# Patient Record
Sex: MALE | Race: WHITE | Employment: OTHER | URBAN - NONMETROPOLITAN AREA
[De-identification: names, ages, dates, MRNs, and addresses within clinical notes are randomized per-mention and may not be internally consistent; named-entity substitution may affect disease eponyms.]

---

## 2018-08-12 ENCOUNTER — DIRECT ADMIT ORDERS (OUTPATIENT)
Dept: PRIMARY CARE CLINIC | Age: 68
End: 2018-08-12

## 2018-08-12 ENCOUNTER — HOSPITAL ENCOUNTER (INPATIENT)
Age: 68
LOS: 1 days | Discharge: HOME | DRG: 379 | End: 2018-08-13
Attending: FAMILY MEDICINE | Admitting: FAMILY MEDICINE
Payer: MEDICARE

## 2018-08-12 ENCOUNTER — HOSPITAL ENCOUNTER (OUTPATIENT)
Age: 68
Setting detail: SPECIMEN
Discharge: HOME | DRG: 379 | End: 2018-08-12
Payer: MEDICARE

## 2018-08-12 ENCOUNTER — HOSPITAL ENCOUNTER (OUTPATIENT)
Age: 68
Discharge: HOME | DRG: 379 | End: 2018-08-14
Payer: MEDICARE

## 2018-08-12 ENCOUNTER — APPOINTMENT (OUTPATIENT)
Dept: CT IMAGING | Age: 68
DRG: 379 | End: 2018-08-12
Attending: FAMILY MEDICINE
Payer: MEDICARE

## 2018-08-12 ENCOUNTER — OFFICE VISIT (OUTPATIENT)
Dept: PRIMARY CARE CLINIC | Age: 68
End: 2018-08-12
Payer: MEDICARE

## 2018-08-12 VITALS
WEIGHT: 197 LBS | SYSTOLIC BLOOD PRESSURE: 118 MMHG | HEIGHT: 68 IN | DIASTOLIC BLOOD PRESSURE: 66 MMHG | HEART RATE: 57 BPM | TEMPERATURE: 97.5 F | OXYGEN SATURATION: 97 % | BODY MASS INDEX: 29.86 KG/M2

## 2018-08-12 DIAGNOSIS — R10.9 ABDOMINAL CRAMPING: ICD-10-CM

## 2018-08-12 DIAGNOSIS — R10.9 ABDOMINAL CRAMPING: Primary | ICD-10-CM

## 2018-08-12 DIAGNOSIS — R19.5 HEMATEST POSITIVE STOOLS: ICD-10-CM

## 2018-08-12 DIAGNOSIS — I25.10 CORONARY ARTERY DISEASE WITHOUT ANGINA PECTORIS, UNSPECIFIED VESSEL OR LESION TYPE, UNSPECIFIED WHETHER NATIVE OR TRANSPLANTED HEART: ICD-10-CM

## 2018-08-12 DIAGNOSIS — K92.2 GASTROINTESTINAL HEMORRHAGE, UNSPECIFIED GASTROINTESTINAL HEMORRHAGE TYPE: ICD-10-CM

## 2018-08-12 LAB
ABSOLUTE EOS #: 0.1 K/UL (ref 0–0.4)
ABSOLUTE IMMATURE GRANULOCYTE: ABNORMAL K/UL (ref 0–0.3)
ABSOLUTE LYMPH #: 1.5 K/UL (ref 1–4.8)
ABSOLUTE MONO #: 0.5 K/UL (ref 0.1–1.2)
ALBUMIN SERPL-MCNC: 3.9 G/DL (ref 3.5–5.2)
ALBUMIN/GLOBULIN RATIO: 1 (ref 1–2.5)
ALP BLD-CCNC: 72 U/L (ref 40–129)
ALT SERPL-CCNC: 52 U/L (ref 5–41)
AMYLASE: 66 U/L (ref 28–100)
ANION GAP SERPL CALCULATED.3IONS-SCNC: 12 MMOL/L (ref 9–17)
AST SERPL-CCNC: 48 U/L
BASOPHILS # BLD: 1 % (ref 0–2)
BASOPHILS ABSOLUTE: 0 K/UL (ref 0–0.2)
BILIRUB SERPL-MCNC: 0.65 MG/DL (ref 0.3–1.2)
BUN BLDV-MCNC: 26 MG/DL (ref 8–23)
BUN/CREAT BLD: 27 (ref 9–20)
CALCIUM SERPL-MCNC: 9.1 MG/DL (ref 8.6–10.4)
CHLORIDE BLD-SCNC: 101 MMOL/L (ref 98–107)
CO2: 25 MMOL/L (ref 20–31)
CREAT SERPL-MCNC: 0.98 MG/DL (ref 0.7–1.2)
DIFFERENTIAL TYPE: ABNORMAL
EOSINOPHILS RELATIVE PERCENT: 2 % (ref 1–8)
GFR AFRICAN AMERICAN: >60 ML/MIN
GFR NON-AFRICAN AMERICAN: >60 ML/MIN
GFR SERPL CREATININE-BSD FRML MDRD: ABNORMAL ML/MIN/{1.73_M2}
GFR SERPL CREATININE-BSD FRML MDRD: ABNORMAL ML/MIN/{1.73_M2}
GLUCOSE BLD-MCNC: 135 MG/DL (ref 70–99)
HCT VFR BLD CALC: 36.7 % (ref 41–53)
HEMOGLOBIN: 12.9 G/DL (ref 13.5–17.5)
IMMATURE GRANULOCYTES: ABNORMAL %
LIPASE: 51 U/L (ref 13–60)
LYMPHOCYTES # BLD: 35 % (ref 15–43)
MCH RBC QN AUTO: 34.4 PG (ref 26–34)
MCHC RBC AUTO-ENTMCNC: 35.2 G/DL (ref 31–37)
MCV RBC AUTO: 97.7 FL (ref 80–100)
MONOCYTES # BLD: 11 % (ref 6–14)
NRBC AUTOMATED: ABNORMAL PER 100 WBC
PDW BLD-RTO: 12.3 % (ref 11–14.5)
PLATELET # BLD: 169 K/UL (ref 140–450)
PLATELET ESTIMATE: ABNORMAL
PMV BLD AUTO: 9.7 FL (ref 6–12)
POTASSIUM SERPL-SCNC: 4 MMOL/L (ref 3.7–5.3)
RBC # BLD: 3.75 M/UL (ref 4.5–5.9)
RBC # BLD: ABNORMAL 10*6/UL
SEG NEUTROPHILS: 51 % (ref 44–74)
SEGMENTED NEUTROPHILS ABSOLUTE COUNT: 2.3 K/UL (ref 1.8–7.7)
SODIUM BLD-SCNC: 138 MMOL/L (ref 135–144)
TOTAL PROTEIN: 7.8 G/DL (ref 6.4–8.3)
WBC # BLD: 4.4 K/UL (ref 3.5–11)
WBC # BLD: ABNORMAL 10*3/UL

## 2018-08-12 PROCEDURE — 80053 COMPREHEN METABOLIC PANEL: CPT

## 2018-08-12 PROCEDURE — G8427 DOCREV CUR MEDS BY ELIG CLIN: HCPCS | Performed by: PHYSICIAN ASSISTANT

## 2018-08-12 PROCEDURE — 6360000004 HC RX CONTRAST MEDICATION: Performed by: FAMILY MEDICINE

## 2018-08-12 PROCEDURE — 85025 COMPLETE CBC W/AUTO DIFF WBC: CPT

## 2018-08-12 PROCEDURE — 36415 COLL VENOUS BLD VENIPUNCTURE: CPT

## 2018-08-12 PROCEDURE — G8419 CALC BMI OUT NRM PARAM NOF/U: HCPCS | Performed by: PHYSICIAN ASSISTANT

## 2018-08-12 PROCEDURE — 2580000003 HC RX 258: Performed by: PHYSICIAN ASSISTANT

## 2018-08-12 PROCEDURE — 1123F ACP DISCUSS/DSCN MKR DOCD: CPT | Performed by: PHYSICIAN ASSISTANT

## 2018-08-12 PROCEDURE — 2580000003 HC RX 258: Performed by: NURSE PRACTITIONER

## 2018-08-12 PROCEDURE — 99214 OFFICE O/P EST MOD 30 MIN: CPT | Performed by: PHYSICIAN ASSISTANT

## 2018-08-12 PROCEDURE — 2709999900 CT ABDOMEN PELVIS W IV CONTRAST

## 2018-08-12 PROCEDURE — 1101F PT FALLS ASSESS-DOCD LE1/YR: CPT | Performed by: PHYSICIAN ASSISTANT

## 2018-08-12 PROCEDURE — 2060000000 HC ICU INTERMEDIATE R&B

## 2018-08-12 PROCEDURE — 82150 ASSAY OF AMYLASE: CPT

## 2018-08-12 PROCEDURE — 83690 ASSAY OF LIPASE: CPT

## 2018-08-12 PROCEDURE — 81001 URINALYSIS AUTO W/SCOPE: CPT

## 2018-08-12 PROCEDURE — 4040F PNEUMOC VAC/ADMIN/RCVD: CPT | Performed by: PHYSICIAN ASSISTANT

## 2018-08-12 PROCEDURE — G8598 ASA/ANTIPLAT THER USED: HCPCS | Performed by: PHYSICIAN ASSISTANT

## 2018-08-12 PROCEDURE — 85018 HEMOGLOBIN: CPT

## 2018-08-12 PROCEDURE — 94760 N-INVAS EAR/PLS OXIMETRY 1: CPT

## 2018-08-12 PROCEDURE — 6370000000 HC RX 637 (ALT 250 FOR IP): Performed by: NURSE PRACTITIONER

## 2018-08-12 PROCEDURE — 1036F TOBACCO NON-USER: CPT | Performed by: PHYSICIAN ASSISTANT

## 2018-08-12 PROCEDURE — 3017F COLORECTAL CA SCREEN DOC REV: CPT | Performed by: PHYSICIAN ASSISTANT

## 2018-08-12 PROCEDURE — 85014 HEMATOCRIT: CPT

## 2018-08-12 RX ORDER — SODIUM CHLORIDE 9 MG/ML
INJECTION, SOLUTION INTRAVENOUS CONTINUOUS
Status: DISCONTINUED | OUTPATIENT
Start: 2018-08-12 | End: 2018-08-13 | Stop reason: HOSPADM

## 2018-08-12 RX ORDER — ISOSORBIDE MONONITRATE 30 MG/1
30 TABLET, EXTENDED RELEASE ORAL DAILY
Status: DISCONTINUED | OUTPATIENT
Start: 2018-08-13 | End: 2018-08-13 | Stop reason: HOSPADM

## 2018-08-12 RX ORDER — METOPROLOL TARTRATE 50 MG/1
50 TABLET, FILM COATED ORAL 2 TIMES DAILY
Status: DISCONTINUED | OUTPATIENT
Start: 2018-08-12 | End: 2018-08-13 | Stop reason: HOSPADM

## 2018-08-12 RX ORDER — FAMOTIDINE 20 MG/1
20 TABLET, FILM COATED ORAL NIGHTLY
COMMUNITY
Start: 2018-07-22 | End: 2019-01-25 | Stop reason: SDUPTHER

## 2018-08-12 RX ORDER — SODIUM CHLORIDE 0.9 % (FLUSH) 0.9 %
10 SYRINGE (ML) INJECTION EVERY 12 HOURS SCHEDULED
Status: DISCONTINUED | OUTPATIENT
Start: 2018-08-12 | End: 2018-08-13 | Stop reason: HOSPADM

## 2018-08-12 RX ORDER — LISINOPRIL AND HYDROCHLOROTHIAZIDE 25; 20 MG/1; MG/1
1 TABLET ORAL DAILY
Status: DISCONTINUED | OUTPATIENT
Start: 2018-08-13 | End: 2018-08-12

## 2018-08-12 RX ORDER — SODIUM CHLORIDE 0.9 % (FLUSH) 0.9 %
10 SYRINGE (ML) INJECTION PRN
Status: CANCELLED | OUTPATIENT
Start: 2018-08-12 | End: 2019-08-12

## 2018-08-12 RX ORDER — CYCLOBENZAPRINE HCL 10 MG
10 TABLET ORAL 2 TIMES DAILY PRN
Status: DISCONTINUED | OUTPATIENT
Start: 2018-08-12 | End: 2018-08-13 | Stop reason: HOSPADM

## 2018-08-12 RX ORDER — LISINOPRIL 20 MG/1
20 TABLET ORAL DAILY
Status: DISCONTINUED | OUTPATIENT
Start: 2018-08-12 | End: 2018-08-13 | Stop reason: HOSPADM

## 2018-08-12 RX ORDER — AMLODIPINE BESYLATE 2.5 MG/1
2.5 TABLET ORAL DAILY
COMMUNITY
Start: 2018-08-08 | End: 2018-09-21

## 2018-08-12 RX ORDER — CYCLOBENZAPRINE HCL 10 MG
10 TABLET ORAL NIGHTLY
COMMUNITY
Start: 2018-07-22 | End: 2018-11-12 | Stop reason: SDUPTHER

## 2018-08-12 RX ORDER — PANTOPRAZOLE SODIUM 40 MG/1
40 TABLET, DELAYED RELEASE ORAL
Status: DISCONTINUED | OUTPATIENT
Start: 2018-08-12 | End: 2018-08-13 | Stop reason: HOSPADM

## 2018-08-12 RX ORDER — SODIUM CHLORIDE 0.9 % (FLUSH) 0.9 %
10 SYRINGE (ML) INJECTION PRN
Status: DISCONTINUED | OUTPATIENT
Start: 2018-08-12 | End: 2018-08-13 | Stop reason: HOSPADM

## 2018-08-12 RX ORDER — LISINOPRIL AND HYDROCHLOROTHIAZIDE 25; 20 MG/1; MG/1
1 TABLET ORAL DAILY
COMMUNITY
Start: 2018-06-11 | End: 2019-10-17 | Stop reason: SDUPTHER

## 2018-08-12 RX ORDER — ONDANSETRON 2 MG/ML
4 INJECTION INTRAMUSCULAR; INTRAVENOUS EVERY 6 HOURS PRN
Status: DISCONTINUED | OUTPATIENT
Start: 2018-08-12 | End: 2018-08-12 | Stop reason: SDUPTHER

## 2018-08-12 RX ORDER — HYDROCHLOROTHIAZIDE 25 MG/1
25 TABLET ORAL DAILY
Status: DISCONTINUED | OUTPATIENT
Start: 2018-08-12 | End: 2018-08-13 | Stop reason: HOSPADM

## 2018-08-12 RX ORDER — GEMFIBROZIL 600 MG/1
600 TABLET, FILM COATED ORAL DAILY
COMMUNITY
Start: 2018-06-17 | End: 2018-11-28 | Stop reason: SDUPTHER

## 2018-08-12 RX ORDER — FAMOTIDINE 20 MG/1
20 TABLET, FILM COATED ORAL NIGHTLY
Status: DISCONTINUED | OUTPATIENT
Start: 2018-08-12 | End: 2018-08-12

## 2018-08-12 RX ORDER — ONDANSETRON 2 MG/ML
4 INJECTION INTRAMUSCULAR; INTRAVENOUS EVERY 6 HOURS PRN
Status: DISCONTINUED | OUTPATIENT
Start: 2018-08-12 | End: 2018-08-13 | Stop reason: HOSPADM

## 2018-08-12 RX ORDER — ONDANSETRON 2 MG/ML
4 INJECTION INTRAMUSCULAR; INTRAVENOUS EVERY 6 HOURS PRN
Status: CANCELLED | OUTPATIENT
Start: 2018-08-12 | End: 2019-08-12

## 2018-08-12 RX ORDER — BISOPROLOL FUMARATE 10 MG/1
10 TABLET ORAL DAILY
COMMUNITY
Start: 2018-06-09 | End: 2019-01-25 | Stop reason: SDUPTHER

## 2018-08-12 RX ORDER — ISOSORBIDE MONONITRATE 30 MG/1
30 TABLET, EXTENDED RELEASE ORAL DAILY
COMMUNITY
Start: 2018-07-22 | End: 2018-11-28 | Stop reason: SDUPTHER

## 2018-08-12 RX ORDER — VITAMIN B COMPLEX
1 CAPSULE ORAL DAILY
COMMUNITY

## 2018-08-12 RX ORDER — SODIUM CHLORIDE 0.9 % (FLUSH) 0.9 %
10 SYRINGE (ML) INJECTION PRN
Status: DISCONTINUED | OUTPATIENT
Start: 2018-08-12 | End: 2018-08-12 | Stop reason: SDUPTHER

## 2018-08-12 RX ORDER — SODIUM CHLORIDE 0.9 % (FLUSH) 0.9 %
10 SYRINGE (ML) INJECTION EVERY 12 HOURS SCHEDULED
Status: CANCELLED | OUTPATIENT
Start: 2018-08-12 | End: 2019-08-12

## 2018-08-12 RX ORDER — AMLODIPINE BESYLATE 2.5 MG/1
2.5 TABLET ORAL DAILY
Status: DISCONTINUED | OUTPATIENT
Start: 2018-08-13 | End: 2018-08-13 | Stop reason: HOSPADM

## 2018-08-12 RX ORDER — SODIUM CHLORIDE 0.9 % (FLUSH) 0.9 %
10 SYRINGE (ML) INJECTION EVERY 12 HOURS SCHEDULED
Status: DISCONTINUED | OUTPATIENT
Start: 2018-08-12 | End: 2018-08-12

## 2018-08-12 RX ORDER — GEMFIBROZIL 600 MG/1
600 TABLET, FILM COATED ORAL DAILY
Status: DISCONTINUED | OUTPATIENT
Start: 2018-08-13 | End: 2018-08-13 | Stop reason: HOSPADM

## 2018-08-12 RX ORDER — PRAVASTATIN SODIUM 10 MG
10 TABLET ORAL DAILY
COMMUNITY
End: 2019-07-29 | Stop reason: SDUPTHER

## 2018-08-12 RX ORDER — CHOLECALCIFEROL (VITAMIN D3) 25 MCG
2500 TABLET,CHEWABLE ORAL DAILY
COMMUNITY

## 2018-08-12 RX ORDER — PRAVASTATIN SODIUM 20 MG
10 TABLET ORAL NIGHTLY
Status: DISCONTINUED | OUTPATIENT
Start: 2018-08-12 | End: 2018-08-13 | Stop reason: HOSPADM

## 2018-08-12 RX ADMIN — PRAVASTATIN SODIUM 10 MG: 20 TABLET ORAL at 21:22

## 2018-08-12 RX ADMIN — SODIUM CHLORIDE, PRESERVATIVE FREE 10 ML: 5 INJECTION INTRAVENOUS at 21:24

## 2018-08-12 RX ADMIN — Medication 10 ML: at 19:56

## 2018-08-12 RX ADMIN — FAMOTIDINE 20 MG: 20 TABLET ORAL at 21:21

## 2018-08-12 RX ADMIN — METOPROLOL TARTRATE 50 MG: 50 TABLET ORAL at 21:21

## 2018-08-12 RX ADMIN — LISINOPRIL 20 MG: 20 TABLET ORAL at 21:21

## 2018-08-12 RX ADMIN — IOPAMIDOL 100 ML: 755 INJECTION, SOLUTION INTRAVENOUS at 19:01

## 2018-08-12 RX ADMIN — IOHEXOL 50 ML: 240 INJECTION, SOLUTION INTRATHECAL; INTRAVASCULAR; INTRAVENOUS; ORAL at 19:01

## 2018-08-12 RX ADMIN — HYDROCHLOROTHIAZIDE 25 MG: 25 TABLET ORAL at 21:21

## 2018-08-12 RX ADMIN — SODIUM CHLORIDE: 9 INJECTION, SOLUTION INTRAVENOUS at 21:21

## 2018-08-12 ASSESSMENT — PAIN SCALES - GENERAL
PAINLEVEL_OUTOF10: 0
PAINLEVEL_OUTOF10: 2

## 2018-08-12 ASSESSMENT — ENCOUNTER SYMPTOMS
HEMATEMESIS: 0
VOMITING: 0
ABDOMINAL PAIN: 1
BLOOD IN STOOL: 1
RESPIRATORY NEGATIVE: 1
HEMATOCHEZIA: 1
NAUSEA: 0
CONSTIPATION: 0

## 2018-08-13 VITALS
HEART RATE: 53 BPM | OXYGEN SATURATION: 97 % | RESPIRATION RATE: 16 BRPM | DIASTOLIC BLOOD PRESSURE: 72 MMHG | HEIGHT: 68 IN | BODY MASS INDEX: 30.36 KG/M2 | SYSTOLIC BLOOD PRESSURE: 134 MMHG | TEMPERATURE: 97.4 F | WEIGHT: 200.3 LBS

## 2018-08-13 LAB
-: ABNORMAL
ABSOLUTE EOS #: 0.1 K/UL (ref 0–0.4)
ABSOLUTE IMMATURE GRANULOCYTE: ABNORMAL K/UL (ref 0–0.3)
ABSOLUTE LYMPH #: 1.4 K/UL (ref 1–4.8)
ABSOLUTE MONO #: 0.4 K/UL (ref 0.1–1.2)
AMORPHOUS: ABNORMAL
ANION GAP SERPL CALCULATED.3IONS-SCNC: 10 MMOL/L (ref 9–17)
BACTERIA: ABNORMAL
BASOPHILS # BLD: 0 % (ref 0–2)
BASOPHILS ABSOLUTE: 0 K/UL (ref 0–0.2)
BILIRUBIN URINE: NEGATIVE
BUN BLDV-MCNC: 20 MG/DL (ref 8–23)
BUN/CREAT BLD: 19 (ref 9–20)
CALCIUM SERPL-MCNC: 8.7 MG/DL (ref 8.6–10.4)
CASTS UA: ABNORMAL /LPF (ref 0–2)
CHLORIDE BLD-SCNC: 102 MMOL/L (ref 98–107)
CO2: 27 MMOL/L (ref 20–31)
COLOR: ABNORMAL
COMMENT UA: ABNORMAL
CREAT SERPL-MCNC: 1.04 MG/DL (ref 0.7–1.2)
CRYSTALS, UA: ABNORMAL /HPF
DATE, STOOL #1: ABNORMAL
DATE, STOOL #2: ABNORMAL
DATE, STOOL #3: ABNORMAL
DIFFERENTIAL TYPE: ABNORMAL
EOSINOPHILS RELATIVE PERCENT: 2 % (ref 1–8)
EPITHELIAL CELLS UA: ABNORMAL /HPF (ref 0–5)
GFR AFRICAN AMERICAN: >60 ML/MIN
GFR NON-AFRICAN AMERICAN: >60 ML/MIN
GFR SERPL CREATININE-BSD FRML MDRD: ABNORMAL ML/MIN/{1.73_M2}
GFR SERPL CREATININE-BSD FRML MDRD: ABNORMAL ML/MIN/{1.73_M2}
GLUCOSE BLD-MCNC: 130 MG/DL (ref 70–99)
GLUCOSE URINE: NEGATIVE
HCT VFR BLD CALC: 33.3 % (ref 41–53)
HCT VFR BLD CALC: 35.1 % (ref 41–53)
HCT VFR BLD CALC: 36.9 % (ref 41–53)
HEMOCCULT SP1 STL QL: POSITIVE
HEMOCCULT SP2 STL QL: ABNORMAL
HEMOCCULT SP3 STL QL: ABNORMAL
HEMOGLOBIN: 11.8 G/DL (ref 13.5–17.5)
HEMOGLOBIN: 12.3 G/DL (ref 13.5–17.5)
HEMOGLOBIN: 12.8 G/DL (ref 13.5–17.5)
IMMATURE GRANULOCYTES: ABNORMAL %
KETONES, URINE: NEGATIVE
LEUKOCYTE ESTERASE, URINE: NEGATIVE
LYMPHOCYTES # BLD: 32 % (ref 15–43)
MCH RBC QN AUTO: 34.1 PG (ref 26–34)
MCHC RBC AUTO-ENTMCNC: 34.9 G/DL (ref 31–37)
MCV RBC AUTO: 97.7 FL (ref 80–100)
MONOCYTES # BLD: 9 % (ref 6–14)
MUCUS: ABNORMAL
NITRITE, URINE: NEGATIVE
NRBC AUTOMATED: ABNORMAL PER 100 WBC
OTHER OBSERVATIONS UA: ABNORMAL
PDW BLD-RTO: 12.3 % (ref 11–14.5)
PH UA: 5 (ref 5–6)
PLATELET # BLD: 131 K/UL (ref 140–450)
PLATELET ESTIMATE: ABNORMAL
PMV BLD AUTO: 9.9 FL (ref 6–12)
POTASSIUM SERPL-SCNC: 4.3 MMOL/L (ref 3.7–5.3)
PROTEIN UA: NEGATIVE
RBC # BLD: 3.59 M/UL (ref 4.5–5.9)
RBC # BLD: ABNORMAL 10*6/UL
RBC UA: ABNORMAL /HPF (ref 0–4)
RENAL EPITHELIAL, UA: ABNORMAL /HPF
SEG NEUTROPHILS: 57 % (ref 44–74)
SEGMENTED NEUTROPHILS ABSOLUTE COUNT: 2.5 K/UL (ref 1.8–7.7)
SODIUM BLD-SCNC: 139 MMOL/L (ref 135–144)
SPECIFIC GRAVITY UA: 1 (ref 1.01–1.02)
TIME, STOOL #1: 1300
TIME, STOOL #2: ABNORMAL
TIME, STOOL #3: ABNORMAL
TRICHOMONAS: ABNORMAL
TURBIDITY: ABNORMAL
URINE HGB: NEGATIVE
UROBILINOGEN, URINE: NORMAL
WBC # BLD: 4.4 K/UL (ref 3.5–11)
WBC # BLD: ABNORMAL 10*3/UL
WBC UA: ABNORMAL /HPF (ref 0–4)
YEAST: ABNORMAL

## 2018-08-13 PROCEDURE — 6370000000 HC RX 637 (ALT 250 FOR IP): Performed by: NURSE PRACTITIONER

## 2018-08-13 PROCEDURE — 99221 1ST HOSP IP/OBS SF/LOW 40: CPT | Performed by: SURGERY

## 2018-08-13 PROCEDURE — 99238 HOSP IP/OBS DSCHRG MGMT 30/<: CPT | Performed by: INTERNAL MEDICINE

## 2018-08-13 PROCEDURE — 36415 COLL VENOUS BLD VENIPUNCTURE: CPT

## 2018-08-13 PROCEDURE — 94761 N-INVAS EAR/PLS OXIMETRY MLT: CPT

## 2018-08-13 PROCEDURE — 80048 BASIC METABOLIC PNL TOTAL CA: CPT

## 2018-08-13 PROCEDURE — 2580000003 HC RX 258: Performed by: NURSE PRACTITIONER

## 2018-08-13 PROCEDURE — 85025 COMPLETE CBC W/AUTO DIFF WBC: CPT

## 2018-08-13 PROCEDURE — 85018 HEMOGLOBIN: CPT

## 2018-08-13 PROCEDURE — 85014 HEMATOCRIT: CPT

## 2018-08-13 PROCEDURE — 82274 ASSAY TEST FOR BLOOD FECAL: CPT

## 2018-08-13 RX ADMIN — GEMFIBROZIL 600 MG: 600 TABLET ORAL at 09:11

## 2018-08-13 RX ADMIN — METOPROLOL TARTRATE 50 MG: 50 TABLET ORAL at 09:06

## 2018-08-13 RX ADMIN — SODIUM CHLORIDE: 9 INJECTION, SOLUTION INTRAVENOUS at 08:42

## 2018-08-13 RX ADMIN — ISOSORBIDE MONONITRATE 30 MG: 30 TABLET, EXTENDED RELEASE ORAL at 09:06

## 2018-08-13 RX ADMIN — AMLODIPINE BESYLATE 2.5 MG: 2.5 TABLET ORAL at 09:11

## 2018-08-13 RX ADMIN — PANTOPRAZOLE SODIUM 40 MG: 40 TABLET, DELAYED RELEASE ORAL at 05:49

## 2018-08-13 ASSESSMENT — PAIN SCALES - GENERAL
PAINLEVEL_OUTOF10: 0

## 2018-08-13 NOTE — PLAN OF CARE
Problem: Discharge Planning:  Goal: Discharged to appropriate level of care  Discharged to appropriate level of care   Outcome: Ongoing      Problem:  Bowel Function - Altered:  Goal: Bowel elimination is within specified parameters  Bowel elimination is within specified parameters   Outcome: Ongoing      Problem: Fluid Volume - Imbalance:  Goal: Will show no signs and symptoms of excessive bleeding  Will show no signs and symptoms of excessive bleeding   Outcome: Ongoing    Goal: Absence of imbalanced fluid volume signs and symptoms  Absence of imbalanced fluid volume signs and symptoms   Outcome: Ongoing      Problem: Nausea/Vomiting:  Goal: Absence of nausea/vomiting  Absence of nausea/vomiting   Outcome: Ongoing    Goal: Able to drink  Able to drink   Outcome: Ongoing    Goal: Able to eat  Able to eat   Outcome: Ongoing    Goal: Ability to achieve adequate nutritional intake will improve  Ability to achieve adequate nutritional intake will improve   Outcome: Ongoing      Problem: Pain:  Goal: Pain level will decrease  Pain level will decrease   Outcome: Ongoing    Goal: Control of acute pain  Control of acute pain   Outcome: Ongoing    Goal: Control of chronic pain  Control of chronic pain   Outcome: Ongoing

## 2018-08-13 NOTE — PROGRESS NOTES
Hospitalist Progress Note    Patient:  Kiera Gunter     YOB: 1950    MRN: [id number]   Admit date: 8/12/2018     Acct: [de-identified]     PCP: No primary care provider on file.     CC--Interval History: GI bleed---hemoglobin stable---no abdominal pain----seen by General Surgery---home----8.13.2018--outpatient colonoscopy---holding aspirin    HTN--see meds below    Hyperlipidemia---on statin and fibrate    Thrombocytopenia     Hearing impairment    See note below     All other ROS negative except noted in HPI    Diet:  Diet NPO Time Specified Exceptions are: Sips with Meds    Medications:  Scheduled Meds:   sodium chloride flush  10 mL Intravenous 2 times per day    metoprolol tartrate  50 mg Oral BID    amLODIPine  2.5 mg Oral Daily    gemfibrozil  600 mg Oral Daily    isosorbide mononitrate  30 mg Oral Daily    pravastatin  10 mg Oral Nightly    lisinopril  20 mg Oral Daily    And    hydrochlorothiazide  25 mg Oral Daily    pantoprazole  40 mg Oral QAM AC     Continuous Infusions:   sodium chloride 75 mL/hr at 08/13/18 0842     PRN Meds:magnesium hydroxide, ondansetron, sodium chloride flush, cyclobenzaprine    Objective:  Labs:  CBC with Differential:    Lab Results   Component Value Date    WBC 4.4 08/13/2018    RBC 3.59 08/13/2018    HGB 12.8 08/13/2018    HCT 36.9 08/13/2018     08/13/2018    MCV 97.7 08/13/2018    MCH 34.1 08/13/2018    MCHC 34.9 08/13/2018    RDW 12.3 08/13/2018    LYMPHOPCT 32 08/13/2018    MONOPCT 9 08/13/2018    BASOPCT 0 08/13/2018    MONOSABS 0.40 08/13/2018    LYMPHSABS 1.40 08/13/2018    EOSABS 0.10 08/13/2018    BASOSABS 0.00 08/13/2018    DIFFTYPE NOT REPORTED 08/13/2018     BMP:    Lab Results   Component Value Date     08/13/2018    K 4.3 08/13/2018     08/13/2018    CO2 27 08/13/2018    BUN 20 08/13/2018    LABALBU 3.9 08/12/2018    CREATININE 1.04 08/13/2018    CALCIUM 8.7 08/13/2018    GFRAA >60 08/13/2018    LABGLOM >60 08/13/2018 GLUCOSE 130 08/13/2018           Physical Exam:  Vitals: BP (!) 148/70   Pulse 58   Temp 98 °F (36.7 °C) (Tympanic)   Resp 16   Ht 5' 8\" (1.727 m)   Wt 200 lb 4.8 oz (90.9 kg)   SpO2 97%   BMI 30.46 kg/m²   24 hour intake/output:  Intake/Output Summary (Last 24 hours) at 08/13/18 1315  Last data filed at 08/13/18 0550   Gross per 24 hour   Intake             1235 ml   Output                0 ml   Net             1235 ml     Last 3 weights: Wt Readings from Last 3 Encounters:   08/12/18 200 lb 4.8 oz (90.9 kg)   08/12/18 197 lb (89.4 kg)     HEENT: Normocephalic and Atraumatic  Neck: Supple, No Masses, Tenderness, Nodularity and No Lymphadenopathy  Chest/Lungs: Clear to Auscultation without Rales, Rhonchi, or Wheezes  Cardiac: Regular Rate and Rhythm  GI/Abdomen:  Bowel Sounds Present and Soft, Non-tender, without Guarding or Rebound Tenderness  : Not examined  EXT/Skin: No Edema, No Cyanosis and No Clubbing  Neuro: hearing impairment-----, Alert and Oriented and No Localizing Signs/Symptoms      Assessment:  NASH GALAN md         dc  world call  70  WM  DALY Morales;  thalia [manjit] Bakos, PM---[place]; Eryn Silvius, GS]  FULL CODE    NO ANTICOAGULATION---GI BLEED    GI bleed---8.12.2018        CT abdomen-pelvis---8.12.2018---no acute changes---                 normal appendix--chronic prostatitis calcifications---                 diverticulosis--normal GB----fatty liver   Hypertension  Hyperlipidemia     ASCVD         Angina at rest---history         Cardiac catheterization x 2  DM Type 2----dietary control  Thrombocytopenia   Tobacco abuse--quit---1.1.1997  HEARING IMPAIRMENT   PMH:    skin carcinoma, OA---knees, constipation   PSH:   . hernia repair, colonoscopy,                cosmetic--forehead--mouth---1987    Allergies:        PCN---rash,  Dtap-ipv--rash  Intolerances:  codeine---nausea-vomiting       Active Problems:    GI bleed  Resolved Problems:    * No resolved hospital

## 2018-08-13 NOTE — CONSULTS
General Surgery   Consultation        PATIENT NAME: Piyush Cabrera OF BIRTH: 1950    ADMISSION DATE: 8/12/2018  6:03 PM     Admitting Provider: Eduard Hair Physician: Rochelle Schwartz     TODAY'S DATE: 8/13/2018    Consult Regarding:  Abdominal pain, rectal bleeding    HISTORY OF PRESENT ILLNESS:  The patient is a 70 y.o. male  who presented last evening with complaint of right sided abdominal pain as well as bright red blood per rectum. Patient states prior to presentation to the ER he had been having right lower quadrant abdominal pain for approximately 1 day and just prior to admission had a bowel movement that was loose with large amount of bright red blood. Patient denies any prior episodes of rectal bleeding. He does report approximately 2 weeks ago he was seen at [facility] ER for complaint of numbness in his right upper and lower extremity as well as in his right flank. Per patient workup at [facility] revealed nerve impingement and disc disease. He was recommended to follow up with his pain management doctor. Per report he is having no pain at that time. On his evaluation in the ER last evening hemoglobin was noted to be slightly low at 11.8. CT scan was performed which showed no acute abdominal abnormalities. However due to patient's symptoms of GI bleed and pain he was admitted for further evaluation. Patient denies any nausea/emesis since onset of symptoms and denies fevers/chills. Patient does report his most recent colonoscopy was approximately 3 years ago done at [facility].  Per his report there were no abnormalities found and he was told to follow-up in 10 years for repeat colonoscopy. Denies any family history of inflammatory bowel conditions or colon cancer. Gen. surgery is consulted for evaluation of abdominal pain and blood per rectum. Since admission patient states his abdominal pain is completely resolved.   He does mention that he wonders

## 2018-08-14 NOTE — DISCHARGE SUMMARY
Imdur,  hydrochlorothiazide/lisinopril combination. Hyperlipidemia, for which he is on Pravachol together with fenofibrate. The patient has had osteoarthritis together with muscle spasm and seen by  pain management, Dr. Adelina Wilkinson at [facility]. Prior history of angina at rest.  Has had at least 2 cardiac caths. The  patient is uncertain as to the results, has never had a stent placed or  surgeries related to the same. The patient noted to have thrombocytopenia this hospitalization with  platelet count of 239,667. The patient received no anticoagulation this  hospitalization due to the patient's GI bleeding. The patient was on  aspirin previously and this has been held pending the patient's upcoming  colonoscopy. The patient is stable, discharged on 08/13/2018. LABORATORY DATA:  Around the time of discharge, white cell count 4.4,  hemoglobin 12.3, hematocrit 35.1, and platelets 430,374 down from 169,000. Sodium 139, potassium 4.3, chloride 102, CO2 27, BUN 20, creatinine 1.0,  glucose 130, calcium 8.7, and GFR greater than 60. DISCHARGE INSTRUCTIONS/FOLLOWUP:  Discharged to home on 08/13/2018. DIET:  Cardiac, diabetic. ACTIVITY:  As tolerated. MEDICATIONS CONTINUED, NO CHANGE:  Amlodipine (Norvasc) 2.5 mg p.o. daily;  B complex vitamins 1 p.o. daily. B12 1200 mcg p.o. daily. Bisoprolol  (Zebeta) 10 mg p.o. daily. Flexeril (cyclobenzaprine) 10 mg p.o. nightly. Vitamin D3 1000 units p.o. daily. Pepcid (famotidine) 20 mg p.o. nightly. Gemfibrozil (Lopid) 600 mg p.o. daily. Isosorbide mononitrate (Imdur) 30  mg p.o. daily. Lisinopril/HCTZ 20/25 one p.o. daily. Pravastatin  (Pravachol) 10 mg p.o. daily. MEDICATION STOPPED:  Aspirin 81 mg daily due to GI bleeding. Followup will be scheduled with Dr. Jose Washington, [facility]. Follow up with Dr. Mook Clarke of General Surgery for  colonoscopy.     Any aspect of the patient's care not discussed in the chart

## 2018-08-21 ENCOUNTER — OFFICE VISIT (OUTPATIENT)
Dept: SURGERY | Age: 68
End: 2018-08-21
Payer: MEDICARE

## 2018-08-21 VITALS
TEMPERATURE: 96.8 F | DIASTOLIC BLOOD PRESSURE: 80 MMHG | SYSTOLIC BLOOD PRESSURE: 142 MMHG | BODY MASS INDEX: 30.16 KG/M2 | WEIGHT: 199 LBS | HEIGHT: 68 IN | HEART RATE: 64 BPM

## 2018-08-21 DIAGNOSIS — K92.1 BLOOD IN STOOL: ICD-10-CM

## 2018-08-21 DIAGNOSIS — R10.31 RIGHT LOWER QUADRANT ABDOMINAL PAIN: Primary | ICD-10-CM

## 2018-08-21 PROCEDURE — 99203 OFFICE O/P NEW LOW 30 MIN: CPT | Performed by: SURGERY

## 2018-08-21 NOTE — LETTER
AMBULATORY SURGERY INSTRUCTIONS    - If you should develop a cold, sore throat, cough, fever or other new indication of illness prior to the scheduled surgery, please notify the [address] [place] [address] office as early as possible. - DO NOT EAT OR DRINK ANYTHING AFTER MIDNIGHT THE NIGHT BEFORE YOUR SURGERY. This includes water, tea, juice, cereal, coffee, etc.    - Refrain from smoking the day of your surgery or procedure. - Wear simple loose clothing, which can be easily changed. - Do not wear any make-up, lipstick or nail polish. - Please leave contact lenses at home or bring container for them. - Leave jewelry (including rings) and other valuables at home. - Make arrangements for a family member or friend to drive you to and from the surgery center. The anesthetic may affect your judgement following surgery and driving a vehicle within 24 hours after the anesthesia could be dangerous. Please remember that a responsible adult must remain in the building at all times during your surgery. - Children should be accompanied by two adults; one to watch the child, the other to drive the vehicle home. - Please shower or bathe both on the evening prior to surgery and on the morning of surgery using an antibacterial soap/Hibiclens    - You may be asked to sign several forms prior to surgery; patients under age 25 have a parent or legal guardian sign the permit to operate.    - DO NOT take aspirin, Aleve, Motrin, Ibuprofen, Naproxen, Vitamins or Herbal supplements for 1  weeks or 7  days prior to the day of surgery.    -The morning of your procedure please take blood pressure, heart, and seizure medications with a small sip of water. [facility] on the 1st floor in the hospital, after check in you will then go to the second floor.        [facility] requests that all medications are kept in

## 2018-08-21 NOTE — PROGRESS NOTES
Jono Phillips is a 70 y.o. male who presents today for follow-up from recent hospitalization for abdominal pain and positive fecal occult blood test.  Patient was in the hospital approximately one week ago with acute onset of right lower quadrant pain. Patient had workup in the ER which included CT scan which showed no acute findings and no evidence of appendicitis. However, the patient did have a fecal occult blood test which returned positive. Due to the patient's significant pain he was admitted for observation. Patient had resolution of his pain soon after admission to the floor and his hemoglobin remained stable in the low to mid 12 range. Since being discharged patient states his abdominal pain has been vastly improved but he does still occasionally get right lower quadrant and right flank pain. He does not relate this to any activity, his bowel movements, or eating. He denies any visible blood in the stool since discharge. Patient has had prior colonoscopies with the last one being approximately 4-5 years ago. He is unable to recall the results of that colonoscopy. He is here for follow-up and schedule colonoscopy for further evaluation of occult positive stool and abdominal pain.     Past Medical History:   Diagnosis Date    Angina at rest [facility])     Arthritis     CAD (coronary artery disease)     angina    Hyperlipidemia     Hypertension     Skin cancer        Past Surgical History:   Procedure Laterality Date    CARDIAC CATHETERIZATION      x 2    COLONOSCOPY      COSMETIC SURGERY      forehead and  mouth in 1987    HERNIA REPAIR         Current Outpatient Prescriptions   Medication Sig Dispense Refill    polyethylene glycol (GOLYTELY) 236 g solution Take 4,000 mLs by mouth once for 1 dose 4000 mL 0    bisacodyl (BISACODYL) 5 MG EC tablet Take 1 tablet by mouth See Admin Instructions 2 tablet 0    gemfibrozil (LOPID) 600 MG tablet Take 600 mg by mouth daily       and moist with normal mucous membranes  Neck: neck supple and non tender without mass  Lungs: clear to auscultation without wheezes or rales   Heart: S1S2, no mumurs, RRR  Abdomen: soft, nontender, no HSM, no guarding, no rebound, no masses  Extremity: negative  Neuro: CN II-XII grossly intact      Assessment     3  57-year-old male with recent admission for right lower quadrant and right flank abdominal pain and fecal occult positive stool    Plan     1. We'll plan to proceed with colonoscopy for further evaluation earliest convenient date. Risks, benefits, and alternatives of the procedure explained and written informed consent obtained. 2.  Bowel prep instructions and prescriptions provided and instructions reviewed in detail. 3.  Plan for follow-up after colonoscopy based on findings.     Electronically signed by Nelly Dupree DO on 8/21/2018 at 11:01 AM

## 2018-08-23 ENCOUNTER — OFFICE VISIT (OUTPATIENT)
Dept: FAMILY MEDICINE CLINIC | Age: 68
End: 2018-08-23
Payer: MEDICARE

## 2018-08-23 VITALS
OXYGEN SATURATION: 97 % | BODY MASS INDEX: 29.95 KG/M2 | DIASTOLIC BLOOD PRESSURE: 80 MMHG | HEART RATE: 67 BPM | SYSTOLIC BLOOD PRESSURE: 120 MMHG | WEIGHT: 197 LBS

## 2018-08-23 DIAGNOSIS — K21.9 GASTROESOPHAGEAL REFLUX DISEASE WITHOUT ESOPHAGITIS: ICD-10-CM

## 2018-08-23 DIAGNOSIS — I10 ESSENTIAL HYPERTENSION: ICD-10-CM

## 2018-08-23 DIAGNOSIS — E78.5 HYPERLIPIDEMIA, UNSPECIFIED HYPERLIPIDEMIA TYPE: Primary | ICD-10-CM

## 2018-08-23 PROCEDURE — 1123F ACP DISCUSS/DSCN MKR DOCD: CPT | Performed by: FAMILY MEDICINE

## 2018-08-23 PROCEDURE — 1111F DSCHRG MED/CURRENT MED MERGE: CPT | Performed by: FAMILY MEDICINE

## 2018-08-23 PROCEDURE — 1101F PT FALLS ASSESS-DOCD LE1/YR: CPT | Performed by: FAMILY MEDICINE

## 2018-08-23 PROCEDURE — G8427 DOCREV CUR MEDS BY ELIG CLIN: HCPCS | Performed by: FAMILY MEDICINE

## 2018-08-23 PROCEDURE — 3017F COLORECTAL CA SCREEN DOC REV: CPT | Performed by: FAMILY MEDICINE

## 2018-08-23 PROCEDURE — G8417 CALC BMI ABV UP PARAM F/U: HCPCS | Performed by: FAMILY MEDICINE

## 2018-08-23 PROCEDURE — G8598 ASA/ANTIPLAT THER USED: HCPCS | Performed by: FAMILY MEDICINE

## 2018-08-23 PROCEDURE — 1036F TOBACCO NON-USER: CPT | Performed by: FAMILY MEDICINE

## 2018-08-23 PROCEDURE — 99204 OFFICE O/P NEW MOD 45 MIN: CPT | Performed by: FAMILY MEDICINE

## 2018-08-23 PROCEDURE — 4040F PNEUMOC VAC/ADMIN/RCVD: CPT | Performed by: FAMILY MEDICINE

## 2018-08-23 ASSESSMENT — PATIENT HEALTH QUESTIONNAIRE - PHQ9
1. LITTLE INTEREST OR PLEASURE IN DOING THINGS: 0
SUM OF ALL RESPONSES TO PHQ QUESTIONS 1-9: 0
SUM OF ALL RESPONSES TO PHQ QUESTIONS 1-9: 0
SUM OF ALL RESPONSES TO PHQ9 QUESTIONS 1 & 2: 0
2. FEELING DOWN, DEPRESSED OR HOPELESS: 0

## 2018-08-23 ASSESSMENT — ENCOUNTER SYMPTOMS
RESPIRATORY NEGATIVE: 1
BLOOD IN STOOL: 1
BACK PAIN: 1

## 2018-09-12 ENCOUNTER — HOSPITAL ENCOUNTER (OUTPATIENT)
Age: 68
Setting detail: OUTPATIENT SURGERY
Discharge: HOME | End: 2018-09-12
Attending: SURGERY | Admitting: SURGERY
Payer: MEDICARE

## 2018-09-12 ENCOUNTER — ANESTHESIA (OUTPATIENT)
Dept: OPERATING ROOM | Age: 68
End: 2018-09-12
Payer: MEDICARE

## 2018-09-12 ENCOUNTER — ANESTHESIA EVENT (OUTPATIENT)
Dept: OPERATING ROOM | Age: 68
End: 2018-09-12
Payer: MEDICARE

## 2018-09-12 VITALS — DIASTOLIC BLOOD PRESSURE: 58 MMHG | SYSTOLIC BLOOD PRESSURE: 124 MMHG | OXYGEN SATURATION: 99 %

## 2018-09-12 VITALS
DIASTOLIC BLOOD PRESSURE: 81 MMHG | RESPIRATION RATE: 16 BRPM | HEART RATE: 60 BPM | HEIGHT: 68 IN | WEIGHT: 190 LBS | OXYGEN SATURATION: 97 % | BODY MASS INDEX: 28.79 KG/M2 | SYSTOLIC BLOOD PRESSURE: 184 MMHG | TEMPERATURE: 98 F

## 2018-09-12 PROCEDURE — 88305 TISSUE EXAM BY PATHOLOGIST: CPT

## 2018-09-12 PROCEDURE — 00811 ANES LWR INTST NDSC NOS: CPT | Performed by: NURSE ANESTHETIST, CERTIFIED REGISTERED

## 2018-09-12 PROCEDURE — 3700000000 HC ANESTHESIA ATTENDED CARE: Performed by: SURGERY

## 2018-09-12 PROCEDURE — 2500000003 HC RX 250 WO HCPCS: Performed by: NURSE ANESTHETIST, CERTIFIED REGISTERED

## 2018-09-12 PROCEDURE — 7100000011 HC PHASE II RECOVERY - ADDTL 15 MIN: Performed by: SURGERY

## 2018-09-12 PROCEDURE — 7100000010 HC PHASE II RECOVERY - FIRST 15 MIN: Performed by: SURGERY

## 2018-09-12 PROCEDURE — 45380 COLONOSCOPY AND BIOPSY: CPT | Performed by: SURGERY

## 2018-09-12 PROCEDURE — 3700000001 HC ADD 15 MINUTES (ANESTHESIA): Performed by: SURGERY

## 2018-09-12 PROCEDURE — 6360000002 HC RX W HCPCS: Performed by: NURSE ANESTHETIST, CERTIFIED REGISTERED

## 2018-09-12 PROCEDURE — 2709999900 HC NON-CHARGEABLE SUPPLY: Performed by: SURGERY

## 2018-09-12 PROCEDURE — 3609010300 HC COLONOSCOPY W/BIOPSY SINGLE/MULTIPLE: Performed by: SURGERY

## 2018-09-12 PROCEDURE — 2580000003 HC RX 258: Performed by: SURGERY

## 2018-09-12 RX ORDER — SODIUM CHLORIDE 0.9 % (FLUSH) 0.9 %
10 SYRINGE (ML) INJECTION PRN
Status: DISCONTINUED | OUTPATIENT
Start: 2018-09-12 | End: 2018-09-12 | Stop reason: HOSPADM

## 2018-09-12 RX ORDER — PROPOFOL 10 MG/ML
INJECTION, EMULSION INTRAVENOUS PRN
Status: DISCONTINUED | OUTPATIENT
Start: 2018-09-12 | End: 2018-09-12 | Stop reason: SDUPTHER

## 2018-09-12 RX ORDER — SODIUM CHLORIDE, SODIUM LACTATE, POTASSIUM CHLORIDE, CALCIUM CHLORIDE 600; 310; 30; 20 MG/100ML; MG/100ML; MG/100ML; MG/100ML
INJECTION, SOLUTION INTRAVENOUS CONTINUOUS
Status: DISCONTINUED | OUTPATIENT
Start: 2018-09-12 | End: 2018-09-12 | Stop reason: HOSPADM

## 2018-09-12 RX ORDER — SODIUM CHLORIDE 0.9 % (FLUSH) 0.9 %
10 SYRINGE (ML) INJECTION EVERY 12 HOURS SCHEDULED
Status: DISCONTINUED | OUTPATIENT
Start: 2018-09-12 | End: 2018-09-12 | Stop reason: HOSPADM

## 2018-09-12 RX ORDER — LIDOCAINE HYDROCHLORIDE 20 MG/ML
INJECTION, SOLUTION INFILTRATION; PERINEURAL PRN
Status: DISCONTINUED | OUTPATIENT
Start: 2018-09-12 | End: 2018-09-12 | Stop reason: SDUPTHER

## 2018-09-12 RX ADMIN — PROPOFOL 450 MG: 10 INJECTION, EMULSION INTRAVENOUS at 07:30

## 2018-09-12 RX ADMIN — SODIUM CHLORIDE, POTASSIUM CHLORIDE, SODIUM LACTATE AND CALCIUM CHLORIDE: 600; 310; 30; 20 INJECTION, SOLUTION INTRAVENOUS at 07:15

## 2018-09-12 RX ADMIN — LIDOCAINE HYDROCHLORIDE 40 MG: 20 INJECTION, SOLUTION INFILTRATION; PERINEURAL at 07:30

## 2018-09-12 ASSESSMENT — PAIN - FUNCTIONAL ASSESSMENT: PAIN_FUNCTIONAL_ASSESSMENT: 0-10

## 2018-09-12 ASSESSMENT — PAIN SCALES - GENERAL
PAINLEVEL_OUTOF10: 0
PAINLEVEL_OUTOF10: 0

## 2018-09-12 NOTE — OP NOTE
PROCEDURE NOTE    DATE OF PROCEDURE: 9/12/2018    SURGEON: Yvonne Dyer DO    ASSISTANT: None    PREOPERATIVE DIAGNOSIS: Abdominal pain, Occult positive stool    POSTOPERATIVE DIAGNOSIS:  Same and cecal polyp and hyperplastic sigmoid polyps    OPERATION: Colonoscopy with cold forcep polypectomy x 2    ANESTHESIA: IV sedation per CRNA.     ESTIMATED BLOOD LOSS:  Less than 5 ml     COMPLICATIONS: None. PROCEDURE: The patient was given IV conscious sedation per anesthesia. The patient was given supplemental O2 by nasal cannula. The patient's SPO2 remained above 90% throughout the procedure. The colonoscope was inserted per rectum and advanced under direct vision to the cecum without difficulty. There was a polyp noted at the cecum which was removed with cold forcep. In sigmoid there were small hyperplastic appearing polyps and the 2 largest were removed with cold forceps. The prep was good so exam was adequate. The colon was decompressed and the scope was removed. Findings:    1. Cecal polyp - removed with cold forcep biopsy tool  2. Sigmoid polyps - likely hyperplastic, 2 largest removed with cold forcep biopsy tool      IMPRESSION/PLAN:     1. Cecal and sigmoid polyps - will follow up pathology  2. Pt no longer having abdominal pain. Continue regular follow up with PCP  3. Repeat colonoscopy recommendation will be based on pathology results of polyps  4. Follow up in 1 week to review pathology and final recommendations.         Electronically signed by Ranjan Arriaga DO  on 9/12/2018 at 7:57 AM

## 2018-09-12 NOTE — H&P
Katherine Gautam is a 70 y.o. male who presents today for follow-up from recent hospitalization for abdominal pain and positive fecal occult blood test.  Patient was in the hospital approximately one week ago with acute onset of right lower quadrant pain. Patient had workup in the ER which included CT scan which showed no acute findings and no evidence of appendicitis. However, the patient did have a fecal occult blood test which returned positive. Due to the patient's significant pain he was admitted for observation. Patient had resolution of his pain soon after admission to the floor and his hemoglobin remained stable in the low to mid 12 range. Since being discharged patient states his abdominal pain has been vastly improved but he does still occasionally get right lower quadrant and right flank pain. He does not relate this to any activity, his bowel movements, or eating. He denies any visible blood in the stool since discharge. Patient has had prior colonoscopies with the last one being approximately 4-5 years ago. He is unable to recall the results of that colonoscopy.   He is here for follow-up and schedule colonoscopy for further evaluation of occult positive stool and abdominal pain.     Past Medical History        Past Medical History:   Diagnosis Date    Angina at rest [facility])      Arthritis      CAD (coronary artery disease)       angina    Hyperlipidemia      Hypertension      Skin cancer              Past Surgical History   Past Surgical History:   Procedure Laterality Date    CARDIAC CATHETERIZATION         x 2    COLONOSCOPY        COSMETIC SURGERY         forehead and  mouth in 1987    HERNIA REPAIR                Current Facility-Administered Medications          Current Outpatient Prescriptions   Medication Sig Dispense Refill    polyethylene glycol (GOLYTELY) 236 g solution Take 4,000 mLs by mouth once for 1 dose 4000 mL 0    bisacodyl (BISACODYL) 5 MG EC tablet Take 08/21/18 0951   BP: (!) 142/80   Pulse:     Temp:        General:in no apparent distress, well developed and well nourished, alert and oriented times 3  Eyes: PERRL and EOMI  Ears, Nose, Throat: external ear and ear canal normal bilaterally, oropharynx clear and moist with normal mucous membranes  Neck: neck supple and non tender without mass  Lungs: clear to auscultation without wheezes or rales   Heart: S1S2, no mumurs, RRR  Abdomen: soft, nontender, no HSM, no guarding, no rebound, no masses  Extremity: negative  Neuro: CN II-XII grossly intact        Assessment      3  34-year-old male with recent admission for right lower quadrant and right flank abdominal pain and fecal occult positive stool     Plan      1. We'll plan to proceed with colonoscopy for further evaluation earliest convenient date. Risks, benefits, and alternatives of the procedure explained and written informed consent obtained. 2.  Bowel prep instructions and prescriptions provided and instructions reviewed in detail.   3.  Plan for follow-up after colonoscopy based on findings.     Electronically signed by Bessie Lemon DO on 8/21/2018 at 11:01 AM    The patient was interviewed and examined and there have been no changes since the above History and Physical.    Electronically signed by Bessie Lemon DO on 9/11/2018 at 9:27 PM

## 2018-09-13 LAB — SURGICAL PATHOLOGY REPORT: NORMAL

## 2018-09-21 ENCOUNTER — OFFICE VISIT (OUTPATIENT)
Dept: FAMILY MEDICINE CLINIC | Age: 68
End: 2018-09-21
Payer: MEDICARE

## 2018-09-21 VITALS
SYSTOLIC BLOOD PRESSURE: 148 MMHG | DIASTOLIC BLOOD PRESSURE: 80 MMHG | BODY MASS INDEX: 30.41 KG/M2 | WEIGHT: 200 LBS | OXYGEN SATURATION: 97 % | HEART RATE: 66 BPM

## 2018-09-21 DIAGNOSIS — E78.5 HYPERLIPIDEMIA, UNSPECIFIED HYPERLIPIDEMIA TYPE: ICD-10-CM

## 2018-09-21 DIAGNOSIS — I10 ESSENTIAL HYPERTENSION: Primary | ICD-10-CM

## 2018-09-21 DIAGNOSIS — R25.1 TREMOR: ICD-10-CM

## 2018-09-21 DIAGNOSIS — Z12.5 PROSTATE CANCER SCREENING: ICD-10-CM

## 2018-09-21 DIAGNOSIS — Z23 NEEDS FLU SHOT: ICD-10-CM

## 2018-09-21 DIAGNOSIS — Z23 NEED FOR PROPHYLACTIC VACCINATION AGAINST STREPTOCOCCUS PNEUMONIAE (PNEUMOCOCCUS): ICD-10-CM

## 2018-09-21 DIAGNOSIS — R73.9 ELEVATED BLOOD SUGAR: ICD-10-CM

## 2018-09-21 PROCEDURE — 1036F TOBACCO NON-USER: CPT | Performed by: FAMILY MEDICINE

## 2018-09-21 PROCEDURE — 1123F ACP DISCUSS/DSCN MKR DOCD: CPT | Performed by: FAMILY MEDICINE

## 2018-09-21 PROCEDURE — 99214 OFFICE O/P EST MOD 30 MIN: CPT | Performed by: FAMILY MEDICINE

## 2018-09-21 PROCEDURE — 3017F COLORECTAL CA SCREEN DOC REV: CPT | Performed by: FAMILY MEDICINE

## 2018-09-21 PROCEDURE — G8598 ASA/ANTIPLAT THER USED: HCPCS | Performed by: FAMILY MEDICINE

## 2018-09-21 PROCEDURE — 4040F PNEUMOC VAC/ADMIN/RCVD: CPT | Performed by: FAMILY MEDICINE

## 2018-09-21 PROCEDURE — G8417 CALC BMI ABV UP PARAM F/U: HCPCS | Performed by: FAMILY MEDICINE

## 2018-09-21 PROCEDURE — G8427 DOCREV CUR MEDS BY ELIG CLIN: HCPCS | Performed by: FAMILY MEDICINE

## 2018-09-21 PROCEDURE — 90662 IIV NO PRSV INCREASED AG IM: CPT | Performed by: FAMILY MEDICINE

## 2018-09-21 PROCEDURE — 1101F PT FALLS ASSESS-DOCD LE1/YR: CPT | Performed by: FAMILY MEDICINE

## 2018-09-21 PROCEDURE — G0008 ADMIN INFLUENZA VIRUS VAC: HCPCS | Performed by: FAMILY MEDICINE

## 2018-09-21 RX ORDER — AMLODIPINE BESYLATE 5 MG/1
5 TABLET ORAL DAILY
Qty: 30 TABLET | Refills: 12 | Status: SHIPPED | OUTPATIENT
Start: 2018-09-21 | End: 2018-11-16

## 2018-09-21 ASSESSMENT — ENCOUNTER SYMPTOMS
BACK PAIN: 1
ORTHOPNEA: 0
RESPIRATORY NEGATIVE: 1
BLURRED VISION: 0
GASTROINTESTINAL NEGATIVE: 1
SHORTNESS OF BREATH: 0

## 2018-10-29 LAB
AVERAGE GLUCOSE: NORMAL
HBA1C MFR BLD: 5.8 %
PROSTATE SPECIFIC ANTIGEN: 1.19 NG/ML

## 2018-10-30 DIAGNOSIS — I10 ESSENTIAL HYPERTENSION: ICD-10-CM

## 2018-10-30 DIAGNOSIS — E78.5 HYPERLIPIDEMIA, UNSPECIFIED HYPERLIPIDEMIA TYPE: ICD-10-CM

## 2018-10-30 DIAGNOSIS — Z12.5 PROSTATE CANCER SCREENING: ICD-10-CM

## 2018-10-30 DIAGNOSIS — R73.9 ELEVATED BLOOD SUGAR: ICD-10-CM

## 2018-11-09 ENCOUNTER — TELEPHONE (OUTPATIENT)
Dept: FAMILY MEDICINE CLINIC | Age: 68
End: 2018-11-09

## 2018-11-12 RX ORDER — CYCLOBENZAPRINE HCL 10 MG
10 TABLET ORAL NIGHTLY
Qty: 30 TABLET | Refills: 2 | Status: SHIPPED | OUTPATIENT
Start: 2018-11-12 | End: 2018-11-28 | Stop reason: SDUPTHER

## 2018-11-16 ENCOUNTER — OFFICE VISIT (OUTPATIENT)
Dept: FAMILY MEDICINE CLINIC | Age: 68
End: 2018-11-16
Payer: MEDICARE

## 2018-11-16 VITALS
SYSTOLIC BLOOD PRESSURE: 160 MMHG | BODY MASS INDEX: 30.71 KG/M2 | HEART RATE: 69 BPM | WEIGHT: 202 LBS | OXYGEN SATURATION: 97 % | DIASTOLIC BLOOD PRESSURE: 80 MMHG

## 2018-11-16 DIAGNOSIS — K76.0 FATTY LIVER: Primary | ICD-10-CM

## 2018-11-16 DIAGNOSIS — Z23 NEED FOR PROPHYLACTIC VACCINATION AGAINST STREPTOCOCCUS PNEUMONIAE (PNEUMOCOCCUS): ICD-10-CM

## 2018-11-16 PROCEDURE — 4040F PNEUMOC VAC/ADMIN/RCVD: CPT | Performed by: FAMILY MEDICINE

## 2018-11-16 PROCEDURE — G8482 FLU IMMUNIZE ORDER/ADMIN: HCPCS | Performed by: FAMILY MEDICINE

## 2018-11-16 PROCEDURE — G8427 DOCREV CUR MEDS BY ELIG CLIN: HCPCS | Performed by: FAMILY MEDICINE

## 2018-11-16 PROCEDURE — G8598 ASA/ANTIPLAT THER USED: HCPCS | Performed by: FAMILY MEDICINE

## 2018-11-16 PROCEDURE — G8417 CALC BMI ABV UP PARAM F/U: HCPCS | Performed by: FAMILY MEDICINE

## 2018-11-16 PROCEDURE — 99213 OFFICE O/P EST LOW 20 MIN: CPT | Performed by: FAMILY MEDICINE

## 2018-11-16 PROCEDURE — 90670 PCV13 VACCINE IM: CPT | Performed by: FAMILY MEDICINE

## 2018-11-16 PROCEDURE — G0009 ADMIN PNEUMOCOCCAL VACCINE: HCPCS | Performed by: FAMILY MEDICINE

## 2018-11-16 PROCEDURE — 3017F COLORECTAL CA SCREEN DOC REV: CPT | Performed by: FAMILY MEDICINE

## 2018-11-16 PROCEDURE — 1123F ACP DISCUSS/DSCN MKR DOCD: CPT | Performed by: FAMILY MEDICINE

## 2018-11-16 PROCEDURE — 1101F PT FALLS ASSESS-DOCD LE1/YR: CPT | Performed by: FAMILY MEDICINE

## 2018-11-16 PROCEDURE — 1036F TOBACCO NON-USER: CPT | Performed by: FAMILY MEDICINE

## 2018-11-16 RX ORDER — AMLODIPINE BESYLATE 10 MG/1
10 TABLET ORAL DAILY
Qty: 30 TABLET | Refills: 3 | Status: SHIPPED | OUTPATIENT
Start: 2018-11-16 | End: 2019-07-22 | Stop reason: SDUPTHER

## 2018-11-16 ASSESSMENT — ENCOUNTER SYMPTOMS
GASTROINTESTINAL NEGATIVE: 1
RESPIRATORY NEGATIVE: 1

## 2018-11-28 ENCOUNTER — TELEPHONE (OUTPATIENT)
Dept: FAMILY MEDICINE CLINIC | Age: 68
End: 2018-11-28

## 2018-11-28 RX ORDER — GEMFIBROZIL 600 MG/1
600 TABLET, FILM COATED ORAL DAILY
Qty: 90 TABLET | Refills: 3 | Status: SHIPPED | OUTPATIENT
Start: 2018-11-28 | End: 2018-11-29 | Stop reason: SDUPTHER

## 2018-11-28 RX ORDER — CYCLOBENZAPRINE HCL 10 MG
10 TABLET ORAL NIGHTLY
Qty: 30 TABLET | Refills: 2 | Status: SHIPPED | OUTPATIENT
Start: 2018-11-28 | End: 2019-05-07 | Stop reason: SDUPTHER

## 2018-11-28 RX ORDER — ISOSORBIDE MONONITRATE 30 MG/1
30 TABLET, EXTENDED RELEASE ORAL DAILY
Qty: 90 TABLET | Refills: 3 | Status: SHIPPED | OUTPATIENT
Start: 2018-11-28 | End: 2020-02-24

## 2018-11-29 RX ORDER — GEMFIBROZIL 600 MG/1
600 TABLET, FILM COATED ORAL DAILY
Qty: 90 TABLET | Refills: 3 | Status: SHIPPED | OUTPATIENT
Start: 2018-11-29 | End: 2020-01-13

## 2018-12-03 DIAGNOSIS — K76.0 FATTY LIVER: ICD-10-CM

## 2018-12-14 ENCOUNTER — OFFICE VISIT (OUTPATIENT)
Dept: FAMILY MEDICINE CLINIC | Age: 68
End: 2018-12-14
Payer: MEDICARE

## 2018-12-14 VITALS
DIASTOLIC BLOOD PRESSURE: 70 MMHG | OXYGEN SATURATION: 96 % | HEART RATE: 98 BPM | BODY MASS INDEX: 29.8 KG/M2 | SYSTOLIC BLOOD PRESSURE: 120 MMHG | WEIGHT: 196 LBS

## 2018-12-14 DIAGNOSIS — I10 ESSENTIAL HYPERTENSION: Primary | ICD-10-CM

## 2018-12-14 PROCEDURE — 1036F TOBACCO NON-USER: CPT | Performed by: FAMILY MEDICINE

## 2018-12-14 PROCEDURE — G8427 DOCREV CUR MEDS BY ELIG CLIN: HCPCS | Performed by: FAMILY MEDICINE

## 2018-12-14 PROCEDURE — 3017F COLORECTAL CA SCREEN DOC REV: CPT | Performed by: FAMILY MEDICINE

## 2018-12-14 PROCEDURE — 99213 OFFICE O/P EST LOW 20 MIN: CPT | Performed by: FAMILY MEDICINE

## 2018-12-14 PROCEDURE — 4040F PNEUMOC VAC/ADMIN/RCVD: CPT | Performed by: FAMILY MEDICINE

## 2018-12-14 PROCEDURE — G8482 FLU IMMUNIZE ORDER/ADMIN: HCPCS | Performed by: FAMILY MEDICINE

## 2018-12-14 PROCEDURE — 1123F ACP DISCUSS/DSCN MKR DOCD: CPT | Performed by: FAMILY MEDICINE

## 2018-12-14 PROCEDURE — 1101F PT FALLS ASSESS-DOCD LE1/YR: CPT | Performed by: FAMILY MEDICINE

## 2018-12-14 PROCEDURE — G8598 ASA/ANTIPLAT THER USED: HCPCS | Performed by: FAMILY MEDICINE

## 2018-12-14 PROCEDURE — G8417 CALC BMI ABV UP PARAM F/U: HCPCS | Performed by: FAMILY MEDICINE

## 2018-12-14 ASSESSMENT — ENCOUNTER SYMPTOMS
RESPIRATORY NEGATIVE: 1
GASTROINTESTINAL NEGATIVE: 1

## 2019-01-25 ENCOUNTER — OFFICE VISIT (OUTPATIENT)
Dept: FAMILY MEDICINE CLINIC | Age: 69
End: 2019-01-25
Payer: MEDICARE

## 2019-01-25 VITALS
SYSTOLIC BLOOD PRESSURE: 136 MMHG | BODY MASS INDEX: 29.95 KG/M2 | OXYGEN SATURATION: 98 % | DIASTOLIC BLOOD PRESSURE: 68 MMHG | WEIGHT: 197 LBS | HEART RATE: 64 BPM

## 2019-01-25 DIAGNOSIS — M79.641 RIGHT HAND PAIN: Primary | ICD-10-CM

## 2019-01-25 PROCEDURE — 4040F PNEUMOC VAC/ADMIN/RCVD: CPT | Performed by: FAMILY MEDICINE

## 2019-01-25 PROCEDURE — 3017F COLORECTAL CA SCREEN DOC REV: CPT | Performed by: FAMILY MEDICINE

## 2019-01-25 PROCEDURE — 1036F TOBACCO NON-USER: CPT | Performed by: FAMILY MEDICINE

## 2019-01-25 PROCEDURE — 99213 OFFICE O/P EST LOW 20 MIN: CPT | Performed by: FAMILY MEDICINE

## 2019-01-25 PROCEDURE — G8598 ASA/ANTIPLAT THER USED: HCPCS | Performed by: FAMILY MEDICINE

## 2019-01-25 PROCEDURE — G8482 FLU IMMUNIZE ORDER/ADMIN: HCPCS | Performed by: FAMILY MEDICINE

## 2019-01-25 PROCEDURE — 1123F ACP DISCUSS/DSCN MKR DOCD: CPT | Performed by: FAMILY MEDICINE

## 2019-01-25 PROCEDURE — G8417 CALC BMI ABV UP PARAM F/U: HCPCS | Performed by: FAMILY MEDICINE

## 2019-01-25 PROCEDURE — G8427 DOCREV CUR MEDS BY ELIG CLIN: HCPCS | Performed by: FAMILY MEDICINE

## 2019-01-25 PROCEDURE — 1101F PT FALLS ASSESS-DOCD LE1/YR: CPT | Performed by: FAMILY MEDICINE

## 2019-01-25 RX ORDER — FAMOTIDINE 20 MG/1
20 TABLET, FILM COATED ORAL NIGHTLY
Qty: 180 TABLET | Refills: 3 | Status: SHIPPED | OUTPATIENT
Start: 2019-01-25 | End: 2020-05-12

## 2019-01-25 RX ORDER — BISOPROLOL FUMARATE 10 MG/1
10 TABLET ORAL DAILY
Qty: 90 TABLET | Refills: 3 | Status: SHIPPED | OUTPATIENT
Start: 2019-01-25 | End: 2020-05-18

## 2019-01-25 ASSESSMENT — ENCOUNTER SYMPTOMS
GASTROINTESTINAL NEGATIVE: 1
RESPIRATORY NEGATIVE: 1

## 2019-01-28 ENCOUNTER — TELEPHONE (OUTPATIENT)
Dept: FAMILY MEDICINE CLINIC | Age: 69
End: 2019-01-28

## 2019-01-28 DIAGNOSIS — M79.641 RIGHT HAND PAIN: ICD-10-CM

## 2019-03-05 ENCOUNTER — TELEPHONE (OUTPATIENT)
Dept: FAMILY MEDICINE CLINIC | Age: 69
End: 2019-03-05

## 2019-03-07 ENCOUNTER — OFFICE VISIT (OUTPATIENT)
Dept: FAMILY MEDICINE CLINIC | Age: 69
End: 2019-03-07
Payer: MEDICARE

## 2019-03-07 VITALS
HEART RATE: 70 BPM | BODY MASS INDEX: 30.11 KG/M2 | WEIGHT: 198 LBS | OXYGEN SATURATION: 98 % | SYSTOLIC BLOOD PRESSURE: 120 MMHG | DIASTOLIC BLOOD PRESSURE: 60 MMHG

## 2019-03-07 DIAGNOSIS — M79.605 LEFT LEG PAIN: Primary | ICD-10-CM

## 2019-03-07 PROCEDURE — 1036F TOBACCO NON-USER: CPT | Performed by: FAMILY MEDICINE

## 2019-03-07 PROCEDURE — G8482 FLU IMMUNIZE ORDER/ADMIN: HCPCS | Performed by: FAMILY MEDICINE

## 2019-03-07 PROCEDURE — G8417 CALC BMI ABV UP PARAM F/U: HCPCS | Performed by: FAMILY MEDICINE

## 2019-03-07 PROCEDURE — 4040F PNEUMOC VAC/ADMIN/RCVD: CPT | Performed by: FAMILY MEDICINE

## 2019-03-07 PROCEDURE — G8598 ASA/ANTIPLAT THER USED: HCPCS | Performed by: FAMILY MEDICINE

## 2019-03-07 PROCEDURE — 3017F COLORECTAL CA SCREEN DOC REV: CPT | Performed by: FAMILY MEDICINE

## 2019-03-07 PROCEDURE — 1101F PT FALLS ASSESS-DOCD LE1/YR: CPT | Performed by: FAMILY MEDICINE

## 2019-03-07 PROCEDURE — G8427 DOCREV CUR MEDS BY ELIG CLIN: HCPCS | Performed by: FAMILY MEDICINE

## 2019-03-07 PROCEDURE — 1123F ACP DISCUSS/DSCN MKR DOCD: CPT | Performed by: FAMILY MEDICINE

## 2019-03-07 PROCEDURE — 99213 OFFICE O/P EST LOW 20 MIN: CPT | Performed by: FAMILY MEDICINE

## 2019-03-07 ASSESSMENT — PATIENT HEALTH QUESTIONNAIRE - PHQ9
SUM OF ALL RESPONSES TO PHQ QUESTIONS 1-9: 0
SUM OF ALL RESPONSES TO PHQ QUESTIONS 1-9: 0
2. FEELING DOWN, DEPRESSED OR HOPELESS: 0
1. LITTLE INTEREST OR PLEASURE IN DOING THINGS: 0
SUM OF ALL RESPONSES TO PHQ9 QUESTIONS 1 & 2: 0

## 2019-03-07 ASSESSMENT — ENCOUNTER SYMPTOMS: RESPIRATORY NEGATIVE: 1

## 2019-03-08 RX ORDER — PREDNISONE 10 MG/1
TABLET ORAL
Qty: 20 TABLET | Refills: 0 | Status: SHIPPED | OUTPATIENT
Start: 2019-03-08 | End: 2019-03-15 | Stop reason: SDUPTHER

## 2019-03-15 ENCOUNTER — TELEPHONE (OUTPATIENT)
Dept: FAMILY MEDICINE CLINIC | Age: 69
End: 2019-03-15

## 2019-03-15 RX ORDER — PREDNISONE 10 MG/1
TABLET ORAL
Qty: 20 TABLET | Refills: 0 | Status: SHIPPED | OUTPATIENT
Start: 2019-03-15 | End: 2019-04-16 | Stop reason: ALTCHOICE

## 2019-04-16 ENCOUNTER — OFFICE VISIT (OUTPATIENT)
Dept: FAMILY MEDICINE CLINIC | Age: 69
End: 2019-04-16
Payer: MEDICARE

## 2019-04-16 VITALS
OXYGEN SATURATION: 98 % | DIASTOLIC BLOOD PRESSURE: 62 MMHG | HEART RATE: 64 BPM | SYSTOLIC BLOOD PRESSURE: 130 MMHG | WEIGHT: 198 LBS | BODY MASS INDEX: 30.11 KG/M2

## 2019-04-16 DIAGNOSIS — M54.42 CHRONIC LEFT-SIDED LOW BACK PAIN WITH LEFT-SIDED SCIATICA: Primary | ICD-10-CM

## 2019-04-16 DIAGNOSIS — G89.29 CHRONIC LEFT-SIDED LOW BACK PAIN WITH LEFT-SIDED SCIATICA: Primary | ICD-10-CM

## 2019-04-16 PROCEDURE — G8417 CALC BMI ABV UP PARAM F/U: HCPCS | Performed by: FAMILY MEDICINE

## 2019-04-16 PROCEDURE — 1123F ACP DISCUSS/DSCN MKR DOCD: CPT | Performed by: FAMILY MEDICINE

## 2019-04-16 PROCEDURE — 1036F TOBACCO NON-USER: CPT | Performed by: FAMILY MEDICINE

## 2019-04-16 PROCEDURE — G8598 ASA/ANTIPLAT THER USED: HCPCS | Performed by: FAMILY MEDICINE

## 2019-04-16 PROCEDURE — G8427 DOCREV CUR MEDS BY ELIG CLIN: HCPCS | Performed by: FAMILY MEDICINE

## 2019-04-16 PROCEDURE — 3017F COLORECTAL CA SCREEN DOC REV: CPT | Performed by: FAMILY MEDICINE

## 2019-04-16 PROCEDURE — 99213 OFFICE O/P EST LOW 20 MIN: CPT | Performed by: FAMILY MEDICINE

## 2019-04-16 PROCEDURE — 4040F PNEUMOC VAC/ADMIN/RCVD: CPT | Performed by: FAMILY MEDICINE

## 2019-04-16 RX ORDER — IBUPROFEN 200 MG
200 TABLET ORAL EVERY 6 HOURS PRN
COMMUNITY
End: 2020-11-03

## 2019-04-16 RX ORDER — PREDNISONE 10 MG/1
TABLET ORAL
Qty: 20 TABLET | Refills: 0 | Status: SHIPPED | OUTPATIENT
Start: 2019-04-16 | End: 2019-11-14 | Stop reason: ALTCHOICE

## 2019-04-16 ASSESSMENT — ENCOUNTER SYMPTOMS
BACK PAIN: 1
RESPIRATORY NEGATIVE: 1
EYES NEGATIVE: 1
GASTROINTESTINAL NEGATIVE: 1

## 2019-04-23 ENCOUNTER — HOSPITAL ENCOUNTER (OUTPATIENT)
Dept: MRI IMAGING | Age: 69
Discharge: HOME | End: 2019-04-25
Payer: MEDICARE

## 2019-04-23 DIAGNOSIS — G89.29 CHRONIC LEFT-SIDED LOW BACK PAIN WITH LEFT-SIDED SCIATICA: ICD-10-CM

## 2019-04-23 DIAGNOSIS — M48.062 SPINAL STENOSIS OF LUMBAR REGION WITH NEUROGENIC CLAUDICATION: Primary | ICD-10-CM

## 2019-04-23 DIAGNOSIS — M54.42 CHRONIC LEFT-SIDED LOW BACK PAIN WITH LEFT-SIDED SCIATICA: ICD-10-CM

## 2019-04-23 PROCEDURE — 72148 MRI LUMBAR SPINE W/O DYE: CPT

## 2019-04-29 ENCOUNTER — TELEPHONE (OUTPATIENT)
Dept: FAMILY MEDICINE CLINIC | Age: 69
End: 2019-04-29

## 2019-05-07 RX ORDER — CYCLOBENZAPRINE HCL 10 MG
10 TABLET ORAL NIGHTLY
Qty: 30 TABLET | Refills: 2 | Status: SHIPPED | OUTPATIENT
Start: 2019-05-07 | End: 2020-01-13

## 2019-05-31 ENCOUNTER — OFFICE VISIT (OUTPATIENT)
Dept: PRIMARY CARE CLINIC | Age: 69
End: 2019-05-31
Payer: MEDICARE

## 2019-05-31 ENCOUNTER — HOSPITAL ENCOUNTER (OUTPATIENT)
Dept: GENERAL RADIOLOGY | Age: 69
Discharge: HOME | End: 2019-06-02
Payer: MEDICARE

## 2019-05-31 VITALS
TEMPERATURE: 97.9 F | SYSTOLIC BLOOD PRESSURE: 138 MMHG | BODY MASS INDEX: 28.58 KG/M2 | HEIGHT: 68 IN | HEART RATE: 87 BPM | WEIGHT: 188.6 LBS | DIASTOLIC BLOOD PRESSURE: 84 MMHG | OXYGEN SATURATION: 95 %

## 2019-05-31 DIAGNOSIS — M79.89 SWELLING OF RIGHT HAND: Primary | ICD-10-CM

## 2019-05-31 DIAGNOSIS — M79.89 SWELLING OF RIGHT HAND: ICD-10-CM

## 2019-05-31 PROCEDURE — G8598 ASA/ANTIPLAT THER USED: HCPCS | Performed by: NURSE PRACTITIONER

## 2019-05-31 PROCEDURE — 73130 X-RAY EXAM OF HAND: CPT

## 2019-05-31 PROCEDURE — 1036F TOBACCO NON-USER: CPT | Performed by: NURSE PRACTITIONER

## 2019-05-31 PROCEDURE — 4040F PNEUMOC VAC/ADMIN/RCVD: CPT | Performed by: NURSE PRACTITIONER

## 2019-05-31 PROCEDURE — 3017F COLORECTAL CA SCREEN DOC REV: CPT | Performed by: NURSE PRACTITIONER

## 2019-05-31 PROCEDURE — 99213 OFFICE O/P EST LOW 20 MIN: CPT | Performed by: NURSE PRACTITIONER

## 2019-05-31 PROCEDURE — 1123F ACP DISCUSS/DSCN MKR DOCD: CPT | Performed by: NURSE PRACTITIONER

## 2019-05-31 PROCEDURE — G8427 DOCREV CUR MEDS BY ELIG CLIN: HCPCS | Performed by: NURSE PRACTITIONER

## 2019-05-31 PROCEDURE — G8417 CALC BMI ABV UP PARAM F/U: HCPCS | Performed by: NURSE PRACTITIONER

## 2019-05-31 RX ORDER — NAPROXEN 250 MG/1
250 TABLET ORAL 2 TIMES DAILY WITH MEALS
Qty: 60 TABLET | Refills: 3 | Status: SHIPPED | OUTPATIENT
Start: 2019-05-31 | End: 2020-08-24

## 2019-05-31 RX ORDER — METHYLPREDNISOLONE 4 MG/1
TABLET ORAL
Qty: 1 KIT | Refills: 0 | Status: SHIPPED | OUTPATIENT
Start: 2019-05-31 | End: 2019-06-06

## 2019-05-31 ASSESSMENT — ENCOUNTER SYMPTOMS
GASTROINTESTINAL NEGATIVE: 1
RESPIRATORY NEGATIVE: 1

## 2019-05-31 NOTE — PROGRESS NOTES
Reuben Lopez  18    Chief Complaint   Patient presents with    Pain     Right hand and wrist pain and edema started yesterday. Patient states No injury. This has happend again about 4-5 months ago. HPI: Patient presents with right wrist and hand pain- onset yesterday. No known injury. This has happened once several months ago- resolved on its own. He has not done anything for pain relief. No family hx of gout. Past MedHx:     Past Medical History:   Diagnosis Date    Angina at rest [facility])     Arthritis     CAD (coronary artery disease)     angina    Hyperlipidemia     Hypertension     Skin cancer         Past Surgical History:   Procedure Laterality Date    CARDIAC CATHETERIZATION      x 2    COLONOSCOPY      COLONOSCOPY N/A 2018    COLONOSCOPY WITH BIOPSY performed by Brissa Norman DO at [place] [address]      forehead and  mouth in [place] [address] Hx:     Social History     Tobacco Use    Smoking status: Former Smoker     Packs/day: 0.50     Years: 26.00     Pack years: 13.00     Types: Cigarettes     Start date:      Last attempt to quit: 1997     Years since quittin.4    Smokeless tobacco: Never Used    Tobacco comment: cwaltmire rrt 16   Substance Use Topics    Alcohol use:  Yes     Alcohol/week: 0.6 oz     Types: 1 Cans of beer per week     Comment: 2 beers/month    Drug use: No       Lab Results   Component Value Date    LABA1C 5.8 10/29/2018     No results found for: EAG  Lab Results   Component Value Date    WBC 4.4 2018    HGB 12.8 (L) 2018    HCT 36.9 (L) 2018    MCV 97.7 2018     (L) 2018     Lab Results   Component Value Date     2018    K 4.3 2018     2018    CO2 27 2018    BUN 20 2018    CREATININE 1.04 2018    GLUCOSE 130 (H) 2018    CALCIUM 8.7 2018    PROT 7.8 2018    LABALBU 3.9 2018 BILITOT 0.65 08/12/2018    ALKPHOS 72 08/12/2018    AST 48 (H) 08/12/2018    ALT 52 (H) 08/12/2018    LABGLOM >60 08/13/2018    GFRAA >60 08/13/2018       Outpatient Encounter Medications as of 5/31/2019   Medication Sig Dispense Refill    naproxen (NAPROSYN) 250 MG tablet Take 1 tablet by mouth 2 times daily (with meals) 60 tablet 3    methylPREDNISolone (MEDROL DOSEPACK) 4 MG tablet Take by mouth. 1 kit 0    cyclobenzaprine (FLEXERIL) 10 MG tablet TAKE 1 TABLET BY MOUTH NIGHTLY 30 tablet 2    TURMERIC PO Take 1,000 mg by mouth      ibuprofen (ADVIL;MOTRIN) 200 MG tablet Take 200 mg by mouth every 6 hours as needed for Pain Taking 600mg prn      bisoprolol (ZEBETA) 10 MG tablet Take 1 tablet by mouth daily 90 tablet 3    famotidine (PEPCID) 20 MG tablet Take 1 tablet by mouth nightly 180 tablet 3    gemfibrozil (LOPID) 600 MG tablet Take 1 tablet by mouth daily 90 tablet 3    isosorbide mononitrate (IMDUR) 30 MG extended release tablet Take 1 tablet by mouth daily 90 tablet 3    aspirin 81 MG tablet Take 81 mg by mouth      amLODIPine (NORVASC) 10 MG tablet Take 1 tablet by mouth daily 30 tablet 3    lisinopril-hydrochlorothiazide (PRINZIDE;ZESTORETIC) 20-25 MG per tablet Take 1 tablet by mouth daily       Cyanocobalamin (B-12) 2500 MCG TABS Take 2,500 mcg by mouth daily       pravastatin (PRAVACHOL) 10 MG tablet Take 10 mg by mouth daily      Cholecalciferol (D3-1000 PO) Take 1,000 Units by mouth daily       b complex vitamins capsule Take 1 capsule by mouth daily      predniSONE (DELTASONE) 10 MG tablet Take 2 po bid for 2 days then 3 po once a day for 2 days then 2 pills a day for 2 days then one pill a day till gone 20 tablet 0     No facility-administered encounter medications on file as of 5/31/2019. Review of Systems   Constitutional: Negative for fever. Respiratory: Negative. Cardiovascular: Negative. Gastrointestinal: Negative.     Musculoskeletal: Positive for joint swelling and myalgias. Physical Exam   Constitutional: He appears well-developed and well-nourished. HENT:   Head: Normocephalic. Cardiovascular: Normal rate and regular rhythm. Pulmonary/Chest: Effort normal and breath sounds normal.   Musculoskeletal:        Arms:  Right hand and fingers swelling. No erythema. Very mild warmth to touch. Wrist/fingers with pain with movement. Slightly decreased grasp right hand compared to left. Data reviewed:      :   Diagnosis Orders   1. Swelling of right hand  XR HAND RIGHT (MIN 3 VIEWS)       PLAN:  Return in about 2 weeks (around 6/14/2019). Orders Placed This Encounter   Medications    naproxen (NAPROSYN) 250 MG tablet     Sig: Take 1 tablet by mouth 2 times daily (with meals)     Dispense:  60 tablet     Refill:  3    methylPREDNISolone (MEDROL DOSEPACK) 4 MG tablet     Sig: Take by mouth.      Dispense:  1 kit     Refill:  0     Gout (no hx of) vs arthritis (per xray)  Will treat with above medication  Advised follow up with Dr. Rolando Hodge within next two weeks  Follow up if no improvement    Electronically signed by Larry Pallas, APRN - CNP on 5/31/19 at 3:12 PM

## 2019-06-20 ENCOUNTER — OFFICE VISIT (OUTPATIENT)
Dept: FAMILY MEDICINE CLINIC | Age: 69
End: 2019-06-20
Payer: MEDICARE

## 2019-06-20 VITALS
DIASTOLIC BLOOD PRESSURE: 76 MMHG | BODY MASS INDEX: 30.26 KG/M2 | WEIGHT: 199 LBS | TEMPERATURE: 98 F | HEART RATE: 59 BPM | OXYGEN SATURATION: 98 % | SYSTOLIC BLOOD PRESSURE: 136 MMHG

## 2019-06-20 DIAGNOSIS — E78.5 HYPERLIPIDEMIA, UNSPECIFIED HYPERLIPIDEMIA TYPE: ICD-10-CM

## 2019-06-20 DIAGNOSIS — M19.90 ARTHRITIS: ICD-10-CM

## 2019-06-20 DIAGNOSIS — I10 ESSENTIAL HYPERTENSION: Primary | ICD-10-CM

## 2019-06-20 PROCEDURE — 1036F TOBACCO NON-USER: CPT | Performed by: FAMILY MEDICINE

## 2019-06-20 PROCEDURE — 99214 OFFICE O/P EST MOD 30 MIN: CPT | Performed by: FAMILY MEDICINE

## 2019-06-20 PROCEDURE — G8417 CALC BMI ABV UP PARAM F/U: HCPCS | Performed by: FAMILY MEDICINE

## 2019-06-20 PROCEDURE — 3017F COLORECTAL CA SCREEN DOC REV: CPT | Performed by: FAMILY MEDICINE

## 2019-06-20 PROCEDURE — G8598 ASA/ANTIPLAT THER USED: HCPCS | Performed by: FAMILY MEDICINE

## 2019-06-20 PROCEDURE — 1123F ACP DISCUSS/DSCN MKR DOCD: CPT | Performed by: FAMILY MEDICINE

## 2019-06-20 PROCEDURE — G8427 DOCREV CUR MEDS BY ELIG CLIN: HCPCS | Performed by: FAMILY MEDICINE

## 2019-06-20 PROCEDURE — 4040F PNEUMOC VAC/ADMIN/RCVD: CPT | Performed by: FAMILY MEDICINE

## 2019-06-20 ASSESSMENT — ENCOUNTER SYMPTOMS
ORTHOPNEA: 0
BLURRED VISION: 0
BACK PAIN: 1
SHORTNESS OF BREATH: 0
GASTROINTESTINAL NEGATIVE: 1

## 2019-06-20 NOTE — PATIENT INSTRUCTIONS
Patient Education        DASH Diet: Care Instructions  Your Care Instructions    The DASH diet is an eating plan that can help lower your blood pressure. DASH stands for Dietary Approaches to Stop Hypertension. Hypertension is high blood pressure. The DASH diet focuses on eating foods that are high in calcium, potassium, and magnesium. These nutrients can lower blood pressure. The foods that are highest in these nutrients are fruits, vegetables, low-fat dairy products, nuts, seeds, and legumes. But taking calcium, potassium, and magnesium supplements instead of eating foods that are high in those nutrients does not have the same effect. The DASH diet also includes whole grains, fish, and poultry. The DASH diet is one of several lifestyle changes your doctor may recommend to lower your high blood pressure. Your doctor may also want you to decrease the amount of sodium in your diet. Lowering sodium while following the DASH diet can lower blood pressure even further than just the DASH diet alone. Follow-up care is a key part of your treatment and safety. Be sure to make and go to all appointments, and call your doctor if you are having problems. It's also a good idea to know your test results and keep a list of the medicines you take. How can you care for yourself at home? Following the DASH diet  · Eat 4 to 5 servings of fruit each day. A serving is 1 medium-sized piece of fruit, ½ cup chopped or canned fruit, 1/4 cup dried fruit, or 4 ounces (½ cup) of fruit juice. Choose fruit more often than fruit juice. · Eat 4 to 5 servings of vegetables each day. A serving is 1 cup of lettuce or raw leafy vegetables, ½ cup of chopped or cooked vegetables, or 4 ounces (½ cup) of vegetable juice. Choose vegetables more often than vegetable juice. · Get 2 to 3 servings of low-fat and fat-free dairy each day. A serving is 8 ounces of milk, 1 cup of yogurt, or 1 ½ ounces of cheese. · Eat 6 to 8 servings of grains each day. A serving is 1 slice of bread, 1 ounce of dry cereal, or ½ cup of cooked rice, pasta, or cooked cereal. Try to choose whole-grain products as much as possible. · Limit lean meat, poultry, and fish to 2 servings each day. A serving is 3 ounces, about the size of a deck of cards. · Eat 4 to 5 servings of nuts, seeds, and legumes (cooked dried beans, lentils, and split peas) each week. A serving is 1/3 cup of nuts, 2 tablespoons of seeds, or ½ cup of cooked beans or peas. · Limit fats and oils to 2 to 3 servings each day. A serving is 1 teaspoon of vegetable oil or 2 tablespoons of salad dressing. · Limit sweets and added sugars to 5 servings or less a week. A serving is 1 tablespoon jelly or jam, ½ cup sorbet, or 1 cup of lemonade. · Eat less than 2,300 milligrams (mg) of sodium a day. If you limit your sodium to 1,500 mg a day, you can lower your blood pressure even more. Tips for success  · Start small. Do not try to make dramatic changes to your diet all at once. You might feel that you are missing out on your favorite foods and then be more likely to not follow the plan. Make small changes, and stick with them. Once those changes become habit, add a few more changes. · Try some of the following:  ? Make it a goal to eat a fruit or vegetable at every meal and at snacks. This will make it easy to get the recommended amount of fruits and vegetables each day. ? Try yogurt topped with fruit and nuts for a snack or healthy dessert. ? Add lettuce, tomato, cucumber, and onion to sandwiches. ? Combine a ready-made pizza crust with low-fat mozzarella cheese and lots of vegetable toppings. Try using tomatoes, squash, spinach, broccoli, carrots, cauliflower, and onions. ? Have a variety of cut-up vegetables with a low-fat dip as an appetizer instead of chips and dip. ? Sprinkle sunflower seeds or chopped almonds over salads. Or try adding chopped walnuts or almonds to cooked vegetables.   ? Try some vegetarian meals using beans and peas. Add garbanzo or kidney beans to salads. Make burritos and tacos with mashed rahman beans or black beans. Where can you learn more? Go to https://zuleima.[employer]Jut Inc. org and sign in to your [employer]ge [employer]uster account. Enter C126 in the [employer] box to learn more about \"DASH Diet: Care Instructions. \"     If you do not have an account, please click on the \"Sign Up Now\" link. Current as of: July 22, 2018  Content Version: 12.0  © 5385-3482 [employer]. Care instructions adapted under license by [facility] ([facility]). If you have questions about a medical condition or this instruction, always ask your healthcare professional. [address] any warranty or liability for your use of this information. Patient Education        Learning About Physical Activity  What is physical activity? Physical activity is any kind of activity that gets your body moving. The types of physical activity that can help you get fit and stay healthy include:  · Aerobic or \"cardio\" activities that make your heart beat faster and make you breathe harder, such as brisk walking, riding a bike, or running. Aerobic activities strengthen your heart and lungs and build up your endurance. · Strength training activities that make your muscles work against, or \"resist,\" something, such as lifting weights or doing push-ups. These activities help tone and strengthen your muscles. · Stretches that allow you to move your joints and muscles through their full range of motion. Stretching helps you be more flexible and avoid injury. What are the benefits of physical activity? Being active is one of the best things you can do to get fit and stay healthy. It helps you to:  · Feel stronger and have more energy to do all the things you like to do. · Focus better at school or work and perform better in sports. · Feel, think, and sleep better.   · Reach and stay at a healthy weight. · Lose fat and build lean muscle. · Lower your risk for serious health problems. · Keep your bones, muscles, and joints strong. Being fit lets you do more physical activity. And it lets you work out harder without as much effort. How can you make physical activity part of your life? Get at least 30 minutes of exercise on most days of the week. Walking is a good choice. You also may want to do other activities, such as running, swimming, cycling, or playing tennis or team sports. Pick activities that you like--ones that make your heart beat faster, your muscles stronger, and your muscles and joints more flexible. If you find more than one thing you like doing, do them all. You don't have to do the same thing every day. Get your heart pumping every day. Any activity that makes your heart beat faster and keeps it at that rate for a while counts. Here are some great ways to get your heart beating faster:  · Go for a brisk walk, run, or bike ride. · Go for a hike or swim. · Go in-line skating. · Play a game of touch football, basketball, or soccer. · Ride a bike. · Play tennis or racquetball. · Climb stairs. Even some household chores can be aerobic--just do them at a faster pace. Vacuuming, raking or mowing the lawn, sweeping the garage, and washing and waxing the car all can help get your heart rate up. Strengthen your muscles during the week. You don't have to lift heavy weights or grow big, bulky muscles to get stronger. Doing a few simple activities that make your muscles work against, or \"resist,\" something can help you get stronger. For example, you can:  · Do push-ups or sit-ups, which use your own body weight as resistance. · Lift weights or dumbbells or use stretch bands at home or in a gym or community center. Stretch your muscles often. Stretching will help you as you become more active. It can help you stay flexible, loosen tight muscles, and avoid injury.  It can also help improve your balance and posture and can be a great way to relax. Be sure to stretch the muscles you'll be using when you work out. It's best to warm your muscles slightly before you stretch them. Walk or do some other light aerobic activity for a few minutes, and then start stretching. When you stretch your muscles:  · Do it slowly. Stretching is not about going fast or making sudden movements. · Don't push or bounce during a stretch. · Hold each stretch for at least 15 to 30 seconds, if you can. You should feel a stretch in the muscle, but not pain. · Breathe out as you do the stretch. Then breathe in as you hold the stretch. Don't hold your breath. If you're worried about how more activity might affect your health, have a checkup before you start. Follow any special advice your doctor gives you for getting a smart start. Where can you learn more? Go to https://Tegotech SoftwarepeWeimi.[employer]. org and sign in to your [employer] account. Enter M685 in the [employer] box to learn more about \"Learning About Physical Activity. \"     If you do not have an account, please click on the \"Sign Up Now\" link. Current as of: August 19, 2018  Content Version: 12.0  © 6270-1114 Healthwise, Incorporated. Care instructions adapted under license by [facility] ([facility]). If you have questions about a medical condition or this instruction, always ask your healthcare professional. [place] [address] any warranty or liability for your use of this information.

## 2019-06-20 NOTE — PROGRESS NOTES
Subjective:      Patient ID: Dash Gibson is a 78 y.o. male. Had some recent right wrist pain which has resolved. Has some numbness sensation on the right side of his chest wall laterally that comes and goes   Back pain has improved   Tremor has improved since decreasing his coffee and alcohol     Hypertension   This is a chronic problem. The current episode started more than 1 year ago. The problem has been resolved since onset. The problem is controlled. Pertinent negatives include no anxiety, blurred vision, chest pain, headaches, malaise/fatigue, neck pain, orthopnea, palpitations, peripheral edema, PND, shortness of breath or sweats. Agents associated with hypertension include NSAIDs. Risk factors for coronary artery disease include dyslipidemia and obesity. Past treatments include beta blockers, diuretics, calcium channel blockers and ACE inhibitors. The current treatment provides moderate improvement. Compliance problems include diet. Review of Systems   Constitutional: Negative. Negative for malaise/fatigue. HENT: Negative. Eyes: Negative for blurred vision. Respiratory: Negative for shortness of breath. Cardiovascular: Negative for chest pain, palpitations, orthopnea and PND. Gastrointestinal: Negative. Genitourinary: Positive for decreased urine volume. Musculoskeletal: Positive for arthralgias and back pain. Negative for neck pain. Neurological: Negative for headaches. Hematological: Negative. Psychiatric/Behavioral: Negative. Objective:   Physical Exam   Constitutional: He is oriented to person, place, and time. He appears well-developed and well-nourished. HENT:   Head: Normocephalic and atraumatic. Right Ear: External ear normal.   Left Ear: External ear normal.   Nose: Nose normal.   Mouth/Throat: Oropharynx is clear and moist.   Eyes: Pupils are equal, round, and reactive to light. EOM are normal.   Neck: No thyromegaly present.    Cardiovascular: Normal rate and regular rhythm. Pulmonary/Chest: Effort normal and breath sounds normal.   Abdominal: Soft. There is no tenderness. Musculoskeletal: He exhibits no edema. No active joint effusions    Lymphadenopathy:     He has no cervical adenopathy. Neurological: He is alert and oriented to person, place, and time. He displays no tremor. No obvious pathology noted on chest wall ranging into his thoracic region   Skin: Skin is warm and dry. Nursing note and vitals reviewed. Assessment:      Hypertension   Hyperlipidemia  Numbness right lateral thoracic region   Arthritis        Plan: Will monitor the numbness since he had in the past and resolved and it does not seem to be anywhere else. Did discuss MS. His bp has improved. Reviewed the negative impact on nsaid. As long as well follow up  For medicare annual and recheck if the numbness doesn't resolve and or other areas develop. Yang received counseling on the following healthy behaviors: nutrition and exercise  Reviewed prior labs and health maintenance  Continue current medications, diet and exercise. Discussed use, benefit, and side effects of prescribed medications. Barriers to medication compliance addressed. Patient given educational materials - see patient instructions  Was a self-tracking handout given in paper form or via Traffic[employer]? Yes    Requested Prescriptions      No prescriptions requested or ordered in this encounter       All patient questions answered. Patient voiced understanding. Quality Measures    Body mass index is 30.26 kg/m². Elevated. Weight control planned discussed Healthy diet and regular exercise. BP: 136/76. Blood pressure is normal. Treatment plan consists of No treatment change needed. Fall Risk 8/23/2018   2 or more falls in past year? yes   Fall with injury in past year? yes     The patient does not have a history of falls. I did not - not indicated , complete a risk assessment for falls. A plan of care for falls No Treatment plan indicated    No results found for: LDLCALC, LDLCHOLESTEROL, LDLDIRECT (goal LDL reduction with dx if diabetes is 50% LDL reduction)    PHQ Scores 3/7/2019 8/23/2018   PHQ2 Score 0 0   PHQ9 Score 0 0     Interpretation of Total Score Depression Severity: 1-4 = Minimal depression, 5-9 = Mild depression, 10-14 = Moderate depression, 15-19 = Moderately severe depression, 20-27 = Severe depression            Fabiano Crowell MD

## 2019-07-19 ENCOUNTER — TELEPHONE (OUTPATIENT)
Dept: FAMILY MEDICINE CLINIC | Age: 69
End: 2019-07-19

## 2019-07-19 NOTE — TELEPHONE ENCOUNTER
Patient needs refill of Amlodipine 10 mg sent to [facility] in [place]. 90 day supply please.

## 2019-07-22 RX ORDER — AMLODIPINE BESYLATE 10 MG/1
10 TABLET ORAL DAILY
Qty: 90 TABLET | Refills: 3 | Status: SHIPPED | OUTPATIENT
Start: 2019-07-22 | End: 2020-07-30

## 2019-07-24 ENCOUNTER — TELEPHONE (OUTPATIENT)
Dept: FAMILY MEDICINE CLINIC | Age: 69
End: 2019-07-24

## 2019-07-29 RX ORDER — PRAVASTATIN SODIUM 10 MG
10 TABLET ORAL DAILY
Qty: 90 TABLET | Refills: 3 | Status: SHIPPED | OUTPATIENT
Start: 2019-07-29 | End: 2020-08-26

## 2019-10-17 RX ORDER — LISINOPRIL AND HYDROCHLOROTHIAZIDE 25; 20 MG/1; MG/1
1 TABLET ORAL DAILY
Qty: 90 TABLET | Refills: 3 | Status: SHIPPED | OUTPATIENT
Start: 2019-10-17 | End: 2020-11-17

## 2019-11-14 ENCOUNTER — OFFICE VISIT (OUTPATIENT)
Dept: FAMILY MEDICINE CLINIC | Age: 69
End: 2019-11-14
Payer: MEDICARE

## 2019-11-14 VITALS
OXYGEN SATURATION: 98 % | WEIGHT: 204.2 LBS | BODY MASS INDEX: 31.05 KG/M2 | DIASTOLIC BLOOD PRESSURE: 68 MMHG | SYSTOLIC BLOOD PRESSURE: 134 MMHG | HEART RATE: 72 BPM

## 2019-11-14 DIAGNOSIS — Z23 NEEDS FLU SHOT: ICD-10-CM

## 2019-11-14 DIAGNOSIS — Z23 NEED FOR PROPHYLACTIC VACCINATION AGAINST STREPTOCOCCUS PNEUMONIAE (PNEUMOCOCCUS): ICD-10-CM

## 2019-11-14 DIAGNOSIS — I10 ESSENTIAL HYPERTENSION: Primary | ICD-10-CM

## 2019-11-14 DIAGNOSIS — R73.9 ELEVATED BLOOD SUGAR: ICD-10-CM

## 2019-11-14 DIAGNOSIS — Z12.5 PROSTATE CANCER SCREENING: ICD-10-CM

## 2019-11-14 DIAGNOSIS — E78.5 HYPERLIPIDEMIA, UNSPECIFIED HYPERLIPIDEMIA TYPE: ICD-10-CM

## 2019-11-14 PROCEDURE — G0008 ADMIN INFLUENZA VIRUS VAC: HCPCS | Performed by: FAMILY MEDICINE

## 2019-11-14 PROCEDURE — G8417 CALC BMI ABV UP PARAM F/U: HCPCS | Performed by: FAMILY MEDICINE

## 2019-11-14 PROCEDURE — 1036F TOBACCO NON-USER: CPT | Performed by: FAMILY MEDICINE

## 2019-11-14 PROCEDURE — 90653 IIV ADJUVANT VACCINE IM: CPT | Performed by: FAMILY MEDICINE

## 2019-11-14 PROCEDURE — G8427 DOCREV CUR MEDS BY ELIG CLIN: HCPCS | Performed by: FAMILY MEDICINE

## 2019-11-14 PROCEDURE — 3017F COLORECTAL CA SCREEN DOC REV: CPT | Performed by: FAMILY MEDICINE

## 2019-11-14 PROCEDURE — G8598 ASA/ANTIPLAT THER USED: HCPCS | Performed by: FAMILY MEDICINE

## 2019-11-14 PROCEDURE — 1123F ACP DISCUSS/DSCN MKR DOCD: CPT | Performed by: FAMILY MEDICINE

## 2019-11-14 PROCEDURE — 4040F PNEUMOC VAC/ADMIN/RCVD: CPT | Performed by: FAMILY MEDICINE

## 2019-11-14 PROCEDURE — 99214 OFFICE O/P EST MOD 30 MIN: CPT | Performed by: FAMILY MEDICINE

## 2019-11-14 PROCEDURE — G8482 FLU IMMUNIZE ORDER/ADMIN: HCPCS | Performed by: FAMILY MEDICINE

## 2019-11-14 ASSESSMENT — ENCOUNTER SYMPTOMS
BLURRED VISION: 0
BACK PAIN: 1
GASTROINTESTINAL NEGATIVE: 1
SHORTNESS OF BREATH: 0
ORTHOPNEA: 0

## 2019-12-02 ENCOUNTER — NURSE ONLY (OUTPATIENT)
Dept: LAB | Age: 69
End: 2019-12-02
Payer: MEDICARE

## 2019-12-02 DIAGNOSIS — Z23 NEED FOR PROPHYLACTIC VACCINATION AGAINST STREPTOCOCCUS PNEUMONIAE (PNEUMOCOCCUS): ICD-10-CM

## 2019-12-02 DIAGNOSIS — Z23 NEED FOR PNEUMOCOCCAL VACCINATION: Primary | ICD-10-CM

## 2019-12-02 PROCEDURE — G0009 ADMIN PNEUMOCOCCAL VACCINE: HCPCS | Performed by: FAMILY MEDICINE

## 2019-12-02 PROCEDURE — 90732 PPSV23 VACC 2 YRS+ SUBQ/IM: CPT | Performed by: FAMILY MEDICINE

## 2020-01-13 RX ORDER — CYCLOBENZAPRINE HCL 10 MG
10 TABLET ORAL NIGHTLY
Qty: 30 TABLET | Refills: 0 | Status: SHIPPED | OUTPATIENT
Start: 2020-01-13 | End: 2020-09-14 | Stop reason: ALTCHOICE

## 2020-01-13 RX ORDER — GEMFIBROZIL 600 MG/1
TABLET, FILM COATED ORAL
Qty: 90 TABLET | Refills: 3 | Status: SHIPPED | OUTPATIENT
Start: 2020-01-13 | End: 2021-04-06 | Stop reason: SDUPTHER

## 2020-01-13 NOTE — TELEPHONE ENCOUNTER
Stephen Solomon is calling to request a refill on the following medication(s):  Requested Prescriptions     Pending Prescriptions Disp Refills    cyclobenzaprine (FLEXERIL) 10 MG tablet [Pharmacy Med Name: Cyclobenzaprine HCl 10 MG Oral Tablet] 30 tablet 0     Sig: TAKE 1 TABLET BY MOUTH NIGHTLY    gemfibrozil (LOPID) 600 MG tablet [Pharmacy Med Name: Gemfibrozil 600 MG Oral Tablet] 90 tablet 0     Sig: TAKE 1 TABLET BY MOUTH ONCE DAILY       Last Visit Date (If Applicable):  78/84/10    Next Visit Date:    5/15/2020

## 2020-05-18 RX ORDER — BISOPROLOL FUMARATE 10 MG/1
TABLET ORAL
Qty: 90 TABLET | Refills: 0 | Status: SHIPPED | OUTPATIENT
Start: 2020-05-18 | End: 2020-08-26

## 2020-07-07 ENCOUNTER — APPOINTMENT (OUTPATIENT)
Dept: INTERVENTIONAL RADIOLOGY/VASCULAR | Age: 70
End: 2020-07-07
Payer: MEDICARE

## 2020-07-07 ENCOUNTER — HOSPITAL ENCOUNTER (EMERGENCY)
Age: 70
Discharge: HOME | End: 2020-07-07
Attending: EMERGENCY MEDICINE
Payer: MEDICARE

## 2020-07-07 ENCOUNTER — APPOINTMENT (OUTPATIENT)
Dept: GENERAL RADIOLOGY | Age: 70
End: 2020-07-07
Payer: MEDICARE

## 2020-07-07 VITALS
DIASTOLIC BLOOD PRESSURE: 67 MMHG | RESPIRATION RATE: 12 BRPM | SYSTOLIC BLOOD PRESSURE: 125 MMHG | HEIGHT: 68 IN | OXYGEN SATURATION: 97 % | HEART RATE: 59 BPM | BODY MASS INDEX: 27.28 KG/M2 | WEIGHT: 180 LBS | TEMPERATURE: 97.7 F

## 2020-07-07 PROCEDURE — 99283 EMERGENCY DEPT VISIT LOW MDM: CPT

## 2020-07-07 PROCEDURE — 73610 X-RAY EXAM OF ANKLE: CPT

## 2020-07-07 PROCEDURE — 93971 EXTREMITY STUDY: CPT

## 2020-07-07 RX ORDER — NAPROXEN 500 MG/1
500 TABLET ORAL 2 TIMES DAILY WITH MEALS
Qty: 30 TABLET | Refills: 0 | Status: SHIPPED | OUTPATIENT
Start: 2020-07-07 | End: 2020-09-24

## 2020-07-07 ASSESSMENT — ENCOUNTER SYMPTOMS
VOMITING: 0
NAUSEA: 0
DIARRHEA: 0
WHEEZING: 0
BLOOD IN STOOL: 0
SHORTNESS OF BREATH: 0
BACK PAIN: 0
SORE THROAT: 0
ABDOMINAL PAIN: 0
TROUBLE SWALLOWING: 0
CONSTIPATION: 0

## 2020-07-07 ASSESSMENT — PAIN DESCRIPTION - ORIENTATION: ORIENTATION: RIGHT

## 2020-07-07 ASSESSMENT — PAIN DESCRIPTION - LOCATION: LOCATION: ANKLE

## 2020-07-07 ASSESSMENT — PAIN DESCRIPTION - PAIN TYPE: TYPE: ACUTE PAIN

## 2020-07-07 ASSESSMENT — PAIN - FUNCTIONAL ASSESSMENT: PAIN_FUNCTIONAL_ASSESSMENT: 0-10

## 2020-07-07 ASSESSMENT — PAIN DESCRIPTION - DESCRIPTORS: DESCRIPTORS: ACHING

## 2020-07-07 ASSESSMENT — PAIN SCALES - GENERAL
PAINLEVEL_OUTOF10: 10
PAINLEVEL_OUTOF10: 8

## 2020-07-07 ASSESSMENT — PAIN DESCRIPTION - FREQUENCY: FREQUENCY: CONTINUOUS

## 2020-07-07 NOTE — ED PROVIDER NOTES
[facility]  eMERGENCY dEPARTMENT eNCOUnter      Pt Name: Bonnie Cobb  MRN: [id number]  [place] 1950  Date of evaluation: 7/7/2020      CHIEF COMPLAINT       Chief Complaint   Patient presents with    Ankle Pain     R ankle pain and swelling. denies any trauma         HISTORY OF PRESENT ILLNESS    Bonnie Cobb is a 69 y.o. male who presents chief complaint of right ankle pain there is been no known injury he has had some swelling to it it is worse when he ambulates that radiates up his calf. Patient does say that last Thursday he went to visit his sister in the nursing home and drove down to [place] and back he said he only stop to eat and go to the bathroom and the swelling began after that. He has had it once before in the other ankle and he thought it may be gout he was treated with Naprosyn and he said that made it better. There is been no fevers no chills no cough no shortness of breath      REVIEW OF SYSTEMS         Review of Systems   Constitutional: Negative for chills and fever. HENT: Negative for congestion, dental problem, sore throat and trouble swallowing. Eyes: Negative for visual disturbance. Respiratory: Negative for shortness of breath and wheezing. Cardiovascular: Negative for chest pain, palpitations and leg swelling. Gastrointestinal: Negative for abdominal pain, blood in stool, constipation, diarrhea, nausea and vomiting. Genitourinary: Negative for difficulty urinating, dysuria and testicular pain. Musculoskeletal: Negative for back pain, joint swelling and neck pain. Right ankle swelling   Skin: Negative for rash. Neurological: Negative for dizziness, syncope, weakness and headaches. Hematological: Negative for adenopathy. Does not bruise/bleed easily. Psychiatric/Behavioral: Negative for confusion and suicidal ideas.          PAST MEDICAL HISTORY    has a past medical history of Angina at [facility]), Arthritis, CAD (coronary artery disease), Hyperlipidemia, Hypertension, and Skin cancer. SURGICAL HISTORY      has a past surgical history that includes Cardiac catheterization; hernia repair; Colonoscopy; Cosmetic surgery; and Colonoscopy (N/A, 9/12/2018). CURRENT MEDICATIONS       Previous Medications    AMLODIPINE (NORVASC) 10 MG TABLET    Take 1 tablet by mouth daily    ASPIRIN 81 MG TABLET    Take 81 mg by mouth    B COMPLEX VITAMINS CAPSULE    Take 1 capsule by mouth daily    BISOPROLOL (ZEBETA) 10 MG TABLET    Take 1 tablet by mouth once daily    CHOLECALCIFEROL (D3-1000 PO)    Take 1,000 Units by mouth daily     CYANOCOBALAMIN (B-12) 2500 MCG TABS    Take 2,500 mcg by mouth daily     CYCLOBENZAPRINE (FLEXERIL) 10 MG TABLET    TAKE 1 TABLET BY MOUTH NIGHTLY    FAMOTIDINE (PEPCID) 20 MG TABLET    Take 1 tablet by mouth nightly    GEMFIBROZIL (LOPID) 600 MG TABLET    TAKE 1 TABLET BY MOUTH ONCE DAILY    IBUPROFEN (ADVIL;MOTRIN) 200 MG TABLET    Take 200 mg by mouth every 6 hours as needed for Pain Taking 600mg prn    ISOSORBIDE MONONITRATE (IMDUR) 30 MG EXTENDED RELEASE TABLET    TAKE 1 TABLET BY MOUTH ONCE DAILY    LISINOPRIL-HYDROCHLOROTHIAZIDE (PRINZIDE;ZESTORETIC) 20-25 MG PER TABLET    Take 1 tablet by mouth daily    NAPROXEN (NAPROSYN) 250 MG TABLET    Take 1 tablet by mouth 2 times daily (with meals)    PRAVASTATIN (PRAVACHOL) 10 MG TABLET    Take 1 tablet by mouth daily    TURMERIC PO    Take 1,000 mg by mouth       ALLERGIES     is allergic to codeine; dtap-ipv vaccine; and penicillins. FAMILY HISTORY     has no family status information on file. family history is not on file. SOCIAL HISTORY      reports that he quit smoking about 23 years ago. His smoking use included cigarettes. He started smoking about 49 years ago. He has a 13.00 pack-year smoking history. He has never used smokeless tobacco. He reports current alcohol use of about 1.0 standard drinks of alcohol per week.  He reports that he does not use drugs.    PHYSICAL EXAM     INITIAL VITALS:  height is 5' 8\" (1.727 m) and weight is 180 lb (81.6 kg). His tympanic temperature is 97.7 °F (36.5 °C). His blood pressure is 187/82 (abnormal) and his pulse is 72. His respiration is 16 and oxygen saturation is 96%. Physical Exam  Constitutional:       Appearance: He is well-developed. HENT:      Head: Normocephalic and atraumatic. Eyes:      Pupils: Pupils are equal, round, and reactive to light. Neck:      Musculoskeletal: Normal range of motion and neck supple. Cardiovascular:      Rate and Rhythm: Normal rate and regular rhythm. Pulmonary:      Effort: Pulmonary effort is normal.      Breath sounds: Normal breath sounds. Abdominal:      General: Bowel sounds are normal.      Palpations: Abdomen is soft. Musculoskeletal: Normal range of motion. Comments: Patient has some swelling over the lateral malleolus there is no erythema no obvious bony deformity has good pedal pulses there is no break in the skin the calf has slight tenderness but is not overtly swollen I do not feel a palpable cords   Skin:     General: Skin is warm and dry. Capillary Refill: Capillary refill takes less than 2 seconds. Neurological:      Mental Status: He is alert and oriented to person, place, and time.    Psychiatric:         Behavior: Behavior normal.           DIFFERENTIAL DIAGNOSIS/ MDM:         DIAGNOSTIC RESULTS     EKG: All EKG's are interpreted by the Emergency Department Physician who either signs or Co-signs this chart in the absence of a cardiologist.        RADIOLOGY:   I directly visualized the following  images and reviewed the radiologist interpretations:     EXAMINATION:   DUPLEX VENOUS ULTRASOUND OF THE RIGHT LOWER EXTREMITY, 7/7/2020 8:57 am       TECHNIQUE:   Duplex ultrasound and Doppler images were obtained of the right lower   extremity.       COMPARISON:   None.       HISTORY:   ORDERING SYSTEM PROVIDED HISTORY: Swelling, Rule Out DVT TECHNOLOGIST PROVIDED HISTORY:   Swelling, Rule Out DVT       FINDINGS:   The visualized veins of the right lower extremity are patent and free of   echogenic thrombus. The veins are normally compressible and have normal   phasic flow.           Impression   No evidence of DVT in the right lower extremity.            EXAMINATION:   THREE XRAY VIEWS OF THE RIGHT ANKLE       7/7/2020 8:28 am       COMPARISON:   None.       HISTORY:   ORDERING SYSTEM PROVIDED HISTORY: pain and swelling   TECHNOLOGIST PROVIDED HISTORY:   pain and swelling   Reason for Exam: Lateral and medial ankle pain, lateral ankle swelling, no   known injury, recent travel       FINDINGS:   There appears to be of possible small avulsion off the distal aspect of the   medial malleolus.  There is no evidence of dislocation.  The ankle mortise is   maintained.  There is soft tissue swelling overlying the lateral malleolus. There is diffuse degenerative change of the foot with spurring of the   calcaneus.           Impression   There appears to be small avulsion off the medial malleolus which on some   images appear more well corticated and may represent remote injury although   age is indeterminate.  No gross dislocation.  Soft tissue swelling over the   lateral ankle.                 ED BEDSIDE ULTRASOUND:       LABS:  Labs Reviewed - No data to display        EMERGENCY DEPARTMENT COURSE:   Vitals:    Vitals:    07/07/20 0748   BP: (!) 187/82   Pulse: 72   Resp: 16   Temp: 97.7 °F (36.5 °C)   TempSrc: Tympanic   SpO2: 96%   Weight: 180 lb (81.6 kg)   Height: 5' 8\" (1.727 m)     -------------------------  BP: (!) 187/82, Temp: 97.7 °F (36.5 °C), Pulse: 72, Resp: 16        Re-evaluation Notes        CRITICAL CARE:   None        CONSULTS:      PROCEDURES:  None    FINAL IMPRESSION      1.  Acute right ankle pain          DISPOSITION/PLAN   DISPOSITION discharged    Condition on Disposition    Stable    PATIENT REFERRED TO:  Dontrell Boykin MD  [address] [facility] [address] [place] [id number] [phone]    Schedule an appointment as soon as possible for a visit in 3 days        DISCHARGE MEDICATIONS:  New Prescriptions    NAPROXEN (NAPROSYN) 500 MG TABLET    Take 1 tablet by mouth 2 times daily (with meals) for 30 doses       (Please note that portions of this note were completed with a voice recognition program.  Efforts were made to edit the dictations but occasionally words are mis-transcribed.)    Castaneda MD, F.A.A.E.M.   Attending Emergency Physician                          Chapito Christopher MD  07/07/20 1395

## 2020-07-08 ENCOUNTER — CARE COORDINATION (OUTPATIENT)
Dept: CARE COORDINATION | Age: 70
End: 2020-07-08

## 2020-07-08 NOTE — CARE COORDINATION
Krista Ly was seen at [facility] 2020- Acute Rt. ankle pain. 2020- 9:28 am Spoke with Yang. He stated swellling has gone down. Denied pain. He is taking Naproxen. He stated he will reach out to PCP if needed. Reviewed Healthcare decision makers. States understanding about staying home and prevention measures with regards to COVID-19     Patient contacted regarding recent discharge and COVID-19 risk. Discussed COVID-19 related testing which was not done at this time. Test results were not done. Patient informed of results, if available? N/A     Care Transition Nurse/ Ambulatory Care Manager contacted the patient by telephone to perform post discharge assessment. Verified name and  with patient as identifiers. Patient has following risk factors of: CAD, HTN. CTN/ACM reviewed discharge instructions, medical action plan and red flags related to discharge diagnosis. Reviewed and educated them on any new and changed medications related to discharge diagnosis. Advised obtaining a 90-day supply of all daily and as-needed medications. Education provided regarding infection prevention, and signs and symptoms of COVID-19 and when to seek medical attention with patient who verbalized understanding. Discussed exposure protocols and quarantine from [address] you at higher risk for severe illness  and given an opportunity for questions and concerns. The patient agrees to contact the COVID-19 hotline [phone] or PCP office for questions related to their healthcare. CTN/ACM provided contact information for future reference. From CDC: Are you at higher risk for severe illness?  Wash your hands often.  Avoid close contact (6 feet, which is about two arm lengths) with people who are sick.  Put distance between yourself and other people if COVID-19 is spreading in your community.  Clean and disinfect frequently touched surfaces.    Avoid all cruise travel and non-essential air travel.  Call your healthcare professional if you have concerns about COVID-19 and your underlying condition or if you are sick. For more information on steps you can take to protect yourself, see CDC's How to [id number] [place] [address] for follow-up call in 7-14 days based on severity of symptoms and risk factors.

## 2020-07-21 ENCOUNTER — CARE COORDINATION (OUTPATIENT)
Dept: CARE COORDINATION | Age: 70
End: 2020-07-21

## 2020-07-21 NOTE — CARE COORDINATION
Called and spoke with spouse. She stated Yang was sleeping. She reported that ankle has improved. He has some minor pain at times. Your Patient resolved from the Care Transitions episode on 7/21/2020  Discussed COVID-19 related testing which was not done at this time. Test results were not done. Patient informed of results, if available? N/A    Patient/family has been provided the following resources and education related to COVID-19:                         Signs, symptoms and red flags related to COVID-19            CDC exposure and quarantine guidelines            [employer] exposure contact - [phone]            Contact for their local Department of Health                 Patient currently reports that the following symptoms have improved:  no new/worsening symptoms     No further outreach scheduled with this CTN/ACM. Episode of Care resolved. Patient has this CTN/ACM contact information if future needs arise.

## 2020-07-30 RX ORDER — AMLODIPINE BESYLATE 10 MG/1
TABLET ORAL
Qty: 90 TABLET | Refills: 0 | Status: SHIPPED | OUTPATIENT
Start: 2020-07-30 | End: 2021-04-29

## 2020-07-30 NOTE — TELEPHONE ENCOUNTER
Kalen Randall is requesting a refill on the following medication(s):  Requested Prescriptions     Pending Prescriptions Disp Refills    amLODIPine (NORVASC) 10 MG tablet [Pharmacy Med Name: amLODIPine Besylate 10 MG Oral Tablet] 90 tablet 0     Sig: Take 1 tablet by mouth once daily       Last Visit Date (If Applicable):  76/75/8089    Next Visit Date:    Visit date not found

## 2020-08-24 ENCOUNTER — OFFICE VISIT (OUTPATIENT)
Dept: FAMILY MEDICINE CLINIC | Age: 70
End: 2020-08-24
Payer: MEDICARE

## 2020-08-24 VITALS
SYSTOLIC BLOOD PRESSURE: 138 MMHG | BODY MASS INDEX: 30.11 KG/M2 | HEART RATE: 65 BPM | DIASTOLIC BLOOD PRESSURE: 82 MMHG | WEIGHT: 198 LBS | RESPIRATION RATE: 16 BRPM | OXYGEN SATURATION: 98 %

## 2020-08-24 PROCEDURE — 1123F ACP DISCUSS/DSCN MKR DOCD: CPT | Performed by: FAMILY MEDICINE

## 2020-08-24 PROCEDURE — G8417 CALC BMI ABV UP PARAM F/U: HCPCS | Performed by: FAMILY MEDICINE

## 2020-08-24 PROCEDURE — 4040F PNEUMOC VAC/ADMIN/RCVD: CPT | Performed by: FAMILY MEDICINE

## 2020-08-24 PROCEDURE — 1036F TOBACCO NON-USER: CPT | Performed by: FAMILY MEDICINE

## 2020-08-24 PROCEDURE — G8427 DOCREV CUR MEDS BY ELIG CLIN: HCPCS | Performed by: FAMILY MEDICINE

## 2020-08-24 PROCEDURE — 99213 OFFICE O/P EST LOW 20 MIN: CPT | Performed by: FAMILY MEDICINE

## 2020-08-24 PROCEDURE — 3017F COLORECTAL CA SCREEN DOC REV: CPT | Performed by: FAMILY MEDICINE

## 2020-08-24 RX ORDER — PREDNISONE 20 MG/1
20 TABLET ORAL 2 TIMES DAILY
Qty: 10 TABLET | Refills: 0 | Status: SHIPPED | OUTPATIENT
Start: 2020-08-24 | End: 2020-08-29

## 2020-08-24 ASSESSMENT — PATIENT HEALTH QUESTIONNAIRE - PHQ9
2. FEELING DOWN, DEPRESSED OR HOPELESS: 0
1. LITTLE INTEREST OR PLEASURE IN DOING THINGS: 0
SUM OF ALL RESPONSES TO PHQ9 QUESTIONS 1 & 2: 0
SUM OF ALL RESPONSES TO PHQ QUESTIONS 1-9: 0
SUM OF ALL RESPONSES TO PHQ QUESTIONS 1-9: 0

## 2020-08-24 NOTE — PROGRESS NOTES
Subjective:      Patient ID: Tee Bertrand is a 69 y.o. male. Has been having problems with ankle pain that started around 9 months ago. The ankles often will swell and get warm then pain increases. Is doing better today. The pain got worse when he drank beer. Has not had any since early July. Review of Systems    Objective:   Physical Exam  Vitals signs and nursing note reviewed. Constitutional:       Appearance: Normal appearance. HENT:      Head: Normocephalic and atraumatic. Cardiovascular:      Rate and Rhythm: Normal rate and regular rhythm. Heart sounds: No murmur. Musculoskeletal:      Right lower leg: No edema. Left lower leg: No edema. Comments: May have a slight effusion right ankle no redness no swelling noted on left side    Neurological:      Mental Status: He is alert. Assessment:      Inflammatory arthritis       Plan: Will check labs and make further recommendation once available. Will give prednisone now and see if can totally resolve his pain.          Dione Gomez MD

## 2020-08-25 ENCOUNTER — HOSPITAL ENCOUNTER (OUTPATIENT)
Age: 70
Setting detail: SPECIMEN
Discharge: HOME | End: 2020-08-25
Payer: MEDICARE

## 2020-08-25 LAB
ABSOLUTE EOS #: <0.03 K/UL (ref 0–0.44)
ABSOLUTE IMMATURE GRANULOCYTE: <0.03 K/UL (ref 0–0.3)
ABSOLUTE LYMPH #: 1 K/UL (ref 1.1–3.7)
ABSOLUTE MONO #: 0.26 K/UL (ref 0.1–1.2)
ANION GAP SERPL CALCULATED.3IONS-SCNC: 13 MMOL/L (ref 9–17)
BASOPHILS # BLD: 0 % (ref 0–2)
BASOPHILS ABSOLUTE: <0.03 K/UL (ref 0–0.2)
BUN BLDV-MCNC: 26 MG/DL (ref 8–23)
BUN/CREAT BLD: 23 (ref 9–20)
C-REACTIVE PROTEIN: 0.6 MG/L (ref 0–5)
CALCIUM SERPL-MCNC: 9.8 MG/DL (ref 8.6–10.4)
CHLORIDE BLD-SCNC: 96 MMOL/L (ref 98–107)
CO2: 26 MMOL/L (ref 20–31)
CREAT SERPL-MCNC: 1.14 MG/DL (ref 0.7–1.2)
DIFFERENTIAL TYPE: ABNORMAL
EOSINOPHILS RELATIVE PERCENT: 0 % (ref 1–4)
ESTIMATED AVERAGE GLUCOSE: 120 MG/DL
GFR AFRICAN AMERICAN: >60 ML/MIN
GFR NON-AFRICAN AMERICAN: >60 ML/MIN
GFR SERPL CREATININE-BSD FRML MDRD: ABNORMAL ML/MIN/{1.73_M2}
GFR SERPL CREATININE-BSD FRML MDRD: ABNORMAL ML/MIN/{1.73_M2}
GLUCOSE BLD-MCNC: 186 MG/DL (ref 70–99)
HBA1C MFR BLD: 5.8 % (ref 4.8–5.9)
HCT VFR BLD CALC: 35.8 % (ref 40.7–50.3)
HEMOGLOBIN: 12.4 G/DL (ref 13–17)
IMMATURE GRANULOCYTES: 0 %
LYMPHOCYTES # BLD: 17 % (ref 24–43)
MCH RBC QN AUTO: 32.2 PG (ref 25.2–33.5)
MCHC RBC AUTO-ENTMCNC: 34.6 G/DL (ref 25.2–33.5)
MCV RBC AUTO: 93 FL (ref 82.6–102.9)
MONOCYTES # BLD: 4 % (ref 3–12)
NRBC AUTOMATED: 0 PER 100 WBC
PDW BLD-RTO: 11.9 % (ref 11.8–14.4)
PLATELET # BLD: 183 K/UL (ref 138–453)
PLATELET ESTIMATE: ABNORMAL
PMV BLD AUTO: 12.1 FL (ref 8.1–13.5)
POTASSIUM SERPL-SCNC: 4.3 MMOL/L (ref 3.7–5.3)
RBC # BLD: 3.85 M/UL (ref 4.21–5.77)
RBC # BLD: ABNORMAL 10*6/UL
SEDIMENTATION RATE, ERYTHROCYTE: 54 MM (ref 0–20)
SEG NEUTROPHILS: 79 % (ref 36–65)
SEGMENTED NEUTROPHILS ABSOLUTE COUNT: 4.6 K/UL (ref 1.5–8.1)
SODIUM BLD-SCNC: 135 MMOL/L (ref 135–144)
URIC ACID: 9 MG/DL (ref 3.4–7)
WBC # BLD: 5.9 K/UL (ref 3.5–11.3)
WBC # BLD: ABNORMAL 10*3/UL

## 2020-08-25 PROCEDURE — 84550 ASSAY OF BLOOD/URIC ACID: CPT

## 2020-08-25 PROCEDURE — 85025 COMPLETE CBC W/AUTO DIFF WBC: CPT

## 2020-08-25 PROCEDURE — 85651 RBC SED RATE NONAUTOMATED: CPT

## 2020-08-25 PROCEDURE — 83036 HEMOGLOBIN GLYCOSYLATED A1C: CPT

## 2020-08-25 PROCEDURE — 80048 BASIC METABOLIC PNL TOTAL CA: CPT

## 2020-08-25 PROCEDURE — 86140 C-REACTIVE PROTEIN: CPT

## 2020-08-25 PROCEDURE — 36415 COLL VENOUS BLD VENIPUNCTURE: CPT

## 2020-08-26 ENCOUNTER — TELEPHONE (OUTPATIENT)
Dept: FAMILY MEDICINE CLINIC | Age: 70
End: 2020-08-26

## 2020-08-26 RX ORDER — ALLOPURINOL 300 MG/1
300 TABLET ORAL DAILY
Qty: 30 TABLET | Refills: 5 | Status: SHIPPED | OUTPATIENT
Start: 2020-08-26 | End: 2021-03-29 | Stop reason: SDUPTHER

## 2020-09-14 ENCOUNTER — OFFICE VISIT (OUTPATIENT)
Dept: FAMILY MEDICINE CLINIC | Age: 70
End: 2020-09-14
Payer: MEDICARE

## 2020-09-14 VITALS
SYSTOLIC BLOOD PRESSURE: 136 MMHG | TEMPERATURE: 97.7 F | OXYGEN SATURATION: 97 % | BODY MASS INDEX: 29.95 KG/M2 | RESPIRATION RATE: 16 BRPM | WEIGHT: 197 LBS | HEART RATE: 70 BPM | DIASTOLIC BLOOD PRESSURE: 74 MMHG

## 2020-09-14 PROCEDURE — 99211 OFF/OP EST MAY X REQ PHY/QHP: CPT

## 2020-09-14 PROCEDURE — 99213 OFFICE O/P EST LOW 20 MIN: CPT | Performed by: NURSE PRACTITIONER

## 2020-09-14 RX ORDER — NAPROXEN 375 MG/1
375 TABLET ORAL 2 TIMES DAILY PRN
Qty: 60 TABLET | Refills: 0 | Status: SHIPPED | OUTPATIENT
Start: 2020-09-14 | End: 2020-10-14 | Stop reason: SDUPTHER

## 2020-09-14 ASSESSMENT — PATIENT HEALTH QUESTIONNAIRE - PHQ9
SUM OF ALL RESPONSES TO PHQ QUESTIONS 1-9: 0
1. LITTLE INTEREST OR PLEASURE IN DOING THINGS: 0
SUM OF ALL RESPONSES TO PHQ QUESTIONS 1-9: 0
SUM OF ALL RESPONSES TO PHQ9 QUESTIONS 1 & 2: 0
2. FEELING DOWN, DEPRESSED OR HOPELESS: 0

## 2020-09-14 NOTE — PROGRESS NOTES
[address] [address] [address] [facility], APRN-[facility]  [address]  Phone:  [phone]  Fax:  [phone]  Goyo Neighbor is a 69 y.o. male who presents today for his medical conditions/complaints as noted below. Goyo Neighbor c/o of Hand Pain (rt hand swollen \"between arthritis & gout, allopurinol not helping\")      HPI:     Hand Pain    The incident occurred more than 1 week ago. There was no injury mechanism. The pain is present in the right hand. The quality of the pain is described as aching and stabbing. The pain is at a severity of 8/10. The pain has been worsening since the incident. The symptoms are aggravated by movement and palpation. Treatments tried: allopurinol. Wt Readings from Last 3 Encounters:   20 197 lb (89.4 kg)   20 198 lb (89.8 kg)   20 180 lb (81.6 kg)       Temp Readings from Last 3 Encounters:   20 97.7 °F (36.5 °C)   20 97.7 °F (36.5 °C) (Tympanic)   19 98 °F (36.7 °C) (Tympanic)       BP Readings from Last 3 Encounters:   20 136/74   20 138/82   20 125/67       Pulse Readings from Last 3 Encounters:   20 70   20 65   20 59              Past Medical History:   Diagnosis Date    Ang[facility] rest [facility])     Arthritis     CAD (coronary artery disease)     angina    Hyperlipidemia     Hypertension     Skin cancer       Past Surgical History:   Procedure Laterality Date    CARDIAC CATHETERIZATION      x 2    COLONOSCOPY      COLONOSCOPY N/A 2018    COLONOSCOPY WITH BIOPSY performed by Mely Oh DO at [place] [address]      forehead and  mouth in [address] [place] [address] Avenue       History reviewed. No pertinent family history.   Social History     Tobacco Use    Smoking status: Former Smoker     Packs/day: 0.50     Years: 26.00     Pack years: 13.00     Types: Cigarettes     Start date:      Last attempt to quit: 1997     Years since quittin.7  Smokeless tobacco: Never Used    Tobacco comment: ly rrt 8/12/16   Substance Use Topics    Alcohol use: Yes     Alcohol/week: 1.0 standard drinks     Types: 1 Cans of beer per week     Comment: 2 beers/month      Current Outpatient Medications   Medication Sig Dispense Refill    naproxen (NAPROSYN) 375 MG tablet Take 1 tablet by mouth 2 times daily as needed for Pain 60 tablet 0    pravastatin (PRAVACHOL) 10 MG tablet Take 1 tablet by mouth once daily 90 tablet 3    bisoprolol (ZEBETA) 10 MG tablet Take 1 tablet by mouth once daily 90 tablet 3    allopurinol (ZYLOPRIM) 300 MG tablet Take 1 tablet by mouth daily 30 tablet 5    amLODIPine (NORVASC) 10 MG tablet Take 1 tablet by mouth once daily 90 tablet 0    isosorbide mononitrate (IMDUR) 30 MG extended release tablet TAKE 1 TABLET BY MOUTH ONCE DAILY 90 tablet 3    gemfibrozil (LOPID) 600 MG tablet TAKE 1 TABLET BY MOUTH ONCE DAILY 90 tablet 3    lisinopril-hydrochlorothiazide (PRINZIDE;ZESTORETIC) 20-25 MG per tablet Take 1 tablet by mouth daily 90 tablet 3    TURMERIC PO Take 1,000 mg by mouth      ibuprofen (ADVIL;MOTRIN) 200 MG tablet Take 200 mg by mouth every 6 hours as needed for Pain Taking 600mg prn      aspirin 81 MG tablet Take 81 mg by mouth      Cyanocobalamin (B-12) 2500 MCG TABS Take 2,500 mcg by mouth daily       Cholecalciferol (D3-1000 PO) Take 1,000 Units by mouth daily       b complex vitamins capsule Take 1 capsule by mouth daily      naproxen (NAPROSYN) 500 MG tablet Take 1 tablet by mouth 2 times daily (with meals) for 30 doses 30 tablet 0     No current facility-administered medications for this visit. Allergies   Allergen Reactions    Codeine Nausea Only    Dtap-Ipv Vaccine Rash    Penicillins Rash       No exam data present    Subjective:      Review of Systems   Constitutional: Negative for chills and fever. Musculoskeletal: Positive for arthralgias and joint swelling.    Skin: Positive for color change. Objective:     /74 (Site: Right Upper Arm, Position: Sitting, Cuff Size: Medium Adult)   Pulse 70   Temp 97.7 °F (36.5 °C)   Resp 16   Wt 197 lb (89.4 kg)   SpO2 97%   BMI 29.95 kg/m²     Physical Exam  Vitals signs reviewed. Constitutional:       Appearance: Normal appearance. He is not ill-appearing. Musculoskeletal:         General: Swelling and tenderness present. Right hand: He exhibits decreased range of motion, tenderness, bony tenderness and swelling. He exhibits normal two-point discrimination, normal capillary refill, no deformity and no laceration. Skin:     General: Skin is warm. Capillary Refill: Capillary refill takes less than 2 seconds. Findings: Erythema (of the right hand) present. Neurological:      Mental Status: He is alert. Assessment:      Diagnosis Orders   1. Gouty arthritis  naproxen (NAPROSYN) 375 MG tablet     No results found for this visit on 09/14/20. Plan:       Naprosyn twice daily with food. Take at 2 hours before bed. If gout returns after the Naprosyn is finished call Dr. Lady Navarro as may need increase in allopurinol dosing. Follow up with primary care provider in 1 to 2 days if needed. Continue allopurinol. Patient Instructions   Naprosyn twice daily with food. Take at 2 hours before bed. If gout returns after the Naprosyn is finished call Dr. Lady Navarro as may need increase in allopurinol dosing. Follow up with primary care provider in 1 to 2 days if needed. Continue allopurinol. Patient Education        Purine-Restricted Diet: Care Instructions  Your Care Instructions     Purines are substances that are found in some foods. Your body turns purines into uric acid. High levels of uric acid can cause gout, which is a form of arthritis that causes pain and inflammation in joints. You may be able to help control the amount of uric acid in your body by limiting high-purine foods in your diet.   Follow-up your use of this information. Patient/Caregiver instructed on use, benefit, and side effects of prescribed medications. All patient/parent/caregiver questions answered. Patient/parent/caregiver voiced understanding. Reviewed health maintenance. Instructed to continue current medications, diet and exercise. Patient agreed with treatment plan. Follow up as directed.            Electronically signed by CEM Monk NP on9/14/2020

## 2020-09-14 NOTE — PATIENT INSTRUCTIONS
Naprosyn twice daily with food. Take at 2 hours before bed. If gout returns after the Naprosyn is finished call Dr. Cici Mesa as may need increase in allopurinol dosing. Follow up with primary care provider in 1 to 2 days if needed. Continue allopurinol. Patient Education        Purine-Restricted Diet: Care Instructions  Your Care Instructions     Purines are substances that are found in some foods. Your body turns purines into uric acid. High levels of uric acid can cause gout, which is a form of arthritis that causes pain and inflammation in joints. You may be able to help control the amount of uric acid in your body by limiting high-purine foods in your diet. Follow-up care is a key part of your treatment and safety. Be sure to make and go to all appointments, and call your doctor if you are having problems. It's also a good idea to know your test results and keep a list of the medicines you take. How can you care for yourself at home? · Plan your meals and snacks around foods that are low in purines and are safe for you to eat. These foods include:  ? Green vegetables and tomatoes. ? Fruits. ? Whole-grain breads, rice, and cereals. ? Eggs, peanut butter, and nuts. ? Low-fat milk, cheese, and other milk products. ? Popcorn. ? Gelatin desserts, chocolate, cocoa, and cakes and sweets, in small amounts. · You can eat certain foods that are medium-high in purines, but eat them only once in a while. These foods include:  ? Legumes, such as dried beans and dried peas. You can have 1 cup cooked legumes each day. ? Asparagus, cauliflower, spinach, mushrooms, and green peas. ? Fish and seafood (other than very high-purine seafood). ? Oatmeal, wheat bran, and wheat germ. · Limit very high-purine foods, including:  ? Organ meats, such as liver, kidneys, sweetbreads, and brains. ? Meats, including ramirez, beef, pork, and lamb. ? Game meats and any other meats in large amounts.   ? Anchovies, sardines, herring, mackerel, and scallops. ? Gravy. ? Beer. Where can you learn more? Go to [email or web address] and sign in to your [employer] account. Enter F448 in the [facility] box to learn more about \"Purine-Restricted Diet: Care Instructions. \"     If you do not have an account, please click on the \"Sign Up Now\" link. Current as of: August 22, 2019               Content Version: 12.5  © 3870-2557 Healthwise, Incorporated. Care instructions adapted under license by [facility] ([facility]). If you have questions about a medical condition or this instruction, always ask your healthcare professional. [address] any warranty or liability for your use of this information.

## 2020-09-16 ASSESSMENT — ENCOUNTER SYMPTOMS: COLOR CHANGE: 1

## 2020-09-24 ENCOUNTER — VIRTUAL VISIT (OUTPATIENT)
Dept: FAMILY MEDICINE CLINIC | Age: 70
End: 2020-09-24
Payer: MEDICARE

## 2020-09-24 PROCEDURE — 1123F ACP DISCUSS/DSCN MKR DOCD: CPT | Performed by: FAMILY MEDICINE

## 2020-09-24 PROCEDURE — G0438 PPPS, INITIAL VISIT: HCPCS | Performed by: FAMILY MEDICINE

## 2020-09-24 PROCEDURE — 3017F COLORECTAL CA SCREEN DOC REV: CPT | Performed by: FAMILY MEDICINE

## 2020-09-24 PROCEDURE — 4040F PNEUMOC VAC/ADMIN/RCVD: CPT | Performed by: FAMILY MEDICINE

## 2020-09-24 ASSESSMENT — PATIENT HEALTH QUESTIONNAIRE - PHQ9
SUM OF ALL RESPONSES TO PHQ QUESTIONS 1-9: 0
2. FEELING DOWN, DEPRESSED OR HOPELESS: 0
1. LITTLE INTEREST OR PLEASURE IN DOING THINGS: 0
SUM OF ALL RESPONSES TO PHQ QUESTIONS 1-9: 0
SUM OF ALL RESPONSES TO PHQ9 QUESTIONS 1 & 2: 0

## 2020-09-24 NOTE — PROGRESS NOTES
daily  Yes Historical Provider, MD   b complex vitamins capsule Take 1 capsule by mouth daily Yes Historical Provider, MD       Past Medical History:   Diagnosis Date    Angina [facility] rest [facility])     Arthritis     CAD (coronary artery disease)     angina    Hyperlipidemia     Hypertension     Skin cancer        Past Surgical History:   Procedure Laterality Date    CARDIAC CATHETERIZATION      x 2    COLONOSCOPY      COLONOSCOPY N/A 9/12/2018    COLONOSCOPY WITH BIOPSY performed by Jerod Hess DO at [facility]      forehead and  mouth in [address] [facility]         No family history on file. CareTeam (Including outside providers/suppliers regularly involved in providing care):   Patient Care Team:  Ana Paula Gramajo MD as PCP - General (Family Medicine)  Ana Paula Gramajo MD as PCP - [facility] Empaneled Provider    Wt Readings from Last 3 Encounters:   09/14/20 197 lb (89.4 kg)   08/24/20 198 lb (89.8 kg)   07/07/20 180 lb (81.6 kg)      No flowsheet data found. There is no height or weight on file to calculate BMI. Based upon direct observation of the patient, evaluation of cognition reveals recent and remote memory intact. Patient's complete Health Risk Assessment and screening values have been reviewed and are found in Flowsheets. The following problems were reviewed today and where indicated follow up appointments were made and/or referrals ordered. Positive Risk Factor Screenings with Interventions:     No Positive Risk Factors identified today.     Personalized Preventive Plan   Current Health Maintenance Status  Immunization History   Administered Date(s) Administered    Influenza, High Dose (Fluzone 65 yrs and older) 09/21/2018    Influenza, Triv, inactivated, subunit, adjuvanted, IM (Fluad 65 yrs and older) 11/14/2019    Pneumococcal Conjugate 13-valent ([id number]) 11/16/2018    Pneumococcal Polysaccharide ([id number]) 12/02/2019        Health Maintenance   Topic Date Due    Hepatitis C screen  1950    DTaP/Tdap/Td vaccine (1 - Tdap) 06/12/1968    Shingles Vaccine (1 of 2) 06/12/1999    Annual Wellness Visit (AWV)  05/29/2019    Lipid screen  10/29/2019    Flu vaccine (1) 09/01/2020    A1C test (Diabetic or Prediabetic)  08/25/2021    Potassium monitoring  08/25/2021    Creatinine monitoring  08/25/2021    Statin Therapy  08/26/2021    Colon cancer screen colonoscopy  09/12/2028    Pneumococcal 65+ years Vaccine  Completed    AAA screen  Completed    Hepatitis A vaccine  Aged Out    Hepatitis B vaccine  Aged Out    Hib vaccine  Aged Out    Meningococcal (ACWY) vaccine  Aged Out     Recommendations for [employer] Due: see orders and patient instructions/AVS.  . Recommended screening schedule for the next 5-10 years is provided to the patient in written form: see Patient Instructions/AVS.    Reviewed advanced directives needs paperwork to  offered spiritual service consult     Is having some RLS   Recommended flu shot which he will get (ordered)    Recalled 1/3     Reviewed tdap and shingles shot    Reviewed healthy diet and keeping active    Gets routine eye exams done   Reviewed dental care (has dentures)  No problems with hearing        Jori Pina is a 69 y.o. male being evaluated by a Virtual Visit (phone) encounter to address concerns as mentioned above. A caregiver was present when appropriate. Due to this being a TeleHealth encounter (During DDABE-79 public health emergency), evaluation of the following organ systems was limited: Vitals/Constitutional/EENT/Resp/CV/GI//MS/Neuro/Skin/Heme-Lymph-Imm.   Pursuant to the emergency declaration under the [address] [facility], [address] [place] [address] [facility] authority and the [employer] and [employer] Act, this Virtual Visit was conducted with patient's (and/or legal guardian's) consent, to reduce the patient's risk of exposure to COVID-19 and provide necessary medical care. The patient (and/or legal guardian) has also been advised to contact this office for worsening conditions or problems, and seek emergency medical treatment and/or call 911 if deemed necessary. Patient identification was verified at the start of the visit: Yes    Services were provided through phone to substitute for in-person clinic visit. Patient and provider were located at their individual homes. --Ania Jennings MD on 9/24/2020 at 3:59 PM    An electronic signature was used to authenticate this note.

## 2020-09-24 NOTE — PATIENT INSTRUCTIONS
Personalized Preventive Plan for Jessica High - 9/24/2020  Medicare offers a range of preventive health benefits. Some of the tests and screenings are paid in full while other may be subject to a deductible, co-insurance, and/or copay. Some of these benefits include a comprehensive review of your medical history including lifestyle, illnesses that may run in your family, and various assessments and screenings as appropriate. After reviewing your medical record and screening and assessments performed today your provider may have ordered immunizations, labs, imaging, and/or referrals for you. A list of these orders (if applicable) as well as your Preventive Care list are included within your After Visit Summary for your review. Other Preventive Recommendations:    · A preventive eye exam performed by an eye specialist is recommended every 1-2 years to screen for glaucoma; cataracts, macular degeneration, and other eye disorders. · A preventive dental visit is recommended every 6 months. · Try to get at least 150 minutes of exercise per week or 10,000 steps per day on a pedometer . · Order or download the FREE \"Exercise & Physical Activity: Your Everyday Guide\" from [employer]. Call 7-885.711.4550 or search [employer] online. · You need 9154-0157 mg of calcium and 2646-6377 IU of vitamin D per day. It is possible to meet your calcium requirement with diet alone, but a vitamin D supplement is usually necessary to meet this goal.  · When exposed to the sun, use a sunscreen that protects against both UVA and UVB radiation with an SPF of 30 or greater. Reapply every 2 to 3 hours or after sweating, drying off with a towel, or swimming. · Always wear a seat belt when traveling in a car. Always wear a helmet when riding a bicycle or motorcycle.   Patient information: Weight loss treatments    INTRODUCTION -- Obesity is a major international problem, and Americans are more weight and staying at your current weight (or within 5 percent). Many people have a \"dream\" weight that is difficult or impossible to achieve. People at high risk of developing diabetes who are able to lose 5 percent of their body weight and maintain this weight will reduce their risk of developing diabetes by about 50 percent and reduce their blood pressure. This is a success. Losing more than 15 percent of your body weight and staying at this weight is an extremely good result, even if you never reach your \"dream\" or \"ideal\" weight. LIFESTYLE CHANGES -- Programs that help you to change your lifestyle are usually run by psychologists or other professionals. The goals of lifestyle changes are to help you change your eating habits, become more active, and be more aware of how much you eat and exercise, helping you to make healthier choices. This type of treatment can be broken down into three steps: The triggers that make you want to eat   Eating   What happens after you eat  Triggers to eat -- Determining what triggers you to eat involves figuring out what foods you eat and where and when you eat. To figure out what triggers you to eat, keep a record for a few days of everything you eat, the places where you eat, how often you eat, and the emotions you were feeling when you ate. For some people, the trigger is related to a certain time of day or night. For others, the trigger is related to a certain place, like sitting at a desk working. Eating -- You can change your eating habits by breaking the chain of events between the trigger for eating and eating itself. There are many ways to do this.  For instance, you can:  Limit where you eat to a few places (eg, dining room)   Restrict the number of utensils (eg, only a fork) used for eating   Drink a sip of water between each bite   Chew your food a certain number of times   Get up and stop eating every few minutes  What happens after you eat -- Rewarding yourself for good eating behaviors can help you to develop better habits. This is not a reward for weight loss; instead, it is a reward for changing unhealthy behaviors. Do not use food as a reward. Some people find money, clothing, or personal care (eg, a hair cut, manicure, or massage) to be effective rewards. Treat yourself immediately after making better eating choices to reinforce the value of the good behavior. You need to have clear behavior goals, and you must have a time frame for reaching your goals. Reward small changes along the way to your final goal.  Other factors that contribute to successful weight loss -- Changing your behavior involves more than just changing unhealthy eating habits; it also involves finding people around you to support your weight loss, reducing stress, and learning to be strong when tempted by food. Establish a \"costa\" system -- Having a friend or family member available to provide support and reinforce good behavior is very helpful. The support person needs to understand your goals. Learn to be strong -- Learning to be strong when tempted by food is an important part of losing weight. As an example, you will need to learn how to say \"no\" and continue to say no when urged to eat at parties and social gatherings. Develop strategies for events before you go, such as eating before you go or taking low-calorie snacks and drinks with you. Develop a support system -- Having a support system is helpful when losing weight. This is why many [employer] groups are successful. Family support is also essential; if your family does not support your efforts to lose weight, this can slow your progress or even keep you from losing weight. Positive thinking -- People often have conversations with themselves in their head; these conversations can be positive or negative.  If you eat a piece of cake that was not planned, you may respond by thinking, \"Oh, you stupid idiot, you've blown your diet!\" and as a result, you may eat more cake. A positive thought for the same event could be, \"Well, I ate cake when it was not on my plan. Now I should do something to get back on track. \" A positive approach is much more likely to be successful than a negative one. Reduce stress -- Although stress is a part of everyday life, it can trigger uncontrolled eating in some people. It is important to find a way to get through these difficult times without eating or by eating low-calorie food, like raw vegetables. It may be helpful to imagine a relaxing place that allows you to temporarily escape from stress. With deep breaths and closed eyes, you can imagine this relaxing place for a few minutes. Self-help programs -- Self-help programs like [employer] Watchers®, Overeaters Anonymous®, and Take Off [place] (TOPS)© work for some people. As with all weight loss programs, you are most likely to be successful with these plans if you make long-term changes in how you eat. CHOOSING A DIET -- A calorie is a unit of energy found in food. Your body needs calories to function. The goal of any diet is to burn up more calories than you eat. How quickly you lose weight depends upon several factors, such as your age, gender, and starting weight. Older people have a slower metabolism than young people, so they lose weight more slowly. Men lose more weight than women of similar height and weight when dieting because they use more energy. People who are extremely overweight lose weight more quickly than those who are only mildly overweight. Try not to drink alcohol or drinks with added sugar, and most sweets (candy, cakes, cookies), since they rarely contain important nutrients. Portion-controlled diets -- One simple way to diet is to buy packaged foods, like frozen low-calorie meals or meal-replacement canned drinks.  A typical meal plan for one day may include:  A meal-replacement drink or breakfast bar for breakfast cheese). A relatively low amount of red meat and meat products. Substitute fish or poultry for red meat. For those who drink alcohol, a modest amount (mainly as red wine) may help to protect against cardiovascular disease. A modest amount is up to one (4 ounce) glass per day for women and up to two glasses per day for men. Which diet is best? -- Studies have compared different diets, including:  Very-low-carbohydrate (Atkins)   Macronutrient balance controlling glycemic load (Zone®)   Reduced-calorie (Weight Watchers®)   Very-low-fat (Ornish)  No one diet is \"best\" for weight loss. Any diet will help you to lose weight if you stick with the diet. Therefore, it is important to choose a diet that includes foods you like. Fad diets -- Fad diets often promise quick weight loss (more than 1 to 2 pounds per week) and may claim that you do not need to exercise or give up favorite foods. Some fad diets cost a lot of money, because you have to pay for seminars or pills. Fad diets generally lack any scientific evidence that they are safe and effective, but instead rely on \"before\" and \"after\" photos or testimonials. Diets that sound too good to be true usually are. These plans are a waste of time and money and are not recommended. A doctor, nurse, or nutritionist can help you find a safe and effective way to lose weight and keep it off. WEIGHT LOSS MEDICINES -- Taking a weight loss medicine may be helpful when used in combination with diet, exercise, and lifestyle changes. However, it is important to understand the risks and benefits of these medicines. It is also important to be realistic about your goal weight using a weight loss medicine; you may not reach your \"dream\" weight, but you may be able to reduce your risk of diabetes or heart disease.    Weight loss medicines may be recommended for people who have not been able to lose weight with diet and exercise who have a:  BMI of 30 or more    BMI between 27 and diarrhea, leakage of stool, or oily stools. These problems are more likely when you take orlistat with a high-fat meal (if more than 30 percent of calories in the meal are from fat). Side effects usually improve as you learn to avoid high-fat foods. Severe liver injury has been reported rarely in patients taking orlistat, but it is not known if orlistat caused the liver problems. Diet supplements -- Diet supplements are widely used by people who are trying to lose weight, although the safety and efficacy of these supplements are often unproven. A few of the more common diet supplements are discussed below; none of these are recommended because they have not been studied carefully, and there is no proof they are safe or effective. Chitosan and wheat dextrin are ineffective for weight loss, and their use is not recommended. Ephedra, a compound related to ephedrine, is no longer available in the [place] due to safety concerns. Many nonprescription diet pills previously contained ephedra. Although some studies have shown that ephedra helps with weight loss, there can be serious side effects (psychiatric symptoms, palpitations, and stomach upset), including death. There are not enough data about the safety and efficacy of chromium, ginseng, glucomannan, green tea, hydroxycitric acid, L carnitine, psyllium, pyruvate supplements, [place] wort, and conjugated linoleic acid. Two supplements from [facility], [address] [place] (also known as the Laya Jimenez 15 pill) and Herbathin dietary supplement, have been shown to contain prescription drugs. Hoodia gordonii is a dietary supplement derived from a plant in [place]. It is not recommended because there is no proof that it is safe or effective. Bitter orange (Citrus aurantium) can increase your heart rate and blood pressure and is not recommended.   SHOULD I HAVE SURGERY TO LOSE WEIGHT? -- Weight loss surgery is recommended ONLY for people with one of the following:  Severe obesity (body mass index above 40) (calculator 1 and calculator 2) who have not responded to diet, exercise, or weight loss medicines   Body mass index between 35 and 40, along with a serious medical problem (including diabetes, severe joint pain, or sleep apnea) that would improve with weight loss  You should be sure that you understand the potential risks and benefits of weight loss surgery. You must be motivated and willing to make lifelong changes in how you eat to reach and maintain a healthier weight after surgery. You must also be realistic about weight loss after surgery (see 'Effectiveness of weight loss surgery' below). PREPARING FOR WEIGHT LOSS SURGERY -- Most people who have weight loss surgery will meet with several specialists before surgery is scheduled. This often includes a dietitian, mental health counselor, a doctor who specializes in care of obese people, and a surgeon who performs weight loss surgery (bariatric surgeon). You may need to work with these providers for several weeks or months before surgery. The nutritionist will explain what and how much you will be able to eat after surgery. You may also need to lose a small amount of weight before surgery. The mental health specialist will help you to cope with stress and other factors that can make it harder to lose weight or trigger you to eat   The medical doctor will determine whether you need other tests, counseling, or treatment before surgery. He or she might also help you begin a medical weight loss program so that you can lose some weight before surgery. The bariatric surgeon will meet with you to discuss the surgeries available to treat obesity. He or she will also make sure you are a good candidate for surgery. TYPES OF WEIGHT LOSS SURGERY -- There are several types of weight loss surgeries, the most common being lap banding, gastric bypass, and gastric sleeve.   Lap banding -- Laparoscopic adjustable gastric banding (LAGB), or lap banding, is a surgery that uses an adjustable band around the opening to the stomach (figure 1). This reduces the amount of food that you can eat at one time. Lap banding is done through small incisions, with a laparoscope. The band can be adjusted after surgery, allowing you to eat more or less food. Adjustments to the size and tightness of the band are made by using a needle to add or remove fluid from a port (a small container under the skin that is connected to the band). Adding fluid to the band makes it tighter which restricts the amount of food you can eat and may help you to lose more weight. Lap banding is a popular choice because it is relatively simple to perform, can be adjusted or removed, and has a low risk of serious complications immediately after surgery. However, weight loss with the lap band depends on your ability to follow the program closely. You will need to prepare nutritious meals that [facility] with\" the band, not against it. For example, the lap band will not work well if you eat or drink a large amount of liquid calories (like ice cream). The band will not help you to feel full when you eat/drink liquid calories. Weight loss ranges from 45 to 75 percent after two years. As an example, a person who is 120 pounds overweight could expect to lose approximately 54 to 90 pounds in the two years after lap banding. Gastric bypass -- Omaira-en-Y gastric bypass, also called gastric bypass, helps you to lose weight by reducing the amount of food you can eat and reducing the number of calories and nutrients you absorb from the food you eat. To perform gastric bypass, a surgeon creates a small stomach pouch by dividing the stomach and attaching it to the small intestine. This helps you to lose weight in two ways: The smaller stomach can hold less food than before surgery. This causes you to feel full after eating a very small amount of food or liquid.  Over time, the pouch might problems you had before surgery. Some of the more common early surgical complications (one to six weeks after surgery) include:  Bleeding   Infection   Blockage or tear in the bowels   Need for further surgery  Important medical complications after surgery can include blood clots in the legs or lungs, heart attack, pneumonia, and urinary tract infection. Complications are less likely when surgery is performed in centers that are experienced in weight loss surgery. In general, centers with experience in weight loss surgery have:  Board-certified doctors and surgeons   A team of support staff (dietitians, counselors, nurses)   Long-term follow-up after surgery   Hospital staff experienced with the care of weight loss patients. This includes nurses who are trained in the care of patients immediately after surgery and anesthesiologists who are experienced in caring for the morbidly obese. EFFECTIVENESS OF WEIGHT LOSS SURGERY -- The goal of weight loss surgery is to reduce the risk of illness or death associated with obesity. Weight loss surgery can also help you to feel and look better, reduce the amount of money you spend on medicines, and cut down on sick days. As an example, weight loss surgery can improve health problems related to obesity (diabetes, high blood pressure, high cholesterol, sleep apnea) to the point that you need less or no medicine. Finally, weight loss surgery might reduce your risk of developing heart disease, cancer, and certain infections. AFTER WEIGHT LOSS SURGERY -- You will need to stay in the hospital until your team feels that it is safe for you to leave (on average, one to three days). Do not drive if you are taking prescription pain medicine. Begin exercising as soon as possible once you have healed; most weight loss centers will design an exercise program for you. Once you are home, it is important to eat and drink exactly what your doctor and dietitian recommend.  You will see your doctor, nurse, and dietitian on a regular basis after surgery to monitor your health, diet, and weight loss. You will be able to slowly increase how much you eat over time, although it will always be important to:  Eat small, frequent meals and not skip meals   Chew your food slowly and completely   Avoid eating while \"distracted\" (such as eating while watching TV)   Stop eating when you feel full   Drink liquids at least 30 minutes before or after eating   Avoid foods high in fat or sugar   Take vitamin supplements, as recommended  It can take several months to learn to listen to your body so that you know when you are hungry and when you are full. You may dislike foods you previously loved, and you may begin to prefer new foods. This can be a frustrating process for some people, so talk to your dietitian if you are having trouble. It usually takes between one and two years to lose weight after surgery. After reaching their goal weight, some people have plastic surgery (called \"body contouring\") to remove excess skin from the body, particularly in the abdominal area. Before you decide to have weight loss surgery, you must commit to staying healthy for life. This includes following up with your healthcare team, exercising most days of the week, and eating a sensible diet every day. It can be difficult to develop new eating and exercise habits after weight loss surgery, and you will have to work hard to stick to your goals. Recovering from surgery and losing weight can be stressful and emotional, and it is important to have the support of family and friends. Working with a , therapist, or support group can help you through the ups and downs. WHERE TO GET MORE INFORMATION -- Your healthcare provider is the best source of information for questions and concerns related to your medical problem.   This article will be updated as needed every four months on our Web site ([email or web address])  ·     Keep Your Memory Darlys Donning       Many factors can affect your ability to remembera hectic lifestyle, aging, stress, chronic disease, and certain medicines. But, there are steps you can take to sharpen your mind and help preserve your memory. Challenge Your Brain   Regularly challenging your mind may help keeps it in top shape. Good mental exercises include:   Crossword puzzlesUse a dictionary if you need it; you will learn more that way. Brainteasers Try some! Crafts, such as wood working and sewing   Hobbies, such as gardening and building model airplanes   SocializingVisit old friends or join groups to meet new ones. Reading   Learning a new language   Taking a class, whether it be art history or jay chi   TravelingExperience the food, history, and culture of your destination   Learning to use a computer   Going to museums, the theater, or thought-provoking movies   Changing things in your daily life, such as reversing your pattern in the grocery store or brushing your teeth using your nondominant hand   Use Memory Aids   There is no need to remember every detail on your own. These memory aids can help:   Calendars and day planners   Electronic organizers to store all sorts of helpful informationThese devices can \"beep\" to remind you of appointments. A book of days to record birthdays, anniversaries, and other occasions that occur on the same date every year   Detailed \"to-do\" lists and strategically placed sticky notes   Quick \"study\" sessionsBefore a gathering, review who will be there so their names will be fresh in your mind. Establish routinesFor example, keep your keys, wallet, and umbrella in the same place all the time or take medicine with your 8:00 AM glass of juice   Live a Healthy Life   Many actions that will keep your body strong will do the same for your mind.  For example:   Talk to Your Doctor About Herbs and Supplements    Malnutrition and vitamin deficiencies can impair your mental function. For example, vitamin B12 deficiency can cause a range of symptoms, including confusion. But, what if your nutritional needs are being met? Can herbs and supplements still offer a benefit? Researchers have investigated a range of natural remedies, such as ginkgo , ginseng , and the supplement phosphatidylserine (PS). So far, though, the evidence is inconsistent as to whether these products can improve memory or thinking. If you are interested in taking herbs and supplements, talk to your doctor first because they may interact with other medicines that you are taking. Exercise Regularly    Among the many benefits of regular exercise are increased blood flow to the brain and decreased risk of certain diseases that can interfere with memory function. One study found that even moderate exercise has a beneficial effect. Examples of \"moderate\" exercise include:   Playing 18 holes of golf once a week, without a cart   Playing tennis twice a week   Walking one mile per day   Manage Stress    It can be tough to remember what is important when your mind is cluttered. Make time for relaxation. Choose activities that calm you down, and make it routine. Manage Chronic Conditions    Side effects of high blood pressure , diabetes, and heart disease can interfere with mental function. Many of the lifestyle steps discussed here can help manage these conditions. Strive to eat a healthy diet, exercise regularly, get stress under control, and follow your doctor's advice for your condition. Minimize Medications    Talk to your doctor about the medicines that you take. Some may be unnecessary. Also, healthy lifestyle habits may lower the need for certain drugs.      Last Reviewed: April 2010 Yesenia Shea MD   Updated: 4/13/2010   ·     [address] [address] [facility]       As we get older, changes in balance, gait, strength, vision, hearing, and cognition make even the most youthful senior more prone to accidents. Falls are one of the leading health risks for older people. This increased risk of falling is related to:   Aging process (eg, decreased muscle strength, slowed reflexes)   Higher incidence of chronic health problems (eg, arthritis, diabetes) that may limit mobility, agility or sensory awareness   Side effects of medicine (eg, dizziness, blurred vision)especially medicines like prescription pain medicines and drugs used to treat mental health conditions   Depending on the brittleness of your bones, the consequences of a fall can be serious and long lasting. Home Life   Research by the Association of Aging [facility]) shows that some home accidents among older adults can be prevented by making simple lifestyle changes and basic modifications and repairs to the home environment. Here are some lifestyle changes that experts recommend:   Have your hearing and vision checked regularly. Be sure to wear prescription glasses that are right for you. Speak to your doctor or pharmacist about the possible side effects of your medicines. A number of medicines can cause dizziness. If you have problems with sleep, talk to your doctor. Limit your intake of alcohol. If necessary, use a cane or walker to help maintain your balance. Wear supportive, rubber-soled shoes, even at home. If you live in a region that gets wintry weather, you may want to put special cleats on your shoes to prevent you from slipping on the snow and ice. Exercise regularly to help maintain muscle tone, agility, and balance. Always hold the banister when going up or down stairs. Also, use  bars when getting in or out of the bath or shower, or using the toilet. To avoid dizziness, get up slowly from a lying down position. Sit up first, dangling your legs for a minute or two before rising to a standing position.    Overall Home Safety Check   According to the Consumer Product Safety Commision's \"Older Consumer Home Safety Checklist,\" it is important to check for potential hazards in each room. And remember, proper lighting is an essential factor in home safety. If you cannot see clearly, you are more likely to fall. Important questions to ask yourself include:   Are lamp, electric, extension, and telephone cords placed out of the flow of traffic and maintained in good condition? Have frayed cords been replaced? Are all small rugs and runners slip resistant? If not, you can secure them to the floor with a special double-sided carpet tape. Are smoke detectors properly locatedone on every floor of your home and one outside of every sleeping area? Are they in good working order? Are batteries replaced at least once a year? Do you have a well-maintained carbon monoxide detector outside every sleeping are in your home? Does your furniture layout leave plenty of space to maneuver between and around chairs, tables, beds, and sofas? Are hallways, stairs and passages between rooms well lit? Can you reach a lamp without getting out of bed? Are floor surfaces well maintained? Shag rugs, high-pile carpeting, tile floors, and polished wood floors can be particularly slippery. Stairs should always have handrails and be carpeted or fitted with a non-skid tread. Is your telephone easily reachable. Is the cord safely tucked away? Room by Room   According to the Association of Aging, bathrooms and yasmeen are the two most potentially hazardous rooms in your home. In the Kitchen    Be sure your stove is in proper working order and always make sure burners and the oven are off before you go out or go to sleep. Keep pots on the back burners, turn handles away from the front of the stove, and keep stove clean and free of grease build-up. Kitchen ventilation systems and range exhausts should be working properly. Keep flammable objects such as towels and pot holders away from the cooking area except when in use.  Make sure kitchen curtains are tied back. Move cords and appliances away from the sink and hot surfaces. If extension cords are needed, install wiring guides so they do not hang over the sink, range, or working areas. Look for coffee pots, kettles and toaster ovens with automatic shut-offs. Keep a mop handy in the kitchen so you can wipe up spills instantly. You should also have a small fire extinguisher. Arrange your kitchen with frequently used items on lower shelves to avoid the need to stand on a stepstool to reach them. Make sure countertops are well-lit to avoid injuries while cutting and preparing food. In the Bathroom    Use a non-slip mat or decals in the tub and shower, since wet, soapy tile or porcelain surfaces are extremely slippery. Make sure bathroom rugs are non-skid or tape them firmly to the floor. Bathtubs should have at least one, preferably two, grab bars, firmly attached to structural supports in the wall. (Do not use built-in soap holders or glass shower doors as grab bars.)    Tub seats fitted with non-slip material on the legs allow you to wash sitting down. For people with limited mobility, bathtub transfer benches allow you to slide safely into the tub. Raised toilet seats and toilet safety rails are helpful for those with knee or hip problems. In the [facility]    Make sure you use a nightlight and that the area around your bed is clear of potential obstacles. Be careful with electric blankets and never go to sleep with a heating pad, which can cause serious burns even if on a low setting. Use fire-resistant mattress covers and pillows, and NEVER smoke in bed. Keep a phone next to the bed that is programmed to dial 911 at the push of a button. If you have a chronic condition, you may want to sign on with an automatic call-in service. Typically the system includes a small pendant that connects directly to an emergency medical voice-response system.  You should also make arrangements to stay in contact with someonefriend, neighbor, family memberon a regular schedule. Fire Prevention   According to the [employer] (Smoke Alarms for Every) [address] [facility], senior citizens are one of the two highest risk groups for death and serious injuries due to residential fires. When cooking, wear short-sleeved items, never a bulky long-sleeved robe. The [facility]'s Safety Checklist for Older Consumers emphasizes the importance of checking basements, garages, workshops and storage areas for fire hazards, such as volatile liquids, piles of old rags or clothing and overloaded circuits. Never smoke in bed or when lying down on a couch or recliner chair. Small portable electric or kerosene heaters are responsible for many home fires and should be used cautiously if at all. If you do use one, be sure to keep them away from flammable materials. In case of fire, make sure you have a pre-established emergency exit plan. Have a professional check your fireplace and other fuel-burning appliances yearly. Helping Hands   Baby boomers entering the reyna years will continue to see the development of new products to help older adults live safely and independently in spite of age-related changes. Making Life More Livable  , by [employer] [employer], lists over 1,000 products for \"living well in the mature years,\" such as bathing and mobility aids, household security devices, ergonomically designed knives and peelers, and faucet valves and knobs for temperature control. Medical supply stores and organizations are good sources of information about products that improve your quality of life and insure your safety. Last Reviewed: November 2009 Jennifer Elliott MD   Updated: 3/7/2011     ·        Advance Care Planning: Care Instructions  Your Care Instructions     It can be hard to live with an illness that cannot be cured.  But if your health is getting worse, you may want to make decisions about end-of-life care. Planning for the end of your life does not mean that you are giving up. It is a way to make sure that your wishes are met. Clearly stating your wishes can make it easier for your loved ones. Making plans while you are still able may also ease your mind and make your final days less stressful and more meaningful. Follow-up care is a key part of your treatment and safety. Be sure to make and go to all appointments, and call your doctor if you are having problems. It's also a good idea to know your test results and keep a list of the medicines you take. What can you do to plan for the end of life? You can bring these issues up with your doctor. You do not need to wait until your doctor starts the conversation. You might start with \"I would not be willing to live with . Cesar Drones Cesar Drones Cesar Drones \" When you complete this sentence it helps your doctor understand your wishes. Talk openly and honestly with your doctor. This is the best way to understand the decisions you will need to make as your health changes. Know that you can always change your mind. Ask your doctor about commonly used life-support measures. These include tube feedings, breathing machines, and fluids given through a vein (IV). Understanding these treatments will help you decide whether you want them. You may choose to have these life-supporting treatments for a limited time. This allows a trial period to see whether they will help you. You may also decide that you want your doctor to take only certain measures to keep you alive. It is important to spell out these conditions so that your doctor and family understand them. Talk to your doctor about how long you are likely to live. He or she may be able to give you an idea of what usually happens with your specific illness. Think about preparing papers that state your wishes. This way there will not be any confusion about what you want. You can change your instructions at any time.   Which Enter P184 in the [facility] box to learn more about \"Advance Care Planning: Care Instructions. \"     If you do not have an account, please click on the \"Sign Up Now\" link. Current as of: December 9, 2019               Content Version: 12.5  © 8939-2229 Healthwise, Incorporated. Care instructions adapted under license by [facility] ([facility]). If you have questions about a medical condition or this instruction, always ask your healthcare professional. [address] any warranty or liability for your use of this information.     ·

## 2020-09-30 ENCOUNTER — NURSE ONLY (OUTPATIENT)
Dept: FAMILY MEDICINE CLINIC | Age: 70
End: 2020-09-30
Payer: MEDICARE

## 2020-09-30 PROCEDURE — PBSHW INFLUENZA, QUADV, ADJUVANTED, 65 YRS +, IM, PF, PREFILL SYR, 0.5ML (FLUAD): Performed by: FAMILY MEDICINE

## 2020-09-30 PROCEDURE — 90471 IMMUNIZATION ADMIN: CPT

## 2020-09-30 NOTE — PROGRESS NOTES
Vaccine Information Sheet, \"Influenza - Inactivated\"  given to Yesenia Renteria  ,or parent/legal guardian of  Yesenia Renteria and verbalized understanding. Patient responses:    Have you ever had a reaction to a flu vaccine? No  Are you able to eat eggs without adverse effects? Yes  Do you have any current illness? No  Have you ever had Guillian [place] Syndrome? No    Flu vaccine given per order. Please see immunization tab.

## 2020-10-14 RX ORDER — NAPROXEN 375 MG/1
375 TABLET ORAL 2 TIMES DAILY PRN
Qty: 90 TABLET | Refills: 3 | Status: SHIPPED | OUTPATIENT
Start: 2020-10-14 | End: 2021-05-06

## 2020-10-14 NOTE — TELEPHONE ENCOUNTER
Uriah Atkins is requesting a refill on the following medication(s):  Requested Prescriptions      No prescriptions requested or ordered in this encounter       Last Visit Date (If Applicable):  5/59/2446    Next Visit Date:    Visit date not found

## 2020-11-03 ENCOUNTER — VIRTUAL VISIT (OUTPATIENT)
Dept: FAMILY MEDICINE CLINIC | Age: 70
End: 2020-11-03
Payer: MEDICARE

## 2020-11-03 PROCEDURE — 99441 PR PHYS/QHP TELEPHONE EVALUATION 5-10 MIN: CPT | Performed by: FAMILY MEDICINE

## 2020-11-03 NOTE — PROGRESS NOTES
Crista Ledesma is a 69 y.o. male evaluated via telephone on 11/3/2020. Consent:  He and/or health care decision maker is aware that that he may receive a bill for this telephone service, depending on his insurance coverage, and has provided verbal consent to proceed: Yes      Documentation:  I communicated with the patient and/or health care decision maker about   Wife  of covid today. Details of this discussion including any medical advice provided:     She got ill last week and  within a week  He would like to be tested since he has been in close contact with her    He has no type of symptoms at this time    Will order a covid test  He is in quarantine    Grieving counseling offered and refused         I affirm this is a Patient Initiated Episode with a Patient who has not had a related appointment within my department in the past 7 days or scheduled within the next 24 hours.     Patient identification was verified at the start of the visit: Yes    Total Time: minutes: 5-10 minutes    Note: not billable if this call serves to triage the patient into an appointment for the relevant concern      Tianna Dean

## 2021-03-29 RX ORDER — ALLOPURINOL 300 MG/1
300 TABLET ORAL DAILY
Qty: 90 TABLET | Refills: 3 | Status: SHIPPED | OUTPATIENT
Start: 2021-03-29 | End: 2021-11-15 | Stop reason: ALTCHOICE

## 2021-03-29 NOTE — TELEPHONE ENCOUNTER
Richi Mccann is requesting a refill on the following medication(s):  Requested Prescriptions     Pending Prescriptions Disp Refills    allopurinol (ZYLOPRIM) 300 MG tablet 90 tablet 3     Sig: Take 1 tablet by mouth daily       Last Visit Date (If Applicable):  73/3/1589    Next Visit Date:    Visit date not found

## 2021-04-06 RX ORDER — GEMFIBROZIL 600 MG/1
TABLET, FILM COATED ORAL
Qty: 90 TABLET | Refills: 3 | Status: SHIPPED | OUTPATIENT
Start: 2021-04-06 | End: 2021-07-22 | Stop reason: SDUPTHER

## 2021-04-06 NOTE — TELEPHONE ENCOUNTER
Funmi Gamino is requesting a refill on the following medication(s):  Requested Prescriptions     Pending Prescriptions Disp Refills    gemfibrozil (LOPID) 600 MG tablet 90 tablet 3     Sig: TAKE 1 TABLET BY MOUTH ONCE DAILY       Last Visit Date (If Applicable):  42/4/1101    Next Visit Date:    Visit date not found

## 2021-04-29 RX ORDER — AMLODIPINE BESYLATE 10 MG/1
TABLET ORAL
Qty: 90 TABLET | Refills: 0 | Status: SHIPPED | OUTPATIENT
Start: 2021-04-29 | End: 2021-07-22 | Stop reason: SDUPTHER

## 2021-06-08 RX ORDER — AMLODIPINE BESYLATE 10 MG/1
TABLET ORAL
Qty: 90 TABLET | Refills: 0 | OUTPATIENT
Start: 2021-06-08

## 2021-06-08 NOTE — TELEPHONE ENCOUNTER
Luiz Justin is requesting a refill on the following medication(s):  Requested Prescriptions     Pending Prescriptions Disp Refills    amLODIPine (NORVASC) 10 MG tablet 90 tablet 0       Last Visit Date (If Applicable):  Visit date not found    Next Visit Date:    Visit date not found

## 2021-07-22 ENCOUNTER — OFFICE VISIT (OUTPATIENT)
Dept: FAMILY MEDICINE CLINIC | Age: 71
End: 2021-07-22
Payer: COMMERCIAL

## 2021-07-22 VITALS
WEIGHT: 192 LBS | HEIGHT: 68 IN | BODY MASS INDEX: 29.1 KG/M2 | OXYGEN SATURATION: 98 % | SYSTOLIC BLOOD PRESSURE: 138 MMHG | HEART RATE: 56 BPM | DIASTOLIC BLOOD PRESSURE: 72 MMHG

## 2021-07-22 DIAGNOSIS — E78.5 HYPERLIPIDEMIA, UNSPECIFIED HYPERLIPIDEMIA TYPE: Primary | ICD-10-CM

## 2021-07-22 DIAGNOSIS — I10 ESSENTIAL HYPERTENSION: ICD-10-CM

## 2021-07-22 DIAGNOSIS — Z12.5 SCREENING FOR PROSTATE CANCER: ICD-10-CM

## 2021-07-22 DIAGNOSIS — M25.561 CHRONIC PAIN OF BOTH KNEES: ICD-10-CM

## 2021-07-22 DIAGNOSIS — Z11.59 NEED FOR HEPATITIS C SCREENING TEST: ICD-10-CM

## 2021-07-22 DIAGNOSIS — M10.9 GOUTY ARTHRITIS: ICD-10-CM

## 2021-07-22 DIAGNOSIS — G89.29 CHRONIC PAIN OF BOTH KNEES: ICD-10-CM

## 2021-07-22 DIAGNOSIS — I25.10 CORONARY ARTERY DISEASE WITHOUT ANGINA PECTORIS, UNSPECIFIED VESSEL OR LESION TYPE, UNSPECIFIED WHETHER NATIVE OR TRANSPLANTED HEART: ICD-10-CM

## 2021-07-22 DIAGNOSIS — M25.562 CHRONIC PAIN OF BOTH KNEES: ICD-10-CM

## 2021-07-22 PROCEDURE — 99214 OFFICE O/P EST MOD 30 MIN: CPT | Performed by: NURSE PRACTITIONER

## 2021-07-22 RX ORDER — PRAVASTATIN SODIUM 10 MG
TABLET ORAL
Qty: 90 TABLET | Refills: 3 | Status: SHIPPED | OUTPATIENT
Start: 2021-07-22

## 2021-07-22 RX ORDER — NAPROXEN 375 MG/1
TABLET ORAL
Qty: 60 TABLET | Refills: 0 | Status: CANCELLED | OUTPATIENT
Start: 2021-07-22

## 2021-07-22 RX ORDER — ISOSORBIDE MONONITRATE 30 MG/1
TABLET, EXTENDED RELEASE ORAL
Qty: 90 TABLET | Refills: 3 | Status: SHIPPED | OUTPATIENT
Start: 2021-07-22

## 2021-07-22 RX ORDER — BISOPROLOL FUMARATE 10 MG/1
TABLET ORAL
Qty: 90 TABLET | Refills: 3 | Status: SHIPPED | OUTPATIENT
Start: 2021-07-22

## 2021-07-22 RX ORDER — AMLODIPINE BESYLATE 10 MG/1
TABLET ORAL
Qty: 90 TABLET | Refills: 3 | Status: SHIPPED | OUTPATIENT
Start: 2021-07-22

## 2021-07-22 RX ORDER — MELOXICAM 7.5 MG/1
7.5 TABLET ORAL DAILY
Qty: 30 TABLET | Refills: 3 | Status: SHIPPED | OUTPATIENT
Start: 2021-07-22 | End: 2021-11-11 | Stop reason: ALTCHOICE

## 2021-07-22 RX ORDER — GEMFIBROZIL 600 MG/1
TABLET, FILM COATED ORAL
Qty: 90 TABLET | Refills: 3 | Status: SHIPPED | OUTPATIENT
Start: 2021-07-22

## 2021-07-22 SDOH — ECONOMIC STABILITY: FOOD INSECURITY: WITHIN THE PAST 12 MONTHS, THE FOOD YOU BOUGHT JUST DIDN'T LAST AND YOU DIDN'T HAVE MONEY TO GET MORE.: NEVER TRUE

## 2021-07-22 SDOH — ECONOMIC STABILITY: FOOD INSECURITY: WITHIN THE PAST 12 MONTHS, YOU WORRIED THAT YOUR FOOD WOULD RUN OUT BEFORE YOU GOT MONEY TO BUY MORE.: NEVER TRUE

## 2021-07-22 ASSESSMENT — ENCOUNTER SYMPTOMS
ALLERGIC/IMMUNOLOGIC NEGATIVE: 1
GASTROINTESTINAL NEGATIVE: 1
SHORTNESS OF BREATH: 0
CHEST TIGHTNESS: 0
EYES NEGATIVE: 1
RESPIRATORY NEGATIVE: 1

## 2021-07-22 ASSESSMENT — PATIENT HEALTH QUESTIONNAIRE - PHQ9
1. LITTLE INTEREST OR PLEASURE IN DOING THINGS: 0
SUM OF ALL RESPONSES TO PHQ9 QUESTIONS 1 & 2: 0
SUM OF ALL RESPONSES TO PHQ QUESTIONS 1-9: 0
2. FEELING DOWN, DEPRESSED OR HOPELESS: 0

## 2021-07-22 ASSESSMENT — SOCIAL DETERMINANTS OF HEALTH (SDOH): HOW HARD IS IT FOR YOU TO PAY FOR THE VERY BASICS LIKE FOOD, HOUSING, MEDICAL CARE, AND HEATING?: NOT HARD AT ALL

## 2021-07-22 NOTE — PROGRESS NOTES
Claudette Aran (:  1950) is a 66 y.o. male,Established patient, here for evaluation of the following chief complaint(s):  Hypertension (previous errol pt. ) and Knee Pain (posterior bilateral knees. hx of arthritis.)         ASSESSMENT/PLAN:  1. Hyperlipidemia, unspecified hyperlipidemia type  -     Lipid Panel; Future  -     Comprehensive Metabolic Panel; Future  -     Vitamin D 25 Hydroxy; Future  -     Christine Mike MD, Cardiology, [place]  2. Gouty arthritis  3. Coronary artery disease without angina pectoris, unspecified vessel or lesion type, unspecified whether native or transplanted heart  -     isosorbide mononitrate (IMDUR) 30 MG extended release tablet; Take 1 tablet by mouth once daily, Disp-90 tablet, R-3Normal  -     amLODIPine (NORVASC) 10 MG tablet; Take 1 tablet by mouth once daily, Disp-90 tablet, R-3Normal  -     pravastatin (PRAVACHOL) 10 MG tablet; Take 1 tablet by mouth once daily, Disp-90 tablet, R-3Normal  -     CBC Auto Differential; Future  -     Comprehensive Metabolic Panel; Future  -     Vitamin D 25 Hydroxy; Future  -     Christine Mike MD, Cardiology, [place]  4. Essential hypertension  -     bisoprolol (ZEBETA) 10 MG tablet; Take 1 tablet by mouth once daily, Disp-90 tablet, R-3Normal  -     Christine Mike MD, Cardiology, [place]  5. Screening for prostate cancer  -     PSA Screening; Future  6. Need for hepatitis C screening test  -     Hepatitis C Antibody; Future  7. Chronic pain of both knees  -     meloxicam (MOBIC) 7.5 MG tablet; Take 1 tablet by mouth daily, Disp-30 tablet, R-3Normal  -     External Referral To Orthopedic Surgery      Return in about 6 months (around 2022), or if symptoms worsen or fail to improve. Subjective   SUBJECTIVE/OBJECTIVE:  Patient presents today to establish care and have annual visit. Complaints of increased knee pain takes naproxen daily with little relief.  R knee is the worst. Has history of gouty arthritis. Site is not warm, red, or swollen. Patient works long shifts on his feet everyday contributing to arthritis pain. Discussed consultation with ortho for possible injections to help pain control. Patient agreeable. He has not seen his cardiologist in a few years. Denies any chest pain or SOB. No recent stress test.     Review of Systems   Constitutional: Negative. Eyes: Negative. Respiratory: Negative. Negative for chest tightness and shortness of breath. Cardiovascular: Negative. Negative for chest pain and palpitations. Gastrointestinal: Negative. Endocrine: Negative. Genitourinary: Negative. Musculoskeletal: Positive for arthralgias. Allergic/Immunologic: Negative. Neurological: Negative. Hematological: Negative. All other systems reviewed and are negative. Objective   Physical Exam  Vitals reviewed. Constitutional:       Appearance: Normal appearance. HENT:      Head: Normocephalic. Cardiovascular:      Rate and Rhythm: Normal rate and regular rhythm. Pulses: Normal pulses. Heart sounds: Normal heart sounds. Pulmonary:      Effort: Pulmonary effort is normal.      Breath sounds: Normal breath sounds. Abdominal:      General: Abdomen is flat. Musculoskeletal:         General: Tenderness present. Right knee: Tenderness present. Left knee: Tenderness present. Skin:     General: Skin is warm and dry. Capillary Refill: Capillary refill takes less than 2 seconds. Neurological:      General: No focal deficit present. Mental Status: He is alert and oriented to person, place, and time. Return in about 6 months (around 1/22/2022), or if symptoms worsen or fail to improve.     Orders Placed This Encounter   Medications    isosorbide mononitrate (IMDUR) 30 MG extended release tablet     Sig: Take 1 tablet by mouth once daily     Dispense:  90 tablet     Refill:  3    amLODIPine (NORVASC) 10 MG tablet     Sig: Take 1 tablet by mouth once daily     Dispense:  90 tablet     Refill:  3    gemfibrozil (LOPID) 600 MG tablet     Sig: TAKE 1 TABLET BY MOUTH ONCE DAILY     Dispense:  90 tablet     Refill:  3    pravastatin (PRAVACHOL) 10 MG tablet     Sig: Take 1 tablet by mouth once daily     Dispense:  90 tablet     Refill:  3    bisoprolol (ZEBETA) 10 MG tablet     Sig: Take 1 tablet by mouth once daily     Dispense:  90 tablet     Refill:  3    meloxicam (MOBIC) 7.5 MG tablet     Sig: Take 1 tablet by mouth daily     Dispense:  30 tablet     Refill:  3     Orders Placed This Encounter   Procedures    Lipid Panel     Standing Status:   Future     Standing Expiration Date:   7/22/2022     Order Specific Question:   Is Patient Fasting?/# of Hours     Answer:   10-12    CBC Auto Differential     Standing Status:   Future     Standing Expiration Date:   7/22/2022    Comprehensive Metabolic Panel     Standing Status:   Future     Standing Expiration Date:   7/22/2022    Vitamin D 25 Hydroxy     Standing Status:   Future     Standing Expiration Date:   7/22/2022    PSA Screening     Standing Status:   Future     Standing Expiration Date:   7/22/2022    Hepatitis C Antibody     Standing Status:   Future     Standing Expiration Date:   7/22/2022   Mustapha Heller MD, Cardiology, [place]     Referral Priority:   Routine     Referral Type:   Eval and Treat     Referral Reason:   Specialty Services Required     Referred to Provider:   Zuleyma Manning DO     Requested Specialty:   Cardiology     Number of Visits Requested:   1    External Referral To Orthopedic Surgery     Referral Priority:   Routine     Referral Type:   Eval and Treat     Referral Reason:   Specialty Services Required     Referred to Provider:   Shanika Graham     Requested Specialty:   Orthopedic Surgery     Number of Visits Requested:   1       Patient given educational materials - see patient instructions.   Discussed use, benefit, and side effects of

## 2021-07-22 NOTE — PATIENT INSTRUCTIONS
Patient Education        Joint Injections: Care Instructions  Your Care Instructions     Joint injections are shots into a joint, such as the knee. They may be used to put in medicines, such as pain relievers. A corticosteroid, or steroid, shot is used to reduce inflammation in tendons or joints. It is often used to treat problems such as arthritis, tendinitis, and bursitis. Steroids can be injected directly into a painful, inflamed joint. They can also help reduce inflammation of a bursa. A bursa is a sac of fluid. It cushions and lubricates areas where tendons, ligaments, skin, muscles, or bones rub against each other. A steroid shot can sometimes help with short-term pain relief when other treatments haven't worked. If steroid shots help, pain may improve for weeks or months. Follow-up care is a key part of your treatment and safety. Be sure to make and go to all appointments, and call your doctor if you are having problems. It's also a good idea to know your test results and keep a list of the medicines you take. How can you care for yourself at home? · Put ice or a cold pack on the area for 10 to 20 minutes at a time. Put a thin cloth between the ice and your skin. · Ask your doctor if you can take an over-the-counter pain medicine, such as acetaminophen (Tylenol), ibuprofen (Advil, Motrin), or naproxen (Aleve). Be safe with medicines. Read and follow all instructions on the label. · Avoid strenuous activities for several days. In particular, avoid ones that put stress on the area where you got the shot. · If you have dressings over the area, keep them clean and dry. You may remove them when your doctor tells you to. When should you call for help? Call your doctor now or seek immediate medical care if:    · You have signs of infection, such as:  ? Increased pain, swelling, warmth, or redness. ? Red streaks leading from the site. ? Pus draining from the site. ? A fever.    Watch closely for changes in your health, and be sure to contact your doctor if you have any problems. Where can you learn more? Go to https://chpepiceweb.[employer]. org and sign in to your [employer] account. Enter N616 in the [facility] box to learn more about \"Joint Injections: Care Instructions. \"     If you do not have an account, please click on the \"Sign Up Now\" link. Current as of: November 16, 2020               Content Version: 12.9  © 2006-2021 [employer]. Care instructions adapted under license by [facility] ([facility]). If you have questions about a medical condition or this instruction, always ask your healthcare professional. [place] [address] any warranty or liability for your use of this information. Patient Education        Knee Pain or Injury: Care Instructions  Your Care Instructions     Injuries are a common cause of knee problems. Sudden (acute) injuries may be caused by a direct blow to the knee. They can also be caused by abnormal twisting, bending, or falling on the knee. Pain, bruising, or swelling may be severe, and may start within minutes of the injury. Overuse is another cause of knee pain. Other causes are climbing stairs, kneeling, and other activities that use the knee. Everyday wear and tear, especially as you get older, also can cause knee pain. Rest, along with home treatment, often relieves pain and allows your knee to heal. If you have a serious knee injury, you may need tests and treatment. Follow-up care is a key part of your treatment and safety. Be sure to make and go to all appointments, and call your doctor if you are having problems. It's also a good idea to know your test results and keep a list of the medicines you take. How can you care for yourself at home? · Be safe with medicines. Read and follow all instructions on the label. ? If the doctor gave you a prescription medicine for pain, take it as prescribed.   ? If you are not taking a prescription pain medicine, ask your doctor if you can take an over-the-counter medicine. · Rest and protect your knee. Take a break from any activity that may cause pain. · Put ice or a cold pack on your knee for 10 to 20 minutes at a time. Put a thin cloth between the ice and your skin. · Prop up a sore knee on a pillow when you ice it or anytime you sit or lie down for the next 3 days. Try to keep it above the level of your heart. This will help reduce swelling. · If your knee is not swollen, you can put moist heat, a heating pad, or a warm cloth on your knee. · If your doctor recommends an elastic bandage, sleeve, or other type of support for your knee, wear it as directed. · Follow your doctor's instructions about how much weight you can put on your leg. Use a cane, crutches, or a walker as instructed. · Follow your doctor's instructions about activity during your healing process. If you can do mild exercise, slowly increase your activity. · Reach and stay at a healthy weight. Extra weight can strain the joints, especially the knees and hips, and make the pain worse. Losing even a few pounds may help. When should you call for help? Call 911 anytime you think you may need emergency care. For example, call if:    · You have symptoms of a blood clot in your lung (called a pulmonary embolism). These may include:  ? Sudden chest pain. ? Trouble breathing. ? Coughing up blood. Call your doctor now or seek immediate medical care if:    · You have severe or increasing pain.     · Your leg or foot turns cold or changes color.     · You cannot stand or put weight on your knee.     · Your knee looks twisted or bent out of shape.     · You cannot move your knee.     · You have signs of infection, such as:  ? Increased pain, swelling, warmth, or redness. ? Red streaks leading from the knee. ? Pus draining from a place on your knee.   ? A fever.     · You have signs of a blood clot in your leg (called a deep vein thrombosis), such as:  ? Pain in your calf, back of the knee, thigh, or groin. ? Redness and swelling in your leg or groin. Watch closely for changes in your health, and be sure to contact your doctor if:    · You have tingling, weakness, or numbness in your knee.     · You have any new symptoms, such as swelling.     · You have bruises from a knee injury that last longer than 2 weeks.     · You do not get better as expected. Where can you learn more? Go to https://[facility].[employer]op. org and sign in to your [employer] account. Enter K195 in the [employer] box to learn more about \"Knee Pain or Injury: Care Instructions. \"     If you do not have an account, please click on the \"Sign Up Now\" link. Current as of: October 19, 2020               Content Version: 12.9  © 4702-1151 Healthwise, [employer]. Care instructions adapted under license by [facility] ([facility]). If you have questions about a medical condition or this instruction, always ask your healthcare professional. [place] [address] any warranty or liability for your use of this information.

## 2021-07-30 LAB
ALBUMIN/GLOBULIN RATIO: 1.4 G/DL
ALBUMIN: 4.3 G/DL (ref 3.5–5)
ALP BLD-CCNC: 71 UNITS/L (ref 38–126)
ALT SERPL-CCNC: 23 UNITS/L (ref 4–50)
ANION GAP SERPL CALCULATED.3IONS-SCNC: 10.7 MMOL/L
AST SERPL-CCNC: 40 UNITS/L (ref 17–59)
BASOPHILS %: 0.86 (ref 0–3)
BASOPHILS ABSOLUTE: 0.04 (ref 0–0.3)
BILIRUB SERPL-MCNC: 0.7 MG/DL (ref 0.2–1.3)
BUN BLDV-MCNC: 28 MG/DL (ref 9–20)
CALCIUM SERPL-MCNC: 9 MG/DL (ref 8.4–10.2)
CHLORIDE BLD-SCNC: 101 MMOL/L (ref 98–120)
CHOLESTEROL/HDL RATIO: 3.44 RATIO (ref 0–4.5)
CHOLESTEROL: 117 MG/DL (ref 50–200)
CO2: 27 MMOL/L (ref 22–31)
CREAT SERPL-MCNC: 1.2 MG/DL (ref 0.7–1.3)
DIAGNOSTIC PSA: 1.3 NG/ML (ref 0–4)
EOSINOPHILS %: 2.3 (ref 0–10)
EOSINOPHILS ABSOLUTE: 0.12 (ref 0–1.1)
GFR CALCULATED: > 60
GLOBULIN: 3.1 G/DL
GLUCOSE: 100 MG/DL (ref 75–110)
HCT VFR BLD CALC: 33 % (ref 42–52)
HDLC SERPL-MCNC: 34 MG/DL (ref 36–68)
HEMOGLOBIN: 11.6 (ref 13.8–17.8)
HEPATITIS C ANTIBODY: NONREACTIVE
LDL CHOLESTEROL CALCULATED: 56.4 MG/DL (ref 0–160)
LYMPHOCYTE %: 30.96 (ref 20–51.1)
LYMPHOCYTES ABSOLUTE: 1.6 (ref 1–5.5)
MCH RBC QN AUTO: 32.6 PG (ref 28.5–32.5)
MCHC RBC AUTO-ENTMCNC: 35.2 G/DL (ref 32–37)
MCV RBC AUTO: 92.7 FL (ref 80–94)
MONOCYTES %: 11.48 (ref 1.7–9.3)
MONOCYTES ABSOLUTE: 0.59 (ref 0.1–1)
NEUTROPHILS %: 54.4 (ref 42.2–75.2)
NEUTROPHILS ABSOLUTE: 2.81 (ref 2–8.1)
PDW BLD-RTO: 11.8 % (ref 10–15.5)
PLATELET # BLD: 140.5 THOU/MM3 (ref 130–400)
POTASSIUM SERPL-SCNC: 3.8 MMOL/L (ref 3.6–5)
RBC: 3.55 M/UL (ref 4.7–6.1)
SODIUM BLD-SCNC: 138 MMOL/L (ref 135–145)
TOTAL PROTEIN, SERUM: 7.5 G/DL (ref 6.3–8.2)
TRIGL SERPL-MCNC: 133 MG/DL (ref 10–250)
VITAMIN D 25-HYDROXY: 55.5 NG/ML (ref 30–100)
VLDLC SERPL CALC-MCNC: 27 MG/DL (ref 0–50)
WBC: 5.2 THOU/ML3 (ref 4.8–10.8)

## 2021-08-02 DIAGNOSIS — D64.9 ANEMIA, UNSPECIFIED TYPE: Primary | ICD-10-CM

## 2021-08-09 DIAGNOSIS — D64.9 ANEMIA, UNSPECIFIED TYPE: Primary | ICD-10-CM

## 2021-08-31 ENCOUNTER — OFFICE VISIT (OUTPATIENT)
Dept: ONCOLOGY | Age: 71
End: 2021-08-31
Payer: COMMERCIAL

## 2021-08-31 ENCOUNTER — OFFICE VISIT (OUTPATIENT)
Dept: PRIMARY CARE CLINIC | Age: 71
End: 2021-08-31
Payer: COMMERCIAL

## 2021-08-31 VITALS
SYSTOLIC BLOOD PRESSURE: 128 MMHG | TEMPERATURE: 98 F | HEIGHT: 68 IN | HEART RATE: 55 BPM | OXYGEN SATURATION: 96 % | DIASTOLIC BLOOD PRESSURE: 76 MMHG | WEIGHT: 187 LBS | RESPIRATION RATE: 16 BRPM | BODY MASS INDEX: 28.34 KG/M2

## 2021-08-31 VITALS
WEIGHT: 187.2 LBS | DIASTOLIC BLOOD PRESSURE: 80 MMHG | HEART RATE: 60 BPM | OXYGEN SATURATION: 96 % | HEIGHT: 68 IN | SYSTOLIC BLOOD PRESSURE: 132 MMHG | BODY MASS INDEX: 28.37 KG/M2 | TEMPERATURE: 97.9 F

## 2021-08-31 DIAGNOSIS — K76.0 FATTY LIVER: ICD-10-CM

## 2021-08-31 DIAGNOSIS — M54.42 ACUTE LEFT-SIDED LOW BACK PAIN WITH LEFT-SIDED SCIATICA: Primary | ICD-10-CM

## 2021-08-31 DIAGNOSIS — D64.9 ANEMIA, UNSPECIFIED TYPE: Primary | ICD-10-CM

## 2021-08-31 PROCEDURE — 99213 OFFICE O/P EST LOW 20 MIN: CPT | Performed by: FAMILY MEDICINE

## 2021-08-31 PROCEDURE — 99204 OFFICE O/P NEW MOD 45 MIN: CPT | Performed by: INTERNAL MEDICINE

## 2021-08-31 RX ORDER — TIZANIDINE 4 MG/1
4 TABLET ORAL 3 TIMES DAILY PRN
Qty: 30 TABLET | Refills: 0 | Status: SHIPPED | OUTPATIENT
Start: 2021-08-31

## 2021-08-31 RX ORDER — PREDNISONE 20 MG/1
TABLET ORAL
Qty: 15 TABLET | Refills: 0 | Status: SHIPPED | OUTPATIENT
Start: 2021-08-31 | End: 2021-10-14

## 2021-08-31 ASSESSMENT — ENCOUNTER SYMPTOMS: BACK PAIN: 1

## 2021-08-31 NOTE — LETTER
[address] [facility] A department of [place] [address]  Phone: [phone]  Fax: [address], DO      August 31, 2021    Patient:   Cm Sands  Date of Birth   1950  Date of visit   8/31/2021        To Whom it May Concern:      Rishi Roth was seen in my clinic on 8/31/2021. Please excuse from work 8/31/21-8/4/21 due to acute medical issues. May return on 8/5/21 without restrictions. If you have any questions or concerns, please don't hesitate to call.       Sincerely,      Chichi Ramos, DO

## 2021-08-31 NOTE — PROGRESS NOTES
Shalonda Hassan                                                                                                                  8/31/2021  MRN:   [id number]  YOB: 1950  PCP:                           CEM Hough CNP  Referring Physician: Bryant Martines  Treating Physician Name: Denice Phoenix MD      Reason for visit:  Chief Complaint   Patient presents with    New Patient     Anemia   Patient presents to the clinic to establish care and for further work-up and evaluation    Current problems:  Normocytic anemia, chronic    Active and recent treatments:  Work-up in progress    Summary of Case/History:    Shalonda Hassan a 66 y.o.male is a patient who presents to the clinic to establish care and for further work-up and evaluation. Patient was noted to have a hemoglobin of 11.6 with MCV 92 on his routine work-up done earlier in July. Review of patient's record revealed he has had mild anemia since 2018 with hemoglobin around 11.8 in August 2018 12.4 in August 2020. Work-up done so far shows adequate kidney function patient was noted to have positive stool occult blood test back in 2018 patient has had colonoscopy done back in 2018 due to findings of positive stool occult blood test and underwent polypectomy. Pathology from the polypectomy came back as hyperplastic polyp and tubular adenoma.     Past Medical History:   Past Medical History:   Diagnosis Date    Angina at rest [facility])     Arthritis     CAD (coronary artery disease)     angina    Hyperlipidemia     Hypertension     Skin cancer        Past Surgical History:     Past Surgical History:   Procedure Laterality Date    CARDIAC CATHETERIZATION      x 2    COLONOSCOPY      COLONOSCOPY N/A 9/12/2018    COLONOSCOPY WITH BIOPSY performed by Sonali Collado DO at [place] [address]      forehead and  mouth in [address]         Patient Family Social History:    Social History     Socioeconomic History    Marital status:      Spouse name: None    Number of children: None    Years of education: None    Highest education level: None   Occupational History    None   Tobacco Use    Smoking status: Former Smoker     Packs/day: 0.50     Years: 26.00     Pack years: 13.00     Types: Cigarettes     Start date:      Quit date: 1997     Years since quittin.6    Smokeless tobacco: Never Used    Tobacco comment: ly rrt 16   Substance and Sexual Activity    Alcohol use: Yes     Alcohol/week: 1.0 standard drinks     Types: 1 Cans of beer per week     Comment: 2 beers/month    Drug use: No    Sexual activity: None   Other Topics Concern    None   Social History Narrative    None     Social Determinants of Health     Financial Resource Strain: Low Risk     Difficulty of Paying Living Expenses: Not hard at all   Food Insecurity: No Food Insecurity    Worried About Running Out of Food in the Last Year: Never true    Mayra of Food in the Last Year: Never true   Transportation Needs:     Lack of Transportation (Medical):  Lack of Transportation (Non-Medical):    Physical Activity:     Days of Exercise per Week:     Minutes of Exercise per Session:    Stress:     Feeling of Stress :    Social Connections:     Frequency of Communication with Friends and Family:     Frequency of Social Gatherings with Friends and Family:     Attends Mosque Services:     Active Member of Clubs or Organizations:     Attends Club or Organization Meetings:     Marital Status:    Intimate Partner Violence:     Fear of Current or Ex-Partner:     Emotionally Abused:     Physically Abused:     Sexually Abused:      History reviewed. No pertinent family history.       Current Medications:     Current Outpatient Medications   Medication Sig Dispense Refill    isosorbide mononitrate (IMDUR) 30 MG extended release tablet Take 1 tablet by mouth once daily 90 tablet 3    amLODIPine (NORVASC) 10 MG tablet Take 1 tablet by mouth once daily 90 tablet 3    gemfibrozil (LOPID) 600 MG tablet TAKE 1 TABLET BY MOUTH ONCE DAILY 90 tablet 3    pravastatin (PRAVACHOL) 10 MG tablet Take 1 tablet by mouth once daily 90 tablet 3    bisoprolol (ZEBETA) 10 MG tablet Take 1 tablet by mouth once daily 90 tablet 3    meloxicam (MOBIC) 7.5 MG tablet Take 1 tablet by mouth daily 30 tablet 3    allopurinol (ZYLOPRIM) 300 MG tablet Take 1 tablet by mouth daily 90 tablet 3    lisinopril-hydroCHLOROthiazide (PRINZIDE;ZESTORETIC) 20-25 MG per tablet Take 1 tablet by mouth once daily 90 tablet 3    TURMERIC PO Take 1,000 mg by mouth      aspirin 81 MG tablet Take 81 mg by mouth      Cyanocobalamin (B-12) 2500 MCG TABS Take 2,500 mcg by mouth daily       Cholecalciferol (D3-1000 PO) Take 1,000 Units by mouth daily       b complex vitamins capsule Take 1 capsule by mouth daily       No current facility-administered medications for this visit. Allergies:   Codeine, Dtap-ipv vaccine, and Penicillins    Review of Systems:    Constitutional: No fever or chills. No night sweats, no weight loss   Eyes: No eye discharge, double vision, or eye pain   HEENT: negative for sore mouth, sore throat, hoarseness and voice change   Respiratory: negative for cough , sputum, dyspnea, wheezing, hemoptysis, chest pain   Cardiovascular: negative for chest pain, dyspnea, palpitations, orthopnea, PND   Gastrointestinal: negative for nausea, vomiting, diarrhea, constipation, abdominal pain, Dysphagia, hematemesis and hematochezia   Genitourinary: negative for frequency, dysuria, nocturia, urinary incontinence, and hematuria   Integument: negative for rash, skin lesions, bruises.    Hematologic/Lymphatic: negative for easy bruising, bleeding, lymphadenopathy, or petechiae   Endocrine: negative for heat or cold intolerance,weight changes, change in bowel habits and hair loss   Musculoskeletal: negative for myalgias, arthralgias, pain, joint swelling,and bone pain   Neurological: negative for headaches, dizziness, seizures, weakness, numbness        Physical Exam:    Vitals: /80 (Site: Right Upper Arm, Position: Sitting, Cuff Size: Large Adult)   Pulse 60   Temp 97.9 °F (36.6 °C)   Ht 5' 8\" (1.727 m)   Wt 187 lb 3.2 oz (84.9 kg)   SpO2 96%   BMI 28.46 kg/m²   General appearance - well appearing, no in pain or distress  Mental status - AAO X3  Eyes - pupils equal and reactive, extraocular eye movements intact  Mouth - mucous membranes moist, pharynx normal without lesions  Neck - supple, no significant adenopathy  Lymphatics - no palpable lymphadenopathy, no hepatosplenomegaly  Chest - clear to auscultation, no wheezes, rales or rhonchi, symmetric air entry  Heart - normal rate, regular rhythm, normal S1, S2, no murmurs  Abdomen - soft, nontender, nondistended, no masses or organomegaly  Neurological - alert, oriented, normal speech, no focal findings or movement disorder noted  Extremities - peripheral pulses normal, no pedal edema, no clubbing or cyanosis  Skin - normal coloration and turgor, no rashes, no suspicious skin lesions noted       DATA:    Results for orders placed or performed in visit on 07/22/21   CBC Auto Differential   Result Value Ref Range    WBC 5.2 4.8 - 10.8 Thou/mL3    RBC 3.55 (L) 4.70 - 6.10 M/uL    Hemoglobin 11.6 (L) 13.8 - 17.8    Hematocrit 33.0 (L) 42.0 - 52.0 %    MCV 92.7 80.0 - 94.0 fl    MCH 32.6 (H) 28.5 - 32.5 pg    MCHC 35.2 32.0 - 37.0 g/dL    RDW 11.8 10.0 - 15.5 %    Platelets 431.0 248 - 400 Thou/mm3    Neutrophils % 54.40 42.2 - 75.2    Lymphocyte % 30.96 20 - 51.1    Monocytes % 11.48 (H) 1.7 - 9.3    Eosinophils % 2.300 0.0 - 10.0    Basophils % 0.8572 0.0 - 3.0    Neutrophils Absolute 2.809 2.0 - 8.1    Lymphocytes Absolute 1.599 1.0 - 5.5    Monocytes Absolute 0.5928 0.1 - 1.0    Eosinophils Absolute 0.1188 0.0 - 1.1    Basophils Absolute 0.0443 0.0 - 0.3   Comprehensive Metabolic Panel   Result my ability. Reyna Mccollum MD        I spent more than 60 minutes examining, evaluating, reviewing data, counseling the patient and coordinating care. Greater than 50% of time was spent face-to-face with the patient this note is created with the assistance of a speech recognition program.  While intending to generate a document that actually reflects the content of the visit, the document can still have some errors including those of syntax and sound a like substitutions which may escape proof reading. It such instances, actual meaning can be extrapolated by contextual diversion.

## 2021-08-31 NOTE — PROGRESS NOTES
2021     Bradley Raygoza (:  1950) is a 66 y.o. male, here for evaluation of the following medical concerns:    Back Pain  This is a new problem. The current episode started in the past 7 days (has been having low back pain for about a week). The problem occurs constantly. The problem has been gradually worsening since onset. The pain is present in the lumbar spine and gluteal. The quality of the pain is described as stabbing (sharp pain). Radiates to: left calf  The pain is moderate. Associated symptoms include leg pain. Pertinent negatives include no fever, paresthesias, tingling or weakness. He has tried NSAIDs for the symptoms. Did review patient's med list, allergies, social history,pmhx and pshx today as noted in the record. Review of Systems   Constitutional: Negative for chills, fatigue and fever. Musculoskeletal: Positive for back pain and myalgias. Negative for arthralgias and gait problem. Neurological: Negative for tingling, weakness and paresthesias. Prior to Visit Medications    Medication Sig Taking?  Authorizing Provider   isosorbide mononitrate (IMDUR) 30 MG extended release tablet Take 1 tablet by mouth once daily Yes CEM Lugo CNP   amLODIPine (NORVASC) 10 MG tablet Take 1 tablet by mouth once daily Yes CEM Lugo CNP   gemfibrozil (LOPID) 600 MG tablet TAKE 1 TABLET BY MOUTH ONCE DAILY Yes CEM Lugo CNP   pravastatin (PRAVACHOL) 10 MG tablet Take 1 tablet by mouth once daily Yes CEM Lugo CNP   bisoprolol (ZEBETA) 10 MG tablet Take 1 tablet by mouth once daily Yes CEM Lugo CNP   meloxicam (MOBIC) 7.5 MG tablet Take 1 tablet by mouth daily Yes CEM Lugo CNP   allopurinol (ZYLOPRIM) 300 MG tablet Take 1 tablet by mouth daily Yes Italo Allen MD   lisinopril-hydroCHLOROthiazide (PRINZIDE;ZESTORETIC) 20-25 MG per tablet Take 1 tablet by mouth once daily Yes Italo Allen MD TURMERIC PO Take 1,000 mg by mouth Yes Historical Provider, MD   aspirin 81 MG tablet Take 81 mg by mouth Yes Historical Provider, MD   Cyanocobalamin (B-12) 2500 MCG TABS Take 2,500 mcg by mouth daily  Yes Historical Provider, MD   Cholecalciferol (D3-1000 PO) Take 1,000 Units by mouth daily  Yes Historical Provider, MD   b complex vitamins capsule Take 1 capsule by mouth daily Yes Historical Provider, MD        Social History     Tobacco Use    Smoking status: Former Smoker     Packs/day: 0.50     Years: 26.00     Pack years: 13.00     Types: Cigarettes     Start date:      Quit date: 1997     Years since quittin.6    Smokeless tobacco: Never Used    Tobacco comment: cwaltmemily rrt 16   Substance Use Topics    Alcohol use: Yes     Alcohol/week: 1.0 standard drinks     Types: 1 Cans of beer per week     Comment: 2 beers/month        Vitals:    21 0923   BP: 128/76   Site: Left Upper Arm   Position: Sitting   Cuff Size: Large Adult   Pulse: 55   Resp: 16   Temp: 98 °F (36.7 °C)   TempSrc: Tympanic   SpO2: 96%   Weight: 187 lb (84.8 kg)   Height: 5' 8\" (1.727 m)     Estimated body mass index is 28.43 kg/m² as calculated from the following:    Height as of this encounter: 5' 8\" (1.727 m). Weight as of this encounter: 187 lb (84.8 kg). Physical Exam  Vitals and nursing note reviewed. Constitutional:       General: He is not in acute distress. Appearance: He is well-developed. He is not diaphoretic. HENT:      Head: Normocephalic and atraumatic. Eyes:      Conjunctiva/sclera: Conjunctivae normal.   Pulmonary:      Effort: Pulmonary effort is normal.   Musculoskeletal:         General: Tenderness present. No swelling. Normal range of motion. Cervical back: Normal range of motion. Comments: Increased paravertebral muscular tension and tenderness with palpation to the left lumbosacral spine. Skin:     General: Skin is warm and dry. Coloration: Skin is not pale. Findings: No erythema or rash. Neurological:      Mental Status: He is alert and oriented to person, place, and time. Psychiatric:         Behavior: Behavior normal.         Thought Content: Thought content normal.         Judgment: Judgment normal.         ASSESSMENT/PLAN:  Encounter Diagnosis   Name Primary?  Acute left-sided low back pain with left-sided sciatica Yes     Orders Placed This Encounter   Medications    predniSONE (DELTASONE) 20 MG tablet     Si bid for 5 days, 1 qd for 5 days     Dispense:  15 tablet     Refill:  0    tiZANidine (ZANAFLEX) 4 MG tablet     Sig: Take 1 tablet by mouth 3 times daily as needed (muscle spasm)     Dispense:  30 tablet     Refill:  0     RX as noted above. Likely muscular strain. Can use heat to the affected area. Stretching exercises recommended. Work note provided. Return  if no improvement in symptoms or if any further symptoms arise. No follow-ups on file. An electronic signature was used to authenticate this note.     --Maria Teresa Rodas, DO on 2021 at 9:44 AM

## 2021-09-08 DIAGNOSIS — D64.9 ANEMIA, UNSPECIFIED TYPE: Primary | ICD-10-CM

## 2021-09-21 ENCOUNTER — HOSPITAL ENCOUNTER (OUTPATIENT)
Dept: LAB | Age: 71
Discharge: HOME | End: 2021-09-21
Payer: COMMERCIAL

## 2021-09-21 DIAGNOSIS — Z12.5 SCREENING FOR PROSTATE CANCER: ICD-10-CM

## 2021-09-21 DIAGNOSIS — D64.9 ANEMIA, UNSPECIFIED TYPE: ICD-10-CM

## 2021-09-21 DIAGNOSIS — Z11.59 NEED FOR HEPATITIS C SCREENING TEST: ICD-10-CM

## 2021-09-21 LAB
ABSOLUTE EOS #: 0.11 K/UL (ref 0–0.44)
ABSOLUTE IMMATURE GRANULOCYTE: <0.03 K/UL (ref 0–0.3)
ABSOLUTE LYMPH #: 1.19 K/UL (ref 1.1–3.7)
ABSOLUTE MONO #: 0.41 K/UL (ref 0.1–1.2)
ABSOLUTE RETIC #: 0.08 M/UL (ref 0.03–0.08)
ANION GAP SERPL CALCULATED.3IONS-SCNC: 12 MMOL/L (ref 9–17)
BASOPHILS # BLD: 1 % (ref 0–2)
BASOPHILS ABSOLUTE: <0.03 K/UL (ref 0–0.2)
BUN BLDV-MCNC: 40 MG/DL (ref 8–23)
BUN/CREAT BLD: 26 (ref 9–20)
CALCIUM SERPL-MCNC: 9 MG/DL (ref 8.6–10.4)
CHLORIDE BLD-SCNC: 101 MMOL/L (ref 98–107)
CO2: 26 MMOL/L (ref 20–31)
CREAT SERPL-MCNC: 1.52 MG/DL (ref 0.7–1.2)
DIFFERENTIAL TYPE: ABNORMAL
EOSINOPHILS RELATIVE PERCENT: 3 % (ref 1–4)
FERRITIN: 674 UG/L (ref 30–400)
FERRITIN: 674 UG/L (ref 30–400)
FOLATE: 19.6 NG/ML
FREE KAPPA/LAMBDA RATIO: 0.02 (ref 0.26–1.65)
GFR AFRICAN AMERICAN: 55 ML/MIN
GFR NON-AFRICAN AMERICAN: 45 ML/MIN
GFR SERPL CREATININE-BSD FRML MDRD: ABNORMAL ML/MIN/{1.73_M2}
GFR SERPL CREATININE-BSD FRML MDRD: ABNORMAL ML/MIN/{1.73_M2}
GLUCOSE BLD-MCNC: 129 MG/DL (ref 70–99)
HCT VFR BLD CALC: 33.6 % (ref 40.7–50.3)
HEMOGLOBIN: 11.3 G/DL (ref 13–17)
HEPATITIS C ANTIBODY: NONREACTIVE
IMMATURE GRANULOCYTES: 0 %
IMMATURE RETIC FRACT: 8.2 % (ref 2.7–18.3)
IRON SATURATION: 29 % (ref 20–55)
IRON: 77 UG/DL (ref 59–158)
KAPPA FREE LIGHT CHAINS QNT: 0.94 MG/DL (ref 0.37–1.94)
LAMBDA FREE LIGHT CHAINS QNT: 62.26 MG/DL (ref 0.57–2.63)
LYMPHOCYTES # BLD: 30 % (ref 24–43)
MCH RBC QN AUTO: 32.8 PG (ref 25.2–33.5)
MCHC RBC AUTO-ENTMCNC: 33.6 G/DL (ref 25.2–33.5)
MCV RBC AUTO: 97.4 FL (ref 82.6–102.9)
MONOCYTES # BLD: 10 % (ref 3–12)
NRBC AUTOMATED: 0 PER 100 WBC
PDW BLD-RTO: 11.9 % (ref 11.8–14.4)
PLATELET # BLD: 156 K/UL (ref 138–453)
PLATELET ESTIMATE: ABNORMAL
PMV BLD AUTO: 11.7 FL (ref 8.1–13.5)
POTASSIUM SERPL-SCNC: 3.8 MMOL/L (ref 3.7–5.3)
PROSTATE SPECIFIC ANTIGEN: 1.57 UG/L
RBC # BLD: 3.45 M/UL (ref 4.21–5.77)
RBC # BLD: ABNORMAL 10*6/UL
RETIC %: 2.3 % (ref 0.5–1.9)
RETIC HEMOGLOBIN: 36.8 PG (ref 28.2–35.7)
SEG NEUTROPHILS: 56 % (ref 36–65)
SEGMENTED NEUTROPHILS ABSOLUTE COUNT: 2.29 K/UL (ref 1.5–8.1)
SODIUM BLD-SCNC: 139 MMOL/L (ref 135–144)
TOTAL IRON BINDING CAPACITY: 268 UG/DL (ref 250–450)
TSH SERPL DL<=0.05 MIU/L-ACNC: 1.92 MIU/L (ref 0.3–5)
UNSATURATED IRON BINDING CAPACITY: 191 UG/DL (ref 112–347)
VITAMIN B-12: 1271 PG/ML (ref 232–1245)
WBC # BLD: 4 K/UL (ref 3.5–11.3)
WBC # BLD: ABNORMAL 10*3/UL

## 2021-09-21 PROCEDURE — 80048 BASIC METABOLIC PNL TOTAL CA: CPT

## 2021-09-21 PROCEDURE — 82746 ASSAY OF FOLIC ACID SERUM: CPT

## 2021-09-21 PROCEDURE — 85025 COMPLETE CBC W/AUTO DIFF WBC: CPT

## 2021-09-21 PROCEDURE — 82728 ASSAY OF FERRITIN: CPT

## 2021-09-21 PROCEDURE — 36415 COLL VENOUS BLD VENIPUNCTURE: CPT

## 2021-09-21 PROCEDURE — 82607 VITAMIN B-12: CPT

## 2021-09-21 PROCEDURE — 86334 IMMUNOFIX E-PHORESIS SERUM: CPT

## 2021-09-21 PROCEDURE — 84165 PROTEIN E-PHORESIS SERUM: CPT

## 2021-09-21 PROCEDURE — 86803 HEPATITIS C AB TEST: CPT

## 2021-09-21 PROCEDURE — 83540 ASSAY OF IRON: CPT

## 2021-09-21 PROCEDURE — 84443 ASSAY THYROID STIM HORMONE: CPT

## 2021-09-21 PROCEDURE — 84155 ASSAY OF PROTEIN SERUM: CPT

## 2021-09-21 PROCEDURE — G0103 PSA SCREENING: HCPCS

## 2021-09-21 PROCEDURE — 83550 IRON BINDING TEST: CPT

## 2021-09-21 PROCEDURE — 85045 AUTOMATED RETICULOCYTE COUNT: CPT

## 2021-09-21 PROCEDURE — 83883 ASSAY NEPHELOMETRY NOT SPEC: CPT

## 2021-09-22 LAB
IRON SATURATION: 30 % (ref 20–55)
IRON: 83 UG/DL (ref 59–158)
PATHOLOGIST REVIEW: NORMAL
SURGICAL PATHOLOGY REPORT: NORMAL
TOTAL IRON BINDING CAPACITY: 280 UG/DL (ref 250–450)
UNSATURATED IRON BINDING CAPACITY: 197 UG/DL (ref 112–347)

## 2021-09-23 LAB
ALBUMIN (CALCULATED): 4.2 G/DL (ref 3.2–5.2)
ALBUMIN PERCENT: 58 % (ref 45–65)
ALPHA 1 PERCENT: 2 % (ref 3–6)
ALPHA 2 PERCENT: 9 % (ref 6–13)
ALPHA-1-GLOBULIN: 0.2 G/DL (ref 0.1–0.4)
ALPHA-2-GLOBULIN: 0.7 G/DL (ref 0.5–0.9)
BETA GLOBULIN: 0.6 G/DL (ref 0.5–1.1)
BETA PERCENT: 8 % (ref 11–19)
GAMMA GLOBULIN %: 23 % (ref 9–20)
GAMMA GLOBULIN: 1.7 G/DL (ref 0.5–1.5)
PATHOLOGIST: ABNORMAL
PATHOLOGIST: NORMAL
PROTEIN ELECTROPHORESIS, SERUM: ABNORMAL
SERUM IFX INTERP: NORMAL
TOTAL PROT. SUM,%: 100 % (ref 98–102)
TOTAL PROT. SUM: 7.4 G/DL (ref 6.3–8.2)
TOTAL PROTEIN: 7.2 G/DL (ref 6.4–8.3)

## 2021-09-28 ENCOUNTER — OFFICE VISIT (OUTPATIENT)
Dept: ONCOLOGY | Age: 71
End: 2021-09-28
Payer: COMMERCIAL

## 2021-09-28 VITALS
HEART RATE: 58 BPM | OXYGEN SATURATION: 94 % | TEMPERATURE: 98 F | DIASTOLIC BLOOD PRESSURE: 78 MMHG | WEIGHT: 189 LBS | SYSTOLIC BLOOD PRESSURE: 130 MMHG | BODY MASS INDEX: 28.64 KG/M2 | HEIGHT: 68 IN

## 2021-09-28 DIAGNOSIS — D89.2 PARAPROTEINEMIA: Primary | ICD-10-CM

## 2021-09-28 DIAGNOSIS — D64.9 ANEMIA, UNSPECIFIED TYPE: ICD-10-CM

## 2021-09-28 DIAGNOSIS — N18.9 CHRONIC KIDNEY DISEASE, UNSPECIFIED CKD STAGE: ICD-10-CM

## 2021-09-28 DIAGNOSIS — K76.0 FATTY LIVER: ICD-10-CM

## 2021-09-28 DIAGNOSIS — R77.9 ABNORMALITY OF PLASMA PROTEIN: ICD-10-CM

## 2021-09-28 PROCEDURE — 99214 OFFICE O/P EST MOD 30 MIN: CPT | Performed by: INTERNAL MEDICINE

## 2021-09-28 NOTE — PROGRESS NOTES
Rosales Stage                                                                                                                  9/28/2021  MRN:   [id number]  YOB: 1950  PCP:                           CEM Davis CNP  Referring Physician: No ref. provider found  Treating Physician Name: Esme Goodson MD      Reason for visit:  Chief Complaint   Patient presents with    Anemia     1 month follow up    Reviewed results of lab work-up. Current problems:  Normocytic anemia, chronic  IgG lambda paraproteinemia    Active and recent treatments:  Bone marrow biopsy and CT PET    Interval history:  Patient presents to the clinic to discuss results of his lab work-up. Patient complaining of knee pain. Denies any weight loss. Denies any swollen glands. Denies any drenching night sweats. Work-up came back positive for paraproteinemia 1.26 g. Denies any headaches or vision change. During this visit patient's allergy, social, medical, surgical history and medications were reviewed and updated. Summary of Case/History:    Rosales Stage a 36 y.o.male is a patient who presents to the clinic to establish care and for further work-up and evaluation. Patient was noted to have a hemoglobin of 11.6 with MCV 92 on his routine work-up done earlier in July. Review of patient's record revealed he has had mild anemia since 2018 with hemoglobin around 11.8 in August 2018 12.4 in August 2020. Work-up done so far shows adequate kidney function patient was noted to have positive stool occult blood test back in 2018 patient has had colonoscopy done back in 2018 due to findings of positive stool occult blood test and underwent polypectomy. Pathology from the polypectomy came back as hyperplastic polyp and tubular adenoma.     Past Medical History:   Past Medical History:   Diagnosis Date    Angina at rest [facility])     Arthritis     CAD (coronary artery disease)     angina    Hyperlipidemia  Hypertension     Skin cancer        Past Surgical History:     Past Surgical History:   Procedure Laterality Date    CARDIAC CATHETERIZATION      x 2    COLONOSCOPY      COLONOSCOPY N/A 2018    COLONOSCOPY WITH BIOPSY performed by Dakota Anderson DO at [place] [address]      forehead and  mouth in [address] [place] [address]         Patient Family Social History:    Social History     Socioeconomic History    Marital status:      Spouse name: None    Number of children: None    Years of education: None    Highest education level: None   Occupational History    None   Tobacco Use    Smoking status: Former Smoker     Packs/day: 0.50     Years: 26.00     Pack years: 13.00     Types: Cigarettes     Start date:      Quit date: 1997     Years since quittin.7    Smokeless tobacco: Never Used    Tobacco comment: ly rrt 16   Substance and Sexual Activity    Alcohol use: Yes     Alcohol/week: 1.0 standard drinks     Types: 1 Cans of beer per week     Comment: 2 beers/month    Drug use: No    Sexual activity: None   Other Topics Concern    None   Social History Narrative    None     Social Determinants of Health     Financial Resource Strain: Low Risk     Difficulty of Paying Living Expenses: Not hard at all   Food Insecurity: No Food Insecurity    Worried About Running Out of Food in the Last Year: Never true    Mayra of Food in the Last Year: Never true   Transportation Needs:     Lack of Transportation (Medical):      Lack of Transportation (Non-Medical):    Physical Activity:     Days of Exercise per Week:     Minutes of Exercise per Session:    Stress:     Feeling of Stress :    Social Connections:     Frequency of Communication with Friends and Family:     Frequency of Social Gatherings with Friends and Family:     Attends Muslim Services:     Active Member of Clubs or Organizations:     Attends Club or Organization Meetings:     Marital Status:    Intimate Partner Violence:     Fear of Current or Ex-Partner:     Emotionally Abused:     Physically Abused:     Sexually Abused:      No family history on file. Current Medications:     Current Outpatient Medications   Medication Sig Dispense Refill    predniSONE (DELTASONE) 20 MG tablet 1 bid for 5 days, 1 qd for 5 days 15 tablet 0    tiZANidine (ZANAFLEX) 4 MG tablet Take 1 tablet by mouth 3 times daily as needed (muscle spasm) 30 tablet 0    isosorbide mononitrate (IMDUR) 30 MG extended release tablet Take 1 tablet by mouth once daily 90 tablet 3    amLODIPine (NORVASC) 10 MG tablet Take 1 tablet by mouth once daily 90 tablet 3    gemfibrozil (LOPID) 600 MG tablet TAKE 1 TABLET BY MOUTH ONCE DAILY 90 tablet 3    pravastatin (PRAVACHOL) 10 MG tablet Take 1 tablet by mouth once daily 90 tablet 3    bisoprolol (ZEBETA) 10 MG tablet Take 1 tablet by mouth once daily 90 tablet 3    meloxicam (MOBIC) 7.5 MG tablet Take 1 tablet by mouth daily 30 tablet 3    allopurinol (ZYLOPRIM) 300 MG tablet Take 1 tablet by mouth daily 90 tablet 3    lisinopril-hydroCHLOROthiazide (PRINZIDE;ZESTORETIC) 20-25 MG per tablet Take 1 tablet by mouth once daily 90 tablet 3    TURMERIC PO Take 1,000 mg by mouth      aspirin 81 MG tablet Take 81 mg by mouth      Cyanocobalamin (B-12) 2500 MCG TABS Take 2,500 mcg by mouth daily       Cholecalciferol (D3-1000 PO) Take 1,000 Units by mouth daily       b complex vitamins capsule Take 1 capsule by mouth daily       No current facility-administered medications for this visit. Allergies:   Codeine, Dtap-ipv vaccine, and Penicillins    Review of Systems:    Constitutional: No fever or chills.  No night sweats, no weight loss   Eyes: No eye discharge, double vision, or eye pain   HEENT: negative for sore mouth, sore throat, hoarseness and voice change   Respiratory: negative for cough , sputum, dyspnea, wheezing, hemoptysis, chest pain   Cardiovascular: negative for chest pain, dyspnea, palpitations, orthopnea, PND   Gastrointestinal: negative for nausea, vomiting, diarrhea, constipation, abdominal pain, Dysphagia, hematemesis and hematochezia   Genitourinary: negative for frequency, dysuria, nocturia, urinary incontinence, and hematuria   Integument: negative for rash, skin lesions, bruises. Hematologic/Lymphatic: negative for easy bruising, bleeding, lymphadenopathy, or petechiae   Endocrine: negative for heat or cold intolerance,weight changes, change in bowel habits and hair loss   Musculoskeletal: Positive for joint pain.   Positive for right knee pain  Neurological: negative for headaches, dizziness, seizures, weakness, numbness        Physical Exam:    Vitals: /78 (Site: Right Upper Arm, Position: Sitting, Cuff Size: Medium Adult)   Pulse 58   Temp 98 °F (36.7 °C)   Ht 5' 8\" (1.727 m)   Wt 189 lb (85.7 kg)   SpO2 94%   BMI 28.74 kg/m²   General appearance - well appearing, no in pain or distress  Mental status - AAO X3  Eyes - pupils equal and reactive, extraocular eye movements intact  Mouth - mucous membranes moist, pharynx normal without lesions  Neck - supple, no significant adenopathy  Lymphatics - no palpable lymphadenopathy, no hepatosplenomegaly  Chest - clear to auscultation, no wheezes, rales or rhonchi, symmetric air entry  Heart - normal rate, regular rhythm, normal S1, S2, no murmurs  Abdomen - soft, nontender, nondistended, no masses or organomegaly  Neurological - alert, oriented, normal speech, no focal findings or movement disorder noted  Extremities - peripheral pulses normal, no pedal edema, no clubbing or cyanosis  Skin - normal coloration and turgor, no rashes, no suspicious skin lesions noted       DATA:    Results for orders placed or performed during the hospital encounter of 09/21/21   PSA Screening   Result Value Ref Range    PSA 1.57 <4.1 ug/L   Hepatitis C Antibody   Result Value Ref Range    Hepatitis C Ab NONREACTIVE NONREACTIVE   Ferritin   Result Value Ref Range    Ferritin 674 (H) 30 - 400 ug/L   Iron And TIBC   Result Value Ref Range    Iron 83 59 - 158 ug/dL    TIBC 280 250 - 450 ug/dL    Iron Saturation 30 20 - 55 %    UIBC 197 112 - 347 ug/dL   TSH With Reflex Ft4   Result Value Ref Range    TSH 1.92 0.30 - 5.00 mIU/L     No results found. Impression: IgG lambda paraproteinemia-1.26 g/dL  Normocytic anemia, chronic  Chronic arthritis  Coronary artery disease  Hypertension  Diabetes mellitus  Status post polypectomy 2018      Plan:  Personally reviewed results of lab work-up and other relevant clinical data. Lab work-up shows IgG lambda paraproteinemia 1.26 g  Patient also has CKD is anemic. Calcium is within range. Does have joint pain. We will obtain bone marrow biopsy  We will also obtain CT PET. Patient free light chain ratio is 66. Discussed differential diagnosis of paraproteinemia. We will also obtain Congo red on the bone marrow biopsy given patient paraproteinemias lambda restricted  Return to clinic to review results of lab work-up and the relevant clinical data  Patient had multiple questions which answered to the best of my ability. Andree Shanks MD        This note is created with the assistance of a speech recognition program.  While intending to generate a document that actually reflects the content of the visit, the document can still have some errors including those of syntax and sound a like substitutions which may escape proof reading. It such instances, actual meaning can be extrapolated by contextual diversion.

## 2021-09-29 ENCOUNTER — TELEPHONE (OUTPATIENT)
Dept: ONCOLOGY | Age: 71
End: 2021-09-29

## 2021-10-08 ENCOUNTER — HOSPITAL ENCOUNTER (OUTPATIENT)
Dept: NUCLEAR MEDICINE | Age: 71
Discharge: HOME | End: 2021-10-10
Payer: COMMERCIAL

## 2021-10-08 DIAGNOSIS — R77.9 ABNORMALITY OF PLASMA PROTEIN: ICD-10-CM

## 2021-10-08 LAB — GLUCOSE BLD-MCNC: 102 MG/DL (ref 75–110)

## 2021-10-08 PROCEDURE — 78816 PET IMAGE W/CT FULL BODY: CPT

## 2021-10-08 PROCEDURE — 2580000003 HC RX 258: Performed by: INTERNAL MEDICINE

## 2021-10-08 PROCEDURE — A9552 F18 FDG: HCPCS | Performed by: INTERNAL MEDICINE

## 2021-10-08 PROCEDURE — 3430000000 HC RX DIAGNOSTIC RADIOPHARMACEUTICAL: Performed by: INTERNAL MEDICINE

## 2021-10-08 PROCEDURE — 82947 ASSAY GLUCOSE BLOOD QUANT: CPT

## 2021-10-08 RX ORDER — SODIUM CHLORIDE 0.9 % (FLUSH) 0.9 %
10 SYRINGE (ML) INJECTION ONCE
Status: COMPLETED | OUTPATIENT
Start: 2021-10-08 | End: 2021-10-08

## 2021-10-08 RX ORDER — FLUDEOXYGLUCOSE F 18 200 MCI/ML
10 INJECTION, SOLUTION INTRAVENOUS
Status: COMPLETED | OUTPATIENT
Start: 2021-10-08 | End: 2021-10-08

## 2021-10-08 RX ADMIN — FLUDEOXYGLUCOSE F 18 11.74 MILLICURIE: 200 INJECTION, SOLUTION INTRAVENOUS at 10:46

## 2021-10-08 RX ADMIN — SODIUM CHLORIDE, PRESERVATIVE FREE 10 ML: 5 INJECTION INTRAVENOUS at 10:46

## 2021-10-11 DIAGNOSIS — D64.9 ANEMIA, UNSPECIFIED TYPE: Primary | ICD-10-CM

## 2021-10-11 DIAGNOSIS — D89.2 PARAPROTEINEMIA: ICD-10-CM

## 2021-10-11 PROCEDURE — 38221 DX BONE MARROW BIOPSIES: CPT | Performed by: INTERNAL MEDICINE

## 2021-10-14 ENCOUNTER — HOSPITAL ENCOUNTER (OUTPATIENT)
Dept: CT IMAGING | Age: 71
Discharge: HOME | End: 2021-10-16
Payer: COMMERCIAL

## 2021-10-14 ENCOUNTER — OFFICE VISIT (OUTPATIENT)
Dept: PRIMARY CARE CLINIC | Age: 71
End: 2021-10-14
Payer: COMMERCIAL

## 2021-10-14 VITALS
RESPIRATION RATE: 16 BRPM | BODY MASS INDEX: 27.74 KG/M2 | TEMPERATURE: 98 F | WEIGHT: 183 LBS | DIASTOLIC BLOOD PRESSURE: 77 MMHG | SYSTOLIC BLOOD PRESSURE: 169 MMHG | HEIGHT: 68 IN | OXYGEN SATURATION: 97 % | HEART RATE: 57 BPM

## 2021-10-14 VITALS
BODY MASS INDEX: 28.28 KG/M2 | WEIGHT: 186 LBS | DIASTOLIC BLOOD PRESSURE: 74 MMHG | SYSTOLIC BLOOD PRESSURE: 126 MMHG | TEMPERATURE: 98.2 F | HEART RATE: 62 BPM | OXYGEN SATURATION: 97 %

## 2021-10-14 DIAGNOSIS — M54.32 SCIATICA, LEFT SIDE: Primary | ICD-10-CM

## 2021-10-14 DIAGNOSIS — D64.9 ANEMIA, UNSPECIFIED TYPE: ICD-10-CM

## 2021-10-14 DIAGNOSIS — D89.2 PARAPROTEINEMIA: ICD-10-CM

## 2021-10-14 LAB
ABSOLUTE EOS #: 0.14 K/UL (ref 0–0.44)
ABSOLUTE IMMATURE GRANULOCYTE: <0.03 K/UL (ref 0–0.3)
ABSOLUTE LYMPH #: 1.92 K/UL (ref 1.1–3.7)
ABSOLUTE MONO #: 0.64 K/UL (ref 0.1–1.2)
BASOPHILS # BLD: 1 % (ref 0–2)
BASOPHILS ABSOLUTE: 0.03 K/UL (ref 0–0.2)
DIFFERENTIAL TYPE: ABNORMAL
EOSINOPHILS RELATIVE PERCENT: 3 % (ref 1–4)
HCT VFR BLD CALC: 37.9 % (ref 40.7–50.3)
HEMOGLOBIN: 12.9 G/DL (ref 13–17)
IMMATURE GRANULOCYTES: 0 %
LYMPHOCYTES # BLD: 38 % (ref 24–43)
MCH RBC QN AUTO: 32.9 PG (ref 25.2–33.5)
MCHC RBC AUTO-ENTMCNC: 34 G/DL (ref 25.2–33.5)
MCV RBC AUTO: 96.7 FL (ref 82.6–102.9)
MONOCYTES # BLD: 13 % (ref 3–12)
NRBC AUTOMATED: 0 PER 100 WBC
PDW BLD-RTO: 12.8 % (ref 11.8–14.4)
PLATELET # BLD: 185 K/UL (ref 138–453)
PLATELET ESTIMATE: ABNORMAL
PMV BLD AUTO: 10.9 FL (ref 8.1–13.5)
RBC # BLD: 3.92 M/UL (ref 4.21–5.77)
RBC # BLD: ABNORMAL 10*6/UL
SEG NEUTROPHILS: 45 % (ref 36–65)
SEGMENTED NEUTROPHILS ABSOLUTE COUNT: 2.35 K/UL (ref 1.5–8.1)
WBC # BLD: 5.1 K/UL (ref 3.5–11.3)
WBC # BLD: ABNORMAL 10*3/UL

## 2021-10-14 PROCEDURE — 85025 COMPLETE CBC W/AUTO DIFF WBC: CPT

## 2021-10-14 PROCEDURE — 36415 COLL VENOUS BLD VENIPUNCTURE: CPT

## 2021-10-14 PROCEDURE — 88237 TISSUE CULTURE BONE MARROW: CPT

## 2021-10-14 PROCEDURE — 88185 FLOWCYTOMETRY/TC ADD-ON: CPT

## 2021-10-14 PROCEDURE — 88360 TUMOR IMMUNOHISTOCHEM/MANUAL: CPT

## 2021-10-14 PROCEDURE — 88264 CHROMOSOME ANALYSIS 20-25: CPT

## 2021-10-14 PROCEDURE — 88271 CYTOGENETICS DNA PROBE: CPT

## 2021-10-14 PROCEDURE — 77012 CT SCAN FOR NEEDLE BIOPSY: CPT

## 2021-10-14 PROCEDURE — 88184 FLOWCYTOMETRY/ TC 1 MARKER: CPT

## 2021-10-14 PROCEDURE — 88305 TISSUE EXAM BY PATHOLOGIST: CPT

## 2021-10-14 PROCEDURE — 88275 CYTOGENETICS 100-300: CPT

## 2021-10-14 PROCEDURE — 88313 SPECIAL STAINS GROUP 2: CPT

## 2021-10-14 PROCEDURE — 88280 CHROMOSOME KARYOTYPE STUDY: CPT

## 2021-10-14 PROCEDURE — 7100000011 HC PHASE II RECOVERY - ADDTL 15 MIN

## 2021-10-14 PROCEDURE — 2709999900 CT BIOPSY BONE MARROW

## 2021-10-14 PROCEDURE — 7100000010 HC PHASE II RECOVERY - FIRST 15 MIN

## 2021-10-14 PROCEDURE — 99213 OFFICE O/P EST LOW 20 MIN: CPT | Performed by: FAMILY MEDICINE

## 2021-10-14 PROCEDURE — 88311 DECALCIFY TISSUE: CPT

## 2021-10-14 RX ORDER — SODIUM CHLORIDE 0.9 % (FLUSH) 0.9 %
10 SYRINGE (ML) INJECTION PRN
Status: DISCONTINUED | OUTPATIENT
Start: 2021-10-14 | End: 2021-10-17 | Stop reason: HOSPADM

## 2021-10-14 RX ORDER — PREDNISONE 50 MG/1
50 TABLET ORAL DAILY
Qty: 5 TABLET | Refills: 0 | Status: SHIPPED | OUTPATIENT
Start: 2021-10-14 | End: 2021-10-19

## 2021-10-14 ASSESSMENT — PAIN DESCRIPTION - PAIN TYPE: TYPE: CHRONIC PAIN

## 2021-10-14 ASSESSMENT — ENCOUNTER SYMPTOMS
RESPIRATORY NEGATIVE: 1
ALLERGIC/IMMUNOLOGIC NEGATIVE: 1
BACK PAIN: 1
GASTROINTESTINAL NEGATIVE: 1
EYES NEGATIVE: 1

## 2021-10-14 ASSESSMENT — PAIN DESCRIPTION - LOCATION: LOCATION: BACK;HIP;LEG

## 2021-10-14 ASSESSMENT — PAIN DESCRIPTION - DIRECTION: RADIATING_TOWARDS: LEG

## 2021-10-14 ASSESSMENT — PAIN - FUNCTIONAL ASSESSMENT: PAIN_FUNCTIONAL_ASSESSMENT: 0-10

## 2021-10-14 ASSESSMENT — PAIN SCALES - GENERAL: PAINLEVEL_OUTOF10: 10

## 2021-10-14 ASSESSMENT — PAIN DESCRIPTION - ORIENTATION: ORIENTATION: RIGHT;LEFT;LOWER

## 2021-10-14 NOTE — LETTER
[address]  CT Scan  [address] [place] [id number]  Phone: [phone]            October 14, 2021     Patient: Jagruti Lawson   YOB: 1950   Date of Visit: 10/14/2021       To Whom It May Concern: It is my medical opinion that Dora Rojas return to work on Sunday October 17, 2021. He was seen at our facility for a procedure today. If you have any questions or concerns, please don't hesitate to call.     Sincerely,        Dr. Sunil Morales MD/KB

## 2021-10-14 NOTE — PROGRESS NOTES
Post bone marrow biopsy telemetry shows sinus bradycardia with unifocal PVC, bigeminy at times. Patient without palpitations, dizziness, shortness of breath or chest pain. Skin warm and dry. bp 165/76.

## 2021-10-14 NOTE — FLOWSHEET NOTE
At discharge, patient continued to state he had pain to bilateral hip, lower back and legs that was present upon admission. States tylenol and advil does not help. Encouraged patient to seek attention with PCP at [facility]. Patient then taken to urgent care for immediate treatment.

## 2021-10-19 LAB
BONE MARROW REPORT: NORMAL
FLOW CYTOMETRY, BM: NORMAL

## 2021-10-25 ENCOUNTER — HOSPITAL ENCOUNTER (OUTPATIENT)
Dept: LAB | Age: 71
Discharge: HOME | End: 2021-10-25
Payer: COMMERCIAL

## 2021-10-25 DIAGNOSIS — D64.9 ANEMIA, UNSPECIFIED TYPE: ICD-10-CM

## 2021-10-25 LAB
CHROMOSOME STUDY: NORMAL
TSH SERPL DL<=0.05 MIU/L-ACNC: 3.17 MIU/L (ref 0.3–5)

## 2021-10-25 PROCEDURE — 36415 COLL VENOUS BLD VENIPUNCTURE: CPT

## 2021-10-25 PROCEDURE — 84443 ASSAY THYROID STIM HORMONE: CPT

## 2021-10-26 ENCOUNTER — OFFICE VISIT (OUTPATIENT)
Dept: ONCOLOGY | Age: 71
End: 2021-10-26
Payer: COMMERCIAL

## 2021-10-26 VITALS
BODY MASS INDEX: 28.04 KG/M2 | OXYGEN SATURATION: 99 % | HEART RATE: 61 BPM | HEIGHT: 68 IN | WEIGHT: 185 LBS | SYSTOLIC BLOOD PRESSURE: 136 MMHG | DIASTOLIC BLOOD PRESSURE: 64 MMHG

## 2021-10-26 DIAGNOSIS — D47.2 SMOLDERING MYELOMA: Primary | ICD-10-CM

## 2021-10-26 DIAGNOSIS — K76.0 FATTY LIVER: ICD-10-CM

## 2021-10-26 DIAGNOSIS — D64.9 ANEMIA, UNSPECIFIED TYPE: ICD-10-CM

## 2021-10-26 DIAGNOSIS — N18.9 CHRONIC KIDNEY DISEASE, UNSPECIFIED CKD STAGE: ICD-10-CM

## 2021-10-26 PROCEDURE — 99215 OFFICE O/P EST HI 40 MIN: CPT | Performed by: INTERNAL MEDICINE

## 2021-10-26 NOTE — PROGRESS NOTES
Janna Flynn                                                                                                                  10/26/2021  MRN:   [id number]  YOB: 1950  PCP:                           CEM Kumari CNP  Referring Physician: No ref. 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 Complains of bone pain. Patient has been taking Motrin and Mobic at home. Pain is not well controlled according the patient. Patient CT PET came back negative however bone marrow biopsy came back positive for greater than 10% monoclonal plasma cells. Repeat CBC shows improvement in hemoglobin. During this visit patient's allergy, social, medical, surgical history and medications were reviewed and updated. Summary of Case/History:    Janna Flynn a 89 y.o.male is a patient who presents to the clinic to establish care and for further work-up and evaluation. Patient was noted to have a hemoglobin of 11.6 with MCV 92 on his routine work-up done earlier in July. Review of patient's record revealed he has had mild anemia since 2018 with hemoglobin around 11.8 in August 2018 12.4 in August 2020. Work-up done so far shows adequate kidney function patient was noted to have positive stool occult blood test back in 2018 patient has had colonoscopy done back in 2018 due to findings of positive stool occult blood test and underwent polypectomy.   Pathology from the polypectomy came back as hyperplastic polyp and tubular adenoma. Past Medical History:   Past Medical History:   Diagnosis Date    [facility]ina [facility] rest [facility])     Arthritis     CAD (coronary artery disease)     angina    Hyperlipidemia     Hypertension     Skin cancer        Past Surgical History:     Past Surgical History:   Procedure Laterality Date    CARDIAC CATHETERIZATION      x 2    COLONOSCOPY      COLONOSCOPY N/A 2018    COLONOSCOPY WITH BIOPSY performed by [facility]quivel DO at [facility]      forehead and  mouth in     CT BONE MARROW BIOPSY  10/14/2021    CT BONE MARROW BIOPSY 10/14/2021 [facility] CT SCAN    HERNIA REPAIR         Patient Family Social History:    Social History     Socioeconomic History    Marital status:      Spouse name: None    Number of children: None    Years of education: None    Highest education level: None   Occupational History    None   Tobacco Use    Smoking status: Former Smoker     Packs/day: 0.50     Years: 26.00     Pack years: 13.00     Types: Cigarettes     Start date:      Quit date: 1997     Years since quittin.8    Smokeless tobacco: Never Used    Tobacco comment: ly middleton 16   Substance and Sexual Activity    Alcohol use: Yes     Alcohol/week: 1.0 standard drinks     Types: 1 Cans of beer per week     Comment: 2 beers/month    Drug use: No    Sexual activity: None   Other Topics Concern    None   Social History Narrative    None     Social Determinants of Health     Financial Resource Strain: Low Risk     Difficulty of Paying Living Expenses: Not hard at all   Food Insecurity: No Food Insecurity    Worried About Running Out of Food in the Last Year: Never true    Mayra of Food in the Last Year: Never true   Transportation Needs:     Lack of Transportation (Medical):      Lack of Transportation (Non-Medical):    Physical Activity:     Days of Exercise per Week:     Minutes of Exercise per Session:    Stress:     Feeling of Stress : Social Connections:     Frequency of Communication with Friends and Family:     Frequency of Social Gatherings with Friends and Family:     Attends Pentecostalism Services:     Active Member of Clubs or Organizations:     Attends Club or Organization Meetings:     Marital Status:    Intimate Partner Violence:     Fear of Current or Ex-Partner:     Emotionally Abused:     Physically Abused:     Sexually Abused:      History reviewed. No pertinent family history. Current Medications:     Current Outpatient Medications   Medication Sig Dispense Refill    tiZANidine (ZANAFLEX) 4 MG tablet Take 1 tablet by mouth 3 times daily as needed (muscle spasm) 30 tablet 0    isosorbide mononitrate (IMDUR) 30 MG extended release tablet Take 1 tablet by mouth once daily 90 tablet 3    amLODIPine (NORVASC) 10 MG tablet Take 1 tablet by mouth once daily 90 tablet 3    gemfibrozil (LOPID) 600 MG tablet TAKE 1 TABLET BY MOUTH ONCE DAILY 90 tablet 3    pravastatin (PRAVACHOL) 10 MG tablet Take 1 tablet by mouth once daily 90 tablet 3    bisoprolol (ZEBETA) 10 MG tablet Take 1 tablet by mouth once daily 90 tablet 3    meloxicam (MOBIC) 7.5 MG tablet Take 1 tablet by mouth daily 30 tablet 3    allopurinol (ZYLOPRIM) 300 MG tablet Take 1 tablet by mouth daily 90 tablet 3    lisinopril-hydroCHLOROthiazide (PRINZIDE;ZESTORETIC) 20-25 MG per tablet Take 1 tablet by mouth once daily 90 tablet 3    TURMERIC PO Take 1,000 mg by mouth      aspirin 81 MG tablet Take 81 mg by mouth (Patient not taking: Reported on 10/14/2021)      Cyanocobalamin (B-12) 2500 MCG TABS Take 2,500 mcg by mouth daily       Cholecalciferol (D3-1000 PO) Take 1,000 Units by mouth daily       b complex vitamins capsule Take 1 capsule by mouth daily       No current facility-administered medications for this visit. Allergies:   Codeine, Dtap-ipv vaccine, and Penicillins    Review of Systems:    Constitutional: No fever or chills.  No night sweats, positive weight loss   Eyes: No eye discharge, double vision, or eye pain   HEENT: negative for sore mouth, sore throat, hoarseness and voice change   Respiratory: negative for cough , sputum, dyspnea, wheezing, hemoptysis, chest pain   Cardiovascular: negative for chest pain, dyspnea, palpitations, orthopnea, PND   Gastrointestinal: negative for nausea, vomiting, diarrhea, constipation, abdominal pain, Dysphagia, hematemesis and hematochezia   Genitourinary: negative for frequency, dysuria, nocturia, urinary incontinence, and hematuria   Integument: negative for rash, skin lesions, bruises. Hematologic/Lymphatic: negative for easy bruising, bleeding, lymphadenopathy, or petechiae   Endocrine: negative for heat or cold intolerance,weight changes, change in bowel habits and hair loss   Musculoskeletal: Positive for joint pain.   Positive for right knee pain  Neurological: negative for headaches, dizziness, seizures, weakness, numbness        Physical Exam:    Vitals: /64   Pulse 61   Ht 5' 8\" (1.727 m)   Wt 185 lb (83.9 kg)   SpO2 99%   BMI 28.13 kg/m²   General appearance - well appearing, no in pain or distress  Mental status - AAO X3  Eyes - pupils equal and reactive, extraocular eye movements intact  Mouth - mucous membranes moist, pharynx normal without lesions  Neck - supple, no significant adenopathy  Lymphatics - no palpable lymphadenopathy, no hepatosplenomegaly  Chest - clear to auscultation, no wheezes, rales or rhonchi, symmetric air entry  Heart - normal rate, regular rhythm, normal S1, S2, no murmurs  Abdomen - soft, nontender, nondistended, no masses or organomegaly  Neurological - alert, oriented, normal speech, no focal findings or movement disorder noted  Extremities - peripheral pulses normal, no pedal edema, no clubbing or cyanosis  Skin - normal coloration and turgor, no rashes, no suspicious skin lesions noted       DATA:    Results for orders placed or performed during the hospital encounter of 10/25/21   TSH with Reflex   Result Value Ref Range    TSH 3.17 0.30 - 5.00 mIU/L     Results for orders placed or performed during the hospital encounter of 10/25/21   TSH with Reflex   Result Value Ref Range    TSH 3.17 0.30 - 5.00 mIU/L       CT GUIDED NEEDLE PLACEMENT    Result Date: 10/14/2021  PROCEDURE: CT GUIDED BONE MARROW ASPIRATION AND CORE NEEDLE BONE BIOPSY OF THE right ILIAC BONE. 10/14/2021 HISTORY: ORDERING SYSTEM PROVIDED HISTORY: Anemia, unspecified type TECHNOLOGIST PROVIDED HISTORY: Reason for Exam: Bone marrow bx right posterior iliac spine Acuity: Unknown Type of Exam: Initial TECHNIQUE: Informed consent was obtained following a detailed explanation of the procedure including risks, benefits, and alternatives. Universal protocol was followed. Axial images were obtained through the iliac bones using CT guidance and a suitable skin site was prepped and draped in sterile fashion. Local anesthesia was achieved with lidocaine. An 11 gauge [employer] bone marrow biopsy needle was advanced into the right iliac bone and approximately 10 mL of bone marrow aspirate was obtained. A single core biopsy specimen was obtained and the patient tolerated the procedure well. Dose modulation, iterative reconstruction, and/or weight based adjustment of the mA/kV was utilized to reduce the radiation dose to as low as reasonably achievable. Successful CT guided bone marrow aspiration and core biopsy of the iliac bone. PET CT WHOLE BODY    Result Date: 10/8/2021  EXAMINATION: WHOLE BODY PET/CT WITH LOWER EXTREMITIES. 10/8/2021 TECHNIQUE: Following IV injection of 12.74 mCi of F 18 -FDG, PET  tumor imaging was acquired from the top of the skull to the mid thighs. Then, a separate acquisition of the lower extremities from mid thigh to the tip of the toes was acquired. Computed tomography was used for purposes of attenuation correction and anatomic localization.   Fusion imaging was utilized for interpretation. Body uptake time 87 mins. Glucose level 102 mg/dl. COMPARISON: None HISTORY: ORDERING SYSTEM PROVIDED HISTORY: Abnormality of plasma protein TECHNOLOGIST PROVIDED HISTORY: paraproteinemia Reason for Exam: paraproteinemia, Abnormality of plasma protein Acuity: Acute Type of Exam: Initial FINDINGS: HEAD/NECK: No focal abnormal FDG activity is identified. CHEST: No focal abnormal FDG activity is identified. ABDOMEN/PELVIS: No focal abnormal FDG activity is identified. BONES/SOFT TISSUE: No focal abnormal FDG activity is identified. INCIDENTAL CT FINDINGS: No pneumothorax. No pericardial or pleural effusions. No free air or free fluid. Negative PET-CT. CT BIOPSY BONE MARROW    Result Date: 10/14/2021  PROCEDURE: CT GUIDED BONE MARROW ASPIRATION AND CORE NEEDLE BONE BIOPSY OF THE right ILIAC BONE. 10/14/2021 HISTORY: ORDERING SYSTEM PROVIDED HISTORY: Anemia, unspecified type TECHNOLOGIST PROVIDED HISTORY: Reason for Exam: Bone marrow bx right posterior iliac spine Acuity: Unknown Type of Exam: Initial TECHNIQUE: Informed consent was obtained following a detailed explanation of the procedure including risks, benefits, and alternatives. Universal protocol was followed. Axial images were obtained through the iliac bones using CT guidance and a suitable skin site was prepped and draped in sterile fashion. Local anesthesia was achieved with lidocaine. An 11 gauge [employer] bone marrow biopsy needle was advanced into the right iliac bone and approximately 10 mL of bone marrow aspirate was obtained. A single core biopsy specimen was obtained and the patient tolerated the procedure well. Dose modulation, iterative reconstruction, and/or weight based adjustment of the mA/kV was utilized to reduce the radiation dose to as low as reasonably achievable. Successful CT guided bone marrow aspiration and core biopsy of the iliac bone.          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 Patient does not have hypercalcemia. Renal dysfunction can be explained by hypertension, NSAID use and creatinine is less than 2. Hemoglobin is greater than 10. CT PET does not show any bony lesions. I believe patient has a smoldering myeloma discussed risk of progression to an active myeloma recommend close monitoring and surveillance. Cytogenetics consistent with high risk  Discussed indications for treatment for myeloma  We will reach out to pathology department to obtain Congo red staining on the specimen  Patient had multiple questions which answered to the best of my ability. Fauzia Oviedo MD        I spent more than 40 minutes examining, evaluating, reviewing data, counseling the patient and coordinating care. Greater than 50% of time was spent face-to-face with the patient this note is created with the assistance of a speech recognition program.  While intending to generate a document that actually reflects the content of the visit, the document can still have some errors including those of syntax and sound a like substitutions which may escape proof reading. It such instances, actual meaning can be extrapolated by contextual diversion.

## 2021-11-09 ENCOUNTER — NURSE TRIAGE (OUTPATIENT)
Dept: OTHER | Facility: CLINIC | Age: 71
End: 2021-11-09

## 2021-11-11 ENCOUNTER — OFFICE VISIT (OUTPATIENT)
Dept: FAMILY MEDICINE CLINIC | Age: 71
End: 2021-11-11
Payer: COMMERCIAL

## 2021-11-11 VITALS
HEIGHT: 68 IN | HEART RATE: 61 BPM | OXYGEN SATURATION: 96 % | SYSTOLIC BLOOD PRESSURE: 105 MMHG | WEIGHT: 182 LBS | TEMPERATURE: 97 F | BODY MASS INDEX: 27.58 KG/M2 | DIASTOLIC BLOOD PRESSURE: 85 MMHG

## 2021-11-11 DIAGNOSIS — M54.42 ACUTE BILATERAL LOW BACK PAIN WITH LEFT-SIDED SCIATICA: Primary | ICD-10-CM

## 2021-11-11 DIAGNOSIS — C90.00 MULTIPLE MYELOMA NOT HAVING ACHIEVED REMISSION (HCC): ICD-10-CM

## 2021-11-11 PROCEDURE — 99213 OFFICE O/P EST LOW 20 MIN: CPT | Performed by: NURSE PRACTITIONER

## 2021-11-11 RX ORDER — TRAMADOL HYDROCHLORIDE 50 MG/1
50 TABLET ORAL EVERY 6 HOURS PRN
Qty: 20 TABLET | Refills: 0 | Status: SHIPPED | OUTPATIENT
Start: 2021-11-11 | End: 2021-11-15 | Stop reason: SDUPTHER

## 2021-11-11 ASSESSMENT — ENCOUNTER SYMPTOMS: BACK PAIN: 1

## 2021-11-11 NOTE — PATIENT INSTRUCTIONS
Stop the mobic or meloxicam.  This is hard on the kidneys. Continue the tylenol from the pharmacist every 8 hours. Add Tramadol 50 mg every 6 hours as needed for pain. Do not operate heavy machinery or drive while taking this medication. Follow up with Martita Elena or oncologist.    Delmy Britt help with back pain. Patient Education        Back Pain: Care Instructions  Your Care Instructions     Back pain has many possible causes. It is often related to problems with muscles and ligaments of the back. It may also be related to problems with the nerves, discs, or bones of the back. Moving, lifting, standing, sitting, or sleeping in an awkward way can strain the back. Sometimes you don't notice the injury until later. Arthritis is another common cause of back pain. Although it may hurt a lot, back pain usually improves on its own within several weeks. Most people recover in 12 weeks or less. Using good home treatment and being careful not to stress your back can help you feel better sooner. Follow-up care is a key part of your treatment and safety. Be sure to make and go to all appointments, and call your doctor if you are having problems. It's also a good idea to know your test results and keep a list of the medicines you take. How can you care for yourself at home? · Sit or lie in positions that are most comfortable and reduce your pain. Try one of these positions when you lie down:  ? Lie on your back with your knees bent and supported by large pillows. ? Lie on the floor with your legs on the seat of a sofa or chair. ? Lie on your side with your knees and hips bent and a pillow between your legs. ? Lie on your stomach if it does not make pain worse. · Do not sit up in bed, and avoid soft couches and twisted positions. Bed rest can help relieve pain at first, but it delays healing. Avoid bed rest after the first day of back pain. · Change positions every 30 minutes.  If you must sit for long periods of time, take breaks from sitting. Get up and walk around, or lie in a comfortable position. · Try using a heating pad on a low or medium setting for 15 to 20 minutes every 2 or 3 hours. Try a warm shower in place of one session with the heating pad. · You can also try an ice pack for 10 to 15 minutes every 2 to 3 hours. Put a thin cloth between the ice pack and your skin. · Take pain medicines exactly as directed. ? If the doctor gave you a prescription medicine for pain, take it as prescribed. ? If you are not taking a prescription pain medicine, ask your doctor if you can take an over-the-counter medicine. · Take short walks several times a day. You can start with 5 to 10 minutes, 3 or 4 times a day, and work up to longer walks. Walk on level surfaces and avoid hills and stairs until your back is better. · Return to work and other activities as soon as you can. Continued rest without activity is usually not good for your back. · To prevent future back pain, do exercises to stretch and strengthen your back and stomach. Learn how to use good posture, safe lifting techniques, and proper body mechanics. When should you call for help? Call your doctor now or seek immediate medical care if:    · You have new or worsening numbness in your legs.     · You have new or worsening weakness in your legs. (This could make it hard to stand up.)     · You lose control of your bladder or bowels. Watch closely for changes in your health, and be sure to contact your doctor if:    · You have a fever, lose weight, or don't feel well.     · You do not get better as expected. Where can you learn more? Go to https://[email or web address].Cronote. org and sign in to your [employer] account. Enter M661 in the [facility] box to learn more about \"Back Pain: Care Instructions. \"     If you do not have an account, please click on the \"Sign Up Now\" link.   Current as of: July 1, 2021               Content Version: 13.0  © 9302-6851 Healthwise, Incorporated. Care instructions adapted under license by [facility] [place] ([facility]). If you have questions about a medical condition or this instruction, always ask your healthcare professional. [address] any warranty or liability for your use of this information.

## 2021-11-11 NOTE — PROGRESS NOTES
[facility] [place] [address] [facility], APRN-[facility]  [address]  Phone:  [phone]  Fax:  [phone]  Sue Estrella is a 66 y.o. male who presents today for his medical conditions/complaints as noted below. Sue Estrella c/o of Back Pain (lower back pain. Patient states it started in his leg. s/s started 3 weeks ago. Diagnosed with myeloma in his back a few weeks ago. )      HPI:     Back Pain  This is a new problem. The current episode started 1 to 4 weeks ago (Diagnosed with multiple myeloma. ). The problem has been gradually worsening since onset. The pain is present in the lumbar spine. The pain is at a severity of 10/10. Wt Readings from Last 3 Encounters:   11/11/21 182 lb (82.6 kg)   10/26/21 185 lb (83.9 kg)   10/14/21 183 lb (83 kg)       Temp Readings from Last 3 Encounters:   11/11/21 97 °F (36.1 °C)   10/14/21 98 °F (36.7 °C) (Oral)   10/14/21 98.2 °F (36.8 °C) (Tympanic)       BP Readings from Last 3 Encounters:   11/11/21 105/85   10/26/21 136/64   10/14/21 (!) 169/77       Pulse Readings from Last 3 Encounters:   11/11/21 61   10/26/21 61   10/14/21 57        SpO2 Readings from Last 3 Encounters:   11/11/21 96%   10/26/21 99%   10/14/21 97%             Past Medical History:   Diagnosis Date    Angina at rest [facility])     Arthritis     CAD (coronary artery disease)     angina    Hyperlipidemia     Hypertension     Skin cancer       Past Surgical History:   Procedure Laterality Date    CARDIAC CATHETERIZATION      x 2    COLONOSCOPY      COLONOSCOPY N/A 9/12/2018    COLONOSCOPY WITH BIOPSY performed by Polly Dempsey DO at [place] [address]      forehead and  mouth in 1987    [facility] BIOPSY  10/14/2021    CT BONE MARROW BIOPSY 10/14/2021 [facility] CT SCAN    HERNIA REPAIR       History reviewed. No pertinent family history.   Social History     Tobacco Use    Smoking status: Former Smoker     Packs/day: 0.50     Years: 26.00 Pack years: 13.00     Types: Cigarettes     Start date:      Quit date: 1997     Years since quittin.8    Smokeless tobacco: Never Used    Tobacco comment: ly rrt 16   Substance Use Topics    Alcohol use: Yes     Alcohol/week: 1.0 standard drink     Types: 1 Cans of beer per week     Comment: 2 beers/month      Current Outpatient Medications   Medication Sig Dispense Refill    traMADol (ULTRAM) 50 MG tablet Take 1 tablet by mouth every 6 hours as needed for Pain for up to 5 days. Intended supply: 5 days. Take lowest dose possible to manage pain 20 tablet 0    tiZANidine (ZANAFLEX) 4 MG tablet Take 1 tablet by mouth 3 times daily as needed (muscle spasm) 30 tablet 0    isosorbide mononitrate (IMDUR) 30 MG extended release tablet Take 1 tablet by mouth once daily 90 tablet 3    amLODIPine (NORVASC) 10 MG tablet Take 1 tablet by mouth once daily 90 tablet 3    gemfibrozil (LOPID) 600 MG tablet TAKE 1 TABLET BY MOUTH ONCE DAILY 90 tablet 3    pravastatin (PRAVACHOL) 10 MG tablet Take 1 tablet by mouth once daily 90 tablet 3    bisoprolol (ZEBETA) 10 MG tablet Take 1 tablet by mouth once daily 90 tablet 3    allopurinol (ZYLOPRIM) 300 MG tablet Take 1 tablet by mouth daily 90 tablet 3    lisinopril-hydroCHLOROthiazide (PRINZIDE;ZESTORETIC) 20-25 MG per tablet Take 1 tablet by mouth once daily 90 tablet 3    TURMERIC PO Take 1,000 mg by mouth      aspirin 81 MG tablet Take 81 mg by mouth       Cyanocobalamin (B-12) 2500 MCG TABS Take 2,500 mcg by mouth daily       Cholecalciferol (D3-1000 PO) Take 1,000 Units by mouth daily       b complex vitamins capsule Take 1 capsule by mouth daily       No current facility-administered medications for this visit. Allergies   Allergen Reactions    Codeine Nausea Only    Dtap-Ipv Vaccine Rash    Penicillins Rash       No exam data present    Subjective:      Review of Systems   Musculoskeletal: Positive for back pain.        Objective: /85 (Site: Right Upper Arm, Position: Sitting, Cuff Size: Medium Adult)   Pulse 61   Temp 97 °F (36.1 °C)   Ht 5' 8\" (1.727 m)   Wt 182 lb (82.6 kg)   SpO2 96%   BMI 27.67 kg/m²     Physical Exam  Vitals reviewed. Musculoskeletal:         General: Tenderness present. No deformity or signs of injury. Lumbar back: Tenderness and bony tenderness present. Decreased range of motion. Positive left straight leg raise test.      Comments: Patient is tender in the L3-4-5 S1 area. Radicular pain extending down to the mid calf on the left side. Skin:     General: Skin is warm and dry. Neurological:      Mental Status: He is alert. Assessment:      Diagnosis Orders   1. Acute bilateral low back pain with left-sided sciatica  traMADol (ULTRAM) 50 MG tablet   2. Multiple myeloma not having achieved remission (HCC)  traMADol (ULTRAM) 50 MG tablet     No results found for this visit on 11/11/21. Plan:       Suspect pain is secondary to myeloma process in the spinal processes. Stop the mobic or meloxicam.  This is hard on the kidneys. Continue the tylenol from the pharmacist every 8 hours. Add Tramadol 50 mg every 6 hours as needed for pain. Do not operate heavy machinery or drive while taking this medication. Follow up with Mallika Cavanaugh or oncologist.    Brnadie Larson help with back pain. Patient Instructions     Stop the mobic or meloxicam.  This is hard on the kidneys. Continue the tylenol from the pharmacist every 8 hours. Add Tramadol 50 mg every 6 hours as needed for pain. Do not operate heavy machinery or drive while taking this medication. Follow up with Mallika Cavanaugh or oncologist.    Brandie Bis help with back pain. Patient Education        Back Pain: Care Instructions  Your Care Instructions     Back pain has many possible causes. It is often related to problems with muscles and ligaments of the back.  It may also be related to problems with the nerves, discs, or bones of the back. Moving, lifting, standing, sitting, or sleeping in an awkward way can strain the back. Sometimes you don't notice the injury until later. Arthritis is another common cause of back pain. Although it may hurt a lot, back pain usually improves on its own within several weeks. Most people recover in 12 weeks or less. Using good home treatment and being careful not to stress your back can help you feel better sooner. Follow-up care is a key part of your treatment and safety. Be sure to make and go to all appointments, and call your doctor if you are having problems. It's also a good idea to know your test results and keep a list of the medicines you take. How can you care for yourself at home? · Sit or lie in positions that are most comfortable and reduce your pain. Try one of these positions when you lie down:  ? Lie on your back with your knees bent and supported by large pillows. ? Lie on the floor with your legs on the seat of a sofa or chair. ? Lie on your side with your knees and hips bent and a pillow between your legs. ? Lie on your stomach if it does not make pain worse. · Do not sit up in bed, and avoid soft couches and twisted positions. Bed rest can help relieve pain at first, but it delays healing. Avoid bed rest after the first day of back pain. · Change positions every 30 minutes. If you must sit for long periods of time, take breaks from sitting. Get up and walk around, or lie in a comfortable position. · Try using a heating pad on a low or medium setting for 15 to 20 minutes every 2 or 3 hours. Try a warm shower in place of one session with the heating pad. · You can also try an ice pack for 10 to 15 minutes every 2 to 3 hours. Put a thin cloth between the ice pack and your skin. · Take pain medicines exactly as directed. ? If the doctor gave you a prescription medicine for pain, take it as prescribed.   ? If you are not taking a prescription pain medicine, ask your doctor if you can take an over-the-counter medicine. · Take short walks several times a day. You can start with 5 to 10 minutes, 3 or 4 times a day, and work up to longer walks. Walk on level surfaces and avoid hills and stairs until your back is better. · Return to work and other activities as soon as you can. Continued rest without activity is usually not good for your back. · To prevent future back pain, do exercises to stretch and strengthen your back and stomach. Learn how to use good posture, safe lifting techniques, and proper body mechanics. When should you call for help? Call your doctor now or seek immediate medical care if:    · You have new or worsening numbness in your legs.     · You have new or worsening weakness in your legs. (This could make it hard to stand up.)     · You lose control of your bladder or bowels. Watch closely for changes in your health, and be sure to contact your doctor if:    · You have a fever, lose weight, or don't feel well.     · You do not get better as expected. Where can you learn more? Go to [email or web address]://[email or web address].Tutee. org and sign in to your [email or web address] account. Enter F912 in the "[employer] box to learn more about \"Back Pain: Care Instructions. \"     If you do not have an account, please click on the \"Sign Up Now\" link. Current as of: July 1, 2021               Content Version: 13.0  © 0301-2533 [employer]. Care instructions adapted under license by [employer]. If you have questions about a medical condition or this instruction, always ask your healthcare professional. [place] [address] any warranty or liability for your use of this information. Patient/Caregiver instructed on use, benefit, and side effects of prescribed medications. All patient/parent/caregiver questions answered. Patient/parent/caregiver voiced understanding. Reviewed health maintenance.   Instructed to continue current medications, diet and exercise. Patient agreed with treatment plan. Follow up as directed.            Electronically signed by CEM Lux NP on11/11/2021

## 2021-11-15 ENCOUNTER — OFFICE VISIT (OUTPATIENT)
Dept: FAMILY MEDICINE CLINIC | Age: 71
End: 2021-11-15
Payer: COMMERCIAL

## 2021-11-15 VITALS
OXYGEN SATURATION: 98 % | BODY MASS INDEX: 27.28 KG/M2 | HEIGHT: 68 IN | SYSTOLIC BLOOD PRESSURE: 140 MMHG | WEIGHT: 180 LBS | HEART RATE: 72 BPM | DIASTOLIC BLOOD PRESSURE: 84 MMHG

## 2021-11-15 DIAGNOSIS — M54.42 CHRONIC MIDLINE LOW BACK PAIN WITH LEFT-SIDED SCIATICA: Primary | ICD-10-CM

## 2021-11-15 DIAGNOSIS — D47.2 SMOLDERING MULTIPLE MYELOMA: ICD-10-CM

## 2021-11-15 DIAGNOSIS — G89.29 CHRONIC MIDLINE LOW BACK PAIN WITH LEFT-SIDED SCIATICA: Primary | ICD-10-CM

## 2021-11-15 PROCEDURE — 99214 OFFICE O/P EST MOD 30 MIN: CPT | Performed by: INTERNAL MEDICINE

## 2021-11-15 RX ORDER — TRAMADOL HYDROCHLORIDE 50 MG/1
50 TABLET ORAL EVERY 6 HOURS PRN
Qty: 28 TABLET | Refills: 0 | Status: SHIPPED | OUTPATIENT
Start: 2021-11-15 | End: 2021-11-22

## 2021-11-15 ASSESSMENT — ENCOUNTER SYMPTOMS
COUGH: 0
BACK PAIN: 1
SINUS PAIN: 0
SORE THROAT: 0
CHEST TIGHTNESS: 0
SHORTNESS OF BREATH: 0
BLOOD IN STOOL: 0
RHINORRHEA: 0
DIARRHEA: 0
TROUBLE SWALLOWING: 0
ABDOMINAL PAIN: 0

## 2021-11-15 NOTE — PROGRESS NOTES
[address] [place] [address] [place] [address]  Dept: [phone]  Dept Fax: [phone]  Loc: [phone]     Visit Date:  11/15/2021    Patient:  Michael Torrez  YOB: 1950    HPI:   Michael Torrez presents today for   Chief Complaint   Patient presents with    Back Pain     Seen in , Across lower back. Hx of bone cancer. Pain 8/10   . HPI 66year old is coming is coming in acute on chronic worsening low back pain. Starts lower back goes across the lumbar spines and radiates down to posterior buttock/back/leg thigh pain. Radiculopathy related symptoms mostly left leg and stays mostly on the right hip. At job does a lot of walk, stiffness in the back hard to get up and keep going. Recent PET scan was negative for lytic lesions. Does have arthritis/central canal stenosis multilevel degenerative disc disease. Rough last year lost wife due to COVID 19. In construction for 10-12 years. Strenous work all my life. Works at [employer] right now. Severe central spinal canal narrowing at L2-L3. 2. Severe central spinal canal narrowing at L3-L4.  There is an associated   left paracentral disc extrusion at this level, compressing the left L4 nerve   root which may be contributing to left radicular symptoms. 3. Mild central spinal canal narrowing at L4-L5 with severe right, and   moderate-severe left foraminal narrowing.    4. Bilateral L5 spondylolysis.           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 Past Medical History:   has a past medical history of Angina at rest [facility]), Arthritis, CAD (coronary artery disease), Hyperlipidemia, Hypertension, and Skin cancer. Past Surgical History   has a past surgical history that includes Cardiac catheterization; hernia repair; Colonoscopy; Cosmetic surgery; Colonoscopy (N/A, 9/12/2018); and CT BIOPSY BONE MARROW (10/14/2021). Family History  family history is not on file. Social History   reports that he quit smoking about 24 years ago. His smoking use included cigarettes. He started smoking about 50 years ago. He has a 13.00 pack-year smoking history. He has never used smokeless tobacco. He reports current alcohol use of about 1.0 standard drink of alcohol per week. He reports that he does not use drugs. Health Maintenance:    Health Maintenance   Topic Date Due    DTaP/Tdap/Td vaccine (1 - Tdap) Never done    Shingles Vaccine (1 of 2) Never done    COVID-19 Vaccine (3 - Pfizer risk 4-dose series) 06/11/2021    A1C test (Diabetic or Prediabetic)  08/25/2021    Flu vaccine (1) 09/01/2021    Lipid screen  07/30/2022    Potassium monitoring  09/21/2022    Creatinine monitoring  09/21/2022    Colon cancer screen colonoscopy  09/12/2028    Pneumococcal 65+ yrs at Risk Vaccine  Completed    AAA screen  Completed    Hepatitis C screen  Completed    Hepatitis A vaccine  Aged Out    Hepatitis B vaccine  Aged Out    Hib vaccine  Aged Out    Meningococcal (ACWY) vaccine  Aged Out       Subjective:      Review of Systems   Constitutional: Negative for fatigue, fever and unexpected weight change. HENT: Negative for ear pain, postnasal drip, rhinorrhea, sinus pain, sore throat and trouble swallowing. Eyes: Negative for visual disturbance. Respiratory: Negative for cough, chest tightness and shortness of breath. Cardiovascular: Negative for chest pain and leg swelling. Gastrointestinal: Negative for abdominal pain, blood in stool and diarrhea. Endocrine: Negative for polyuria. Genitourinary: Negative for difficulty urinating and flank pain. Musculoskeletal: Positive for arthralgias, back pain and myalgias. Negative for joint swelling. Skin: Negative for rash. Allergic/Immunologic: Negative for environmental allergies. Neurological: Negative for weakness, light-headedness, numbness and headaches. Hematological: Negative for adenopathy. Psychiatric/Behavioral: Negative for behavioral problems and suicidal ideas. The patient is not nervous/anxious. Objective:     BP (!) 140/84 (Site: Right Upper Arm, Position: Sitting, Cuff Size: Medium Adult)   Pulse 72   Ht 5' 8\" (1.727 m)   Wt 180 lb (81.6 kg)   SpO2 98%   BMI 27.37 kg/m²     Physical Exam  Vitals and nursing note reviewed. HENT:      Head: Normocephalic and atraumatic. Cardiovascular:      Rate and Rhythm: Normal rate and regular rhythm. Pulses: Normal pulses. Heart sounds: Normal heart sounds. Pulmonary:      Effort: Pulmonary effort is normal.      Breath sounds: Normal breath sounds. No stridor. Musculoskeletal:         General: Tenderness present. Legs:       Comments: No focal spinal or paraspinal tenderness or vertebral tenderness  Very stiff. Skin:     General: Skin is warm. Neurological:      Mental Status: He is oriented to person, place, and time. Mental status is at baseline. Psychiatric:         Mood and Affect: Mood normal.             Assessment       Diagnosis Orders   1. Chronic midline low back pain with left-sided sciatica     2. Smoldering multiple myeloma (HCC)           PLAN   Avoid NSAIDs for now. ICE/HEAT/MASSAGE/topical arthritis prn.   Tramadol prn refills placed-educated on side effects like drowsiness/sedation/constipation/impaired cognition/central respiratory depression. Spine specialist referral/Pain Management referrals. No orders of the defined types were placed in this encounter. No follow-ups on file. Patient given educational materials - see patient instructions. Discussed use, benefit, and side effects of prescribed medications. All patient questions answered. Pt voiced understanding. Reviewed health maintenance.        Electronically signed Tamie Del Valle MD on 11/15/2021 at 4:03 PM EST

## 2021-12-01 DIAGNOSIS — M54.50 LUMBAR SPINE PAIN: Primary | ICD-10-CM

## 2021-12-10 ENCOUNTER — HOSPITAL ENCOUNTER (OUTPATIENT)
Dept: GENERAL RADIOLOGY | Age: 71
Discharge: HOME | End: 2021-12-12
Payer: COMMERCIAL

## 2021-12-10 ENCOUNTER — OFFICE VISIT (OUTPATIENT)
Dept: ORTHOPEDIC SURGERY | Age: 71
End: 2021-12-10
Payer: COMMERCIAL

## 2021-12-10 VITALS
WEIGHT: 180 LBS | DIASTOLIC BLOOD PRESSURE: 84 MMHG | SYSTOLIC BLOOD PRESSURE: 184 MMHG | HEIGHT: 68 IN | BODY MASS INDEX: 27.28 KG/M2 | HEART RATE: 61 BPM

## 2021-12-10 DIAGNOSIS — M54.16 LUMBAR RADICULAR PAIN: ICD-10-CM

## 2021-12-10 DIAGNOSIS — M48.061 SPINAL STENOSIS OF LUMBAR REGION WITHOUT NEUROGENIC CLAUDICATION: ICD-10-CM

## 2021-12-10 DIAGNOSIS — C90.00 MULTIPLE MYELOMA, REMISSION STATUS UNSPECIFIED (HCC): ICD-10-CM

## 2021-12-10 DIAGNOSIS — M54.50 LUMBAR SPINE PAIN: ICD-10-CM

## 2021-12-10 DIAGNOSIS — M54.50 LUMBAR SPINE PAIN: Primary | ICD-10-CM

## 2021-12-10 PROCEDURE — 72100 X-RAY EXAM L-S SPINE 2/3 VWS: CPT

## 2021-12-10 PROCEDURE — 99203 OFFICE O/P NEW LOW 30 MIN: CPT | Performed by: ORTHOPAEDIC SURGERY

## 2021-12-10 NOTE — PROGRESS NOTES
Patient ID: Rosa Fu is a 66 y.o. male    Chief Compliant:  No chief complaint on file. Diagnostic imaging:    MRI lumbar spine 2019 reviewed patient with significant multilevel lumbar spondylosis and stenosis greatest at L3-4 where he has a left paracentral disc herniation and severe stenosis    AP lateral lumbar spine diffuse lumbar spondylosis greater than expected for stated age no acute fractures noted    Assessment and Plan:  1. Lumbar spine pain    2. Spinal stenosis of lumbar region without neurogenic claudication    3. Lumbar radicular pain    4. Multiple myeloma, remission status unspecified (HCC)      Acute onset of left radicular leg pain and numbness    MRI lumbar spine    Refer to pain management for lumbar epidural steroid injections    Off work note provided    Follow up after MRI    HPI:  This is a 66 y.o. male who presents to the clinic today as a new patient for lower back evaluation. Hx of multiple myeloma    Patient with chronic lower back pain, recently developed left leg numbness and pain about 1 month. He notes mild left leg weakness. Patient has underwent LESI in the past with moderate relief    Review of Systems   All other systems reviewed and are negative.       Past History:    Current Outpatient Medications:     tiZANidine (ZANAFLEX) 4 MG tablet, Take 1 tablet by mouth 3 times daily as needed (muscle spasm), Disp: 30 tablet, Rfl: 0    isosorbide mononitrate (IMDUR) 30 MG extended release tablet, Take 1 tablet by mouth once daily, Disp: 90 tablet, Rfl: 3    amLODIPine (NORVASC) 10 MG tablet, Take 1 tablet by mouth once daily, Disp: 90 tablet, Rfl: 3    gemfibrozil (LOPID) 600 MG tablet, TAKE 1 TABLET BY MOUTH ONCE DAILY, Disp: 90 tablet, Rfl: 3    pravastatin (PRAVACHOL) 10 MG tablet, Take 1 tablet by mouth once daily, Disp: 90 tablet, Rfl: 3    bisoprolol (ZEBETA) 10 MG tablet, Take 1 tablet by mouth once daily, Disp: 90 tablet, Rfl: 3   lisinopril-hydroCHLOROthiazide (PRINZIDE;ZESTORETIC) 20-25 MG per tablet, Take 1 tablet by mouth once daily, Disp: 90 tablet, Rfl: 3    TURMERIC PO, Take 1,000 mg by mouth, Disp: , Rfl:     aspirin 81 MG tablet, Take 81 mg by mouth , Disp: , Rfl:     Cyanocobalamin (B-12) 2500 MCG TABS, Take 2,500 mcg by mouth daily , Disp: , Rfl:     Cholecalciferol (D3-1000 PO), Take 1,000 Units by mouth daily , Disp: , Rfl:     b complex vitamins capsule, Take 1 capsule by mouth daily, Disp: , Rfl:   Allergies   Allergen Reactions    Codeine Nausea Only    Dtap-Ipv Vaccine Rash    Penicillins Rash     Social History     Socioeconomic History    Marital status:      Spouse name: Not on file    Number of children: Not on file    Years of education: Not on file    Highest education level: Not on file   Occupational History    Not on file   Tobacco Use    Smoking status: Former Smoker     Packs/day: 0.50     Years: 26.00     Pack years: 13.00     Types: Cigarettes     Start date:      Quit date: 1997     Years since quittin.9    Smokeless tobacco: Never Used    Tobacco comment: ly rrt 16   Substance and Sexual Activity    Alcohol use: Yes     Alcohol/week: 1.0 standard drink     Types: 1 Cans of beer per week     Comment: 2 beers/month    Drug use: No    Sexual activity: Not on file   Other Topics Concern    Not on file   Social History Narrative    Not on file     Social Determinants of Health     Financial Resource Strain: Low Risk     Difficulty of Paying Living Expenses: Not hard at all   Food Insecurity: No Food Insecurity    Worried About Running Out of Food in the Last Year: Never true    [address] in the Last Year: Never true   Transportation Needs:     Lack of Transportation (Medical): Not on file    Lack of Transportation (Non-Medical):  Not on file   Physical Activity:     Days of Exercise per Week: Not on file    Minutes of Exercise per Session: Not on file Stress:     Feeling of Stress : Not on file   Social Connections:     Frequency of Communication with Friends and Family: Not on file    Frequency of Social Gatherings with Friends and Family: Not on file    Attends Advent Services: Not on file    Active Member of Clubs or Organizations: Not on file    Attends Club or Organization Meetings: Not on file    Marital Status: Not on file   Intimate Partner Violence:     Fear of Current or Ex-Partner: Not on file    Emotionally Abused: Not on file    Physically Abused: Not on file    Sexually Abused: Not on file   Housing Stability:     Unable to Pay for Housing in the Last Year: Not on file    Number of [place] in the Last Year: Not on file    Unstable Housing in the Last Year: Not on file     Past Medical History:   Diagnosis Date    Angina at rest [facility])     Arthritis     CAD (coronary artery disease)     angina    Hyperlipidemia     Hypertension     Skin cancer      Past Surgical History:   Procedure Laterality Date    CARDIAC CATHETERIZATION      x 2    COLONOSCOPY      COLONOSCOPY N/A 9/12/2018    COLONOSCOPY WITH BIOPSY performed by Hemalatha Salvador DO at [facility] 8977      forehead and  mouth in [facility] [facility]  10/14/2021    CT BONE MARROW BIOPSY 10/14/2021 [facility] CT SCAN    HERNIA REPAIR       No family history on file. Physical Exam:  Vitals signs and nursing note reviewed. Constitutional:       Appearance: well-developed. HENT:      Head: Normocephalic and atraumatic. Nose: Nose normal.   Eyes:      Conjunctiva/sclera: Conjunctivae normal.   Neck:      Musculoskeletal: Normal range of motion and neck supple. Pulmonary:      Effort: Pulmonary effort is normal. No respiratory distress. Musculoskeletal:      Comments: Normal gait     Skin:     General: Skin is warm and dry. Neurological:      Mental Status: Alert and oriented to person, place, and time.       Sensory: No sensory deficit. Psychiatric:         Behavior: Behavior normal.         Thought Content: Thought content normal.        Provider Attestation:  Alondra Moreno, personally performed the services described in this documentation. All medical record entries made by the scribe were at my direction and in my presence. I have reviewed the chart and discharge instructions and agree that the records reflect my personal performance and is accurate and complete. [facility], MD 12/10/21     Scribe Attestation:  By signing my name below, Craigjose Catalina, attest that this documentation has been prepared under the direction and in the presence of Dr. Bibi Bone. Electronically signed: Yemi Valentin, 12/10/21     Please note that this chart was generated using voice recognition Dragon dictation software. Although every effort was made to ensure the accuracy of this automated transcription, some errors in transcription may have occurred.

## 2021-12-10 NOTE — LETTER
[facility] A department of [place] [address]  Phone: [phone]  Fax: [phone]    Leslee Melton MD        December 10, 2021     Patient: Irma Bryant   YOB: 1950   Date of Visit: 12/10/2021       To Whom It May Concern: It is my medical opinion that Rogerio Stuart should remain out of work until 12/27/2021. If you have any questions or concerns, please don't hesitate to call.     Sincerely,        Leslee Melton MD
No bruits; no thyromegaly or nodules

## 2021-12-21 ENCOUNTER — OFFICE VISIT (OUTPATIENT)
Dept: PAIN MANAGEMENT | Age: 71
End: 2021-12-21
Payer: COMMERCIAL

## 2021-12-21 VITALS
SYSTOLIC BLOOD PRESSURE: 124 MMHG | WEIGHT: 177 LBS | BODY MASS INDEX: 26.83 KG/M2 | HEIGHT: 68 IN | DIASTOLIC BLOOD PRESSURE: 80 MMHG

## 2021-12-21 DIAGNOSIS — M48.07 SPINAL STENOSIS OF LUMBOSACRAL REGION: ICD-10-CM

## 2021-12-21 DIAGNOSIS — M47.817 LUMBOSACRAL SPONDYLOSIS WITHOUT MYELOPATHY: ICD-10-CM

## 2021-12-21 DIAGNOSIS — M54.16 LUMBAR RADICULOPATHY: Primary | ICD-10-CM

## 2021-12-21 DIAGNOSIS — M51.36 DDD (DEGENERATIVE DISC DISEASE), LUMBAR: ICD-10-CM

## 2021-12-21 PROCEDURE — 99204 OFFICE O/P NEW MOD 45 MIN: CPT | Performed by: PHYSICAL MEDICINE & REHABILITATION

## 2021-12-21 RX ORDER — GABAPENTIN 100 MG/1
100 CAPSULE ORAL 3 TIMES DAILY
Qty: 90 CAPSULE | Refills: 0 | Status: SHIPPED | OUTPATIENT
Start: 2021-12-21 | End: 2022-11-01

## 2021-12-21 ASSESSMENT — ENCOUNTER SYMPTOMS
BACK PAIN: 1
ALLERGIC/IMMUNOLOGIC NEGATIVE: 1
RESPIRATORY NEGATIVE: 1
NAUSEA: 0
EYES NEGATIVE: 1
CONSTIPATION: 0

## 2021-12-21 NOTE — LETTER
[address] Pain Management A department of [place] [address]  Phone: [phone]  Fax: [phone]           Linda Kulkarni MD      December 21, 2021     Patient: Vassie Najjar   MR Number: [id number]   YOB: 1950   Date of Visit: 12/21/2021       Dear Dr. Aaron Kathleen: Thank you for referring Bee Toledo to me for evaluation/treatment. Below are the relevant portions of my assessment and plan of care. If you have questions, please do not hesitate to call me. I look forward to following Farhan Andrews along with you.     Sincerely,        Linda Kulkarni MD    CC providers:  Mary Anne Arizmendi MD  [facility] [address], [address] [facility]  [address]  [address] [place]

## 2021-12-21 NOTE — PATIENT INSTRUCTIONS
injection done. If they cannot stay, the injection will be rescheduled. The valium may affect your judgment following the procedure and driving a vehicle within 24 hours after the sedation could be dangerous. 6.  Wear simple loose clothing, which can be easily changed. 7.  Leave jewelry (including rings) and other valuables at home. 8.  You will be asked to sign several forms prior to surgery; patients under the age of 25 must have a parent or legal guardian sign the permit to be able to do the procedure. 9.  You must have finished any antibiotic prescribed for recent infections. If required, please take pre-procedure antibiotic or other pre-procedure medications as instructed. 10. Bring inhalers and pain medications with you to your procedure. 11. Bring your MRI/CT films if they were done outside of the [facility]. 12. If you should develop a cold, sore throat, cough, fever or other new indication of illness or infection, or are started on antibiotics within 2 weeks of the scheduled procedure, please notify the [place] office as early as possible at [phone]. If calling after 4:30pm the day prior to your scheduled procedure please contact [phone] and Leave a Voice Message.

## 2021-12-21 NOTE — LETTER
[address] Pain Management A department of [place] [address]  Phone: [phone]  Fax: [phone]    Philip Souza MD    December 21, 2021     [facility] [address] [address]    Patient: Irma Bryant   MR Number: [id number]   YOB: 1950   Date of Visit: 12/21/2021       Dear Arya Brannon:    Thank you for referring Rogerio Stuart to me for evaluation/treatment. Below are the relevant portions of my assessment and plan of care. If you have questions, please do not hesitate to call me. I look forward to following Danita Vences along with you.     Sincerely,      Philip Souza MD

## 2021-12-21 NOTE — Clinical Note
[address] Pain Management A department of [place] [address]  Phone: [phone]  Fax: [phone]    Barby Ugalde MD        December 21, 2021     Patient: Artur Montanez   YOB: 1950   Date of Visit: 12/21/2021       To Whom It May Concern: It is my medical opinion that Isabel Calvo {Work release (duty restriction):19108}. If you have any questions or concerns, please don't hesitate to call.     Sincerely,        Barby Ugalde MD

## 2021-12-21 NOTE — PROGRESS NOTES
PAIN MANAGEMENTNEW PATIENT CONSULTATION  12/21/21    Amelia Morfin  1950    ReferringProvider:  Mackenzie Hines MD  [place] [address], [address] [facility],  [address] [place] [address]    Primary Care Physician:  Wing Santacruz, CEM - CNP  [address] [facility]  [place] [address]    HPIinformation obtained from the patient as well as the Comprehensive Pain ManagementHealth Questionnaire filled out by the patient. The questionnaire will be scannedinto the electronic chart and PMH, PSH, meds, and allergies will be updates accordingly. Subjective:       CHIEF COMPLAINT:  This is a69 y.o. male patientwho presents with a complaint of New Patient (referred dr. Coretta Pinzon for lower back) and Pain (lower back down left leg and right anterior pelvic area. )      PAIN HPI:    Pain in B lumbar and radiates  Down  Posterior   Left leg  Had similar   Radicular  Pain  Years ago  That  Epidural  Steroid injections   Made better       Location: Back  Location Modifier: let   Severity of Pain: 3  Duration of Pain: Intermittent  Frequency of Pain: Intermittent  Aggravating Factors: Stretching, Bending, Straightening, Standing, Walking, Stairs, Exercise, Kneeling and Squatting  Limiting Behavior: no  Relieving Factors: Heat, Cold and Medication -  Result of Injury: no  Work Related Injury: no  [place] of Worker Compensation Case: no      Prior evaluation:    Previous lower back problems:No    Previous workup: No   History of surgery in painful area:No    Previous pain medication trials have included:       Opioids: -     NSAID's:-     Muscle relaxants: -     Neuropathicpain meds: -    Previous Pain Management Physician: No   Nerve blocks, spinal injections:No   Typeof Pain Intervention -. Date of last Pain Intervention  January /2030   Was there pain relief from Pain Intervention: No.    How long was your pain relief from the Pain Intervention 1days.      Sleep:       Difficultyfalling asleep:  No   Takes sleepingmedication: No    Mental health:    Patient feels - secondary to their current pain problems as described above. H/O depression and anxiety: No   Patient is not seeing psychologist orpsychiatrist   Abuse history? -    Employed? No  Alcohol use?: No  Tobacco use?: No  Marijuana use?: No  Illicit drug use?: No    Imaging: Reviewed available imagingin our system with the patient. No results found. EXAMINATION:   THREE XRAY VIEWS OF THE LUMBAR SPINE       12/10/2021 9:59 am       COMPARISON:   None.       HISTORY:   ORDERING SYSTEM PROVIDED HISTORY: Lumbar spine pain   TECHNOLOGIST PROVIDED HISTORY:   Reason for Exam: low back pain x 2-3 months, no injury, left leg numbness       FINDINGS:   Lumbar spine alignment is anatomic.  No acute osseous abnormality.  Vertebral   body axial heights maintained.  Moderate to advanced degenerative change most   significant L3-4 L4-5 with loss of disc height endplate spondylosis. Advanced facet arthropathy lower lumbar spine.  There is calcification of the   abdominal aorta.           Impression   Multilevel degenerative change.       Atherosclerosis.             Referring physician records reviewed. Review of Systems   Constitutional: Positive for fatigue. HENT: Negative. Eyes: Negative. Respiratory: Negative. Cardiovascular: Negative. Gastrointestinal: Negative for constipation and nausea. Endocrine: Negative. Genitourinary: Negative for difficulty urinating. Musculoskeletal: Positive for arthralgias, back pain and myalgias. Skin: Negative. Allergic/Immunologic: Negative. Neurological: Positive for weakness and numbness. Hematological: Negative. Psychiatric/Behavioral: Positive for sleep disturbance. All other systems reviewed and are negative.       Allergies   Allergen Reactions    Codeine Nausea Only    Dtap-Ipv Vaccine Rash    Penicillins Rash       Outpatient Medications Prior to Visit   Medication Sig Dispense Refill    tiZANidine (ZANAFLEX) 4 MG tablet Take 1 tablet by mouth 3 times daily as needed (muscle spasm) 30 tablet 0    isosorbide mononitrate (IMDUR) 30 MG extended release tablet Take 1 tablet by mouth once daily 90 tablet 3    amLODIPine (NORVASC) 10 MG tablet Take 1 tablet by mouth once daily 90 tablet 3    gemfibrozil (LOPID) 600 MG tablet TAKE 1 TABLET BY MOUTH ONCE DAILY 90 tablet 3    pravastatin (PRAVACHOL) 10 MG tablet Take 1 tablet by mouth once daily 90 tablet 3    bisoprolol (ZEBETA) 10 MG tablet Take 1 tablet by mouth once daily 90 tablet 3    lisinopril-hydroCHLOROthiazide (PRINZIDE;ZESTORETIC) 20-25 MG per tablet Take 1 tablet by mouth once daily 90 tablet 3    TURMERIC PO Take 1,000 mg by mouth      aspirin 81 MG tablet Take 81 mg by mouth       Cyanocobalamin (B-12) 2500 MCG TABS Take 2,500 mcg by mouth daily       Cholecalciferol (D3-1000 PO) Take 1,000 Units by mouth daily       b complex vitamins capsule Take 1 capsule by mouth daily       No facility-administered medications prior to visit. Past Medical History:   Diagnosis Date    Angina at rest [facility])     Arthritis     CAD (coronary artery disease)     angina    Hyperlipidemia     Hypertension     Skin cancer        Past Surgical History:   Procedure Laterality Date    CARDIAC CATHETERIZATION      x 2    COLONOSCOPY      COLONOSCOPY N/A 9/12/2018    COLONOSCOPY WITH BIOPSY performed by Dariela Lopez DO at [facility] [address]      forehead and  mouth in 1987    CT BONE MARROW BIOPSY  10/14/2021    CT BONE MARROW BIOPSY 10/14/2021 [facility] CT SCAN    HERNIA REPAIR         No family history on file.   Social History     Socioeconomic History    Marital status:      Spouse name: None    Number of children: None    Years of education: None    Highest education level: None   Occupational History    None   Tobacco Use    Smoking status: Former Smoker     Packs/day: 0.50     Years: 26.00     Pack years: 13.00     Types: Cigarettes     Start date:      Quit date: 1997     Years since quittin.9    Smokeless tobacco: Never Used    Tobacco comment: ly rrt 16   Substance and Sexual Activity    Alcohol use: Yes     Alcohol/week: 1.0 standard drink     Types: 1 Cans of beer per week     Comment: 2 beers/month    Drug use: No    Sexual activity: None   Other Topics Concern    None   Social History Narrative    None     Social Determinants of Health     Financial Resource Strain: Low Risk     Difficulty of Paying Living Expenses: Not hard at all   Food Insecurity: No Food Insecurity    Worried About Running Out of Food in the Last Year: Never true    [place] of Food in the Last Year: Never true   Transportation Needs:     Lack of Transportation (Medical): Not on file    Lack of Transportation (Non-Medical):  Not on file   Physical Activity:     Days of Exercise per Week: Not on file    Minutes of Exercise per Session: Not on file   Stress:     Feeling of Stress : Not on file   Social Connections:     Frequency of Communication with Friends and Family: Not on file    Frequency of Social Gatherings with Friends and Family: Not on file    Attends Sabianist Services: Not on file    Active Member of [address] [place] [address] or Organizations: Not on file    Attends Club or Organization Meetings: Not on file    Marital Status: Not on file   Intimate Partner Violence:     Fear of Current or Ex-Partner: Not on file    Emotionally Abused: Not on file    Physically Abused: Not on file    Sexually Abused: Not on file   Housing Stability:     Unable to Pay for Housing in the Last Year: Not on file    Number of [place] in the Last Year: Not on file    Unstable Housing in the Last Year: Not on file         Objective:     Physical Exam:  Vitals:    21 1257   BP: 124/80   Site: Right Upper Arm   Position: Sitting   Cuff Size: Medium Adult   Weight: 177 lb (80.3 kg)   Height: 5' 8\" (1.727 m)          Physical Exam  Constitutional: Diagnoses:  1. Lumbar radiculopathy    2. Lumbosacral spondylosis without myelopathy    3. DDD (degenerative disc disease), lumbar    4. Spinal stenosis of lumbosacral region        Medical/ Psychological Comorbidities:  As listed in the past medical and surgical history    Functional Limitations secondary to the above problems:  Chronic painlimits function and quality of life    Plan:   Left L4,L5 TFE x2 off ASA 3 days  1. Meds:   New Prescriptions    No medications on file      No orders of the defined types were placed in this encounter. Controlled Substances Monitoring:    OARRS report was reviewed for [place], [place]. Pt educated about the risks of taking opiates, including increasedsedation, constipation, slowed breathing, tolerance, dependence, and addiction. No orders of the defined types were placed in this encounter. No follow-ups on file. The patient expressed understanding of the above assessment and plan. Totaltime spent face to face with patient was 30 minutes inwhich  50% or more of the time was spent in counseling, education about risk andbenefits of the above plan, and coordination of care.

## 2021-12-28 ENCOUNTER — HOSPITAL ENCOUNTER (OUTPATIENT)
Dept: LAB | Age: 71
Discharge: HOME | End: 2021-12-28
Payer: COMMERCIAL

## 2021-12-28 DIAGNOSIS — D64.9 ANEMIA, UNSPECIFIED TYPE: ICD-10-CM

## 2021-12-28 DIAGNOSIS — N18.9 CHRONIC KIDNEY DISEASE, UNSPECIFIED CKD STAGE: ICD-10-CM

## 2021-12-28 DIAGNOSIS — K76.0 FATTY LIVER: ICD-10-CM

## 2021-12-28 DIAGNOSIS — E78.5 HYPERLIPIDEMIA, UNSPECIFIED HYPERLIPIDEMIA TYPE: ICD-10-CM

## 2021-12-28 DIAGNOSIS — D47.2 SMOLDERING MYELOMA: ICD-10-CM

## 2021-12-28 DIAGNOSIS — I25.10 CORONARY ARTERY DISEASE WITHOUT ANGINA PECTORIS, UNSPECIFIED VESSEL OR LESION TYPE, UNSPECIFIED WHETHER NATIVE OR TRANSPLANTED HEART: ICD-10-CM

## 2021-12-28 LAB
ABSOLUTE EOS #: 0.09 K/UL (ref 0–0.44)
ABSOLUTE EOS #: 0.09 K/UL (ref 0–0.44)
ABSOLUTE IMMATURE GRANULOCYTE: <0.03 K/UL (ref 0–0.3)
ABSOLUTE IMMATURE GRANULOCYTE: <0.03 K/UL (ref 0–0.3)
ABSOLUTE LYMPH #: 1.16 K/UL (ref 1.1–3.7)
ABSOLUTE LYMPH #: 1.16 K/UL (ref 1.1–3.7)
ABSOLUTE MONO #: 0.35 K/UL (ref 0.1–1.2)
ABSOLUTE MONO #: 0.35 K/UL (ref 0.1–1.2)
ALBUMIN SERPL-MCNC: 3.9 G/DL (ref 3.5–5.2)
ALBUMIN SERPL-MCNC: 3.9 G/DL (ref 3.5–5.2)
ALBUMIN/GLOBULIN RATIO: 1 (ref 1–2.5)
ALBUMIN/GLOBULIN RATIO: 1 (ref 1–2.5)
ALP BLD-CCNC: 74 U/L (ref 40–129)
ALP BLD-CCNC: 74 U/L (ref 40–129)
ALT SERPL-CCNC: 20 U/L (ref 5–41)
ALT SERPL-CCNC: 20 U/L (ref 5–41)
ANION GAP SERPL CALCULATED.3IONS-SCNC: 12 MMOL/L (ref 9–17)
ANION GAP SERPL CALCULATED.3IONS-SCNC: 12 MMOL/L (ref 9–17)
AST SERPL-CCNC: 23 U/L
AST SERPL-CCNC: 23 U/L
BASOPHILS # BLD: 1 % (ref 0–2)
BASOPHILS # BLD: 1 % (ref 0–2)
BASOPHILS ABSOLUTE: 0.03 K/UL (ref 0–0.2)
BASOPHILS ABSOLUTE: 0.03 K/UL (ref 0–0.2)
BILIRUB SERPL-MCNC: 0.33 MG/DL (ref 0.3–1.2)
BILIRUB SERPL-MCNC: 0.33 MG/DL (ref 0.3–1.2)
BUN BLDV-MCNC: 30 MG/DL (ref 8–23)
BUN BLDV-MCNC: 30 MG/DL (ref 8–23)
BUN/CREAT BLD: 37 (ref 9–20)
BUN/CREAT BLD: 37 (ref 9–20)
CALCIUM SERPL-MCNC: 9.8 MG/DL (ref 8.6–10.4)
CALCIUM SERPL-MCNC: 9.8 MG/DL (ref 8.6–10.4)
CHLORIDE BLD-SCNC: 100 MMOL/L (ref 98–107)
CHLORIDE BLD-SCNC: 100 MMOL/L (ref 98–107)
CHOLESTEROL/HDL RATIO: 3.9
CHOLESTEROL: 154 MG/DL
CO2: 26 MMOL/L (ref 20–31)
CO2: 26 MMOL/L (ref 20–31)
CREAT SERPL-MCNC: 0.81 MG/DL (ref 0.7–1.2)
CREAT SERPL-MCNC: 0.81 MG/DL (ref 0.7–1.2)
DIFFERENTIAL TYPE: ABNORMAL
DIFFERENTIAL TYPE: ABNORMAL
EOSINOPHILS RELATIVE PERCENT: 3 % (ref 1–4)
EOSINOPHILS RELATIVE PERCENT: 3 % (ref 1–4)
FERRITIN: 516 UG/L (ref 30–400)
FOLATE: 15.9 NG/ML
FREE KAPPA/LAMBDA RATIO: 0.01 (ref 0.26–1.65)
GFR AFRICAN AMERICAN: >60 ML/MIN
GFR AFRICAN AMERICAN: >60 ML/MIN
GFR NON-AFRICAN AMERICAN: >60 ML/MIN
GFR NON-AFRICAN AMERICAN: >60 ML/MIN
GFR SERPL CREATININE-BSD FRML MDRD: ABNORMAL ML/MIN/{1.73_M2}
GLUCOSE BLD-MCNC: 115 MG/DL (ref 70–99)
GLUCOSE BLD-MCNC: 115 MG/DL (ref 70–99)
HCT VFR BLD CALC: 38.2 % (ref 40.7–50.3)
HCT VFR BLD CALC: 38.2 % (ref 40.7–50.3)
HDLC SERPL-MCNC: 40 MG/DL
HEMOGLOBIN: 13.3 G/DL (ref 13–17)
HEMOGLOBIN: 13.3 G/DL (ref 13–17)
IGA, LOW RANGE: 24 MG/DL (ref 70–400)
IGA: ABNORMAL MG/DL (ref 70–400)
IGG: 1616 MG/DL (ref 700–1600)
IGM: <25 MG/DL (ref 40–230)
IMMATURE GRANULOCYTES: 0 %
IMMATURE GRANULOCYTES: 0 %
IRON SATURATION: 49 % (ref 20–55)
IRON: 113 UG/DL (ref 59–158)
KAPPA FREE LIGHT CHAINS QNT: 0.85 MG/DL (ref 0.37–1.94)
LAMBDA FREE LIGHT CHAINS QNT: 64.78 MG/DL (ref 0.57–2.63)
LDL CHOLESTEROL: 82 MG/DL (ref 0–130)
LYMPHOCYTES # BLD: 33 % (ref 24–43)
LYMPHOCYTES # BLD: 33 % (ref 24–43)
MCH RBC QN AUTO: 32.4 PG (ref 25.2–33.5)
MCH RBC QN AUTO: 32.4 PG (ref 25.2–33.5)
MCHC RBC AUTO-ENTMCNC: 34.8 G/DL (ref 25.2–33.5)
MCHC RBC AUTO-ENTMCNC: 34.8 G/DL (ref 25.2–33.5)
MCV RBC AUTO: 93.2 FL (ref 82.6–102.9)
MCV RBC AUTO: 93.2 FL (ref 82.6–102.9)
MONOCYTES # BLD: 10 % (ref 3–12)
MONOCYTES # BLD: 10 % (ref 3–12)
NRBC AUTOMATED: 0 PER 100 WBC
NRBC AUTOMATED: 0 PER 100 WBC
PDW BLD-RTO: 12.2 % (ref 11.8–14.4)
PDW BLD-RTO: 12.2 % (ref 11.8–14.4)
PLATELET # BLD: 172 K/UL (ref 138–453)
PLATELET # BLD: 172 K/UL (ref 138–453)
PLATELET ESTIMATE: ABNORMAL
PLATELET ESTIMATE: ABNORMAL
PMV BLD AUTO: 11.3 FL (ref 8.1–13.5)
PMV BLD AUTO: 11.3 FL (ref 8.1–13.5)
POTASSIUM SERPL-SCNC: 4.3 MMOL/L (ref 3.7–5.3)
POTASSIUM SERPL-SCNC: 4.3 MMOL/L (ref 3.7–5.3)
RBC # BLD: 4.1 M/UL (ref 4.21–5.77)
RBC # BLD: 4.1 M/UL (ref 4.21–5.77)
RBC # BLD: ABNORMAL 10*6/UL
RBC # BLD: ABNORMAL 10*6/UL
SEG NEUTROPHILS: 53 % (ref 36–65)
SEG NEUTROPHILS: 54 % (ref 36–65)
SEGMENTED NEUTROPHILS ABSOLUTE COUNT: 1.9 K/UL (ref 1.5–8.1)
SEGMENTED NEUTROPHILS ABSOLUTE COUNT: 1.9 K/UL (ref 1.5–8.1)
SODIUM BLD-SCNC: 138 MMOL/L (ref 135–144)
SODIUM BLD-SCNC: 138 MMOL/L (ref 135–144)
TOTAL IRON BINDING CAPACITY: 232 UG/DL (ref 250–450)
TOTAL PROTEIN: 7.8 G/DL (ref 6.4–8.3)
TOTAL PROTEIN: 7.8 G/DL (ref 6.4–8.3)
TRIGL SERPL-MCNC: 160 MG/DL
UNSATURATED IRON BINDING CAPACITY: 119 UG/DL (ref 112–347)
VITAMIN B-12: 915 PG/ML (ref 232–1245)
VITAMIN D 25-HYDROXY: 55.7 NG/ML (ref 30–100)
VLDLC SERPL CALC-MCNC: ABNORMAL MG/DL (ref 1–30)
WBC # BLD: 3.5 K/UL (ref 3.5–11.3)
WBC # BLD: 3.5 K/UL (ref 3.5–11.3)
WBC # BLD: ABNORMAL 10*3/UL
WBC # BLD: ABNORMAL 10*3/UL

## 2021-12-28 PROCEDURE — 85025 COMPLETE CBC W/AUTO DIFF WBC: CPT

## 2021-12-28 PROCEDURE — 80061 LIPID PANEL: CPT

## 2021-12-28 PROCEDURE — 83883 ASSAY NEPHELOMETRY NOT SPEC: CPT

## 2021-12-28 PROCEDURE — 83550 IRON BINDING TEST: CPT

## 2021-12-28 PROCEDURE — 82306 VITAMIN D 25 HYDROXY: CPT

## 2021-12-28 PROCEDURE — 82728 ASSAY OF FERRITIN: CPT

## 2021-12-28 PROCEDURE — 84155 ASSAY OF PROTEIN SERUM: CPT

## 2021-12-28 PROCEDURE — 84165 PROTEIN E-PHORESIS SERUM: CPT

## 2021-12-28 PROCEDURE — 86334 IMMUNOFIX E-PHORESIS SERUM: CPT

## 2021-12-28 PROCEDURE — 83540 ASSAY OF IRON: CPT

## 2021-12-28 PROCEDURE — 80053 COMPREHEN METABOLIC PANEL: CPT

## 2021-12-28 PROCEDURE — 82746 ASSAY OF FOLIC ACID SERUM: CPT

## 2021-12-28 PROCEDURE — 82784 ASSAY IGA/IGD/IGG/IGM EACH: CPT

## 2021-12-28 PROCEDURE — 36415 COLL VENOUS BLD VENIPUNCTURE: CPT

## 2021-12-28 PROCEDURE — 82607 VITAMIN B-12: CPT

## 2021-12-29 LAB
ALBUMIN (CALCULATED): 3.8 G/DL (ref 3.2–5.2)
ALBUMIN PERCENT: 54 % (ref 45–65)
ALPHA 1 PERCENT: 3 % (ref 3–6)
ALPHA 2 PERCENT: 11 % (ref 6–13)
ALPHA-1-GLOBULIN: 0.2 G/DL (ref 0.1–0.4)
ALPHA-2-GLOBULIN: 0.8 G/DL (ref 0.5–0.9)
BETA GLOBULIN: 0.7 G/DL (ref 0.5–1.1)
BETA PERCENT: 9 % (ref 11–19)
GAMMA GLOBULIN %: 24 % (ref 9–20)
GAMMA GLOBULIN: 1.7 G/DL (ref 0.5–1.5)
PATHOLOGIST: ABNORMAL
PATHOLOGIST: NORMAL
PROTEIN ELECTROPHORESIS, SERUM: ABNORMAL
SERUM IFX INTERP: NORMAL
TOTAL PROT. SUM,%: 101 % (ref 98–102)
TOTAL PROT. SUM: 7.2 G/DL (ref 6.3–8.2)
TOTAL PROTEIN: 7.1 G/DL (ref 6.4–8.3)

## 2022-01-07 ENCOUNTER — APPOINTMENT (OUTPATIENT)
Dept: GENERAL RADIOLOGY | Age: 72
End: 2022-01-07
Attending: PHYSICAL MEDICINE & REHABILITATION
Payer: COMMERCIAL

## 2022-01-07 ENCOUNTER — TELEPHONE (OUTPATIENT)
Dept: PAIN MANAGEMENT | Age: 72
End: 2022-01-07

## 2022-01-07 ENCOUNTER — OFFICE VISIT (OUTPATIENT)
Dept: ORTHOPEDIC SURGERY | Age: 72
End: 2022-01-07

## 2022-01-07 ENCOUNTER — HOSPITAL ENCOUNTER (OUTPATIENT)
Age: 72
Setting detail: OUTPATIENT SURGERY
Discharge: HOME | End: 2022-01-07
Attending: PHYSICAL MEDICINE & REHABILITATION | Admitting: PHYSICAL MEDICINE & REHABILITATION
Payer: COMMERCIAL

## 2022-01-07 VITALS
HEART RATE: 58 BPM | SYSTOLIC BLOOD PRESSURE: 169 MMHG | OXYGEN SATURATION: 98 % | DIASTOLIC BLOOD PRESSURE: 82 MMHG | HEIGHT: 68 IN | TEMPERATURE: 97.3 F | WEIGHT: 181.2 LBS | BODY MASS INDEX: 27.46 KG/M2 | RESPIRATION RATE: 16 BRPM

## 2022-01-07 VITALS
WEIGHT: 177 LBS | HEIGHT: 68 IN | BODY MASS INDEX: 26.83 KG/M2 | HEART RATE: 70 BPM | DIASTOLIC BLOOD PRESSURE: 87 MMHG | SYSTOLIC BLOOD PRESSURE: 133 MMHG

## 2022-01-07 DIAGNOSIS — M54.50 LUMBAR SPINE PAIN: Primary | ICD-10-CM

## 2022-01-07 DIAGNOSIS — M48.061 SPINAL STENOSIS OF LUMBAR REGION WITHOUT NEUROGENIC CLAUDICATION: ICD-10-CM

## 2022-01-07 DIAGNOSIS — M54.16 LUMBAR RADICULAR PAIN: ICD-10-CM

## 2022-01-07 PROBLEM — M47.816 LUMBAR SPONDYLOSIS: Status: ACTIVE | Noted: 2022-01-07

## 2022-01-07 PROCEDURE — 64483 NJX AA&/STRD TFRM EPI L/S 1: CPT | Performed by: PHYSICAL MEDICINE & REHABILITATION

## 2022-01-07 PROCEDURE — 2500000003 HC RX 250 WO HCPCS: Performed by: PHYSICAL MEDICINE & REHABILITATION

## 2022-01-07 PROCEDURE — 6360000002 HC RX W HCPCS: Performed by: PHYSICAL MEDICINE & REHABILITATION

## 2022-01-07 PROCEDURE — 2709999900 HC NON-CHARGEABLE SUPPLY: Performed by: PHYSICAL MEDICINE & REHABILITATION

## 2022-01-07 PROCEDURE — 7100000011 HC PHASE II RECOVERY - ADDTL 15 MIN: Performed by: PHYSICAL MEDICINE & REHABILITATION

## 2022-01-07 PROCEDURE — 3600000056 HC PAIN LEVEL 4 BASE: Performed by: PHYSICAL MEDICINE & REHABILITATION

## 2022-01-07 PROCEDURE — 64484 NJX AA&/STRD TFRM EPI L/S EA: CPT | Performed by: PHYSICAL MEDICINE & REHABILITATION

## 2022-01-07 PROCEDURE — 7100000010 HC PHASE II RECOVERY - FIRST 15 MIN: Performed by: PHYSICAL MEDICINE & REHABILITATION

## 2022-01-07 PROCEDURE — 6360000004 HC RX CONTRAST MEDICATION: Performed by: PHYSICAL MEDICINE & REHABILITATION

## 2022-01-07 PROCEDURE — 3209999900 FLUORO FOR SURGICAL PROCEDURES

## 2022-01-07 PROCEDURE — 2580000003 HC RX 258: Performed by: PHYSICAL MEDICINE & REHABILITATION

## 2022-01-07 RX ORDER — BUPIVACAINE HYDROCHLORIDE 5 MG/ML
INJECTION, SOLUTION EPIDURAL; INTRACAUDAL PRN
Status: DISCONTINUED | OUTPATIENT
Start: 2022-01-07 | End: 2022-01-07 | Stop reason: ALTCHOICE

## 2022-01-07 RX ORDER — DEXAMETHASONE SODIUM PHOSPHATE 10 MG/ML
INJECTION INTRAMUSCULAR; INTRAVENOUS PRN
Status: DISCONTINUED | OUTPATIENT
Start: 2022-01-07 | End: 2022-01-07 | Stop reason: ALTCHOICE

## 2022-01-07 RX ORDER — SODIUM CHLORIDE 9 MG/ML
INJECTION INTRAVENOUS PRN
Status: DISCONTINUED | OUTPATIENT
Start: 2022-01-07 | End: 2022-01-07 | Stop reason: ALTCHOICE

## 2022-01-07 ASSESSMENT — ENCOUNTER SYMPTOMS
NAUSEA: 0
ALLERGIC/IMMUNOLOGIC NEGATIVE: 1
RESPIRATORY NEGATIVE: 1
CONSTIPATION: 0
EYES NEGATIVE: 1
BACK PAIN: 1

## 2022-01-07 ASSESSMENT — PAIN SCALES - GENERAL: PAINLEVEL_OUTOF10: 0

## 2022-01-07 ASSESSMENT — PAIN - FUNCTIONAL ASSESSMENT: PAIN_FUNCTIONAL_ASSESSMENT: 0-10

## 2022-01-07 ASSESSMENT — PAIN DESCRIPTION - DESCRIPTORS: DESCRIPTORS: CONSTANT;NUMBNESS

## 2022-01-07 NOTE — INTERVAL H&P NOTE
I have interviewed and examined the patient and reviewed the recent History and Physical.  There have been no changes to the recent H&P documentation. The surgical consent form has been signed. Last anticoagulant medication use was:3-5 days    Premedication taken for contrast allergy? No    Valium taken for oral sedation? No    Outpatient Medications Marked as Taking for the 1/7/22 encounter [facility] Encounter)   Medication Sig Dispense Refill    gabapentin (NEURONTIN) 100 MG capsule Take 1 capsule by mouth 3 times daily for 31 days. 90 capsule 0    tiZANidine (ZANAFLEX) 4 MG tablet Take 1 tablet by mouth 3 times daily as needed (muscle spasm) 30 tablet 0    isosorbide mononitrate (IMDUR) 30 MG extended release tablet Take 1 tablet by mouth once daily 90 tablet 3    amLODIPine (NORVASC) 10 MG tablet Take 1 tablet by mouth once daily 90 tablet 3    gemfibrozil (LOPID) 600 MG tablet TAKE 1 TABLET BY MOUTH ONCE DAILY 90 tablet 3    pravastatin (PRAVACHOL) 10 MG tablet Take 1 tablet by mouth once daily 90 tablet 3    bisoprolol (ZEBETA) 10 MG tablet Take 1 tablet by mouth once daily 90 tablet 3    lisinopril-hydroCHLOROthiazide (PRINZIDE;ZESTORETIC) 20-25 MG per tablet Take 1 tablet by mouth once daily 90 tablet 3    TURMERIC PO Take 1,000 mg by mouth      Cyanocobalamin (B-12) 2500 MCG TABS Take 2,500 mcg by mouth daily       Cholecalciferol (D3-1000 PO) Take 1,000 Units by mouth daily       b complex vitamins capsule Take 1 capsule by mouth daily         The patient understands the planned operation and its associated risks and benefits and agrees to proceed.         Electronically signed by Carissa Gan MD on 1/7/2022 at 11:48 AM

## 2022-01-07 NOTE — OP NOTE
TRANSFORAMINAL EPIDURAL STEROID INJECTION    1/7/22  The patient was counseled at length about the risks of shane Covid-19 during their perioperative period and any recovery window from their procedure. The patient was made aware that shane Covid-19  may worsen their prognosis for recovering from their procedure  and lend to a higher morbidity and/or mortality risk. All material risks, benefits, and reasonable alternatives including postponing the procedure were discussed. The patient does wish to proceed with the procedure at this time. Surgeon: Aleyda Witt MD    Pre-operative Diagnosis:   Hospital Problems           Last Modified POA    * (Principal) Lumbar radiculitis 1/7/2022 Yes    Lumbar spondylosis 1/7/2022 Yes          Post-operative Diagnosis: Same    Assistants: none    This is a 66 y.o. male patient with pain in the Back, right leg. Previous treatment and examination findings are noted in the H&P. LL4,5 transforaminal epidural injection has been requested for diagnostic and therapeutic reasons. Conservative treatment was ineffective i.e.: ice, NSAIDS, rest, narcotic medication, chiropractic care, physical therapy and message therapy. Patient is unable to perform the following ADL's: ambulating and grooming     Pain Assessment: 0-10  Pain Level: 9     Pain Orientation: Left,Lower  Pain Location: Back  Pain Descriptors: Constant,Numbness (left leg numbness)    Last Plain films: 2020      EXAMINATION:  1. LL4,5 transforaminal radiculogram/epidurogram.   2. LL4,5 transforaminal epidural anesthetic injection. 3. LL4,5 transforaminal epidural steroid injection. CONSENT: Written consent was obtained from the patient on preprinted consent form after explaining the procedure, indications, potential complications and outcomes. Alternative treatments were also discussed. DISCUSSION: The patient was sterilely prepped and draped in the usual fashion in the prone position.  Time out was verified for correct patient, side, level and procedure. SEDATION:   No conscious sedation was performed during the procedure. The patient remained awake and conversed throughout the procedure. The patient underwent pulse oximetry and blood pressure monitoring independently by a trained observer, as well as by a physician. PROCEDURE:  Under image-intensifier control, a 22 gauge needle x 5 inch spinal needle was guided successfully into the epidural space employing a posterior lateral/oblique approach. Needle aspiration was negative for heme or CSF. Instillation of  .5 mL of Omnipaque 240 contrast medium opacified the spinal nerve and demonstrated contiguous flow into the LL 4  epidural space. No vascular spread was noted. Digital subtraction was not employed to evaluate for vascular spread. The patient was monitored for any untoward reaction to contrast medium before proceeding with procedure #2. The patient did not report pain reproduction in a concordant distribution. Following needle position verification, a test dose of .5 mL of sterile lidocaine 0.5% was administered and patient monitored for any adverse effects. Then, 1 mL of   was instilled into the epidural space and the patient's response was again monitored. Finally, Dexamethasone (Decadron 10 mg/mL) 1 ml of 0.5% bupivacaine was then instilled. The patient's response was again monitored. The spinal needle was removed. Instillation of  .5 mL of Omnipaque 240 contrast medium opacified the spinal nerve and demonstrated contiguous flow into the LL 5  epidural space. No vascular spread was noted. Digital subtraction was not employed to evaluate for vascular spread. The patient was monitored for any untoward reaction to contrast medium before proceeding with procedure #2. The patient did not report pain reproduction in a concordant distribution.     Following needle position verification, a test dose of .5 mL of sterile lidocaine 0.5% was administered and patient monitored for any adverse effects. Then, 1 mL of   was instilled into the epidural space and the patient's response was again monitored. Finally, Dexamethasone (Decadron 10 mg/mL) 1 ml of 0.5% bupivacaine was then instilled. The patient's response was again monitored. The spinal needle was removed. The patient tolerated the procedure well and without complications and was noted to be in stable condition prior to discharge from the procedure center with discharge instructions. EBL: no blood loss    SPECIMEN: none    IMPRESSIONS:  1. LL4,5 transforaminal epidurogram, epidural anesthesia and epidural steroid injection procedures accomplished without incident. RECOMMENDATIONS:  1. Complete and return Post-Procedure Pain and Activity Diary.   2. Contact the [address] for symptom exacerbation, fever or unusual symptoms. 3. Post-procedure care according to verbal and written discharge instructions    POST-PROCEDURE EPIDUROGRAPHY INTERPRETATION:    EXAMINATION: AP, lateral, and oblique views    FLUORO TIME: 22 seconds    DISCUSSION: Spot views of the spine reveal normal alignment and segmentation. Spinal needle is positioned at the LL4,5 neuroforamin. Contrast spreads and outlines the LL4,5 nerve/ neuroforamin and epidural space. The epidurogram reveals excellent contrast flow. Visualized spine reveals See radiology report. Soft tissues reveal no abnormalities. IMPRESSION: LL4,5 transforaminal epidurogram/epineurogram reveals satisfactory needle position and contrast spread.      Electronically signed by Vanessa Marrero MD on 1/7/2022 at 11:49 AM

## 2022-01-07 NOTE — LETTER
[facility]  OR  [address]  [place] [address] [place]  Phone: [phone]          January 7, 2022     Patient: Olivia Dooley   YOB: 1950   Date of Visit: 1/7/2022       To Whom It May Concern:    Please excuse Oralia Basurto from work on Friday January 7 as he had a procedure at [facility]. If you have any questions or concerns, please don't hesitate to call.     Sincerely,        Dr. Ac Okeefe MD

## 2022-01-07 NOTE — H&P
PAIN MANAGEMENTNEW PATIENT CONSULTATION  12/21/21    Trent Parra  1950    ReferringProvider:  No referring provider defined for this encounter. Primary Care Physician:  Nahomy Orozco, APRN - CNP  [id number] [id number]    HPIinformation obtained from the patient as well as the Comprehensive Pain ManagementHealth Questionnaire filled out by the patient. The questionnaire will be scannedinto the electronic chart and PMH, PSH, meds, and allergies will be updates accordingly. Subjective:       CHIEF COMPLAINT:  This is a69 y.o. male patientwho presents with a complaint of No chief complaint on file. PAIN HPI:    Pain in B lumbar and radiates  Down  Posterior   Left leg  Had similar   Radicular  Pain  Years ago  That  Epidural  Steroid injections   Made better       Location: Back  Location Modifier: let   Severity of Pain: 3  Duration of Pain: Intermittent  Frequency of Pain: Intermittent  Aggravating Factors: Stretching, Bending, Straightening, Standing, Walking, Stairs, Exercise, Kneeling and Squatting  Limiting Behavior: no  Relieving Factors: Heat, Cold and Medication -  Result of Injury: no  Work Related Injury: no  [place] of Worker Compensation Case: no      Prior evaluation:    Previous lower back problems:No    Previous workup: No   History of surgery in painful area:No    Previous pain medication trials have included:       Opioids: -     NSAID's:-     Muscle relaxants: -     Neuropathicpain meds: -    Previous Pain Management Physician: No   Nerve blocks, spinal injections:No   Typeof Pain Intervention -. Date of last Pain Intervention  January /2030   Was there pain relief from Pain Intervention: No.    How long was your pain relief from the Pain Intervention 1days. Sleep:       Difficultyfalling asleep:  No   Takes sleepingmedication: No    Mental health:    Patient feels - secondary to their current pain problems as described above.    H/O depression and anxiety: No   Patient is not seeing psychologist orpsychiatrist   Abuse history? -    Employed? No  Alcohol use?: No  Tobacco use?: No  Marijuana use?: No  Illicit drug use?: No    Imaging: Reviewed available imagingin our system with the patient. No results found. EXAMINATION:   THREE XRAY VIEWS OF THE LUMBAR SPINE       12/10/2021 9:59 am       COMPARISON:   None.       HISTORY:   ORDERING SYSTEM PROVIDED HISTORY: Lumbar spine pain   TECHNOLOGIST PROVIDED HISTORY:   Reason for Exam: low back pain x 2-3 months, no injury, left leg numbness       FINDINGS:   Lumbar spine alignment is anatomic.  No acute osseous abnormality.  Vertebral   body axial heights maintained.  Moderate to advanced degenerative change most   significant L3-4 L4-5 with loss of disc height endplate spondylosis. Advanced facet arthropathy lower lumbar spine.  There is calcification of the   abdominal aorta.           Impression   Multilevel degenerative change.       Atherosclerosis.             Referring physician records reviewed. Review of Systems   Constitutional: Positive for fatigue. HENT: Negative. Eyes: Negative. Respiratory: Negative. Cardiovascular: Negative. Gastrointestinal: Negative for constipation and nausea. Endocrine: Negative. Genitourinary: Negative for difficulty urinating. Musculoskeletal: Positive for arthralgias, back pain and myalgias. Skin: Negative. Allergic/Immunologic: Negative. Neurological: Positive for weakness and numbness. Hematological: Negative. Psychiatric/Behavioral: Positive for sleep disturbance. All other systems reviewed and are negative.       Allergies   Allergen Reactions    Codeine Nausea Only    Dtap-Ipv Vaccine Rash    Penicillins Rash       Outpatient Medications Prior to Visit   Medication Sig Dispense Refill    tiZANidine (ZANAFLEX) 4 MG tablet Take 1 tablet by mouth 3 times daily as needed (muscle spasm) 30 tablet 0    isosorbide mononitrate (IMDUR) 30 MG extended release tablet Take 1 tablet by mouth once daily 90 tablet 3    amLODIPine (NORVASC) 10 MG tablet Take 1 tablet by mouth once daily 90 tablet 3    gemfibrozil (LOPID) 600 MG tablet TAKE 1 TABLET BY MOUTH ONCE DAILY 90 tablet 3    pravastatin (PRAVACHOL) 10 MG tablet Take 1 tablet by mouth once daily 90 tablet 3    bisoprolol (ZEBETA) 10 MG tablet Take 1 tablet by mouth once daily 90 tablet 3    lisinopril-hydroCHLOROthiazide (PRINZIDE;ZESTORETIC) 20-25 MG per tablet Take 1 tablet by mouth once daily 90 tablet 3    TURMERIC PO Take 1,000 mg by mouth      aspirin 81 MG tablet Take 81 mg by mouth       Cyanocobalamin (B-12) 2500 MCG TABS Take 2,500 mcg by mouth daily       Cholecalciferol (D3-1000 PO) Take 1,000 Units by mouth daily       b complex vitamins capsule Take 1 capsule by mouth daily       No facility-administered medications prior to visit. Past Medical History:   Diagnosis Date    Angina at rest [facility])     Arthritis     CAD (coronary artery disease)     angina    Hyperlipidemia     Hypertension     Skin cancer        Past Surgical History:   Procedure Laterality Date    CARDIAC CATHETERIZATION      x 2    COLONOSCOPY      COLONOSCOPY N/A 2018    COLONOSCOPY WITH BIOPSY performed by Sherwin Camargo DO at [place] [address]      forehead and  mouth in     CT BONE MARROW BIOPSY  10/14/2021    CT BONE MARROW BIOPSY 10/14/2021 [facility] CT SCAN    HERNIA REPAIR         No family history on file.   Social History     Socioeconomic History    Marital status:      Spouse name: Not on file    Number of children: Not on file    Years of education: Not on file    Highest education level: Not on file   Occupational History    Not on file   Tobacco Use    Smoking status: Former Smoker     Packs/day: 0.50     Years: 26.00     Pack years: 13.00     Types: Cigarettes     Start date:      Quit date: 1997     Years since quittin.0    Smokeless tobacco: Never Used    Tobacco comment: ly rrt 8/12/16   Substance and Sexual Activity    Alcohol use: Yes     Alcohol/week: 1.0 standard drink     Types: 1 Cans of beer per week     Comment: 2 beers/month    Drug use: No    Sexual activity: Not on file   Other Topics Concern    Not on file   Social History Narrative    Not on file     Social Determinants of Health     Financial Resource Strain: Low Risk     Difficulty of Paying Living Expenses: Not hard at all   Food Insecurity: No Food Insecurity    Worried About Running Out of Food in the Last Year: Never true    [address] in the Last Year: Never true   Transportation Needs:     Lack of Transportation (Medical): Not on file    Lack of Transportation (Non-Medical): Not on file   Physical Activity:     Days of Exercise per Week: Not on file    Minutes of Exercise per Session: Not on file   Stress:     Feeling of Stress : Not on file   Social Connections:     Frequency of Communication with Friends and Family: Not on file    Frequency of Social Gatherings with Friends and Family: Not on file    Attends Congregational Services: Not on file    Active Member of [address] [place] [address] or Organizations: Not on file    Attends Club or Organization Meetings: Not on file    Marital Status: Not on file   Intimate Partner Violence:     Fear of Current or Ex-Partner: Not on file    Emotionally Abused: Not on file    Physically Abused: Not on file    Sexually Abused: Not on file   Housing Stability:     Unable to Pay for Housing in the Last Year: Not on file    Number of [place] in the Last Year: Not on file    Unstable Housing in the Last Year: Not on file         Objective:     Physical Exam:  There were no vitals filed for this visit. Physical Exam  Constitutional:       Appearance: He is well-developed. HENT:      Head: Normocephalic and atraumatic. Cardiovascular:      Pulses: Normal pulses. Comments: Warm extremities.  Normal capillary refill. Pulmonary:      Effort: Pulmonary effort is normal.   Abdominal:      General: Abdomen is flat. Palpations: Abdomen is soft. Musculoskeletal:      Lumbar back: Negative right straight leg raise test and negative left straight leg raise test.   Skin:     General: Skin is warm and dry. Neurological:      General: No focal deficit present. Mental Status: He is alert and oriented to person, place, and time. Cranial Nerves: No cranial nerve deficit. Sensory: No sensory deficit. Motor: No atrophy or abnormal muscle tone. Deep Tendon Reflexes: Reflexes are normal and symmetric. Psychiatric:         Mood and Affect: Mood normal.         Speech: Speech normal.         Behavior: Behavior normal.         Back Exam     Range of Motion   Extension: normal   Flexion: normal   Lateral bend right: normal   Lateral bend left: normal   Rotation right: normal   Rotation left: normal     Muscle Strength   Right Quadriceps:  5/5   Left Quadriceps:  5/5   Right Hamstrings:  5/5   Left Hamstrings:  5/5     Tests   Straight leg raise right: negative  Straight leg raise left: negative    Other   Toe walk: normal  Heel walk: normal  Sensation: normal  Gait: normal              Labs:   Lab Results   Component Value Date    WBC 3.5 12/28/2021    HGB 13.3 12/28/2021    HCT 38.2 (L) 12/28/2021     12/28/2021    CHOL 154 12/28/2021    TRIG 160 (H) 12/28/2021    HDL 40 (L) 12/28/2021    ALT 20 12/28/2021    AST 23 12/28/2021     12/28/2021    K 4.3 12/28/2021     12/28/2021    CREATININE 0.81 12/28/2021    BUN 30 (H) 12/28/2021    CO2 26 12/28/2021    TSH 3.17 10/25/2021    PSA 1.57 09/21/2021    LABA1C 5.8 08/25/2020       Assessment: This is a 66 y.o. male with the following diagnosis:     Pain Diagnoses:  No diagnosis found.     Medical/ Psychological Comorbidities:  As listed in the past medical and surgical history    Functional Limitations secondary to the above problems:  Chronic painlimits function and quality of life    Plan:   Left L4,L5 TFE x2 off ASA 3 days      Meds:   Current Discharge Medication List         No orders of the defined types were placed in this encounter. Controlled Substances Monitoring:    [place]AR[place] report was reviewed for [place], [place]. Pt educated about the risks of taking opiates, including increasedsedation, constipation, slowed breathing, tolerance, dependence, and addiction. No orders of the defined types were placed in this encounter. No follow-ups on file. The patient expressed understanding of the above assessment and plan. Totaltime spent face to face with patient was 30 minutes inwhich  50% or more of the time was spent in counseling, education about risk andbenefits of the above plan, and coordination of care.

## 2022-02-11 ENCOUNTER — OFFICE VISIT (OUTPATIENT)
Dept: ONCOLOGY | Age: 72
End: 2022-02-11
Payer: COMMERCIAL

## 2022-02-11 VITALS
SYSTOLIC BLOOD PRESSURE: 138 MMHG | RESPIRATION RATE: 16 BRPM | HEART RATE: 63 BPM | HEIGHT: 68 IN | WEIGHT: 184 LBS | BODY MASS INDEX: 27.89 KG/M2 | TEMPERATURE: 98.2 F | DIASTOLIC BLOOD PRESSURE: 80 MMHG | OXYGEN SATURATION: 97 %

## 2022-02-11 DIAGNOSIS — K76.0 FATTY LIVER: ICD-10-CM

## 2022-02-11 DIAGNOSIS — D47.2 SMOLDERING MYELOMA: Primary | ICD-10-CM

## 2022-02-11 DIAGNOSIS — G62.9 NEUROPATHY: ICD-10-CM

## 2022-02-11 DIAGNOSIS — R76.8 ELEVATED SERUM IMMUNOGLOBULIN FREE LIGHT CHAIN LEVEL: ICD-10-CM

## 2022-02-11 PROCEDURE — 4040F PNEUMOC VAC/ADMIN/RCVD: CPT | Performed by: INTERNAL MEDICINE

## 2022-02-11 PROCEDURE — 1036F TOBACCO NON-USER: CPT | Performed by: INTERNAL MEDICINE

## 2022-02-11 PROCEDURE — G8427 DOCREV CUR MEDS BY ELIG CLIN: HCPCS | Performed by: INTERNAL MEDICINE

## 2022-02-11 PROCEDURE — G8484 FLU IMMUNIZE NO ADMIN: HCPCS | Performed by: INTERNAL MEDICINE

## 2022-02-11 PROCEDURE — 1123F ACP DISCUSS/DSCN MKR DOCD: CPT | Performed by: INTERNAL MEDICINE

## 2022-02-11 PROCEDURE — 3017F COLORECTAL CA SCREEN DOC REV: CPT | Performed by: INTERNAL MEDICINE

## 2022-02-11 PROCEDURE — G8417 CALC BMI ABV UP PARAM F/U: HCPCS | Performed by: INTERNAL MEDICINE

## 2022-02-11 PROCEDURE — 99214 OFFICE O/P EST MOD 30 MIN: CPT | Performed by: INTERNAL MEDICINE

## 2022-02-11 NOTE — PROGRESS NOTES
Rosie Liang                                                                                                                  2/11/2022  MRN:   [id number]  YOB: 1950  PCP:                           CEM Cohen CNP  Referring Physician: No ref. 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 Overall patient is doing well. Complains of some back pain. Weight is stable. Hemoglobin is stable. Patient does not have hypercalcemia. Patient CT PET came back negative however bone marrow biopsy came back positive for greater than 10% monoclonal plasma cells. Repeat CBC shows improvement in hemoglobin. During this visit patient's allergy, social, medical, surgical history and medications were reviewed and updated. Summary of Case/History:    Rosie Liang a 27 y.o.male is a patient who presents to the clinic to establish care and for further work-up and evaluation. Patient was noted to have a hemoglobin of 11.6 with MCV 92 on his routine work-up done earlier in July. Review of patient's record revealed he has had mild anemia since 2018 with hemoglobin around 11.8 in August 2018 12.4 in August 2020. Work-up done so far shows adequate kidney function patient was noted to have positive stool occult blood test back in 2018 patient has had colonoscopy done back in 2018 due to findings of positive stool occult blood test and underwent polypectomy.   Pathology from the polypectomy came back as hyperplastic polyp and tubular adenoma. Past Medical History:   Past Medical History:   Diagnosis Date    [facility] at [facility])     Arthritis     CAD (coronary artery disease)     angina    Hyperlipidemia     Hypertension     Skin cancer        Past Surgical History:     Past Surgical History:   Procedure Laterality Date    CARDIAC CATHETERIZATION      x 2    COLONOSCOPY      COLONOSCOPY N/A 2018    COLONOSCOPY WITH BIOPSY performed by Keren Vasquez DO at [facility] [address]      forehead and  mouth in     CT BONE MARROW BIOPSY  10/14/2021    CT BONE MARROW BIOPSY 10/14/2021 [facility] CT SCAN    HERNIA REPAIR      PAIN MANAGEMENT PROCEDURE Left 2022    Left L4 L5 TRANSFORAMINAL performed by Giovanni Jenkins MD at [address] [facility] OR       Patient Family Social History:    Social History     Socioeconomic History    Marital status:      Spouse name: None    Number of children: None    Years of education: None    Highest education level: None   Occupational History    None   Tobacco Use    Smoking status: Former Smoker     Packs/day: 0.50     Years: 26.00     Pack years: 13.00     Types: Cigarettes     Start date:      Quit date: 1997     Years since quittin.1    Smokeless tobacco: Never Used    Tobacco comment: ly rrt 16   Vaping Use    Vaping Use: Never used   Substance and Sexual Activity    Alcohol use: Yes     Alcohol/week: 1.0 standard drink     Types: 1 Cans of beer per week     Comment: 2 beers/month    Drug use: No    Sexual activity: None   Other Topics Concern    None   Social History Narrative    None     Social Determinants of Health     Financial Resource Strain: Low Risk     Difficulty of Paying Living Expenses: Not hard at all   Food Insecurity: No Food Insecurity    Worried About Running Out of Food in the Last Year: Never true    Mayra of Food in the Last Year: Never true   Transportation Needs:     Lack of Transportation (Medical):  Not on file    Lack of Transportation (Non-Medical): Not on file   Physical Activity:     Days of Exercise per Week: Not on file    Minutes of Exercise per Session: Not on file   Stress:     Feeling of Stress : Not on file   Social Connections:     Frequency of Communication with Friends and Family: Not on file    Frequency of Social Gatherings with Friends and Family: Not on file    Attends Church Services: Not on file    Active Member of [address] [place] [address] or Organizations: Not on file    Attends Club or Organization Meetings: Not on file    Marital Status: Not on file   Intimate Partner Violence:     Fear of Current or Ex-Partner: Not on file    Emotionally Abused: Not on file    Physically Abused: Not on file    Sexually Abused: Not on file   Housing Stability:     Unable to Pay for Housing in the Last Year: Not on file    Number of [place] in the Last Year: Not on file    Unstable Housing in the Last Year: Not on file     History reviewed. No pertinent family history.       Current Medications:     Current Outpatient Medications   Medication Sig Dispense Refill    tiZANidine (ZANAFLEX) 4 MG tablet Take 1 tablet by mouth 3 times daily as needed (muscle spasm) 30 tablet 0    isosorbide mononitrate (IMDUR) 30 MG extended release tablet Take 1 tablet by mouth once daily 90 tablet 3    amLODIPine (NORVASC) 10 MG tablet Take 1 tablet by mouth once daily 90 tablet 3    gemfibrozil (LOPID) 600 MG tablet TAKE 1 TABLET BY MOUTH ONCE DAILY 90 tablet 3    pravastatin (PRAVACHOL) 10 MG tablet Take 1 tablet by mouth once daily 90 tablet 3    bisoprolol (ZEBETA) 10 MG tablet Take 1 tablet by mouth once daily 90 tablet 3    lisinopril-hydroCHLOROthiazide (PRINZIDE;ZESTORETIC) 20-25 MG per tablet Take 1 tablet by mouth once daily 90 tablet 3    TURMERIC PO Take 1,000 mg by mouth      aspirin 81 MG tablet Take 81 mg by mouth       Cyanocobalamin (B-12) 2500 MCG TABS Take 2,500 mcg by mouth daily       Cholecalciferol (D3-1000 PO) Take 1,000 Units by mouth daily       b complex vitamins capsule Take 1 capsule by mouth daily      gabapentin (NEURONTIN) 100 MG capsule Take 1 capsule by mouth 3 times daily for 31 days. 90 capsule 0     No current facility-administered medications for this visit. Allergies:   Codeine, Dtap-ipv vaccine, and Penicillins    Review of Systems:    Constitutional: No fever or chills. No night sweats, positive weight gain  Eyes: No eye discharge, double vision, or eye pain   HEENT: negative for sore mouth, sore throat, hoarseness and voice change   Respiratory: negative for cough , sputum, dyspnea, wheezing, hemoptysis, chest pain   Cardiovascular: negative for chest pain, dyspnea, palpitations, orthopnea, PND   Gastrointestinal: negative for nausea, vomiting, diarrhea, constipation, abdominal pain, Dysphagia, hematemesis and hematochezia   Genitourinary: negative for frequency, dysuria, nocturia, urinary incontinence, and hematuria   Integument: negative for rash, skin lesions, bruises. Hematologic/Lymphatic: negative for easy bruising, bleeding, lymphadenopathy, or petechiae   Endocrine: negative for heat or cold intolerance,weight changes, change in bowel habits and hair loss   Musculoskeletal: Positive for joint pain.   Positive for right knee pain  Neurological: negative for headaches, dizziness, seizures, weakness, numbness        Physical Exam:    Vitals: /80 (Site: Right Upper Arm, Position: Sitting, Cuff Size: Medium Adult)   Pulse 63   Temp 98.2 °F (36.8 °C) (Temporal)   Resp 16   Ht 5' 8\" (1.727 m)   Wt 184 lb (83.5 kg)   SpO2 97%   BMI 27.98 kg/m²   General appearance - well appearing, no in pain or distress  Mental status - AAO X3  Eyes - pupils equal and reactive, extraocular eye movements intact  Mouth - mucous membranes moist, pharynx normal without lesions  Neck - supple, no significant adenopathy  Lymphatics - no palpable lymphadenopathy, no hepatosplenomegaly  Chest - clear to auscultation, no wheezes, rales or rhonchi, symmetric air entry  Heart - normal rate, regular rhythm, normal S1, S2, no murmurs  Abdomen - soft, nontender, nondistended, no masses or organomegaly  Neurological - alert, oriented, normal speech, no focal findings or movement disorder noted  Extremities - peripheral pulses normal, no pedal edema, no clubbing or cyanosis  Skin - normal coloration and turgor, no rashes, no suspicious skin lesions noted       DATA:    Results for orders placed or performed during the hospital encounter of 12/28/21   Electrophoresis Protein, Serum without Reflex to Immunofixation   Result Value Ref Range    Total Protein 7.1 6.4 - 8.3 g/dL    Albumin (calculated) 3.8 3.2 - 5.2 g/dL    Albumin % 54 45 - 65 %    Alpha-1-Globulin 0.2 0.1 - 0.4 g/dL    Alpha 1 % 3 3 - 6 %    Alpha-2-Globulin 0.8 0.5 - 0.9 g/dL    Alpha 2 % 11 6 - 13 %    Beta Globulin 0.7 0.5 - 1.1 g/dL    Beta Percent 9 (L) 11 - 19 %    Gamma Globulin 1.7 (H) 0.5 - 1.5 g/dL    Gamma Globulin % 24 (H) 9 - 20 %    Total Prot. Sum 7.2 6.3 - 8.2 g/dL    Total Prot. Sum,% 101 98 - 102 %    Protein Electrophoresis, Serum       A ZONE OF RESTRICTION IS PRESENT IN THE GAMMAGLOBULIN REGION. CONFIRMED BY IMMUNOFIXATION TO BE MONOCLONAL    Pathologist ELECTRONICALLY SIGNED. Jenn Wang M.D. [place]/Lambda Free Lt Chains, Serum Quant   Result Value Ref Range    Kappa Free Light Chains QNT 0.85 0.37 - 1.94 mg/dL    Lambda Free Light Chains QNT 64.78 (H) 0.57 - 2.63 mg/dL    Free Kappa/Lambda Ratio 0.01 (L) 0.26 - 1.65   Immunoglobulin Panel (IgG, IgA, IgM)   Result Value Ref Range    IgG 1616 (H) 700 - 1600 mg/dL    IgA Refer to IGA, Low Range for result. 70 - 400 mg/dL    IgM <25 (L) 40 - 230 mg/dL   Immunofixation serum profile   Result Value Ref Range    Serum IFX Interp       A ZONE OF RESTRICTION IS PRESENT IN THE GAMMAGLOBULIN REGION.   CONFIRMED BY IMMUNOFIXATION TO BE MONOCLONAL Pathologist ELECTRONICALLY SIGNED. Mika Schwartz M.D.     Vitamin B12 & Folate   Result Value Ref Range    Vitamin B-12 915 232 - 1245 pg/mL    Folate 15.9 >4.8 ng/mL   Iron and TIBC   Result Value Ref Range    Iron 113 59 - 158 ug/dL    TIBC 232 (L) 250 - 450 ug/dL    Iron Saturation 49 20 - 55 %    UIBC 119 112 - 347 ug/dL   Ferritin   Result Value Ref Range    Ferritin 516 (H) 30 - 400 ug/L   Comprehensive Metabolic Panel   Result Value Ref Range    Glucose 115 (H) 70 - 99 mg/dL    BUN 30 (H) 8 - 23 mg/dL    CREATININE 0.81 0.70 - 1.20 mg/dL    Bun/Cre Ratio 37 (H) 9 - 20    Calcium 9.8 8.6 - 10.4 mg/dL    Sodium 138 135 - 144 mmol/L    Potassium 4.3 3.7 - 5.3 mmol/L    Chloride 100 98 - 107 mmol/L    CO2 26 20 - 31 mmol/L    Anion Gap 12 9 - 17 mmol/L    Alkaline Phosphatase 74 40 - 129 U/L    ALT 20 5 - 41 U/L    AST 23 <40 U/L    Total Bilirubin 0.33 0.3 - 1.2 mg/dL    Total Protein 7.8 6.4 - 8.3 g/dL    Albumin 3.9 3.5 - 5.2 g/dL    Albumin/Globulin Ratio 1.0 1.0 - 2.5    GFR Non-African American >60 >60 mL/min    GFR African American >60 >60 mL/min    GFR Comment          GFR Staging NOT REPORTED    CBC Auto Differential   Result Value Ref Range    WBC 3.5 3.5 - 11.3 k/uL    RBC 4.10 (L) 4.21 - 5.77 m/uL    Hemoglobin 13.3 13.0 - 17.0 g/dL    Hematocrit 38.2 (L) 40.7 - 50.3 %    MCV 93.2 82.6 - 102.9 fL    MCH 32.4 25.2 - 33.5 pg    MCHC 34.8 (H) 25.2 - 33.5 g/dL    RDW 12.2 11.8 - 14.4 %    Platelets 886 940 - 008 k/uL    MPV 11.3 8.1 - 13.5 fL    NRBC Automated 0.0 0.0 per 100 WBC    Differential Type NOT REPORTED     Seg Neutrophils 53 36 - 65 %    Lymphocytes 33 24 - 43 %    Monocytes 10 3 - 12 %    Eosinophils % 3 1 - 4 %    Basophils 1 0 - 2 %    Immature Granulocytes 0 0 %    Segs Absolute 1.90 1.50 - 8.10 k/uL    Absolute Lymph # 1.16 1.10 - 3.70 k/uL    Absolute Mono # 0.35 0.10 - 1.20 k/uL    Absolute Eos # 0.09 0.00 - 0.44 k/uL    Basophils Absolute 0.03 0.00 - 0.20 k/uL    Absolute Immature Granulocyte <0.03 0.00 - 0.30 k/uL    WBC Morphology NOT REPORTED     RBC Morphology NOT REPORTED     Platelet Estimate NOT REPORTED    IGA, Low Range   Result Value Ref Range    IGA, Low Range 24 (L) 70 - 400 mg/dL     Results for orders placed or performed during the hospital encounter of 12/28/21   Electrophoresis Protein, Serum without Reflex to Immunofixation   Result Value Ref Range    Total Protein 7.1 6.4 - 8.3 g/dL    Albumin (calculated) 3.8 3.2 - 5.2 g/dL    Albumin % 54 45 - 65 %    Alpha-1-Globulin 0.2 0.1 - 0.4 g/dL    Alpha 1 % 3 3 - 6 %    Alpha-2-Globulin 0.8 0.5 - 0.9 g/dL    Alpha 2 % 11 6 - 13 %    Beta Globulin 0.7 0.5 - 1.1 g/dL    Beta Percent 9 (L) 11 - 19 %    Gamma Globulin 1.7 (H) 0.5 - 1.5 g/dL    Gamma Globulin % 24 (H) 9 - 20 %    Total Prot. Sum 7.2 6.3 - 8.2 g/dL    Total Prot. Sum,% 101 98 - 102 %    Protein Electrophoresis, Serum       A ZONE OF RESTRICTION IS PRESENT IN THE GAMMAGLOBULIN REGION. CONFIRMED BY IMMUNOFIXATION TO BE MONOCLONAL    Pathologist ELECTRONICALLY SIGNED. Eduardo Thompson M.D. [place]/Lambda Free Lt Chains, Serum Quant   Result Value Ref Range    Kappa Free Light Chains QNT 0.85 0.37 - 1.94 mg/dL    Lambda Free Light Chains QNT 64.78 (H) 0.57 - 2.63 mg/dL    Free Kappa/Lambda Ratio 0.01 (L) 0.26 - 1.65   Immunoglobulin Panel (IgG, IgA, IgM)   Result Value Ref Range    IgG 1616 (H) 700 - 1600 mg/dL    IgA Refer to IGA, Low Range for result. 70 - 400 mg/dL    IgM <25 (L) 40 - 230 mg/dL   Immunofixation serum profile   Result Value Ref Range    Serum IFX Interp       A ZONE OF RESTRICTION IS PRESENT IN THE GAMMAGLOBULIN REGION. CONFIRMED BY IMMUNOFIXATION TO BE MONOCLONAL    Pathologist ELECTRONICALLY SIGNED. Eduardo Thompson M.D.     Vitamin B12 & Folate   Result Value Ref Range    Vitamin B-12 915 232 - 1245 pg/mL    Folate 15.9 >4.8 ng/mL   Iron and TIBC   Result Value Ref Range    Iron 113 59 - 158 ug/dL    TIBC 232 (L) 250 - 450 ug/dL    Iron Saturation 49 20 - 55 %    UIBC 119 112 - 347 ug/dL   Ferritin   Result Value Ref Range    Ferritin 516 (H) 30 - 400 ug/L   Comprehensive Metabolic Panel   Result Value Ref Range    Glucose 115 (H) 70 - 99 mg/dL    BUN 30 (H) 8 - 23 mg/dL    CREATININE 0.81 0.70 - 1.20 mg/dL    Bun/Cre Ratio 37 (H) 9 - 20    Calcium 9.8 8.6 - 10.4 mg/dL    Sodium 138 135 - 144 mmol/L    Potassium 4.3 3.7 - 5.3 mmol/L    Chloride 100 98 - 107 mmol/L    CO2 26 20 - 31 mmol/L    Anion Gap 12 9 - 17 mmol/L    Alkaline Phosphatase 74 40 - 129 U/L    ALT 20 5 - 41 U/L    AST 23 <40 U/L    Total Bilirubin 0.33 0.3 - 1.2 mg/dL    Total Protein 7.8 6.4 - 8.3 g/dL    Albumin 3.9 3.5 - 5.2 g/dL    Albumin/Globulin Ratio 1.0 1.0 - 2.5    GFR Non-African American >60 >60 mL/min    GFR African American >60 >60 mL/min    GFR Comment          GFR Staging NOT REPORTED    CBC Auto Differential   Result Value Ref Range    WBC 3.5 3.5 - 11.3 k/uL    RBC 4.10 (L) 4.21 - 5.77 m/uL    Hemoglobin 13.3 13.0 - 17.0 g/dL    Hematocrit 38.2 (L) 40.7 - 50.3 %    MCV 93.2 82.6 - 102.9 fL    MCH 32.4 25.2 - 33.5 pg    MCHC 34.8 (H) 25.2 - 33.5 g/dL    RDW 12.2 11.8 - 14.4 %    Platelets 267 385 - 018 k/uL    MPV 11.3 8.1 - 13.5 fL    NRBC Automated 0.0 0.0 per 100 WBC    Differential Type NOT REPORTED     Seg Neutrophils 53 36 - 65 %    Lymphocytes 33 24 - 43 %    Monocytes 10 3 - 12 %    Eosinophils % 3 1 - 4 %    Basophils 1 0 - 2 %    Immature Granulocytes 0 0 %    Segs Absolute 1.90 1.50 - 8.10 k/uL    Absolute Lymph # 1.16 1.10 - 3.70 k/uL    Absolute Mono # 0.35 0.10 - 1.20 k/uL    Absolute Eos # 0.09 0.00 - 0.44 k/uL    Basophils Absolute 0.03 0.00 - 0.20 k/uL    Absolute Immature Granulocyte <0.03 0.00 - 0.30 k/uL    WBC Morphology NOT REPORTED     RBC Morphology NOT REPORTED     Platelet Estimate NOT REPORTED    IGA, Low Range   Result Value Ref Range    IGA, Low Range 24 (L) 70 - 400 mg/dL       CT GUIDED NEEDLE PLACEMENT    Result Date: 10/14/2021  PROCEDURE: CT GUIDED BONE MARROW ASPIRATION AND CORE NEEDLE BONE BIOPSY OF THE right ILIAC BONE. 10/14/2021 HISTORY: ORDERING SYSTEM PROVIDED HISTORY: Anemia, unspecified type TECHNOLOGIST PROVIDED HISTORY: Reason for Exam: Bone marrow bx right posterior iliac spine Acuity: Unknown Type of Exam: Initial TECHNIQUE: Informed consent was obtained following a detailed explanation of the procedure including risks, benefits, and alternatives. Universal protocol was followed. Axial images were obtained through the iliac bones using CT guidance and a suitable skin site was prepped and draped in sterile fashion. Local anesthesia was achieved with lidocaine. An 11 gauge [employer] bone marrow biopsy needle was advanced into the right iliac bone and approximately 10 mL of bone marrow aspirate was obtained. A single core biopsy specimen was obtained and the patient tolerated the procedure well. Dose modulation, iterative reconstruction, and/or weight based adjustment of the mA/kV was utilized to reduce the radiation dose to as low as reasonably achievable. Successful CT guided bone marrow aspiration and core biopsy of the iliac bone. PET CT WHOLE BODY    Result Date: 10/8/2021  EXAMINATION: WHOLE BODY PET/CT WITH LOWER EXTREMITIES. 10/8/2021 TECHNIQUE: Following IV injection of 12.74 mCi of F 18 -FDG, PET  tumor imaging was acquired from the top of the skull to the mid thighs. Then, a separate acquisition of the lower extremities from mid thigh to the tip of the toes was acquired. Computed tomography was used for purposes of attenuation correction and anatomic localization. Fusion imaging was utilized for interpretation. Body uptake time 87 mins. Glucose level 102 mg/dl.  COMPARISON: None HISTORY: ORDERING SYSTEM PROVIDED HISTORY: Abnormality of plasma protein TECHNOLOGIST PROVIDED HISTORY: paraproteinemia Reason for Exam: paraproteinemia, Abnormality of plasma protein Acuity: Acute Type of Exam: Initial FINDINGS: HEAD/NECK: No focal abnormal FDG activity is identified. CHEST: No focal abnormal FDG activity is identified. ABDOMEN/PELVIS: No focal abnormal FDG activity is identified. BONES/SOFT TISSUE: No focal abnormal FDG activity is identified. INCIDENTAL CT FINDINGS: No pneumothorax. No pericardial or pleural effusions. No free air or free fluid. Negative PET-CT. CT BIOPSY BONE MARROW    Result Date: 10/14/2021  PROCEDURE: CT GUIDED BONE MARROW ASPIRATION AND CORE NEEDLE BONE BIOPSY OF THE right ILIAC BONE. 10/14/2021 HISTORY: ORDERING SYSTEM PROVIDED HISTORY: Anemia, unspecified type TECHNOLOGIST PROVIDED HISTORY: Reason for Exam: Bone marrow bx right posterior iliac spine Acuity: Unknown Type of Exam: Initial TECHNIQUE: Informed consent was obtained following a detailed explanation of the procedure including risks, benefits, and alternatives. Universal protocol was followed. Axial images were obtained through the iliac bones using CT guidance and a suitable skin site was prepped and draped in sterile fashion. Local anesthesia was achieved with lidocaine. An 11 gauge [employer] bone marrow biopsy needle was advanced into the right iliac bone and approximately 10 mL of bone marrow aspirate was obtained. A single core biopsy specimen was obtained and the patient tolerated the procedure well. Dose modulation, iterative reconstruction, and/or weight based adjustment of the mA/kV was utilized to reduce the radiation dose to as low as reasonably achievable. Successful CT guided bone marrow aspiration and core biopsy of the iliac bone.          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 Patient does not have hypercalcemia anemia. Kidney function is stable. Discussed results of CT PET which is negative  Discussed results of bone marrow biopsy which confirmed monoclonal plasma cells greater than 10% consistent with myeloma. I believe patient has a smoldering myeloma discussed risk of progression to an active myeloma recommend close monitoring and surveillance. Cytogenetics consistent with high risk  Discussed indications for treatment for myeloma  Patient is planning to move to [place]. I advised patient to establish care with a local oncologist here. We will be happy to monitor patient while he is in the area. Patient had multiple questions which answered to the best of my ability. Laura Christina MD        This note is created with the assistance of a speech recognition program.  While intending to generate a document that actually reflects the content of the visit, the document can still have some errors including those of syntax and sound a like substitutions which may escape proof reading. It such instances, actual meaning can be extrapolated by contextual diversion.

## 2022-02-22 DIAGNOSIS — D47.2 SMOLDERING MYELOMA: Primary | ICD-10-CM

## 2022-02-22 DIAGNOSIS — D89.2 PARAPROTEINEMIA: ICD-10-CM

## 2022-05-20 ENCOUNTER — HOSPITAL ENCOUNTER (OUTPATIENT)
Dept: LAB | Age: 72
Discharge: HOME | End: 2022-05-20
Payer: MEDICARE

## 2022-05-20 DIAGNOSIS — D64.9 ANEMIA, UNSPECIFIED TYPE: ICD-10-CM

## 2022-05-20 DIAGNOSIS — D89.2 PARAPROTEINEMIA: ICD-10-CM

## 2022-05-20 DIAGNOSIS — D47.2 SMOLDERING MYELOMA: ICD-10-CM

## 2022-05-20 LAB
ABSOLUTE EOS #: 0.14 K/UL (ref 0–0.44)
ABSOLUTE IMMATURE GRANULOCYTE: <0.03 K/UL (ref 0–0.3)
ABSOLUTE LYMPH #: 1.21 K/UL (ref 1.1–3.7)
ABSOLUTE MONO #: 0.33 K/UL (ref 0.1–1.2)
ANION GAP SERPL CALCULATED.3IONS-SCNC: 10 MMOL/L (ref 9–17)
BASOPHILS # BLD: 1 % (ref 0–2)
BASOPHILS ABSOLUTE: <0.03 K/UL (ref 0–0.2)
BUN BLDV-MCNC: 22 MG/DL (ref 8–23)
BUN/CREAT BLD: 25 (ref 9–20)
CALCIUM SERPL-MCNC: 9.7 MG/DL (ref 8.6–10.4)
CHLORIDE BLD-SCNC: 100 MMOL/L (ref 98–107)
CO2: 27 MMOL/L (ref 20–31)
CREAT SERPL-MCNC: 0.87 MG/DL (ref 0.7–1.2)
EOSINOPHILS RELATIVE PERCENT: 4 % (ref 1–4)
FREE KAPPA/LAMBDA RATIO: 0.02 (ref 0.26–1.65)
GFR AFRICAN AMERICAN: >60 ML/MIN
GFR NON-AFRICAN AMERICAN: >60 ML/MIN
GFR SERPL CREATININE-BSD FRML MDRD: ABNORMAL ML/MIN/{1.73_M2}
GLUCOSE BLD-MCNC: 123 MG/DL (ref 70–99)
HCT VFR BLD CALC: 37.1 % (ref 40.7–50.3)
HEMOGLOBIN: 12.8 G/DL (ref 13–17)
IGA, LOW RANGE: 26 MG/DL (ref 70–400)
IGA: ABNORMAL MG/DL (ref 70–400)
IGG: 1823 MG/DL (ref 700–1600)
IGM: 25 MG/DL (ref 40–230)
IMMATURE GRANULOCYTES: 0 %
KAPPA FREE LIGHT CHAINS QNT: 1.01 MG/DL (ref 0.37–1.94)
LAMBDA FREE LIGHT CHAINS QNT: 62.86 MG/DL (ref 0.57–2.63)
LYMPHOCYTES # BLD: 38 % (ref 24–43)
MCH RBC QN AUTO: 32.5 PG (ref 25.2–33.5)
MCHC RBC AUTO-ENTMCNC: 34.5 G/DL (ref 25.2–33.5)
MCV RBC AUTO: 94.2 FL (ref 82.6–102.9)
MONOCYTES # BLD: 10 % (ref 3–12)
NRBC AUTOMATED: 0 PER 100 WBC
PDW BLD-RTO: 12.7 % (ref 11.8–14.4)
PLATELET # BLD: 183 K/UL (ref 138–453)
PMV BLD AUTO: 10.9 FL (ref 8.1–13.5)
POTASSIUM SERPL-SCNC: 4.3 MMOL/L (ref 3.7–5.3)
RBC # BLD: 3.94 M/UL (ref 4.21–5.77)
SEG NEUTROPHILS: 47 % (ref 36–65)
SEGMENTED NEUTROPHILS ABSOLUTE COUNT: 1.52 K/UL (ref 1.5–8.1)
SODIUM BLD-SCNC: 137 MMOL/L (ref 135–144)
WBC # BLD: 3.2 K/UL (ref 3.5–11.3)

## 2022-05-20 PROCEDURE — 36415 COLL VENOUS BLD VENIPUNCTURE: CPT

## 2022-05-20 PROCEDURE — 85025 COMPLETE CBC W/AUTO DIFF WBC: CPT

## 2022-05-20 PROCEDURE — 82784 ASSAY IGA/IGD/IGG/IGM EACH: CPT

## 2022-05-20 PROCEDURE — 86334 IMMUNOFIX E-PHORESIS SERUM: CPT

## 2022-05-20 PROCEDURE — 80048 BASIC METABOLIC PNL TOTAL CA: CPT

## 2022-05-20 PROCEDURE — 83883 ASSAY NEPHELOMETRY NOT SPEC: CPT

## 2022-05-20 PROCEDURE — 84165 PROTEIN E-PHORESIS SERUM: CPT

## 2022-05-20 PROCEDURE — 82232 ASSAY OF BETA-2 PROTEIN: CPT

## 2022-05-20 PROCEDURE — 84155 ASSAY OF PROTEIN SERUM: CPT

## 2022-05-23 LAB
ALBUMIN (CALCULATED): 4.3 G/DL (ref 3.2–5.2)
ALBUMIN PERCENT: 57 % (ref 45–65)
ALPHA 1 PERCENT: 2 % (ref 3–6)
ALPHA 2 PERCENT: 10 % (ref 6–13)
ALPHA-1-GLOBULIN: 0.1 G/DL (ref 0.1–0.4)
ALPHA-2-GLOBULIN: 0.7 G/DL (ref 0.5–0.9)
BETA GLOBULIN: 0.6 G/DL (ref 0.5–1.1)
BETA PERCENT: 8 % (ref 11–19)
BETA-2 MICROGLOBULIN: 2.5 MG/L (ref 0.6–2.4)
GAMMA GLOBULIN %: 24 % (ref 9–20)
GAMMA GLOBULIN: 1.8 G/DL (ref 0.5–1.5)
PATHOLOGIST: ABNORMAL
PATHOLOGIST: NORMAL
PROTEIN ELECTROPHORESIS, SERUM: ABNORMAL
SERUM IFX INTERP: NORMAL
TOTAL PROT. SUM,%: 101 % (ref 98–102)
TOTAL PROT. SUM: 7.5 G/DL (ref 6.3–8.2)
TOTAL PROTEIN: 7.5 G/DL (ref 6.4–8.3)

## 2022-05-23 RX ORDER — ALLOPURINOL 300 MG/1
300 TABLET ORAL DAILY
COMMUNITY
End: 2022-05-23 | Stop reason: SDUPTHER

## 2022-05-23 RX ORDER — ALLOPURINOL 300 MG/1
300 TABLET ORAL DAILY
Qty: 30 TABLET | Refills: 0 | Status: SHIPPED | OUTPATIENT
Start: 2022-05-23

## 2022-05-23 NOTE — TELEPHONE ENCOUNTER
Tried to reach patient to schedule appt to get established- and clarify patient currently is taking this medicine. No answer.     Dee Newberry is requesting a refill on the following medication(s):  Requested Prescriptions     Pending Prescriptions Disp Refills    allopurinol (ZYLOPRIM) 300 MG tablet 30 tablet 0     Sig: Take 1 tablet by mouth daily       Last Visit Date (If Applicable):  32/39/9953    Next Visit Date:    Visit date not found

## 2022-05-31 ENCOUNTER — OFFICE VISIT (OUTPATIENT)
Dept: ONCOLOGY | Age: 72
End: 2022-05-31
Payer: MEDICARE

## 2022-05-31 VITALS
BODY MASS INDEX: 28.19 KG/M2 | HEIGHT: 68 IN | WEIGHT: 186 LBS | HEART RATE: 64 BPM | OXYGEN SATURATION: 97 % | DIASTOLIC BLOOD PRESSURE: 74 MMHG | TEMPERATURE: 97.2 F | RESPIRATION RATE: 16 BRPM | SYSTOLIC BLOOD PRESSURE: 134 MMHG

## 2022-05-31 DIAGNOSIS — R76.8 ELEVATED SERUM IMMUNOGLOBULIN FREE LIGHT CHAIN LEVEL: ICD-10-CM

## 2022-05-31 DIAGNOSIS — D47.2 SMOLDERING MYELOMA: Primary | ICD-10-CM

## 2022-05-31 DIAGNOSIS — D64.9 ANEMIA, UNSPECIFIED TYPE: ICD-10-CM

## 2022-05-31 PROCEDURE — 99214 OFFICE O/P EST MOD 30 MIN: CPT | Performed by: INTERNAL MEDICINE

## 2022-05-31 PROCEDURE — 1123F ACP DISCUSS/DSCN MKR DOCD: CPT | Performed by: INTERNAL MEDICINE

## 2022-05-31 NOTE — PROGRESS NOTES
Infant remains in servo-controlled isolette, maintaining temps. Remains on NIPPV, FiO2 29-31%. no a/b. Remains NPO, 8Fr replogle to gravity draining thin clear/green liquid. Ileostomy bag remains intact, draining thin light brown liquid. L cephalic PICC remains in place with 2 dots exposed. TPN and smof lipids infusing per orders. Voiding adequately. No contact from family. Will continue to monitor.    Lab scheduled prior to follow up

## 2022-05-31 NOTE — PROGRESS NOTES
Hyperlipidemia     Hypertension     Skin cancer        Past Surgical History:     Past Surgical History:   Procedure Laterality Date    CARDIAC CATHETERIZATION      x 2    COLONOSCOPY      COLONOSCOPY N/A 2018    COLONOSCOPY WITH BIOPSY performed by Grazyna Wood DO at [place] [id number]      forehead and  mouth in     CT BONE MARROW BIOPSY  10/14/2021    CT BONE MARROW BIOPSY 10/14/2021 [facility] CT SCAN    HERNIA REPAIR      PAIN MANAGEMENT PROCEDURE Left 2022    Left L4 L5 TRANSFORAMINAL performed by Chris Kauffman MD at [facility] OR       Patient Family Social History:    Social History     Socioeconomic History    Marital status:      Spouse name: None    Number of children: None    Years of education: None    Highest education level: None   Occupational History    None   Tobacco Use    Smoking status: Former Smoker     Packs/day: 0.50     Years: 26.00     Pack years: 13.00     Types: Cigarettes     Start date:      Quit date: 1997     Years since quittin.4    Smokeless tobacco: Never Used    Tobacco comment: ly [facility] 16   Vaping Use    Vaping Use: Never used   Substance and Sexual Activity    Alcohol use: Yes     Alcohol/week: 1.0 standard drink     Types: 1 Cans of beer per week     Comment: 2 beers/month    Drug use: No    Sexual activity: None   Other Topics Concern    None   Social History Narrative    None     Social Determinants of Health     Financial Resource Strain: Low Risk     Difficulty of Paying Living Expenses: Not hard at all   Food Insecurity: No Food Insecurity    Worried About Running Out of Food in the Last Year: Never true    Mayra of Food in the Last Year: Never true   Transportation Needs:     Lack of Transportation (Medical): Not on file    Lack of Transportation (Non-Medical):  Not on file   Physical Activity:     Days of Exercise per Week: Not on file    Minutes of Exercise per Session: Not on file   Stress:  Feeling of Stress : Not on file   Social Connections:     Frequency of Communication with Friends and Family: Not on file    Frequency of Social Gatherings with Friends and Family: Not on file    Attends Faith Services: Not on file    Active Member of Clubs or Organizations: Not on file    Attends Club or Organization Meetings: Not on file    Marital Status: Not on file   Intimate Partner Violence:     Fear of Current or Ex-Partner: Not on file    Emotionally Abused: Not on file    Physically Abused: Not on file    Sexually Abused: Not on file   Housing Stability:     Unable to Pay for Housing in the Last Year: Not on file    Number of [place] in the Last Year: Not on file    Unstable Housing in the Last Year: Not on file     History reviewed. No pertinent family history.       Current Medications:     Current Outpatient Medications   Medication Sig Dispense Refill    allopurinol (ZYLOPRIM) 300 MG tablet Take 1 tablet by mouth daily 30 tablet 0    tiZANidine (ZANAFLEX) 4 MG tablet Take 1 tablet by mouth 3 times daily as needed (muscle spasm) 30 tablet 0    isosorbide mononitrate (IMDUR) 30 MG extended release tablet Take 1 tablet by mouth once daily 90 tablet 3    amLODIPine (NORVASC) 10 MG tablet Take 1 tablet by mouth once daily 90 tablet 3    gemfibrozil (LOPID) 600 MG tablet TAKE 1 TABLET BY MOUTH ONCE DAILY 90 tablet 3    pravastatin (PRAVACHOL) 10 MG tablet Take 1 tablet by mouth once daily 90 tablet 3    bisoprolol (ZEBETA) 10 MG tablet Take 1 tablet by mouth once daily 90 tablet 3    lisinopril-hydroCHLOROthiazide (PRINZIDE;ZESTORETIC) 20-25 MG per tablet Take 1 tablet by mouth once daily 90 tablet 3    TURMERIC PO Take 1,000 mg by mouth      aspirin 81 MG tablet Take 81 mg by mouth       Cyanocobalamin (B-12) 2500 MCG TABS Take 2,500 mcg by mouth daily       Cholecalciferol (D3-1000 PO) Take 1,000 Units by mouth daily       b complex vitamins capsule Take 1 capsule by mouth daily      gabapentin (NEURONTIN) 100 MG capsule Take 1 capsule by mouth 3 times daily for 31 days. 90 capsule 0     No current facility-administered medications for this visit. Allergies:   Codeine, Dtap-ipv vaccine, and Penicillins    Review of Systems:    Constitutional: No fever or chills. No night sweats, weight stable  Eyes: No eye discharge, double vision, or eye pain   HEENT: negative for sore mouth, sore throat, hoarseness and voice change   Respiratory: negative for cough , sputum, dyspnea, wheezing, hemoptysis, chest pain   Cardiovascular: negative for chest pain, dyspnea, palpitations, orthopnea, PND   Gastrointestinal: negative for nausea, vomiting, diarrhea, constipation, abdominal pain, Dysphagia, hematemesis and hematochezia   Genitourinary: negative for frequency, dysuria, nocturia, urinary incontinence, and hematuria   Integument: negative for rash, skin lesions, bruises. Hematologic/Lymphatic: negative for easy bruising, bleeding, lymphadenopathy, or petechiae   Endocrine: negative for heat or cold intolerance,weight changes, change in bowel habits and hair loss   Musculoskeletal: Positive for joint pain.   Positive for right knee pain  Neurological: negative for headaches, dizziness, seizures, weakness, numbness        Physical Exam:    Vitals: /74 (Site: Right Upper Arm, Position: Sitting, Cuff Size: Medium Adult)   Pulse 64   Temp 97.2 °F (36.2 °C) (Temporal)   Resp 16   Ht 5' 8\" (1.727 m)   Wt 186 lb (84.4 kg)   SpO2 97%   BMI 28.28 kg/m²   General appearance - well appearing, no in pain or distress  Mental status - AAO X3  Eyes - pupils equal and reactive, extraocular eye movements intact  Mouth - mucous membranes moist, pharynx normal without lesions  Neck - supple, no significant adenopathy  Lymphatics - no palpable lymphadenopathy, no hepatosplenomegaly  Chest - clear to auscultation, no wheezes, rales or rhonchi, symmetric air entry  Heart - normal rate, regular rhythm, normal S1, S2, no murmurs  Abdomen - soft, nontender, nondistended, no masses or organomegaly  Neurological - alert, oriented, normal speech, no focal findings or movement disorder noted  Extremities - peripheral pulses normal, no pedal edema, no clubbing or cyanosis  Skin - normal coloration and turgor, no rashes, no suspicious skin lesions noted       DATA:    Results for orders placed or performed during the hospital encounter of 05/20/22   Immunoglobulin Panel (IgG, IgA, IgM)   Result Value Ref Range    IgG 1823 (H) 700 - 1600 mg/dL    IgA Refer to IGA, Low Range for result. 70 - 400 mg/dL    IgM 25 (L) 40 - 230 mg/dL   Immunofixation serum profile   Result Value Ref Range    Serum IFX Interp       A ZONE OF RESTRICTION IS PRESENT IN THE GAMMAGLOBULIN REGION. CONFIRMED BY IMMUNOFIXATION TO BE MONOCLONAL    Pathologist ELECTRONICALLY SIGNED. Ba Ramírez M.D.     Basic Metabolic Panel   Result Value Ref Range    Glucose 123 (H) 70 - 99 mg/dL    BUN 22 8 - 23 mg/dL    CREATININE 0.87 0.70 - 1.20 mg/dL    Bun/Cre Ratio 25 (H) 9 - 20    Calcium 9.7 8.6 - 10.4 mg/dL    Sodium 137 135 - 144 mmol/L    Potassium 4.3 3.7 - 5.3 mmol/L    Chloride 100 98 - 107 mmol/L    CO2 27 20 - 31 mmol/L    Anion Gap 10 9 - 17 mmol/L    GFR Non-African American >60 >60 mL/min    GFR African American >60 >60 mL/min    GFR Comment         Beta 2 Microglobulin, Serum   Result Value Ref Range    Beta-2 Microglobulin 2.5 (H) 0.6 - 2.4 mg/L   CBC Auto Differential   Result Value Ref Range    WBC 3.2 (L) 3.5 - 11.3 k/uL    RBC 3.94 (L) 4.21 - 5.77 m/uL    Hemoglobin 12.8 (L) 13.0 - 17.0 g/dL    Hematocrit 37.1 (L) 40.7 - 50.3 %    MCV 94.2 82.6 - 102.9 fL    MCH 32.5 25.2 - 33.5 pg    MCHC 34.5 (H) 25.2 - 33.5 g/dL    RDW 12.7 11.8 - 14.4 %    Platelets 131 473 - 136 k/uL    MPV 10.9 8.1 - 13.5 fL    NRBC Automated 0.0 0.0 per 100 WBC    Seg Neutrophils 47 36 - 65 %    Lymphocytes 38 24 - 43 %    Monocytes 10 3 - 12 % Eosinophils % 4 1 - 4 %    Basophils 1 0 - 2 %    Immature Granulocytes 0 0 %    Segs Absolute 1.52 1.50 - 8.10 k/uL    Absolute Lymph # 1.21 1.10 - 3.70 k/uL    Absolute Mono # 0.33 0.10 - 1.20 k/uL    Absolute Eos # 0.14 0.00 - 0.44 k/uL    Basophils Absolute <0.03 0.00 - 0.20 k/uL    Absolute Immature Granulocyte <0.03 0.00 - 0.30 k/uL   Electrophoresis Protein, Serum without Reflex to Immunofixation   Result Value Ref Range    Total Protein 7.5 6.4 - 8.3 g/dL    Albumin (calculated) 4.3 3.2 - 5.2 g/dL    Albumin % 57 45 - 65 %    Alpha-1-Globulin 0.1 0.1 - 0.4 g/dL    Alpha 1 % 2 (L) 3 - 6 %    Alpha-2-Globulin 0.7 0.5 - 0.9 g/dL    Alpha 2 % 10 6 - 13 %    Beta Globulin 0.6 0.5 - 1.1 g/dL    Beta Percent 8 (L) 11 - 19 %    Gamma Globulin 1.8 (H) 0.5 - 1.5 g/dL    Gamma Globulin % 24 (H) 9 - 20 %    Total Prot. Sum 7.5 6.3 - 8.2 g/dL    Total Prot. Sum,% 101 98 - 102 %    Protein Electrophoresis, Serum       A ZONE OF RESTRICTION IS PRESENT IN THE GAMMAGLOBULIN REGION. CONFIRMED BY IMMUNOFIXATION TO BE MONOCLONAL    Pathologist ELECTRONICALLY SIGNED. Roman Gutierrez M.D. [place]/Lambda Free Lt Chains, Serum Quant   Result Value Ref Range    Kappa Free Light Chains QNT 1.01 0.37 - 1.94 mg/dL    Lambda Free Light Chains QNT 62.86 (H) 0.57 - 2.63 mg/dL    Free Kappa/Lambda Ratio 0.02 (L) 0.26 - 1.65   IGA, Low Range   Result Value Ref Range    IGA, Low Range 26 (L) 70 - 400 mg/dL     Results for orders placed or performed during the hospital encounter of 05/20/22   Immunoglobulin Panel (IgG, IgA, IgM)   Result Value Ref Range    IgG 1823 (H) 700 - 1600 mg/dL    IgA Refer to IGA, Low Range for result. 70 - 400 mg/dL    IgM 25 (L) 40 - 230 mg/dL   Immunofixation serum profile   Result Value Ref Range    Serum IFX Interp       A ZONE OF RESTRICTION IS PRESENT IN THE GAMMAGLOBULIN REGION. CONFIRMED BY IMMUNOFIXATION TO BE MONOCLONAL    Pathologist ELECTRONICALLY SIGNED. Roman Gutierrez M.D.     Basic Metabolic Panel   Result Value Ref Range    Glucose 123 (H) 70 - 99 mg/dL    BUN 22 8 - 23 mg/dL    CREATININE 0.87 0.70 - 1.20 mg/dL    Bun/Cre Ratio 25 (H) 9 - 20    Calcium 9.7 8.6 - 10.4 mg/dL    Sodium 137 135 - 144 mmol/L    Potassium 4.3 3.7 - 5.3 mmol/L    Chloride 100 98 - 107 mmol/L    CO2 27 20 - 31 mmol/L    Anion Gap 10 9 - 17 mmol/L    GFR Non-African American >60 >60 mL/min    GFR African American >60 >60 mL/min    GFR Comment         Beta 2 Microglobulin, Serum   Result Value Ref Range    Beta-2 Microglobulin 2.5 (H) 0.6 - 2.4 mg/L   CBC Auto Differential   Result Value Ref Range    WBC 3.2 (L) 3.5 - 11.3 k/uL    RBC 3.94 (L) 4.21 - 5.77 m/uL    Hemoglobin 12.8 (L) 13.0 - 17.0 g/dL    Hematocrit 37.1 (L) 40.7 - 50.3 %    MCV 94.2 82.6 - 102.9 fL    MCH 32.5 25.2 - 33.5 pg    MCHC 34.5 (H) 25.2 - 33.5 g/dL    RDW 12.7 11.8 - 14.4 %    Platelets 376 259 - 044 k/uL    MPV 10.9 8.1 - 13.5 fL    NRBC Automated 0.0 0.0 per 100 WBC    Seg Neutrophils 47 36 - 65 %    Lymphocytes 38 24 - 43 %    Monocytes 10 3 - 12 %    Eosinophils % 4 1 - 4 %    Basophils 1 0 - 2 %    Immature Granulocytes 0 0 %    Segs Absolute 1.52 1.50 - 8.10 k/uL    Absolute Lymph # 1.21 1.10 - 3.70 k/uL    Absolute Mono # 0.33 0.10 - 1.20 k/uL    Absolute Eos # 0.14 0.00 - 0.44 k/uL    Basophils Absolute <0.03 0.00 - 0.20 k/uL    Absolute Immature Granulocyte <0.03 0.00 - 0.30 k/uL   Electrophoresis Protein, Serum without Reflex to Immunofixation   Result Value Ref Range    Total Protein 7.5 6.4 - 8.3 g/dL    Albumin (calculated) 4.3 3.2 - 5.2 g/dL    Albumin % 57 45 - 65 %    Alpha-1-Globulin 0.1 0.1 - 0.4 g/dL    Alpha 1 % 2 (L) 3 - 6 %    Alpha-2-Globulin 0.7 0.5 - 0.9 g/dL    Alpha 2 % 10 6 - 13 %    Beta Globulin 0.6 0.5 - 1.1 g/dL    Beta Percent 8 (L) 11 - 19 %    Gamma Globulin 1.8 (H) 0.5 - 1.5 g/dL    Gamma Globulin % 24 (H) 9 - 20 %    Total Prot. Sum 7.5 6.3 - 8.2 g/dL    Total Prot.  Sum,% 101 98 - 102 %    Protein Electrophoresis, [Negative] : Heme/Lymph Serum       A ZONE OF RESTRICTION IS PRESENT IN THE GAMMAGLOBULIN REGION. CONFIRMED BY IMMUNOFIXATION TO BE MONOCLONAL    Pathologist ELECTRONICALLY SIGNED. Akosua Dow M.D. [place]/Lambda Free Lt Chains, Serum Quant   Result Value Ref Range    Kappa Free Light Chains QNT 1.01 0.37 - 1.94 mg/dL    Lambda Free Light Chains QNT 62.86 (H) 0.57 - 2.63 mg/dL    Free Kappa/Lambda Ratio 0.02 (L) 0.26 - 1.65   IGA, Low Range   Result Value Ref Range    IGA, Low Range 26 (L) 70 - 400 mg/dL       CT GUIDED NEEDLE PLACEMENT    Result Date: 10/14/2021  PROCEDURE: CT GUIDED BONE MARROW ASPIRATION AND CORE NEEDLE BONE BIOPSY OF THE right ILIAC BONE. 10/14/2021 HISTORY: ORDERING SYSTEM PROVIDED HISTORY: Anemia, unspecified type TECHNOLOGIST PROVIDED HISTORY: Reason for Exam: Bone marrow bx right posterior iliac spine Acuity: Unknown Type of Exam: Initial TECHNIQUE: Informed consent was obtained following a detailed explanation of the procedure including risks, benefits, and alternatives. Universal protocol was followed. Axial images were obtained through the iliac bones using CT guidance and a suitable skin site was prepped and draped in sterile fashion. Local anesthesia was achieved with lidocaine. An 11 gauge [id number] bone marrow biopsy needle was advanced into the right iliac bone and approximately 10 mL of bone marrow aspirate was obtained. A single core biopsy specimen was obtained and the patient tolerated the procedure well. Dose modulation, iterative reconstruction, and/or weight based adjustment of the mA/kV was utilized to reduce the radiation dose to as low as reasonably achievable. Successful CT guided bone marrow aspiration and core biopsy of the iliac bone. PET CT WHOLE BODY    Result Date: 10/8/2021  EXAMINATION: WHOLE BODY PET/CT WITH LOWER EXTREMITIES.  10/8/2021 TECHNIQUE: Following IV injection of 12.74 mCi of F 18 -FDG, PET  tumor imaging was acquired from the top of the skull to the mid [Joint Pain] : joint pain thighs. Then, a separate acquisition of the lower extremities from mid thigh to the tip of the toes was acquired. Computed tomography was used for purposes of attenuation correction and anatomic localization. Fusion imaging was utilized for interpretation. Body uptake time 87 mins. Glucose level 102 mg/dl. COMPARISON: None HISTORY: ORDERING SYSTEM PROVIDED HISTORY: Abnormality of plasma protein TECHNOLOGIST PROVIDED HISTORY: paraproteinemia Reason for Exam: paraproteinemia, Abnormality of plasma protein Acuity: Acute Type of Exam: Initial FINDINGS: HEAD/NECK: No focal abnormal FDG activity is identified. CHEST: No focal abnormal FDG activity is identified. ABDOMEN/PELVIS: No focal abnormal FDG activity is identified. BONES/SOFT TISSUE: No focal abnormal FDG activity is identified. INCIDENTAL CT FINDINGS: No pneumothorax. No pericardial or pleural effusions. No free air or free fluid. Negative PET-CT. CT BIOPSY BONE MARROW    Result Date: 10/14/2021  PROCEDURE: CT GUIDED BONE MARROW ASPIRATION AND CORE NEEDLE BONE BIOPSY OF THE right ILIAC BONE. 10/14/2021 HISTORY: ORDERING SYSTEM PROVIDED HISTORY: Anemia, unspecified type TECHNOLOGIST PROVIDED HISTORY: Reason for Exam: Bone marrow bx right posterior iliac spine Acuity: Unknown Type of Exam: Initial TECHNIQUE: Informed consent was obtained following a detailed explanation of the procedure including risks, benefits, and alternatives. Universal protocol was followed. Axial images were obtained through the iliac bones using CT guidance and a suitable skin site was prepped and draped in sterile fashion. Local anesthesia was achieved with lidocaine. An 11 gauge Vitriflex bone marrow biopsy needle was advanced into the right iliac bone and approximately 10 mL of bone marrow aspirate was obtained. A single core biopsy specimen was obtained and the patient tolerated the procedure well.  Dose modulation, iterative reconstruction, and/or weight based adjustment of the mA/kV was utilized to reduce the radiation dose to as low as reasonably achievable. Successful CT guided bone marrow aspiration and core biopsy of the iliac bone. 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 Reviewed indications to start treatment. Patient currently is not hypercalcemic. Hemoglobin is above 12. Patient has adequate kidney functions. CT PET was negative. Discussed results of bone marrow biopsy which confirmed monoclonal plasma cells greater than 10% consistent with myeloma. I believe patient has a smoldering myeloma discussed risk of progression to an active myeloma recommend close monitoring and surveillance. Cytogenetics consistent with high risk  Discussed indications for treatment for myeloma  NCCN guidelines were reviewed and discussed with the patient. The diagnosis and care plan were discussed with the patient in detail. I discussed the natural history of the disease, prognosis, risks and goals of therapy and answered all the patients questions to the best of my ability. Patient expressed understanding and was in agreement. Susi Cedillo MD        This note is created with the assistance of a speech recognition program.  While intending to generate a document that actually reflects the content of the visit, the document can still have some errors including those of syntax and sound a like substitutions which may escape proof reading. It such instances, actual meaning can be extrapolated by contextual diversion.

## 2022-10-25 ENCOUNTER — HOSPITAL ENCOUNTER (OUTPATIENT)
Dept: LAB | Age: 72
Discharge: HOME | End: 2022-10-25
Payer: MEDICARE

## 2022-10-25 DIAGNOSIS — R76.8 ELEVATED SERUM IMMUNOGLOBULIN FREE LIGHT CHAIN LEVEL: ICD-10-CM

## 2022-10-25 DIAGNOSIS — D64.9 ANEMIA, UNSPECIFIED TYPE: ICD-10-CM

## 2022-10-25 DIAGNOSIS — D47.2 SMOLDERING MYELOMA: ICD-10-CM

## 2022-10-25 LAB
ABSOLUTE EOS #: 0.18 K/UL (ref 0–0.44)
ABSOLUTE IMMATURE GRANULOCYTE: <0.03 K/UL (ref 0–0.3)
ABSOLUTE LYMPH #: 1.14 K/UL (ref 1.1–3.7)
ABSOLUTE MONO #: 0.42 K/UL (ref 0.1–1.2)
BASOPHILS # BLD: 1 % (ref 0–2)
BASOPHILS ABSOLUTE: 0.03 K/UL (ref 0–0.2)
EOSINOPHILS RELATIVE PERCENT: 4 % (ref 1–4)
FREE KAPPA/LAMBDA RATIO: 0.01 (ref 0.26–1.65)
HCT VFR BLD CALC: 33.1 % (ref 40.7–50.3)
HEMOGLOBIN: 11.9 G/DL (ref 13–17)
IGA: ABNORMAL MG/DL (ref 70–400)
IGG: 1806 MG/DL (ref 700–1600)
IGM: <25 MG/DL (ref 40–230)
IMMATURE GRANULOCYTES: 0 %
KAPPA FREE LIGHT CHAINS QNT: 0.94 MG/DL (ref 0.37–1.94)
LAMBDA FREE LIGHT CHAINS QNT: 64.89 MG/DL (ref 0.57–2.63)
LYMPHOCYTES # BLD: 28 % (ref 24–43)
MCH RBC QN AUTO: 34 PG (ref 25.2–33.5)
MCHC RBC AUTO-ENTMCNC: 36 G/DL (ref 25.2–33.5)
MCV RBC AUTO: 94.6 FL (ref 82.6–102.9)
MONOCYTES # BLD: 10 % (ref 3–12)
NRBC AUTOMATED: 0 PER 100 WBC
PDW BLD-RTO: 12.8 % (ref 11.8–14.4)
PLATELET # BLD: 171 K/UL (ref 138–453)
PMV BLD AUTO: 10.6 FL (ref 8.1–13.5)
RBC # BLD: 3.5 M/UL (ref 4.21–5.77)
SEG NEUTROPHILS: 57 % (ref 36–65)
SEGMENTED NEUTROPHILS ABSOLUTE COUNT: 2.36 K/UL (ref 1.5–8.1)
WBC # BLD: 4.1 K/UL (ref 3.5–11.3)

## 2022-10-25 PROCEDURE — 85025 COMPLETE CBC W/AUTO DIFF WBC: CPT

## 2022-10-25 PROCEDURE — 86334 IMMUNOFIX E-PHORESIS SERUM: CPT

## 2022-10-25 PROCEDURE — 82784 ASSAY IGA/IGD/IGG/IGM EACH: CPT

## 2022-10-25 PROCEDURE — 83521 IG LIGHT CHAINS FREE EACH: CPT

## 2022-10-25 PROCEDURE — 84165 PROTEIN E-PHORESIS SERUM: CPT

## 2022-10-25 PROCEDURE — 84155 ASSAY OF PROTEIN SERUM: CPT

## 2022-10-25 PROCEDURE — 36415 COLL VENOUS BLD VENIPUNCTURE: CPT

## 2022-10-26 LAB
ALBUMIN (CALCULATED): 4.3 G/DL (ref 3.2–5.2)
ALBUMIN PERCENT: 57 % (ref 45–65)
ALPHA 1 PERCENT: 2 % (ref 3–6)
ALPHA 2 PERCENT: 9 % (ref 6–13)
ALPHA-1-GLOBULIN: 0.2 G/DL (ref 0.1–0.4)
ALPHA-2-GLOBULIN: 0.7 G/DL (ref 0.5–0.9)
BETA GLOBULIN: 0.6 G/DL (ref 0.5–1.1)
BETA PERCENT: 8 % (ref 11–19)
GAMMA GLOBULIN %: 24 % (ref 9–20)
GAMMA GLOBULIN: 1.8 G/DL (ref 0.5–1.5)
IGA, LOW RANGE: 25 MG/DL (ref 70–400)
PATHOLOGIST: ABNORMAL
PATHOLOGIST: NORMAL
PROTEIN ELECTROPHORESIS, SERUM: ABNORMAL
SERUM IFX INTERP: NORMAL
TOTAL PROT. SUM,%: 100 % (ref 98–102)
TOTAL PROT. SUM: 7.6 G/DL (ref 6.3–8.2)
TOTAL PROTEIN: 7.6 G/DL (ref 6.4–8.3)

## 2022-11-01 ENCOUNTER — HOSPITAL ENCOUNTER (OUTPATIENT)
Dept: LAB | Age: 72
Discharge: HOME | End: 2022-11-01
Payer: MEDICARE

## 2022-11-01 ENCOUNTER — OFFICE VISIT (OUTPATIENT)
Dept: ONCOLOGY | Age: 72
End: 2022-11-01
Payer: MEDICARE

## 2022-11-01 VITALS
HEART RATE: 60 BPM | DIASTOLIC BLOOD PRESSURE: 78 MMHG | SYSTOLIC BLOOD PRESSURE: 132 MMHG | TEMPERATURE: 98.6 F | HEIGHT: 68 IN | OXYGEN SATURATION: 96 % | BODY MASS INDEX: 30.37 KG/M2 | WEIGHT: 200.4 LBS

## 2022-11-01 DIAGNOSIS — D47.2 SMOLDERING MYELOMA: Primary | ICD-10-CM

## 2022-11-01 DIAGNOSIS — D47.2 SMOLDERING MYELOMA: ICD-10-CM

## 2022-11-01 DIAGNOSIS — G62.9 NEUROPATHY: ICD-10-CM

## 2022-11-01 DIAGNOSIS — R76.8 ELEVATED SERUM IMMUNOGLOBULIN FREE LIGHT CHAIN LEVEL: ICD-10-CM

## 2022-11-01 LAB
IGA, LOW RANGE: 30 MG/DL (ref 70–400)
IGA: ABNORMAL MG/DL (ref 70–400)
IGG: 1930 MG/DL (ref 700–1600)
IGM: 26 MG/DL (ref 40–230)

## 2022-11-01 PROCEDURE — 36415 COLL VENOUS BLD VENIPUNCTURE: CPT

## 2022-11-01 PROCEDURE — 84155 ASSAY OF PROTEIN SERUM: CPT

## 2022-11-01 PROCEDURE — 84165 PROTEIN E-PHORESIS SERUM: CPT

## 2022-11-01 PROCEDURE — 1123F ACP DISCUSS/DSCN MKR DOCD: CPT | Performed by: INTERNAL MEDICINE

## 2022-11-01 PROCEDURE — 99214 OFFICE O/P EST MOD 30 MIN: CPT | Performed by: INTERNAL MEDICINE

## 2022-11-01 PROCEDURE — 3074F SYST BP LT 130 MM HG: CPT | Performed by: INTERNAL MEDICINE

## 2022-11-01 PROCEDURE — 86334 IMMUNOFIX E-PHORESIS SERUM: CPT

## 2022-11-01 PROCEDURE — 3078F DIAST BP <80 MM HG: CPT | Performed by: INTERNAL MEDICINE

## 2022-11-01 PROCEDURE — 82784 ASSAY IGA/IGD/IGG/IGM EACH: CPT

## 2022-11-01 NOTE — PROGRESS NOTES
Cally Shabazz                                                                                                                  11/1/2022  MRN:   [id number]  YOB: 1950  PCP:                           No primary care provider on file. Referring Physician: Tata Sanders  Treating Physician Name: Elina Elizondo MD      Reason for visit:  Chief Complaint   Patient presents with    Follow-up     Smoldering myeloma    Reviewed results of lab work-up. 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 Patient since last office moved to [place] to be closer to his brother who unfortunately has passed away now. Patient moved back to [place] about a week ago. Patient while at [place] had a stroke causing vision loss in the right eye. Etiology of the stroke is not clear. Patient does not have chemistry drawn today. Hemoglobin is 11.9. During this visit patient's allergy, social, medical, surgical history and medications were reviewed and updated. Summary of Case/History:    Cally Shabazz a 26 y.o.male is a patient who presents to the clinic to establish care and for further work-up and evaluation. Patient was noted to have a hemoglobin of 11.6 with MCV 92 on his routine work-up done earlier in July. Review of patient's record revealed he has had mild anemia since 2018 with hemoglobin around 11.8 in August 2018 12.4 in August 2020. Work-up done so far shows adequate kidney function patient was noted to have positive stool occult blood test back in 2018 patient has had colonoscopy done back in 2018 due to findings of positive stool occult blood test and underwent polypectomy.   Pathology from the polypectomy came back as hyperplastic polyp and tubular adenoma. Past Medical History:   Past Medical History:   Diagnosis Date    Angina at rest [facility])     Arthritis     CAD (coronary artery disease)     angina    Hyperlipidemia     Hypertension     Skin cancer        Past Surgical History:     Past Surgical History:   Procedure Laterality Date    CARDIAC CATHETERIZATION      x 2    COLONOSCOPY      COLONOSCOPY N/A 2018    COLONOSCOPY WITH BIOPSY performed by Elaine Herman DO at [address]      forehead and  mouth in [address] BIOPSY  10/14/2021    CT BONE MARROW BIOPSY 10/14/2021 [address] [place] [address] CT SCAN    HERNIA REPAIR      PAIN MANAGEMENT PROCEDURE Left 2022    Left L4 L5 TRANSFORAMINAL performed by Oren Lazcano MD at [address] OR       Patient Family Social History:    Social History     Socioeconomic History    Marital status:      Spouse name: None    Number of children: None    Years of education: None    Highest education level: None   Tobacco Use    Smoking status: Former     Packs/day: 0.50     Years: 26.00     Pack years: 13.00     Types: Cigarettes     Start date:      Quit date: 1997     Years since quittin.8    Smokeless tobacco: Never    Tobacco comments:     ly rrt 16   Vaping Use    Vaping Use: Never used   Substance and Sexual Activity    Alcohol use: Yes     Alcohol/week: 1.0 standard drink     Types: 1 Cans of beer per week     Comment: 2 beers/month    Drug use: No     No family history on file. Current Medications:     Current Outpatient Medications   Medication Sig Dispense Refill    allopurinol (ZYLOPRIM) 300 MG tablet Take 1 tablet by mouth daily 30 tablet 0    gabapentin (NEURONTIN) 100 MG capsule Take 1 capsule by mouth 3 times daily for 31 days.  90 capsule 0    tiZANidine (ZANAFLEX) 4 MG tablet Take 1 tablet by mouth 3 times daily as needed (muscle spasm) 30 tablet 0    isosorbide mononitrate (IMDUR) 30 MG extended release tablet Take 1 tablet by mouth once daily 90 tablet 3    amLODIPine (NORVASC) 10 MG tablet Take 1 tablet by mouth once daily 90 tablet 3    gemfibrozil (LOPID) 600 MG tablet TAKE 1 TABLET BY MOUTH ONCE DAILY 90 tablet 3    pravastatin (PRAVACHOL) 10 MG tablet Take 1 tablet by mouth once daily 90 tablet 3    bisoprolol (ZEBETA) 10 MG tablet Take 1 tablet by mouth once daily 90 tablet 3    lisinopril-hydroCHLOROthiazide (PRINZIDE;ZESTORETIC) 20-25 MG per tablet Take 1 tablet by mouth once daily 90 tablet 3    TURMERIC PO Take 1,000 mg by mouth      aspirin 81 MG tablet Take 81 mg by mouth       Cyanocobalamin (B-12) 2500 MCG TABS Take 2,500 mcg by mouth daily       Cholecalciferol (D3-1000 PO) Take 1,000 Units by mouth daily       b complex vitamins capsule Take 1 capsule by mouth daily       No current facility-administered medications for this visit. Allergies:   Codeine, Dtap-ipv vaccine, Penicillins, and Tetanus antitoxin    Review of Systems:    Constitutional: No fever or chills. No night sweats, weight stable  Eyes: No eye discharge, double vision, or eye pain   HEENT: negative for sore mouth, sore throat, hoarseness and voice change   Respiratory: negative for cough , sputum, dyspnea, wheezing, hemoptysis, chest pain   Cardiovascular: negative for chest pain, dyspnea, palpitations, orthopnea, PND   Gastrointestinal: negative for nausea, vomiting, diarrhea, constipation, abdominal pain, Dysphagia, hematemesis and hematochezia   Genitourinary: negative for frequency, dysuria, nocturia, urinary incontinence, and hematuria   Integument: negative for rash, skin lesions, bruises. Hematologic/Lymphatic: negative for easy bruising, bleeding, lymphadenopathy, or petechiae   Endocrine: negative for heat or cold intolerance,weight changes, change in bowel habits and hair loss   Musculoskeletal: Positive for joint pain.   Positive for right knee pain  Neurological: negative for headaches, dizziness, seizures, weakness, numbness        Physical Exam:    Vitals: /78 (Site: Left Upper Arm, Position: Sitting, Cuff Size: Large Adult)   Pulse 60   Temp 98.6 °F (37 °C)   Ht 5' 8\" (1.727 m)   Wt 200 lb 6.4 oz (90.9 kg)   SpO2 96%   BMI 30.47 kg/m²   General appearance - well appearing, no in pain or distress  Mental status - AAO X3  Eyes - pupils equal and reactive, extraocular eye movements intact  Mouth - mucous membranes moist, pharynx normal without lesions  Neck - supple, no significant adenopathy  Lymphatics - no palpable lymphadenopathy, no hepatosplenomegaly  Chest - clear to auscultation, no wheezes, rales or rhonchi, symmetric air entry  Heart - normal rate, regular rhythm, normal S1, S2, no murmurs  Abdomen - soft, nontender, nondistended, no masses or organomegaly  Neurological - alert, oriented, normal speech, no focal findings or movement disorder noted  Extremities - peripheral pulses normal, no pedal edema, no clubbing or cyanosis  Skin - normal coloration and turgor, no rashes, no suspicious skin lesions noted       DATA:    Results for orders placed or performed during the hospital encounter of 10/25/22   Electrophoresis Protein, Serum   Result Value Ref Range    Total Protein 7.6 6.4 - 8.3 g/dL    Albumin (calculated) 4.3 3.2 - 5.2 g/dL    Albumin % 57 45 - 65 %    Alpha-1-Globulin 0.2 0.1 - 0.4 g/dL    Alpha 1 % 2 (L) 3 - 6 %    Alpha-2-Globulin 0.7 0.5 - 0.9 g/dL    Alpha 2 % 9 6 - 13 %    Beta Globulin 0.6 0.5 - 1.1 g/dL    Beta Percent 8 (L) 11 - 19 %    Gamma Globulin 1.8 (H) 0.5 - 1.5 g/dL    Gamma Globulin % 24 (H) 9 - 20 %    Total Prot. Sum 7.6 6.3 - 8.2 g/dL    Total Prot. Sum,% 100 98 - 102 %    Protein Electrophoresis, Serum       A ZONE OF RESTRICTION IS PRESENT IN THE GAMMAGLOBULIN REGION. CONFIRMED BY IMMUNOFIXATION TO BE MONOCLONAL    Pathologist ELECTRONICALLY SIGNED. Chris Santoyo M.D.     Kappa/Lambda Quantitative Free Light Chains, Serum   Result Value Ref Range    Kappa Free Light Chains QNT 0.94 0.37 - 1.94 mg/dL    Lambda Free Light Chains QNT 64.89 (H) 0.57 - 2.63 mg/dL    Free Kappa/Lambda Ratio 0.01 (L) 0.26 - 1.65   Immunoglobulin Panel (IgG, IgA, IgM)   Result Value Ref Range    IgG 1806 (H) 700 - 1600 mg/dL    IgA Refer to IGA, Low Range for result. 70 - 400 mg/dL    IgM <25 (L) 40 - 230 mg/dL   Immunofixation serum profile   Result Value Ref Range    Serum IFX Interp       A ZONE OF RESTRICTION IS PRESENT IN THE GAMMAGLOBULIN REGION. CONFIRMED BY IMMUNOFIXATION TO BE MONOCLONAL    Pathologist ELECTRONICALLY SIGNED.  Anjana Avila M.D.    CBC with Auto Differential   Result Value Ref Range    WBC 4.1 3.5 - 11.3 k/uL    RBC 3.50 (L) 4.21 - 5.77 m/uL    Hemoglobin 11.9 (L) 13.0 - 17.0 g/dL    Hematocrit 33.1 (L) 40.7 - 50.3 %    MCV 94.6 82.6 - 102.9 fL    MCH 34.0 (H) 25.2 - 33.5 pg    MCHC 36.0 (H) 25.2 - 33.5 g/dL    RDW 12.8 11.8 - 14.4 %    Platelets 886 730 - 290 k/uL    MPV 10.6 8.1 - 13.5 fL    NRBC Automated 0.0 0.0 per 100 WBC    Seg Neutrophils 57 36 - 65 %    Lymphocytes 28 24 - 43 %    Monocytes 10 3 - 12 %    Eosinophils % 4 1 - 4 %    Basophils 1 0 - 2 %    Immature Granulocytes 0 0 %    Segs Absolute 2.36 1.50 - 8.10 k/uL    Absolute Lymph # 1.14 1.10 - 3.70 k/uL    Absolute Mono # 0.42 0.10 - 1.20 k/uL    Absolute Eos # 0.18 0.00 - 0.44 k/uL    Basophils Absolute 0.03 0.00 - 0.20 k/uL    Absolute Immature Granulocyte <0.03 0.00 - 0.30 k/uL   IGA, Low Range   Result Value Ref Range    IGA, Low Range 25 (L) 70 - 400 mg/dL     Results for orders placed or performed during the hospital encounter of 10/25/22   Electrophoresis Protein, Serum   Result Value Ref Range    Total Protein 7.6 6.4 - 8.3 g/dL    Albumin (calculated) 4.3 3.2 - 5.2 g/dL    Albumin % 57 45 - 65 %    Alpha-1-Globulin 0.2 0.1 - 0.4 g/dL    Alpha 1 % 2 (L) 3 - 6 %    Alpha-2-Globulin 0.7 0.5 - 0.9 g/dL    Alpha 2 % 9 6 - 13 %    Beta Globulin 0.6 0.5 - 1.1 g/dL    Beta Percent 8 (L) 11 - 19 %    Gamma No results found. 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 Patient M protein is slightly rising. We do not have any chemistry today. We will draw them today  We are not sure about the etiology of the CVA. Potentially myeloma can lead to hypercoagulable state causing thromboembolic events. But there is no clear evidence to suggest so at this point. We do not have details regarding the work-up done for the stroke. Will obtain chemistry today at this point we will continue to monitor the myeloma and treat patient as a smoldering myeloma patient. Patient does not have any primary care provider locally since he has moved back to [place] I will refer him to internal medicine in [place]  Discussed results of bone marrow biopsy which confirmed monoclonal plasma cells greater than 10% consistent with myeloma. I believe patient has a smoldering myeloma discussed risk of progression to an active myeloma recommend close monitoring and surveillance. Cytogenetics consistent with high risk  Discussed indications for treatment for myeloma  NCCN guidelines were reviewed and discussed with the patient. The diagnosis and care plan were discussed with the patient in detail. I discussed the natural history of the disease, prognosis, risks and goals of therapy and answered all the patients questions to the best of my ability. Patient expressed understanding and was in agreement.     Tyrell Luciano MD        This note is created with the assistance of a speech recognition program.  While intending to generate a document that actually reflects the content of the visit, the document can still have some errors including those of syntax and sound a like substitutions which may escape proof reading. It such instances, actual meaning can be extrapolated by contextual diversion.

## 2022-11-02 LAB
ALBUMIN (CALCULATED): 4.4 G/DL (ref 3.2–5.2)
ALBUMIN PERCENT: 55 % (ref 45–65)
ALPHA 1 PERCENT: 2 % (ref 3–6)
ALPHA 2 PERCENT: 10 % (ref 6–13)
ALPHA-1-GLOBULIN: 0.2 G/DL (ref 0.1–0.4)
ALPHA-2-GLOBULIN: 0.8 G/DL (ref 0.5–0.9)
BETA GLOBULIN: 0.7 G/DL (ref 0.5–1.1)
BETA PERCENT: 8 % (ref 11–19)
GAMMA GLOBULIN %: 26 % (ref 9–20)
GAMMA GLOBULIN: 2.1 G/DL (ref 0.5–1.5)
PATHOLOGIST: ABNORMAL
PATHOLOGIST: NORMAL
PROTEIN ELECTROPHORESIS, SERUM: ABNORMAL
SERUM IFX INTERP: NORMAL
TOTAL PROT. SUM,%: 101 % (ref 98–102)
TOTAL PROT. SUM: 8.2 G/DL (ref 6.3–8.2)
TOTAL PROTEIN: 8.1 G/DL (ref 6.4–8.3)

## 2022-11-08 ENCOUNTER — TELEPHONE (OUTPATIENT)
Dept: ONCOLOGY | Age: 72
End: 2022-11-08

## 2022-11-09 NOTE — TELEPHONE ENCOUNTER
Per dr. Libia Ramirez labs look good, proceed with f/u as scheduled.    Writer called, n/a unable to leave message

## 2022-12-13 ENCOUNTER — OFFICE VISIT (OUTPATIENT)
Dept: INTERNAL MEDICINE | Age: 72
End: 2022-12-13
Payer: MEDICARE

## 2022-12-13 ENCOUNTER — HOSPITAL ENCOUNTER (OUTPATIENT)
Dept: GENERAL RADIOLOGY | Age: 72
Discharge: HOME | End: 2022-12-15
Payer: MEDICARE

## 2022-12-13 ENCOUNTER — HOSPITAL ENCOUNTER (OUTPATIENT)
Age: 72
Discharge: HOME | End: 2022-12-13
Payer: MEDICARE

## 2022-12-13 VITALS
BODY MASS INDEX: 31.67 KG/M2 | DIASTOLIC BLOOD PRESSURE: 80 MMHG | SYSTOLIC BLOOD PRESSURE: 150 MMHG | HEIGHT: 68 IN | OXYGEN SATURATION: 96 % | HEART RATE: 63 BPM | RESPIRATION RATE: 16 BRPM | WEIGHT: 209 LBS

## 2022-12-13 DIAGNOSIS — M10.9 GOUT, UNSPECIFIED CAUSE, UNSPECIFIED CHRONICITY, UNSPECIFIED SITE: ICD-10-CM

## 2022-12-13 DIAGNOSIS — E78.2 MIXED HYPERLIPIDEMIA: ICD-10-CM

## 2022-12-13 DIAGNOSIS — R73.03 PREDIABETES: ICD-10-CM

## 2022-12-13 DIAGNOSIS — M25.562 LEFT KNEE PAIN, UNSPECIFIED CHRONICITY: Primary | ICD-10-CM

## 2022-12-13 DIAGNOSIS — I25.10 CORONARY ARTERY DISEASE WITHOUT ANGINA PECTORIS, UNSPECIFIED VESSEL OR LESION TYPE, UNSPECIFIED WHETHER NATIVE OR TRANSPLANTED HEART: ICD-10-CM

## 2022-12-13 DIAGNOSIS — M25.561 RIGHT KNEE PAIN, UNSPECIFIED CHRONICITY: ICD-10-CM

## 2022-12-13 DIAGNOSIS — I63.9 CEREBROVASCULAR ACCIDENT (CVA), UNSPECIFIED MECHANISM (HCC): ICD-10-CM

## 2022-12-13 DIAGNOSIS — M25.562 LEFT KNEE PAIN, UNSPECIFIED CHRONICITY: ICD-10-CM

## 2022-12-13 DIAGNOSIS — I10 HYPERTENSION, UNSPECIFIED TYPE: ICD-10-CM

## 2022-12-13 PROBLEM — H47.011 NAION (NON-ARTERITIC ANTERIOR ISCHEMIC OPTIC NEUROPATHY), RIGHT EYE: Status: ACTIVE | Noted: 2022-11-03

## 2022-12-13 PROBLEM — H35.033 HYPERTENSIVE RETINOPATHY OF BOTH EYES: Status: ACTIVE | Noted: 2022-11-03

## 2022-12-13 PROBLEM — H35.3131 EARLY DRY STAGE NONEXUDATIVE AGE-RELATED MACULAR DEGENERATION OF BOTH EYES: Status: ACTIVE | Noted: 2022-11-03

## 2022-12-13 PROBLEM — H25.13 NUCLEAR SCLEROTIC CATARACT OF BOTH EYES: Status: ACTIVE | Noted: 2022-11-03

## 2022-12-13 PROBLEM — I65.23 BILATERAL CAROTID ARTERY STENOSIS: Status: ACTIVE | Noted: 2022-09-06

## 2022-12-13 LAB
ABSOLUTE EOS #: 0.14 K/UL (ref 0–0.44)
ABSOLUTE IMMATURE GRANULOCYTE: <0.03 K/UL (ref 0–0.3)
ABSOLUTE LYMPH #: 1.39 K/UL (ref 1.1–3.7)
ABSOLUTE MONO #: 0.58 K/UL (ref 0.1–1.2)
ALBUMIN SERPL-MCNC: 4 G/DL (ref 3.5–5.2)
ALBUMIN/GLOBULIN RATIO: 1 (ref 1–2.5)
ALP BLD-CCNC: 91 U/L (ref 40–129)
ALT SERPL-CCNC: 48 U/L (ref 5–41)
ANION GAP SERPL CALCULATED.3IONS-SCNC: 12 MMOL/L (ref 9–17)
AST SERPL-CCNC: 44 U/L
BASOPHILS # BLD: 1 % (ref 0–2)
BASOPHILS ABSOLUTE: 0.03 K/UL (ref 0–0.2)
BILIRUB SERPL-MCNC: 0.6 MG/DL (ref 0.3–1.2)
BUN BLDV-MCNC: 27 MG/DL (ref 8–23)
BUN/CREAT BLD: 24 (ref 9–20)
CALCIUM SERPL-MCNC: 9.5 MG/DL (ref 8.6–10.4)
CHLORIDE BLD-SCNC: 97 MMOL/L (ref 98–107)
CHOLESTEROL/HDL RATIO: 5.8
CHOLESTEROL: 161 MG/DL
CO2: 26 MMOL/L (ref 20–31)
CREAT SERPL-MCNC: 1.12 MG/DL (ref 0.7–1.2)
EOSINOPHILS RELATIVE PERCENT: 3 % (ref 1–4)
GFR SERPL CREATININE-BSD FRML MDRD: >60 ML/MIN/1.73M2
GLUCOSE BLD-MCNC: 156 MG/DL (ref 70–99)
HCT VFR BLD CALC: 35 % (ref 40.7–50.3)
HDLC SERPL-MCNC: 28 MG/DL
HEMOGLOBIN: 12.4 G/DL (ref 13–17)
IMMATURE GRANULOCYTES: 0 %
LDL CHOLESTEROL: 64 MG/DL (ref 0–130)
LYMPHOCYTES # BLD: 29 % (ref 24–43)
MCH RBC QN AUTO: 33.3 PG (ref 25.2–33.5)
MCHC RBC AUTO-ENTMCNC: 35.4 G/DL (ref 25.2–33.5)
MCV RBC AUTO: 94.1 FL (ref 82.6–102.9)
MONOCYTES # BLD: 12 % (ref 3–12)
NRBC AUTOMATED: 0 PER 100 WBC
PDW BLD-RTO: 12.4 % (ref 11.8–14.4)
PLATELET # BLD: 194 K/UL (ref 138–453)
PMV BLD AUTO: 11.7 FL (ref 8.1–13.5)
POTASSIUM SERPL-SCNC: 4.1 MMOL/L (ref 3.7–5.3)
RBC # BLD: 3.72 M/UL (ref 4.21–5.77)
SEG NEUTROPHILS: 55 % (ref 36–65)
SEGMENTED NEUTROPHILS ABSOLUTE COUNT: 2.73 K/UL (ref 1.5–8.1)
SODIUM BLD-SCNC: 135 MMOL/L (ref 135–144)
TOTAL PROTEIN: 8.2 G/DL (ref 6.4–8.3)
TRIGL SERPL-MCNC: 344 MG/DL
URIC ACID: 5.4 MG/DL (ref 3.4–7)
WBC # BLD: 4.9 K/UL (ref 3.5–11.3)

## 2022-12-13 PROCEDURE — 73562 X-RAY EXAM OF KNEE 3: CPT

## 2022-12-13 PROCEDURE — 99214 OFFICE O/P EST MOD 30 MIN: CPT | Performed by: INTERNAL MEDICINE

## 2022-12-13 PROCEDURE — 3074F SYST BP LT 130 MM HG: CPT | Performed by: INTERNAL MEDICINE

## 2022-12-13 PROCEDURE — 1123F ACP DISCUSS/DSCN MKR DOCD: CPT | Performed by: INTERNAL MEDICINE

## 2022-12-13 PROCEDURE — 36415 COLL VENOUS BLD VENIPUNCTURE: CPT

## 2022-12-13 PROCEDURE — 85025 COMPLETE CBC W/AUTO DIFF WBC: CPT

## 2022-12-13 PROCEDURE — 80053 COMPREHEN METABOLIC PANEL: CPT

## 2022-12-13 PROCEDURE — 80061 LIPID PANEL: CPT

## 2022-12-13 PROCEDURE — 84550 ASSAY OF BLOOD/URIC ACID: CPT

## 2022-12-13 PROCEDURE — 83036 HEMOGLOBIN GLYCOSYLATED A1C: CPT

## 2022-12-13 PROCEDURE — 3078F DIAST BP <80 MM HG: CPT | Performed by: INTERNAL MEDICINE

## 2022-12-13 PROCEDURE — 99203 OFFICE O/P NEW LOW 30 MIN: CPT | Performed by: INTERNAL MEDICINE

## 2022-12-13 RX ORDER — AMLODIPINE BESYLATE 10 MG/1
TABLET ORAL
Qty: 90 TABLET | Refills: 3 | Status: SHIPPED | OUTPATIENT
Start: 2022-12-13

## 2022-12-13 ASSESSMENT — PATIENT HEALTH QUESTIONNAIRE - PHQ9
SUM OF ALL RESPONSES TO PHQ QUESTIONS 1-9: 0
DEPRESSION UNABLE TO ASSESS: FUNCTIONAL CAPACITY MOTIVATION LIMITS ACCURACY
SUM OF ALL RESPONSES TO PHQ9 QUESTIONS 1 & 2: 0
SUM OF ALL RESPONSES TO PHQ QUESTIONS 1-9: 0
1. LITTLE INTEREST OR PLEASURE IN DOING THINGS: 0
SUM OF ALL RESPONSES TO PHQ QUESTIONS 1-9: 0
2. FEELING DOWN, DEPRESSED OR HOPELESS: 0
SUM OF ALL RESPONSES TO PHQ QUESTIONS 1-9: 0

## 2022-12-13 NOTE — PROGRESS NOTES
[address] [facility] INTERNAL MEDICINE    Visit Date:  12/13/2022  Patient:  Gerald Wakefield  YOB: 1950    Assessment & Plan     Hypertension: Blood pressure elevated, however will obtain kidney function before deciding on blood pressure medication. Continue amlodipine 10 mg every morning and 5 mg every afternoon at this time, if kidney function is normal, will start lisinopril. Smoldering myeloma: Follows with oncology. Will defer management. Bilateral knee osteoarthritis: We will obtain x-rays. Will refer to physical therapy. We will consider referral to orthopedics if there is worsening in his symptoms. Gout: We will obtain uric acid level. Continue allopurinol. Hyperlipidemia: Continue gemfibrozil as well as pravastatin. Will order blood work to assess. CVA: Continue aspirin. Residual vision loss in the right eye. Age-related macular degeneration: Follows with ophthalmology. Can start PreserVision. We will continue to monitor. Diagnosis Orders   1. Left knee pain, unspecified chronicity  XR KNEE LEFT (3 VIEWS)    [facility] - [place]      2. Right knee pain, unspecified chronicity  XR KNEE RIGHT (3 VIEWS)    [facility] - [place]      3. Gout, unspecified cause, unspecified chronicity, unspecified site  Uric Acid      4. Mixed hyperlipidemia  CBC with Auto Differential    Comprehensive Metabolic Panel    Lipid Panel      5. Prediabetes  Hemoglobin A1C      6. Cerebrovascular accident (CVA), unspecified mechanism [facility])  [facility] - [place]      7. Coronary artery disease without angina pectoris, unspecified vessel or lesion type, unspecified whether native or transplanted heart  amLODIPine (NORVASC) 10 MG tablet      8. Hypertension, unspecified type            Chief Complaint  New Patient (Blood pressure issue )    History of Present Illness   Presents to establish care.   He has a history of hypertension, for which she has been using amlodipine 10 mg every morning as well as 5 mg every afternoon. However, it seems that he has a log with elevated blood pressure readings. Denies fever, chills, chest pain, shortness of breath at this time. Looks like he was previously on lisinopril-hydrochlorothiazide but that was discontinued appears. Therefore I advised that we check his kidney function before starting another medication. He agrees with this. Moreover, has a history of osteoarthritis in both knees, and says that he was supposed to get surgery done. Says that there is still some pain right now. Denies fever, chills, swelling, redness, trauma. I advised that we can obtain x-rays and refer to physical therapy and possibly refer to orthopedics and he agrees. Objective  BP (!) 150/80 (Site: Right Upper Arm, Position: Sitting, Cuff Size: Medium Adult)   Pulse 63   Resp 16   Ht 5' 8\" (1.727 m)   Wt 209 lb (94.8 kg)   SpO2 96%   BMI 31.78 kg/m²   Constitutional: No acute distress. Sits in chair comfortably  Eyes: Sclerae nonicteric. No lid lag or proptosis  HENT: External ears normal. No external lesions on the nose  Neck: No gross masses. Trachea visibly midline  Respiratory: Good air entry bilaterally. No wheezing or crackles  Cardiovascular: Normal S1-S2. No murmurs. No lower extremity edema  Gastrointestinal: No visible masses. No visible hernias  Skin: No abnormal rashes. No abnormal masses  Neurologic: Cranial nerves grossly intact  Psychiatric: Normal affect.  Alert and oriented    Medications  Current Outpatient Medications:     amLODIPine (NORVASC) 10 MG tablet, Take 1 tablet by mouth once daily, Disp: 90 tablet, Rfl: 3    allopurinol (ZYLOPRIM) 300 MG tablet, Take 1 tablet by mouth daily, Disp: 30 tablet, Rfl: 0    tiZANidine (ZANAFLEX) 4 MG tablet, Take 1 tablet by mouth 3 times daily as needed (muscle spasm), Disp: 30 tablet, Rfl: 0    isosorbide mononitrate (IMDUR) 30 MG extended release tablet, Take 1 tablet by mouth once daily, Disp: 90 tablet, Rfl: 3    gemfibrozil (LOPID) 600 MG tablet, TAKE 1 TABLET BY MOUTH ONCE DAILY, Disp: 90 tablet, Rfl: 3    pravastatin (PRAVACHOL) 10 MG tablet, Take 1 tablet by mouth once daily, Disp: 90 tablet, Rfl: 3    bisoprolol (ZEBETA) 10 MG tablet, Take 1 tablet by mouth once daily, Disp: 90 tablet, Rfl: 3    TURMERIC PO, Take 1,000 mg by mouth, Disp: , Rfl:     aspirin 81 MG tablet, Take 81 mg by mouth , Disp: , Rfl:     Cyanocobalamin (B-12) 2500 MCG TABS, Take 2,500 mcg by mouth daily , Disp: , Rfl:     Cholecalciferol (D3-1000 PO), Take 1,000 Units by mouth daily , Disp: , Rfl:     b complex vitamins capsule, Take 1 capsule by mouth daily, Disp: , Rfl:     gabapentin (NEURONTIN) 100 MG capsule, Take 1 capsule by mouth 3 times daily for 31 days. , Disp: 90 capsule, Rfl: 0    lisinopril-hydroCHLOROthiazide (PRINZIDE;ZESTORETIC) 20-25 MG per tablet, Take 1 tablet by mouth once daily, Disp: 90 tablet, Rfl: 3    Allergies  Tetanus toxoids, Codeine, Dtap-ipv vaccine, Penicillins, and Tetanus antitoxin    Past Medical History:   Diagnosis Date    Angina at rest [facility])     Arthritis     CAD (coronary artery disease)     angina    Hyperlipidemia     Hypertension     Skin cancer      Past Surgical History:   Procedure Laterality Date    CARDIAC CATHETERIZATION      x 2    COLONOSCOPY      COLONOSCOPY N/A 9/12/2018    COLONOSCOPY WITH BIOPSY performed by Leeann Velazqeuz DO at [address]      forehead and  mouth in [address] [place] [address]  10/14/2021    CT BONE MARROW BIOPSY 10/14/2021 [facility] CT SCAN    HERNIA REPAIR      PAIN MANAGEMENT PROCEDURE Left 1/7/2022    Left L4 L5 TRANSFORAMINAL performed by Meron Tillman MD at [place] [address] History  This patient's family history is not on file. Social History  Nitesh Vasquez  reports that he quit smoking about 25 years ago. His smoking use included cigarettes. He started smoking about 51 years ago.  He has a 13.00 pack-year smoking history. He has never used smokeless tobacco. He reports current alcohol use of about 1.0 standard drink per week. He reports that he does not use drugs. Discussed use, benefit, and side effects of prescribed medications. All questions answered. Patient voiced understanding. Reviewed health maintenance. Electronically signed Scott Mckeon MD on 12/13/2022 at 6:43 PM    This note has been created using the Epic electronic health record, and dictated in part by [employer] dictation system. Despite the documenting physician's best efforts, there may be errors in spelling, grammar or syntax.

## 2022-12-14 LAB
ESTIMATED AVERAGE GLUCOSE: 123 MG/DL
HBA1C MFR BLD: 5.9 % (ref 4–6)

## 2022-12-15 RX ORDER — LISINOPRIL 10 MG/1
10 TABLET ORAL DAILY
Qty: 90 TABLET | Refills: 1 | Status: SHIPPED | OUTPATIENT
Start: 2022-12-15

## 2022-12-19 ENCOUNTER — HOSPITAL ENCOUNTER (OUTPATIENT)
Dept: PHYSICAL THERAPY | Age: 72
Setting detail: THERAPIES SERIES
Discharge: HOME | End: 2022-12-19

## 2022-12-19 NOTE — PROGRESS NOTES
Physical Therapy  Outpatient Physical Therapy    [] [place]  Phone: [phone]  Fax: [phone]      [] [place]  Phone: [phone]  Fax: [phone]    Physical Therapy  Cancellation/No-show Note  Patient Name:  Ami Fagan  :  1950   Date:  2022  Cancelled visits to date: 0  No-shows to date: 1    For today's appointment patient:  []  Cancelled  []  Rescheduled appointment  [x]  No-show     Reason given by patient:  []  Patient ill  []  Conflicting appointment  []  No transportation    []  Conflict with work  []  No reason given  []  Other:     Comments:      Electronically signed by: Ryan Velarde PT

## 2022-12-29 ENCOUNTER — OFFICE VISIT (OUTPATIENT)
Dept: INTERNAL MEDICINE | Age: 72
End: 2022-12-29
Payer: MEDICARE

## 2022-12-29 VITALS
HEART RATE: 65 BPM | WEIGHT: 208 LBS | BODY MASS INDEX: 31.52 KG/M2 | RESPIRATION RATE: 16 BRPM | OXYGEN SATURATION: 96 % | DIASTOLIC BLOOD PRESSURE: 78 MMHG | SYSTOLIC BLOOD PRESSURE: 142 MMHG | HEIGHT: 68 IN

## 2022-12-29 DIAGNOSIS — E78.2 MIXED HYPERLIPIDEMIA: ICD-10-CM

## 2022-12-29 DIAGNOSIS — Z00.00 MEDICARE ANNUAL WELLNESS VISIT, SUBSEQUENT: ICD-10-CM

## 2022-12-29 DIAGNOSIS — M25.562 LEFT KNEE PAIN, UNSPECIFIED CHRONICITY: Primary | ICD-10-CM

## 2022-12-29 DIAGNOSIS — M25.561 RIGHT KNEE PAIN, UNSPECIFIED CHRONICITY: ICD-10-CM

## 2022-12-29 DIAGNOSIS — M10.9 GOUT, UNSPECIFIED CAUSE, UNSPECIFIED CHRONICITY, UNSPECIFIED SITE: ICD-10-CM

## 2022-12-29 DIAGNOSIS — I10 HYPERTENSION, UNSPECIFIED TYPE: ICD-10-CM

## 2022-12-29 PROCEDURE — 99212 OFFICE O/P EST SF 10 MIN: CPT | Performed by: INTERNAL MEDICINE

## 2022-12-29 PROCEDURE — 3078F DIAST BP <80 MM HG: CPT | Performed by: INTERNAL MEDICINE

## 2022-12-29 PROCEDURE — G0439 PPPS, SUBSEQ VISIT: HCPCS | Performed by: INTERNAL MEDICINE

## 2022-12-29 PROCEDURE — 99214 OFFICE O/P EST MOD 30 MIN: CPT | Performed by: INTERNAL MEDICINE

## 2022-12-29 PROCEDURE — 3074F SYST BP LT 130 MM HG: CPT | Performed by: INTERNAL MEDICINE

## 2022-12-29 PROCEDURE — 1123F ACP DISCUSS/DSCN MKR DOCD: CPT | Performed by: INTERNAL MEDICINE

## 2022-12-29 ASSESSMENT — PATIENT HEALTH QUESTIONNAIRE - PHQ9
SUM OF ALL RESPONSES TO PHQ QUESTIONS 1-9: 0
DEPRESSION UNABLE TO ASSESS: FUNCTIONAL CAPACITY MOTIVATION LIMITS ACCURACY
1. LITTLE INTEREST OR PLEASURE IN DOING THINGS: 0
SUM OF ALL RESPONSES TO PHQ9 QUESTIONS 1 & 2: 0
2. FEELING DOWN, DEPRESSED OR HOPELESS: 0
SUM OF ALL RESPONSES TO PHQ QUESTIONS 1-9: 0

## 2022-12-29 ASSESSMENT — LIFESTYLE VARIABLES
HOW MANY STANDARD DRINKS CONTAINING ALCOHOL DO YOU HAVE ON A TYPICAL DAY: PATIENT DOES NOT DRINK
HOW OFTEN DO YOU HAVE A DRINK CONTAINING ALCOHOL: NEVER

## 2022-12-29 NOTE — PROGRESS NOTES
Medicare Annual Wellness Visit    Gabby Paulino is here for Medicare AWV (Patient states that blood pressure goes up after eating.  ), Hypertension, Headache, Back Pain, and Knee Pain    Assessment & Plan   Left knee pain, unspecified chronicity  -     Mita Coelho MD, Pain Management, [place]  Right knee pain, unspecified chronicity  -     Mita Coelho MD, Pain Management, [place]    Recommendations for Preventive Services Due: see orders and patient instructions/AVS.  Recommended screening schedule for the next 5-10 years is provided to the patient in written form: see Patient Instructions/AVS.     No follow-ups on file. Subjective       Patient's complete Health Risk Assessment and screening values have been reviewed and are found in Flowsheets. The following problems were reviewed today and where indicated follow up appointments were made and/or referrals ordered.     Positive Risk Factor Screenings with Interventions:        Depression:  Depression Unable to Assess: Functional capacity motivation limits accuracy  PHQ-2 Score: 0  PHQ-9 Total Score: 0    Interpretation:   1-4 = minimal  5-9 = mild  10-14 = moderate  15-19 = moderately severe  20-27 = severe            General HRA Questions:  Select all that apply: (!) New or Increased Fatigue, New or Increased Pain    Pain Interventions:  Referred to: Pain Specialist    Fatigue Interventions:  Patient advised to follow up in the office for further evaluation and treatment       Weight and Activity:  Physical Activity: Sufficiently Active    Days of Exercise per Week: 3 days    Minutes of Exercise per Session: 90 min     On average, how many days per week do you engage in moderate to strenuous exercise (like a brisk walk)?: 3 days  Have you lost any weight without trying in the past 3 months?: No  Body mass index: (!) 31.62    Obesity Interventions:  See AVS for additional education material          Dentist Screen:  Have you seen the dentist within the past year?: (!) No    Intervention:  See AVS for additional education material        Advanced Directives:  Do you have a Living Will?: (!) No    Intervention:  has NO advanced directive  - referred to ACP Coordinator                                   Objective   Vitals:    12/29/22 1108 12/29/22 1112   BP: (!) 150/86 (!) 142/78   Site: Left Upper Arm Left Upper Arm   Position: Sitting Sitting   Cuff Size: Medium Adult Medium Adult   Pulse: 65    Resp: 16    SpO2: 96%    Weight: 208 lb (94.3 kg)    Height: 5' 8\" (1.727 m)       Body mass index is 31.63 kg/m². Allergies   Allergen Reactions    Tetanus Toxoids Hives    Codeine Nausea Only    Dtap-Ipv Vaccine Rash    Penicillins Rash    Tetanus Antitoxin Rash     Prior to Visit Medications    Medication Sig Taking?  Authorizing Provider   amLODIPine (NORVASC) 10 MG tablet Take 1 tablet by mouth once daily Yes Sebastien Biggs MD   allopurinol (ZYLOPRIM) 300 MG tablet Take 1 tablet by mouth daily Yes CEM Tesfaye CNP   tiZANidine (ZANAFLEX) 4 MG tablet Take 1 tablet by mouth 3 times daily as needed (muscle spasm) Yes Deandre Lynn,    isosorbide mononitrate (IMDUR) 30 MG extended release tablet Take 1 tablet by mouth once daily Yes CEM Fuentes CNP   gemfibrozil (LOPID) 600 MG tablet TAKE 1 TABLET BY MOUTH ONCE DAILY Yes CEM Fuentes CNP   pravastatin (PRAVACHOL) 10 MG tablet Take 1 tablet by mouth once daily Yes CEM Fuentes CNP   bisoprolol (ZEBETA) 10 MG tablet Take 1 tablet by mouth once daily Yes CEM Fuentes CNP   TURMERIC PO Take 1,000 mg by mouth Yes Historical Provider, MD   aspirin 81 MG tablet Take 81 mg by mouth  Yes Historical Provider, MD   Cyanocobalamin (B-12) 2500 MCG TABS Take 2,500 mcg by mouth daily  Yes Historical Provider, MD   Cholecalciferol (D3-1000 PO) Take 1,000 Units by mouth daily  Yes Historical Provider, MD   b complex vitamins capsule Take 1 capsule by mouth daily Yes Historical Provider, MD   lisinopril (PRINIVIL;ZESTRIL) 10 MG tablet Take 1 tablet by mouth daily  Patient not taking: Reported on 12/29/2022  Preet Schwartz MD   gabapentin (NEURONTIN) 100 MG capsule Take 1 capsule by mouth 3 times daily for 31 days. Macho Brody MD   lisinopril-hydroCHLOROthiazide [facility]) 20-25 MG per tablet Take 1 tablet by mouth once daily  Edin Bazzi MD       [facility] (Including outside providers/suppliers regularly involved in providing care): No care team member to display     Reviewed and updated this visit:  Tobacco  Allergies  Meds  Problems  Med Hx  Surg Hx  Soc Hx  Fam Hx          I, Jamil Matamoros LPN, 07/20/2710, performed the documented evaluation under the direct supervision of the attending physician.

## 2022-12-29 NOTE — PROGRESS NOTES
[address] [facility] INTERNAL MEDICINE    Visit Date:  12/29/2022  Patient:  Cally Shabazz  YOB: 1950    Assessment & Plan     Bilateral knee osteoarthritis: He requests referral to pain management and does not want to be referred to orthopedics at this time. X-rays done. Will refer to pain management. Hypertension: Blood pressure elevated, will start amlodipine 10 mg daily and lisinopril 10 mg daily. Reassess next visit. Smoldering myeloma: Follows with oncology. Will defer management. Gout: Continue allopurinol. Uric acid level under control. Hyperlipidemia: Continue gemfibrozil as well as pravastatin. We will continue to monitor     CVA: Continue aspirin. Residual vision loss in the right eye. Age-related macular degeneration: Follows with ophthalmology. Can start PreserVision. We will continue to monitor. Diagnosis Orders   1. Left knee pain, unspecified chronicity  Mita Hoffmann MD, Pain Management, [place]      2. Right knee pain, unspecified chronicity  Mita Hoffmann MD, Pain Management, [place]      3. Medicare annual wellness visit, subsequent        4. Gout, unspecified cause, unspecified chronicity, unspecified site        5. Hypertension, unspecified type        6. Mixed hyperlipidemia            Chief Complaint  Medicare AWV (Patient states that blood pressure goes up after eating.  ), Hypertension, Headache, Back Pain, and Knee Pain    History of Present Illness   Has a history of hypertension, and was previously on amlodipine 10 mg as well as amlodipine 5 mg in the evening. I had started lisinopril 10 mg after checking his kidney function, however, it seems that he thought he only has take lisinopril, and says that his blood pressure was still elevated, and that is why he went back on the amlodipine. I advised that he needs to take both amlodipine and lisinopril.   He verbalized understanding and says that he needs to take both at 10 mg daily. Moreover, has a history of bilateral knee osteoarthritis, x-rays were done last visit and showed arthritis. He is not interested in referral to orthopedics at this time, not interested in knee replacement. He says that he would like to be referred to pain management. Denies fever, chills. Denies recent trauma. Objective  BP (!) 142/78 (Site: Left Upper Arm, Position: Sitting, Cuff Size: Medium Adult)   Pulse 65   Resp 16   Ht 5' 8\" (1.727 m)   Wt 208 lb (94.3 kg)   SpO2 96%   BMI 31.63 kg/m²   Constitutional: No acute distress. Sits in chair comfortably  Eyes: Sclerae nonicteric. No lid lag or proptosis  HENT: External ears normal. No external lesions on the nose  Neck: No gross masses. Trachea visibly midline  Respiratory: Good air entry bilaterally. No wheezing or crackles  Cardiovascular: Normal S1-S2. No murmurs. No lower extremity edema  Gastrointestinal: No visible masses. No visible hernias  Skin: No abnormal rashes. No abnormal masses  Neurologic: Cranial nerves grossly intact  Psychiatric: Normal affect.  Alert and oriented    Medications  Current Outpatient Medications:     amLODIPine (NORVASC) 10 MG tablet, Take 1 tablet by mouth once daily, Disp: 90 tablet, Rfl: 3    allopurinol (ZYLOPRIM) 300 MG tablet, Take 1 tablet by mouth daily, Disp: 30 tablet, Rfl: 0    tiZANidine (ZANAFLEX) 4 MG tablet, Take 1 tablet by mouth 3 times daily as needed (muscle spasm), Disp: 30 tablet, Rfl: 0    isosorbide mononitrate (IMDUR) 30 MG extended release tablet, Take 1 tablet by mouth once daily, Disp: 90 tablet, Rfl: 3    gemfibrozil (LOPID) 600 MG tablet, TAKE 1 TABLET BY MOUTH ONCE DAILY, Disp: 90 tablet, Rfl: 3    pravastatin (PRAVACHOL) 10 MG tablet, Take 1 tablet by mouth once daily, Disp: 90 tablet, Rfl: 3    bisoprolol (ZEBETA) 10 MG tablet, Take 1 tablet by mouth once daily, Disp: 90 tablet, Rfl: 3    TURMERIC PO, Take 1,000 mg by mouth, Disp: , Rfl:     aspirin 81 MG tablet, Take 81 mg by mouth , Disp: , Rfl:     Cyanocobalamin (B-12) 2500 MCG TABS, Take 2,500 mcg by mouth daily , Disp: , Rfl:     Cholecalciferol (D3-1000 PO), Take 1,000 Units by mouth daily , Disp: , Rfl:     b complex vitamins capsule, Take 1 capsule by mouth daily, Disp: , Rfl:     lisinopril (PRINIVIL;ZESTRIL) 10 MG tablet, Take 1 tablet by mouth daily (Patient not taking: Reported on 12/29/2022), Disp: 90 tablet, Rfl: 1    gabapentin (NEURONTIN) 100 MG capsule, Take 1 capsule by mouth 3 times daily for 31 days. , Disp: 90 capsule, Rfl: 0    Allergies  Tetanus toxoids, Codeine, Dtap-ipv vaccine, Penicillins, and Tetanus antitoxin    Past Medical History:   Diagnosis Date    Angina at rest [facility])     Arthritis     CAD (coronary artery disease)     angina    Hyperlipidemia     Hypertension     Skin cancer      Past Surgical History:   Procedure Laterality Date    CARDIAC CATHETERIZATION      x 2    COLONOSCOPY      COLONOSCOPY N/A 9/12/2018    COLONOSCOPY WITH BIOPSY performed by Summer Donnelly DO at [address] [employer] [facility]      forehead and  mouth in [address] [facility]  10/14/2021    CT BONE MARROW BIOPSY 10/14/2021 [address] [facility] CT SCAN    HERNIA REPAIR      PAIN MANAGEMENT PROCEDURE Left 1/7/2022    Left L4 L5 TRANSFORAMINAL performed by Matt Curiel MD at [place] [address] History  This patient's family history is not on file. Social History  Mary Rico  reports that he quit smoking about 26 years ago. His smoking use included cigarettes. He started smoking about 52 years ago. He has a 13.00 pack-year smoking history. He has never used smokeless tobacco. He reports current alcohol use of about 1.0 standard drink per week. He reports that he does not use drugs. Discussed use, benefit, and side effects of prescribed medications. All questions answered. Patient voiced understanding. Reviewed health maintenance.       Electronically signed Sloan Aj MD on 12/29/2022 at 5:58 PM    This note has been created using the Epic electronic health record, and dictated in part by [facility] dictation system. Despite the documenting physician's best efforts, there may be errors in spelling, grammar or syntax.

## 2022-12-29 NOTE — PATIENT INSTRUCTIONS
Personalized Preventive Plan for Yeimy Pisano - 12/29/2022  Medicare offers a range of preventive health benefits. Some of the tests and screenings are paid in full while other may be subject to a deductible, co-insurance, and/or copay. Some of these benefits include a comprehensive review of your medical history including lifestyle, illnesses that may run in your family, and various assessments and screenings as appropriate. After reviewing your medical record and screening and assessments performed today your provider may have ordered immunizations, labs, imaging, and/or referrals for you. A list of these orders (if applicable) as well as your Preventive Care list are included within your After Visit Summary for your review. Other Preventive Recommendations:    A preventive eye exam performed by an eye specialist is recommended every 1-2 years to screen for glaucoma; cataracts, macular degeneration, and other eye disorders. A preventive dental visit is recommended every 6 months. Try to get at least 150 minutes of exercise per week or 10,000 steps per day on a pedometer . Order or download the FREE \"Exercise & Physical Activity: Your Everyday Guide\" from [employer]. Call 0-259.689.9000 or search [employer] online. You need 0467-8813 mg of calcium and 9939-2708 IU of vitamin D per day. It is possible to meet your calcium requirement with diet alone, but a vitamin D supplement is usually necessary to meet this goal.  When exposed to the sun, use a sunscreen that protects against both UVA and UVB radiation with an SPF of 30 or greater. Reapply every 2 to 3 hours or after sweating, drying off with a towel, or swimming. Always wear a seat belt when traveling in a car. Always wear a helmet when riding a bicycle or motorcycle. Fatigue: Care Instructions  Your Care Instructions     Fatigue is a feeling of tiredness, exhaustion, or lack of energy.  You may feel fatigue because of too much or not enough activity. It can also come from stress, lack of sleep, boredom, and poor diet. Many medical problems, such as viral infections, can cause fatigue. Emotional problems, especially depression, are often the cause of fatigue. Fatigue is most often a symptom of another problem. Treatment for fatigue depends on the cause. For example, if you have fatigue because you have a certain health problem, treating this problem also treats your fatigue. If depression or anxiety is the cause, treatment may help. Follow-up care is a key part of your treatment and safety. Be sure to make and go to all appointments, and call your doctor if you are having problems. It's also a good idea to know your test results and keep a list of the medicines you take. How can you care for yourself at home? Get regular exercise. But don't overdo it. Go back and forth between rest and exercise. Get plenty of rest.  Eat a healthy diet. Do not skip meals, especially breakfast.  Reduce your use of caffeine, tobacco, and alcohol. Caffeine is most often found in coffee, tea, cola drinks, and chocolate. Limit medicines that can cause fatigue. This includes tranquilizers and cold and allergy medicines. When should you call for help? Watch closely for changes in your health, and be sure to contact your doctor if:    You have new symptoms such as fever or a rash.     Your fatigue gets worse.     You have been feeling down, depressed, or hopeless. Or you may have lost interest in things that you usually enjoy.     You are not getting better as expected. Where can you learn more? Go to http://www.woods.com/ and enter M810 to learn more about \"Fatigue: Care Instructions. \"  Current as of: February 9, 2022               Content Version: 13.5  © 5662-3679 Healthwise, [employer]. Care instructions adapted under license by [facility]  If you have questions about a medical condition or this instruction, always ask your healthcare professional. [address] any warranty or liability for your use of this information. Learning About Dental Care for Older Adults  Dental care for older adults: Overview  Dental care for older people is much the same as for younger adults. But older adults do have concerns that younger adults do not. Older adults may have problems with gum disease and decay on the roots of their teeth. They may need missing teeth replaced or broken fillings fixed. Or they may have dentures that need to be cared for. Some older adults may have trouble holding a toothbrush. You can help remind the person you are caring for to brush and floss their teeth or to clean their dentures. In some cases, you may need to do the brushing and other dental care tasks. People who have trouble using their hands or who have dementia may need this extra help. How can you help with dental care? Normal dental care  To keep the teeth and gums healthy:  Brush the teeth with fluoride toothpaste twice a day--in the morning and at night--and floss at least once a day. Plaque can quickly build up on the teeth of older adults. Watch for the signs of gum disease. These signs include gums that bleed after brushing or after eating hard foods, such as apples. See a dentist regularly. Many experts recommend checkups every 6 months. Keep the dentist up to date on any new medications the person is taking. Encourage a balanced diet that includes whole grains, vegetables, and fruits, and that is low in saturated fat and sodium. Encourage the person you're caring for not to use tobacco products. They can affect dental and general health. Many older adults have a fixed income and feel that they can't afford dental care. But most [facility] and [facility] have programs in which dentists help older adults by lowering fees.  Contact your area's public health offices or  for information about dental care in your area. Using a toothbrush  Older adults with arthritis sometimes have trouble brushing their teeth because they can't easily hold the toothbrush. Their hands and fingers may be stiff, painful, or weak. If this is the case, you can: Offer an electric toothbrush. Enlarge the handle of a non-electric toothbrush by wrapping a sponge, an elastic bandage, or adhesive tape around it. Push the toothbrush handle through a ball made of rubber or soft foam.  Make the handle longer and thicker by taping Popsicle sticks or tongue depressors to it. You may also be able to buy special toothbrushes, toothpaste dispensers, and floss holders. Your doctor may recommend a soft-bristle toothbrush if the person you care for bleeds easily. Bleeding can happen because of a health problem or from certain medicines. A toothpaste for sensitive teeth may help if the person you care for has sensitive teeth. How do you brush and floss someone's teeth? If the person you are caring for has a hard time cleaning their teeth on their own, you may need to brush and floss their teeth for them. It may be easiest to have the person sit and face away from you, and to sit or stand behind them. That way you can steady their head against your arm as you reach around to floss and brush their teeth. Choose a place that has good lighting and is comfortable for both of you. Before you begin, gather your supplies. You will need gloves, floss, a toothbrush, and a container to hold water if you are not near a sink. Wash and dry your hands well and put on gloves. Start by flossing:  Gently work a piece of floss between each of the teeth toward the gums. A plastic flossing tool may make this easier, and they are available at most [facility]. Curve the floss around each tooth into a U-shape and gently slide it under the gum line. Move the floss firmly up and down several times to scrape off the plaque.   After you've finished flossing, throw away the used floss and begin brushing:  Wet the brush and apply toothpaste. Place the brush at a 45-degree angle where the teeth meet the gums. Press firmly, and move the brush in small circles over the surface of the teeth. Be careful not to brush too hard. Vigorous brushing can make the gums pull away from the teeth and can scratch the tooth enamel. Brush all surfaces of the teeth, on the tongue side and on the cheek side. Pay special attention to the front teeth and all surfaces of the back teeth. Brush chewing surfaces with short back-and-forth strokes. After you've finished, help the person rinse the remaining toothpaste from their mouth. Where can you learn more? Go to http://www.woods.com/ and enter F944 to learn more about \"Learning About Dental Care for Older Adults. \"  Current as of: June 16, 2022               Content Version: 13.5  © 2006-2022 [employer]. Care instructions adapted under license by [facility] ([facility]). If you have questions about a medical condition or this instruction, always ask your healthcare professional. [place] [address] any warranty or liability for your use of this information. Advance Directives: Care Instructions  Overview  An advance directive is a legal way to state your wishes at the end of your life. It tells your family and your doctor what to do if you can't say what you want. There are two main types of advance directives. You can change them any time your wishes change. Living will. This form tells your family and your doctor your wishes about life support and other treatment. The form is also called a declaration. Medical power of . This form lets you name a person to make treatment decisions for you when you can't speak for yourself. This person is called a health care agent (health care proxy, health care surrogate).  The form is also called a durable power of  for health care.  If you do not have an advance directive, decisions about your medical care may be made by a family member, or by a doctor or a  who doesn't know you. It may help to think of an advance directive as a gift to the people who care for you. If you have one, they won't have to make tough decisions by themselves. For more information, including forms for your state, see the [address] website (www.caringinfo.org/planning/advance-directives/). Follow-up care is a key part of your treatment and safety. Be sure to make and go to all appointments, and call your doctor if you are having problems. It's also a good idea to know your test results and keep a list of the medicines you take. What should you include in an advance directive? Many states have a unique advance directive form. (It may ask you to address specific issues.) Or you might use a universal form that's approved by many states. If your form doesn't tell you what to address, it may be hard to know what to include in your advance directive. Use the questions below to help you get started. Who do you want to make decisions about your medical care if you are not able to? What life-support measures do you want if you have a serious illness that gets worse over time or can't be cured? What are you most afraid of that might happen? (Maybe you're afraid of having pain, losing your independence, or being kept alive by machines.)  Where would you prefer to die? (Your home? A hospital? A nursing home?)  Do you want to donate your organs when you die? Do you want certain Mormonism practices performed before you die? When should you call for help? Be sure to contact your doctor if you have any questions. Where can you learn more? Go to http://www.[employer].com/ and enter R264 to learn more about \"Advance Directives: Care Instructions. \"  Current as of: June 16, 2022               Content Version: 13.5  © 9610-8222 Healthwise, [employer]. Care instructions adapted under license by [facility] ([facility]). If you have questions about a medical condition or this instruction, always ask your healthcare professional. [place] [address] any warranty or liability for your use of this information. Personalized Preventive Plan for Rosa Sherman - 12/29/2022  Medicare offers a range of preventive health benefits. Some of the tests and screenings are paid in full while other may be subject to a deductible, co-insurance, and/or copay. Some of these benefits include a comprehensive review of your medical history including lifestyle, illnesses that may run in your family, and various assessments and screenings as appropriate. After reviewing your medical record and screening and assessments performed today your provider may have ordered immunizations, labs, imaging, and/or referrals for you. A list of these orders (if applicable) as well as your Preventive Care list are included within your After Visit Summary for your review. Other Preventive Recommendations:    A preventive eye exam performed by an eye specialist is recommended every 1-2 years to screen for glaucoma; cataracts, macular degeneration, and other eye disorders. A preventive dental visit is recommended every 6 months. Try to get at least 150 minutes of exercise per week or 10,000 steps per day on a pedometer . Order or download the FREE \"Exercise & Physical Activity: Your Everyday Guide\" from [employer]. Call 4-710.840.3672 or search [employer] online. You need 6726-3036 mg of calcium and 0859-1832 IU of vitamin D per day. It is possible to meet your calcium requirement with diet alone, but a vitamin D supplement is usually necessary to meet this goal.  When exposed to the sun, use a sunscreen that protects against both UVA and UVB radiation with an SPF of 30 or greater.  Reapply every 2 to 3 hours or after sweating, drying off with a towel, or swimming. Always wear a seat belt when traveling in a car. Always wear a helmet when riding a bicycle or motorcycle.

## 2023-01-06 ENCOUNTER — OFFICE VISIT (OUTPATIENT)
Dept: PAIN MANAGEMENT | Age: 73
End: 2023-01-06

## 2023-01-06 VITALS
HEART RATE: 66 BPM | DIASTOLIC BLOOD PRESSURE: 68 MMHG | WEIGHT: 207 LBS | SYSTOLIC BLOOD PRESSURE: 139 MMHG | RESPIRATION RATE: 19 BRPM | BODY MASS INDEX: 31.37 KG/M2 | HEIGHT: 68 IN | OXYGEN SATURATION: 98 %

## 2023-01-06 DIAGNOSIS — M54.16 LUMBAR RADICULITIS: ICD-10-CM

## 2023-01-06 DIAGNOSIS — I10 ESSENTIAL HYPERTENSION: ICD-10-CM

## 2023-01-06 DIAGNOSIS — M17.11 PRIMARY OSTEOARTHRITIS OF RIGHT KNEE: Primary | ICD-10-CM

## 2023-01-06 DIAGNOSIS — G89.29 CHRONIC PAIN OF RIGHT KNEE: ICD-10-CM

## 2023-01-06 DIAGNOSIS — M25.561 CHRONIC PAIN OF RIGHT KNEE: ICD-10-CM

## 2023-01-06 DIAGNOSIS — C90.00 MULTIPLE MYELOMA, REMISSION STATUS UNSPECIFIED (HCC): ICD-10-CM

## 2023-01-06 RX ORDER — ALLOPURINOL 300 MG/1
300 TABLET ORAL DAILY
Qty: 30 TABLET | Refills: 0 | Status: SHIPPED | OUTPATIENT
Start: 2023-01-06

## 2023-01-06 RX ORDER — BISOPROLOL FUMARATE 10 MG/1
TABLET, FILM COATED ORAL
Qty: 90 TABLET | Refills: 3 | Status: SHIPPED | OUTPATIENT
Start: 2023-01-06

## 2023-01-06 RX ORDER — NITROGLYCERIN 0.4 MG/1
0.4 TABLET SUBLINGUAL EVERY 5 MIN PRN
COMMUNITY
Start: 2022-06-10 | End: 2023-06-10

## 2023-01-06 ASSESSMENT — ENCOUNTER SYMPTOMS
CONSTIPATION: 0
RESPIRATORY NEGATIVE: 1
NAUSEA: 0
ALLERGIC/IMMUNOLOGIC NEGATIVE: 1
BACK PAIN: 1
EYES NEGATIVE: 1

## 2023-01-06 NOTE — TELEPHONE ENCOUNTER
Manuela Vásquez stopped in and is requesting a refill of the below medication which has been pended for you:     Requested Prescriptions     Pending Prescriptions Disp Refills    allopurinol (ZYLOPRIM) 300 MG tablet 30 tablet 0     Sig: Take 1 tablet by mouth daily    bisoprolol (ZEBETA) 10 MG tablet 90 tablet 3     Sig: Take 1 tablet by mouth once daily       Last Appointment Date: 12/29/2022  Next Appointment Date: 3/7/2023    Allergies   Allergen Reactions    Tetanus Toxoids Hives    Codeine Nausea Only    Dtap-Ipv Vaccine Rash    Penicillins Rash    Tetanus Antitoxin Rash

## 2023-01-06 NOTE — PROGRESS NOTES
Sabrina Smith  1950 1/6/23      PAIN MANAGEMENT CLINIC PROCEDURE NOTE    CHIEF COMPLAINT: This is a 67 y.o. male patient who presents to the Pain Management Clinic with a history of pain in the -. PRE-PROCEDURE DIAGNOSIS: Right knee OA/Pain    POST-PROCEDURE DIAGNOSIS: same as pre-procedure diagnosis    Vitals:    01/06/23 1357   BP: 139/68   Pulse: 66   Resp: 19   SpO2: 98%     . PROCEDURE:     The procedure was explained, including risks and benefits, and the patient has agreed to proceed. The injection site was marked on the patients skin. A large area around the injection site was cleaned with chlora-prep. Vearl Pippins JOINT INJECTION  A 25G 1.5 inch needle was inserted into the right knee(s) joint/ space using a anterior approach. Depth and direction of the needle were monitored at all times. The syringe was aspirated and was negative for heme. Synovial fluid  was not not aspirated. Then, 2 ml of  1% lidocaine and 20mg of kenalog (NDC# 4423-2052-50) was injected into the joint. .  The injection site was cleaned and dressed with a spot band aid. The patient tolerated the procedure well and with out complication The patient was instructed avoid heat and excessive activity for 24-48 hours.

## 2023-01-06 NOTE — PROGRESS NOTES
PAIN MANAGEMENTNEW PATIENT CONSULTATION  1/6/23    Severa List  1950    ReferringProvider:  Carlos Rivera MD  [facility],  [address] [facility] Jaylon Steven    Primary Care Physician:  Brijesh Cross MD  [facility] 99    HPIinformation obtained from the patient as well as the Comprehensive Pain ManagementHealth Questionnaire filled out by the patient. The questionnaire will be scannedinto the electronic chart and PMH, PSH, meds, and allergies will be updates accordingly. Subjective:       CHIEF COMPLAINT:  This is a74 y.o. male patientwho presents with a complaint of Back Pain and Knee Pain (Both )      PAIN HPI:    History of  lower back pain comes and  goes  mostly when standing  B R more than  Left     Both  legs numb  inside     Notes R medial and lateral  knee pain  Notes does swell R knee     Had a stroke affecting R side   When transfers  L leg  has little sensation    Hx myeloma     Location: Back  Location Modifier:  Domenic  Severity of Pain: 4  Duration of Pain: Intermittent  Frequency of Pain: Intermittent  Aggravating Factors:  -, Stretching, Bending, Straightening, Walking, Stairs, Exercise, Kneeling, and Squatting  Limiting Behavior: no  Relieving Factors: Heat, Cold, and Medication -  Result of Injury: no  Work Related Injury: no  Bates of Worker Compensation Case: no      Prior evaluation:    Previous lower back problems:No    Previous workup: No   History of surgery in painful area:No    Previous pain medication trials have included:       Opioids: -     NSAID's:-     Muscle relaxants: -     Neuropathicpain meds: -    Previous Pain Management Physician: No   Nerve blocks, spinal injections:No   Typeof Pain Intervention -. Date of last Pain Intervention . Was there pain relief from Pain Intervention: No.    How long was your pain relief from the Pain Intervention .     Sleep:       Difficultyfalling asleep:  No   Takes sleepingmedication: No    Mental health:    Patient feels - secondary to their current pain problems as described above. H/O depression and anxiety: No   Patient is not seeing psychologist orpsychiatrist   Abuse history? --    Employed? No  Alcohol use?: No  Tobacco use?: No  Marijuana use?: No  Illicit drug use?: No    Imaging: Reviewed available imagingin our system with the patient. No results found. Referring physician records reviewed. Review of Systems   Constitutional:  Positive for fatigue. HENT: Negative. Eyes: Negative. Respiratory: Negative. Cardiovascular: Negative. Gastrointestinal:  Negative for constipation and nausea. Endocrine: Negative. Genitourinary:  Negative for difficulty urinating. Musculoskeletal:  Positive for arthralgias, back pain and myalgias. Skin: Negative. Allergic/Immunologic: Negative. Neurological:  Positive for weakness and numbness. Hematological: Negative. Psychiatric/Behavioral:  Positive for sleep disturbance. All other systems reviewed and are negative. Allergies   Allergen Reactions    Tetanus Toxoids Hives    Codeine Nausea Only    Dtap-Ipv Vaccine Rash    Penicillins Rash    Tetanus Antitoxin Rash       Outpatient Medications Prior to Visit   Medication Sig Dispense Refill    nitroGLYCERIN (NITROSTAT) 0.4 MG SL tablet Place 0.4 mg under the tongue every 5 minutes as needed      lisinopril (PRINIVIL;ZESTRIL) 10 MG tablet Take 1 tablet by mouth daily 90 tablet 1    amLODIPine (NORVASC) 10 MG tablet Take 1 tablet by mouth once daily 90 tablet 3    allopurinol (ZYLOPRIM) 300 MG tablet Take 1 tablet by mouth daily 30 tablet 0    gabapentin (NEURONTIN) 100 MG capsule Take 1 capsule by mouth 3 times daily for 31 days.  90 capsule 0    tiZANidine (ZANAFLEX) 4 MG tablet Take 1 tablet by mouth 3 times daily as needed (muscle spasm) 30 tablet 0    isosorbide mononitrate (IMDUR) 30 MG extended release tablet Take 1 tablet by mouth once daily 90 tablet 3    gemfibrozil (LOPID) 600 MG tablet TAKE 1 TABLET BY MOUTH ONCE DAILY 90 tablet 3    pravastatin (PRAVACHOL) 10 MG tablet Take 1 tablet by mouth once daily 90 tablet 3    bisoprolol (ZEBETA) 10 MG tablet Take 1 tablet by mouth once daily 90 tablet 3    TURMERIC PO Take 1,000 mg by mouth      aspirin 81 MG tablet Take 81 mg by mouth       Cyanocobalamin (B-12) 2500 MCG TABS Take 2,500 mcg by mouth daily       Cholecalciferol (D3-1000 PO) Take 1,000 Units by mouth daily       b complex vitamins capsule Take 1 capsule by mouth daily       No facility-administered medications prior to visit. Past Medical History:   Diagnosis Date    Angina at rest [facility])     Arthritis     CAD (coronary artery disease)     angina    Hyperlipidemia     Hypertension     Skin cancer        Past Surgical History:   Procedure Laterality Date    CARDIAC CATHETERIZATION      x 2    COLONOSCOPY      COLONOSCOPY N/A 2018    COLONOSCOPY WITH BIOPSY performed by Maldonado Laura DO at [address] [facility]      forehead and  mouth in [address] [place] [address] BIOPSY  10/14/2021    CT BONE MARROW BIOPSY 10/14/2021 [address] [facility] CT SCAN    HERNIA REPAIR      PAIN MANAGEMENT PROCEDURE Left 2022    Left L4 L5 TRANSFORAMINAL performed by Riya Sherman MD at [address] [facility]       No family history on file. Social History     Socioeconomic History    Marital status:      Spouse name: None    Number of children: None    Years of education: None    Highest education level: None   Tobacco Use    Smoking status: Former     Packs/day: 0.50     Years: 26.00     Pack years: 13.00     Types: Cigarettes     Start date:      Quit date: 1997     Years since quittin.0    Smokeless tobacco: Never    Tobacco comments:     ly [place] 16   Vaping Use    Vaping Use: Never used   Substance and Sexual Activity    Alcohol use:  Yes     Alcohol/week: 1.0 standard drink     Types: 1 Cans of beer per week     Comment: 2 beers/month    Drug use: No     Social Determinants of Health Physical Activity: Sufficiently Active    Days of Exercise per Week: 3 days    Minutes of Exercise per Session: 90 min         Objective:     Physical Exam:  Vitals:    01/06/23 1357   BP: 139/68   Pulse: 66   Resp: 19   SpO2: 98%   Weight: 207 lb (93.9 kg)   Height: 5' 8\" (1.727 m)          Physical Exam  Constitutional:       Appearance: He is well-developed. HENT:      Head: Normocephalic and atraumatic. Cardiovascular:      Pulses: Normal pulses. Comments: Warm extremities. Normal capillary refill. Pulmonary:      Effort: Pulmonary effort is normal.   Abdominal:      General: Abdomen is flat. Palpations: Abdomen is soft. Musculoskeletal:      Lumbar back: Negative right straight leg raise test and negative left straight leg raise test.   Skin:     General: Skin is warm and dry. Neurological:      Mental Status: He is alert and oriented to person, place, and time. Cranial Nerves: No cranial nerve deficit. Sensory: No sensory deficit. Motor: No atrophy or abnormal muscle tone. Deep Tendon Reflexes: Reflexes are normal and symmetric. Psychiatric:         Speech: Speech normal.         Behavior: Behavior normal.     Right Knee Exam     Tenderness   The patient is experiencing tenderness in the medial joint line (Full ROM). Back Exam     Tenderness   The patient is experiencing tenderness in the lumbar (+ slump   Less pain on extension).     Range of Motion   Extension:  normal   Flexion:  normal   Lateral bend right:  normal   Lateral bend left:  normal   Rotation right:  normal   Rotation left:  normal     Muscle Strength   Right Quadriceps:  5/5   Left Quadriceps:  5/5   Right Hamstrings:  5/5   Left Hamstrings:  5/5     Tests   Straight leg raise right: negative  Straight leg raise left: negative    Other   Toe walk: normal  Heel walk: normal  Sensation: normal  Gait: normal            Labs:   Lab Results   Component Value Date    WBC 4.9 12/13/2022    HGB 12.4 (L) 12/13/2022    HCT 35.0 (L) 12/13/2022     12/13/2022    CHOL 161 12/13/2022    TRIG 344 (H) 12/13/2022    HDL 28 (L) 12/13/2022    ALT 48 (H) 12/13/2022    AST 44 (H) 12/13/2022     12/13/2022    K 4.1 12/13/2022    CL 97 (L) 12/13/2022    CREATININE 1.12 12/13/2022    BUN 27 (H) 12/13/2022    CO2 26 12/13/2022    TSH 3.17 10/25/2021    PSA 1.57 09/21/2021    LABA1C 5.9 12/13/2022       Assessment: This is a 67 y.o. male with the following diagnosis:     Pain Diagnoses:  1. Primary osteoarthritis of right knee    2. Lumbar radiculitis    3. Multiple myeloma, remission status unspecified (HCC)        Medical/ Psychological Comorbidities:  As listed in the past medical and surgical history    Functional Limitations secondary to the above problems:  Chronic painlimits function and quality of life    Plan:     R knee  injection steroid today   Consider viscosupp   Consider  surgical eval  for R TKA    Meds:   New Prescriptions    No medications on file      No orders of the defined types were placed in this encounter. Controlled Substances Monitoring:    [place]AR[place] report was reviewed for [place], [place]. Pt educated about the risks of taking opiates, including increasedsedation, constipation, slowed breathing, tolerance, dependence, and addiction. No orders of the defined types were placed in this encounter. No follow-ups on file. The patient expressed understanding of the above assessment and plan. Totaltime spent face to face with patient was 30 minutes inwhich  50% or more of the time was spent in counseling, education about risk andbenefits of the above plan, and coordination of care.

## 2023-02-03 ENCOUNTER — OFFICE VISIT (OUTPATIENT)
Dept: PAIN MANAGEMENT | Age: 73
End: 2023-02-03
Payer: COMMERCIAL

## 2023-02-03 ENCOUNTER — TELEPHONE (OUTPATIENT)
Dept: PAIN MANAGEMENT | Age: 73
End: 2023-02-03

## 2023-02-03 VITALS
SYSTOLIC BLOOD PRESSURE: 140 MMHG | OXYGEN SATURATION: 96 % | HEART RATE: 65 BPM | WEIGHT: 212.2 LBS | HEIGHT: 68 IN | DIASTOLIC BLOOD PRESSURE: 80 MMHG | BODY MASS INDEX: 32.16 KG/M2

## 2023-02-03 DIAGNOSIS — M47.817 LUMBOSACRAL SPONDYLOSIS WITHOUT MYELOPATHY: ICD-10-CM

## 2023-02-03 DIAGNOSIS — M47.816 LUMBAR FACET JOINT SYNDROME: Primary | ICD-10-CM

## 2023-02-03 PROCEDURE — 3077F SYST BP >= 140 MM HG: CPT | Performed by: PHYSICAL MEDICINE & REHABILITATION

## 2023-02-03 PROCEDURE — 3079F DIAST BP 80-89 MM HG: CPT | Performed by: PHYSICAL MEDICINE & REHABILITATION

## 2023-02-03 PROCEDURE — 1123F ACP DISCUSS/DSCN MKR DOCD: CPT | Performed by: PHYSICAL MEDICINE & REHABILITATION

## 2023-02-03 PROCEDURE — 99214 OFFICE O/P EST MOD 30 MIN: CPT | Performed by: PHYSICAL MEDICINE & REHABILITATION

## 2023-02-03 RX ORDER — GABAPENTIN 100 MG/1
100 CAPSULE ORAL 3 TIMES DAILY
Qty: 90 CAPSULE | Refills: 0 | Status: SHIPPED | OUTPATIENT
Start: 2023-02-03 | End: 2023-03-05

## 2023-02-03 RX ORDER — GABAPENTIN 100 MG/1
100 CAPSULE ORAL DAILY
Qty: 90 CAPSULE | Refills: 0 | Status: SHIPPED | OUTPATIENT
Start: 2023-02-03 | End: 2023-03-05

## 2023-02-03 NOTE — PROGRESS NOTES
PAIN MANAGEMENT FOLLOW-UP NOTE  2/3/23    CHIEF COMPLAINT: This is a72 y.o. male patientwho returns to the Pain Management Clinic with a history of Follow-up, Knee Pain (right), and Back Pain      PAIN HPI:Bandar Gabriel returns today for  reevaluation.  Since the visit, the patient reports that the pain is not changed.   HPI   Location: Back  Location Modifier: left lower back  Severity of Pain: 3  Duration of Pain: Intermittent  Frequency of Pain: Intermittent  Aggravating Factors:  -  Limiting Behavior:  -  Relieving Factors: relaxation        Previous pain medication trials have included:          Mental health:    Patient feels - secondary to their current pain problems as described above.   H/O depression and anxiety: No   Patient is not seeing psychologist orpsychiatrist   Abuse history?No    Employed?No    ANALGESIA:   Are your Current Pain medication (s) helping to decrease pain?  No.   Current Pain score:      ADVERSE AFFECTS:   Medication Side Effects: No.    ACTIVITY:  Are you able to be more active with your pain medications?No      ABERRANT BEHAVIORS SINCE LAST VISIT? No    Review of Systems     Allergies   Allergen Reactions    Tetanus Toxoids Hives    Codeine Nausea Only    Dtap-Ipv Vaccine Rash    Penicillins Rash    Tetanus Antitoxin Rash       Outpatient Medications Prior to Visit   Medication Sig Dispense Refill    allopurinol (ZYLOPRIM) 300 MG tablet Take 1 tablet by mouth daily 30 tablet 0    bisoprolol (ZEBETA) 10 MG tablet Take 1 tablet by mouth once daily 90 tablet 3    nitroGLYCERIN (NITROSTAT) 0.4 MG SL tablet Place 0.4 mg under the tongue every 5 minutes as needed      lisinopril (PRINIVIL;ZESTRIL) 10 MG tablet Take 1 tablet by mouth daily 90 tablet 1    amLODIPine (NORVASC) 10 MG tablet Take 1 tablet by mouth once daily 90 tablet 3    tiZANidine (ZANAFLEX) 4 MG tablet Take 1 tablet by mouth 3 times daily as needed (muscle spasm) 30 tablet 0    isosorbide mononitrate (IMDUR) 30 MG  extended release tablet Take 1 tablet by mouth once daily 90 tablet 3    gemfibrozil (LOPID) 600 MG tablet TAKE 1 TABLET BY MOUTH ONCE DAILY 90 tablet 3    pravastatin (PRAVACHOL) 10 MG tablet Take 1 tablet by mouth once daily 90 tablet 3    TURMERIC PO Take 1,000 mg by mouth      aspirin 81 MG tablet Take 81 mg by mouth       Cyanocobalamin (B-12) 2500 MCG TABS Take 2,500 mcg by mouth daily       Cholecalciferol (D3-1000 PO) Take 1,000 Units by mouth daily       b complex vitamins capsule Take 1 capsule by mouth daily      gabapentin (NEURONTIN) 100 MG capsule Take 1 capsule by mouth 3 times daily for 31 days. 90 capsule 0     No facility-administered medications prior to visit. Past Medical History:   Diagnosis Date    Angina at rest [facility])     Arthritis     CAD (coronary artery disease)     angina    Hyperlipidemia     Hypertension     Skin cancer        Past Surgical History:   Procedure Laterality Date    CARDIAC CATHETERIZATION      x 2    COLONOSCOPY      COLONOSCOPY N/A 2018    COLONOSCOPY WITH BIOPSY performed by Melissa Schmid DO at [address] [facility]      forehead and  mouth in [address] [place] [address] BIOPSY  10/14/2021    CT BONE MARROW BIOPSY 10/14/2021 [facility] CT SCAN    HERNIA REPAIR      PAIN MANAGEMENT PROCEDURE Left 2022    Left L4 L5 TRANSFORAMINAL performed by Carlos Manuel Yeh MD at [address] [place] [address]     No family history on file. Social History     Socioeconomic History    Marital status:      Spouse name: None    Number of children: None    Years of education: None    Highest education level: None   Tobacco Use    Smoking status: Former     Packs/day: 0.50     Years: 26.00     Pack years: 13.00     Types: Cigarettes     Start date:      Quit date: 1997     Years since quittin.1    Smokeless tobacco: Never    Tobacco comments:     ly rrt 16   Vaping Use    Vaping Use: Never used   Substance and Sexual Activity    Alcohol use:  Yes     Alcohol/week: 1.0 standard drink     Types: 1 Cans of beer per week     Comment: 2 beers/month    Drug use: No     Social Determinants of Health     Physical Activity: Sufficiently Active    Days of Exercise per Week: 3 days    Minutes of Exercise per Session: 90 min         Family and Social Historyreviewed in the electronic medical record. Imaging:Reviewed available imaging in our system with the patient. No results found. Objective:     Physical Exam:  Vitals:    02/03/23 1430   BP: (!) 140/80   Pulse: 65   SpO2: 96%   Weight: 212 lb 3.2 oz (96.3 kg)   Height: 5' 8\" (1.727 m)          Physical Exam  Ortho Exam                          Research  has found that  Spine injections    reduce pain and  give  better functional  outcomes. Assessment: This is a 67 y.o. male patient with:    Diagnosis:   Diagnosis Orders   1. Lumbar facet joint syndrome        2. Lumbosacral spondylosis without myelopathy            Medical Comorbidities:  As listed in the patient's past medical and surgical history    Functional Limitations:   Pain limits function and quality of life. Plan:   LL 4-5 5S1 facet steroid injections    Meds:   Controlled Substances Monitoring: Pt educated about the risks of taking opiates,including increased sedation, constipation, slowed breathing, tolerance, dependence,and addiction. New Prescriptions    GABAPENTIN (NEURONTIN) 100 MG CAPSULE    Take 1 capsule by mouth daily for 30 days. Orders Placed This Encounter   Medications    gabapentin (NEURONTIN) 100 MG capsule     Sig: Take 1 capsule by mouth daily for 30 days. Dispense:  90 capsule     Refill:  0     No orders of the defined types were placed in this encounter. No follow-ups on file. Opioid medication has  significant  risk  benefit concerns.    We  instruct    our patients that  a Random Urine Drug Screen is required  along with a  an Opioid assessment questionaire such as ORT  or SOAPP  The patient expressed understanding of the above assessment and plan. Totaltime spent face to face with patient was 25 minutes inwhich  50% or more of the time was spent in counseling, education about risk andbenefits of the above plan, and coordination of care.

## 2023-02-03 NOTE — TELEPHONE ENCOUNTER
Call out to patient to schedule Left L4-l5 L5-S1 Facet with no sedation. Unable to LM due to no VMB set up. Letter mailed.

## 2023-02-10 ENCOUNTER — TELEPHONE (OUTPATIENT)
Dept: PAIN MANAGEMENT | Age: 73
End: 2023-02-10

## 2023-02-10 NOTE — TELEPHONE ENCOUNTER
Lt L4-L5 L5-S1 Facet  with no sedation scheduled for 2/23/23 with surgery center notified. Taylor Casillas

## 2023-02-21 RX ORDER — ALLOPURINOL 300 MG/1
TABLET ORAL
Qty: 30 TABLET | Refills: 0 | Status: SHIPPED | OUTPATIENT
Start: 2023-02-21

## 2023-02-21 NOTE — TELEPHONE ENCOUNTER
Requested Prescriptions     Pending Prescriptions Disp Refills    allopurinol (ZYLOPRIM) 300 MG tablet [Pharmacy Med Name: Allopurinol 300 MG Oral Tablet] 30 tablet 0     Sig: Take 1 tablet by mouth once daily

## 2023-02-23 ENCOUNTER — APPOINTMENT (OUTPATIENT)
Dept: GENERAL RADIOLOGY | Age: 73
End: 2023-02-23
Attending: PHYSICAL MEDICINE & REHABILITATION
Payer: MEDICARE

## 2023-02-23 ENCOUNTER — HOSPITAL ENCOUNTER (OUTPATIENT)
Age: 73
Setting detail: OUTPATIENT SURGERY
Discharge: HOME | End: 2023-02-23
Attending: PHYSICAL MEDICINE & REHABILITATION | Admitting: PHYSICAL MEDICINE & REHABILITATION
Payer: MEDICARE

## 2023-02-23 VITALS
WEIGHT: 210 LBS | DIASTOLIC BLOOD PRESSURE: 91 MMHG | TEMPERATURE: 98.3 F | RESPIRATION RATE: 16 BRPM | SYSTOLIC BLOOD PRESSURE: 197 MMHG | OXYGEN SATURATION: 99 % | BODY MASS INDEX: 31.83 KG/M2 | HEART RATE: 70 BPM | HEIGHT: 68 IN

## 2023-02-23 PROCEDURE — 3600000056 HC PAIN LEVEL 4 BASE: Performed by: PHYSICAL MEDICINE & REHABILITATION

## 2023-02-23 PROCEDURE — 2709999900 HC NON-CHARGEABLE SUPPLY: Performed by: PHYSICAL MEDICINE & REHABILITATION

## 2023-02-23 PROCEDURE — 2500000003 HC RX 250 WO HCPCS: Performed by: PHYSICAL MEDICINE & REHABILITATION

## 2023-02-23 PROCEDURE — 6360000002 HC RX W HCPCS: Performed by: PHYSICAL MEDICINE & REHABILITATION

## 2023-02-23 PROCEDURE — 7100000010 HC PHASE II RECOVERY - FIRST 15 MIN: Performed by: PHYSICAL MEDICINE & REHABILITATION

## 2023-02-23 PROCEDURE — 6360000004 HC RX CONTRAST MEDICATION: Performed by: PHYSICAL MEDICINE & REHABILITATION

## 2023-02-23 PROCEDURE — 3209999900 FLUORO FOR SURGICAL PROCEDURES

## 2023-02-23 RX ORDER — SODIUM CHLORIDE 9 MG/ML
INJECTION, SOLUTION INTRAVENOUS PRN
Status: CANCELLED | OUTPATIENT
Start: 2023-02-23

## 2023-02-23 RX ORDER — SODIUM CHLORIDE 0.9 % (FLUSH) 0.9 %
5-40 SYRINGE (ML) INJECTION PRN
Status: CANCELLED | OUTPATIENT
Start: 2023-02-23

## 2023-02-23 RX ORDER — DEXAMETHASONE SODIUM PHOSPHATE 10 MG/ML
INJECTION INTRAMUSCULAR; INTRAVENOUS PRN
Status: DISCONTINUED | OUTPATIENT
Start: 2023-02-23 | End: 2023-02-23 | Stop reason: ALTCHOICE

## 2023-02-23 RX ORDER — SODIUM CHLORIDE 0.9 % (FLUSH) 0.9 %
5-40 SYRINGE (ML) INJECTION EVERY 12 HOURS SCHEDULED
Status: CANCELLED | OUTPATIENT
Start: 2023-02-23

## 2023-02-23 RX ORDER — LIDOCAINE HYDROCHLORIDE 20 MG/ML
INJECTION, SOLUTION EPIDURAL; INFILTRATION; INTRACAUDAL; PERINEURAL PRN
Status: DISCONTINUED | OUTPATIENT
Start: 2023-02-23 | End: 2023-02-23 | Stop reason: ALTCHOICE

## 2023-02-23 ASSESSMENT — PAIN SCALES - GENERAL
PAINLEVEL_OUTOF10: 8
PAINLEVEL_OUTOF10: 6

## 2023-02-23 ASSESSMENT — PAIN DESCRIPTION - LOCATION: LOCATION: BACK

## 2023-02-23 ASSESSMENT — PAIN DESCRIPTION - FREQUENCY: FREQUENCY: INTERMITTENT

## 2023-02-23 ASSESSMENT — PAIN DESCRIPTION - ORIENTATION: ORIENTATION: LOWER

## 2023-02-23 ASSESSMENT — PAIN DESCRIPTION - PAIN TYPE: TYPE: SURGICAL PAIN;CHRONIC PAIN

## 2023-02-23 ASSESSMENT — PAIN - FUNCTIONAL ASSESSMENT: PAIN_FUNCTIONAL_ASSESSMENT: 0-10

## 2023-02-23 ASSESSMENT — PAIN DESCRIPTION - DESCRIPTORS: DESCRIPTORS: DISCOMFORT

## 2023-02-23 NOTE — INTERVAL H&P NOTE
I have interviewed and examined the patient and reviewed the recent History and Physical.  There have been no changes to the recent H&P documentation. The surgical consent form has been signed. Last anticoagulant medication use was:-    Premedication taken for contrast allergy? No    Valium taken for oral sedation? No    No outpatient medications have been marked as taking for the 2/23/23 encounter [facility] Encounter). The patient understands the planned operation and its associated risks and benefits and agrees to proceed.         Electronically signed by Kiel Solis MD on 2/23/2023 at 9:39 AM

## 2023-02-23 NOTE — DISCHARGE INSTRUCTIONS
Home Care after Facet Joint Injection    The doctor has done an injection to help diagnose the cause and site of your pain. You may feel sore at the injection site for the next 2-4 days. You may apply ice to the site for 20 minutes on and 20 minutes off to decrease pain and discomfort, if needed, for the first 24 hours. After 24 hours, you may use heat if needed. You will be given a pain log to complete for the next 14 days. Complete this form and make a copy for your own records. Then, mail it back to us or drop it off at the pain management clinic. We will need this information to decide the next step in your treatment plan. It is important that you do the activities that most often cause or intensify your pain the first 24 hours after the injection. Record your results on the pain log as directed. Do not nap. Try to limit the use of pain medicines if you can during the first 24 hours. You may resume taking your medicines after the procedure. Our staff will tell you how to take your pain medicines. No baths or soaking the injection site for 24 hours after the procedure. Taking a shower today is okay. You may resume taking your routine medicines after the procedure including pain medicines as prescribed. Remember to limit the use of pain medications the first 24 hours. Resume any medications held for the procedure (blood thinners, aspirin, anti-inflammatories). If you do not already have a follow-up appointment, call your referring doctors office to make one to discuss your results within 2-4 weeks after the treatment. Your doctor will have the report within 7-10 days. Signs of infection  Fever greater than 100.4°F by mouth for 2 readings taken 4 hours apart  Increased redness, swelling around the site  Any drainage from the site     If you have any new symptoms or any signs of infection, please call [phone] during business hours to notify us.  You can also notify your primary care physician. After hours, nights and weekends, call [phone].

## 2023-02-23 NOTE — H&P
PAIN MANAGEMENT FOLLOW-UP NOTE  2/3/23    CHIEF COMPLAINT: This is a74 y.o. male patientwho returns to the [facility] with a history of Follow-up, Knee Pain (right), and Back Pain      PAIN HPI:Bandar De returns today for  reevaluation. Since the visit, the patient reports that the pain is not changed. HPI   Location: Back  Location Modifier: left lower back  Severity of Pain: 3  Duration of Pain: Intermittent  Frequency of Pain: Intermittent  Aggravating Factors:  -  Limiting Behavior:  -  Relieving Factors: relaxation        Previous pain medication trials have included:          Mental health:    Patient feels - secondary to their current pain problems as described above. H/O depression and anxiety: No   Patient is not seeing psychologist orpsychiatrist   Abuse history? No    Employed? No    ANALGESIA:   Are your Current Pain medication (s) helping to decrease pain? No.   Current Pain score:      ADVERSE AFFECTS:   Medication Side Effects: No.    ACTIVITY:  Are you able to be more active with your pain medications? No      ABERRANT BEHAVIORS SINCE LAST VISIT?  No    Review of Systems     Allergies   Allergen Reactions    Tetanus Toxoids Hives    Codeine Nausea Only    Dtap-Ipv Vaccine Rash    Penicillins Rash    Tetanus Antitoxin Rash       Outpatient Medications Prior to Visit   Medication Sig Dispense Refill    allopurinol (ZYLOPRIM) 300 MG tablet Take 1 tablet by mouth daily 30 tablet 0    bisoprolol (ZEBETA) 10 MG tablet Take 1 tablet by mouth once daily 90 tablet 3    nitroGLYCERIN (NITROSTAT) 0.4 MG SL tablet Place 0.4 mg under the tongue every 5 minutes as needed      lisinopril (PRINIVIL;ZESTRIL) 10 MG tablet Take 1 tablet by mouth daily 90 tablet 1    amLODIPine (NORVASC) 10 MG tablet Take 1 tablet by mouth once daily 90 tablet 3    tiZANidine (ZANAFLEX) 4 MG tablet Take 1 tablet by mouth 3 times daily as needed (muscle spasm) 30 tablet 0    isosorbide mononitrate (IMDUR) 30 MG extended release tablet Take 1 tablet by mouth once daily 90 tablet 3    gemfibrozil (LOPID) 600 MG tablet TAKE 1 TABLET BY MOUTH ONCE DAILY 90 tablet 3    pravastatin (PRAVACHOL) 10 MG tablet Take 1 tablet by mouth once daily 90 tablet 3    TURMERIC PO Take 1,000 mg by mouth      aspirin 81 MG tablet Take 81 mg by mouth       Cyanocobalamin (B-12) 2500 MCG TABS Take 2,500 mcg by mouth daily       Cholecalciferol (D3-1000 PO) Take 1,000 Units by mouth daily       b complex vitamins capsule Take 1 capsule by mouth daily      gabapentin (NEURONTIN) 100 MG capsule Take 1 capsule by mouth 3 times daily for 31 days. 90 capsule 0     No facility-administered medications prior to visit. Past Medical History:   Diagnosis Date    Angina at rest [facility])     Arthritis     CAD (coronary artery disease)     angina    Hyperlipidemia     Hypertension     Skin cancer        Past Surgical History:   Procedure Laterality Date    CARDIAC CATHETERIZATION      x 2    COLONOSCOPY      COLONOSCOPY N/A 2018    COLONOSCOPY WITH BIOPSY performed by Rosemary Hillman DO at [address]      forehead and  mouth in [address] [place] [address] BIOPSY  10/14/2021    CT BONE MARROW BIOPSY 10/14/2021 [facility] CT SCAN    HERNIA REPAIR      PAIN MANAGEMENT PROCEDURE Left 2022    Left L4 L5 TRANSFORAMINAL performed by Angeli Maurer MD at [address] [place] [address]     No family history on file. Social History     Socioeconomic History    Marital status:      Spouse name: None    Number of children: None    Years of education: None    Highest education level: None   Tobacco Use    Smoking status: Former     Packs/day: 0.50     Years: 26.00     Pack years: 13.00     Types: Cigarettes     Start date:      Quit date: 1997     Years since quittin.1    Smokeless tobacco: Never    Tobacco comments:     ly rrt 16   Vaping Use    Vaping Use: Never used   Substance and Sexual Activity    Alcohol use:  Yes     Alcohol/week: 1.0 standard drink     Types: 1 Cans of beer per week     Comment: 2 beers/month    Drug use: No     Social Determinants of Health     Physical Activity: Sufficiently Active    Days of Exercise per Week: 3 days    Minutes of Exercise per Session: 90 min         Family and Social Historyreviewed in the electronic medical record. Imaging:Reviewed available imaging in our system with the patient. No results found. Objective:     Physical Exam:  Vitals:    02/03/23 1430   BP: (!) 140/80   Pulse: 65   SpO2: 96%   Weight: 212 lb 3.2 oz (96.3 kg)   Height: 5' 8\" (1.727 m)          Physical Exam  Ortho Exam                          Research  has found that  Spine injections    reduce pain and  give  better functional  outcomes. Assessment: This is a 67 y.o. male patient with:    Diagnosis:   Diagnosis Orders   1. Lumbar facet joint syndrome        2. Lumbosacral spondylosis without myelopathy            Medical Comorbidities:  As listed in the patient's past medical and surgical history    Functional Limitations:   Pain limits function and quality of life. Plan:   LL 4-5 5S1 facet steroid injections    Meds:   Controlled Substances Monitoring: Pt educated about the risks of taking opiates,including increased sedation, constipation, slowed breathing, tolerance, dependence,and addiction. New Prescriptions    GABAPENTIN (NEURONTIN) 100 MG CAPSULE    Take 1 capsule by mouth daily for 30 days. Orders Placed This Encounter   Medications    gabapentin (NEURONTIN) 100 MG capsule     Sig: Take 1 capsule by mouth daily for 30 days. Dispense:  90 capsule     Refill:  0     No orders of the defined types were placed in this encounter. No follow-ups on file. Opioid medication has  significant  risk  benefit concerns.    We  instruct    our patients that  a Random Urine Drug Screen is required  along with a  an Opioid assessment questionaire such as ORT  or SOAPP  The patient expressed understanding of the above assessment and plan. Totaltime spent face to face with patient was 25 minutes inwhich  50% or more of the time was spent in counseling, education about risk andbenefits of the above plan, and coordination of care.

## 2023-02-23 NOTE — OP NOTE
ZYGAPOPHYSEAL JOINT INJECTION    2/23/23    Surgeon: Elisa Rico MD    Pre-operative Diagnosis: Principal Problem:    Lumbar radiculitis  Active Problems:    Lumbar spondylosis  Resolved Problems:    * No resolved hospital problems. *       Post-operative Diagnosis: Same    INDICATION:  Previous treatment and examination findings are noted in the H&P and previous clinic notes. Left L4-5 L5-S1 facet joint injections have been requested for diagnostic and therapeutic reasons. Conservative treatment was ineffective i.e.: ice, NSAIDS, rest, narcotic medication, chiropractic care, physical therapy and message therapy. Patient is unable to perform the following ADL's: ambulating, grooming, sitting, and standing    Pain Assessment: 0-10  Pain Level: 8     Pain Orientation: Left  Pain Location: Back       Last Plain films: -      EXAMINATION:   LL4-5 5-S1 facet joint injections with arthrography. CONSENT:  Written consent was obtained from the patient on preprinted consent form after explaining the procedure, indications, potential complications and outcomes. Alternative treatments were also discussed. DISCUSSION:  The patient was sterilely prepped and draped in the usual fashion in the prone position. Time out\" was verified for correct patient, side, level and procedure. SEDATION:  No conscious sedation was performed during the procedure. The patient remained awake and conversed throughout the procedure. The patient underwent pulse oximetry and blood pressure monitoring independently by a trained observer, as well as by a physician. PROCEDURE[de-identified]  Under image-intensifier control, a standard technique was employed, a 22 gauge needle x 5 inch spinal needle was guided successfully to cannulate the LL4-5 5S1 zygapophyseal joint via a posterior lateral approach. Instillation of .5 mL of.Isovue M contrast medium demonstrated contiguous flow into the joint and the joint capsule.  No vascular spread was noted. Digital subtraction was not employed to evaluate for vascular spread.] The patient was monitored for any untoward reaction to contrast medium before proceeding with procedure #2. Dexamethasone (Decadron 10 mg/mL) was injected into each joint. A total of 2 joints were injected. PROVOCATION: The patient did not report pain reproduction in a concordant distribution upon capsular distention of the lumbar facet joints from the contrast injection. ARTHROGRAPHY: The lumbar facet arthrogram revealed contrast in the joint space in the superior and inferior recesses; no contrast extravasation. The patient tolerated the procedure well and without complications and was noted to be in stable condition prior to discharge from the procedure center with discharge instructions. IMPRESSIONS:  lumbar facet arthrogram is abnormal: -.  lumbar facet joint injection negative for provocation of the patient's pain symptoms. EBL: no blood loss    SPECIMEN: none    RECOMMENDATIONS:  Complete and return Post-Procedure Pain and Activity Diary.   Contact the [address] for symptom exacerbation, fever or unusual symptoms. Post-procedure care according to verbal and written discharge instructions  Continue with PT as per MD directions. Consider diagnositc MBB if there is > 80% pain relief and improved activity scores. SPINE FLUOROSCOPIC IMAGE INTERPRETATION    EXAMINATION:  AP, Lateral, and Oblique views. FLUORO TIME: 12 seconds    DISCUSSION:  Spot views of the spine reveal normal alignment and segmentation. Spinal needles are positioned in the lumbar facet joint. Contrast outlines the joint space and reveals -. Visualized spine reveals disc space -. Soft tissues reveal no abnormalities.     IMPRESSION: lumbar facet arthrogram shows satisfactory needle placement and contrast dispersal.    Electronically signed by Evonne Saucedo MD on 2/23/2023 at 9:41 AM.

## 2023-02-25 ASSESSMENT — ENCOUNTER SYMPTOMS
NAUSEA: 0
BACK PAIN: 1
CONSTIPATION: 0
ALLERGIC/IMMUNOLOGIC NEGATIVE: 1
RESPIRATORY NEGATIVE: 1
EYES NEGATIVE: 1

## 2023-03-06 PROBLEM — S39.012A STRAIN OF MUSCLE, FASCIA AND TENDON OF LOWER BACK, INITIAL ENCOUNTER: Status: ACTIVE | Noted: 2022-01-14

## 2023-03-06 PROBLEM — I25.10 CORONARY ATHEROSCLEROSIS: Status: ACTIVE | Noted: 2018-08-12

## 2023-03-07 ENCOUNTER — HOSPITAL ENCOUNTER (OUTPATIENT)
Age: 73
Discharge: HOME | End: 2023-03-07
Payer: MEDICARE

## 2023-03-07 ENCOUNTER — OFFICE VISIT (OUTPATIENT)
Dept: INTERNAL MEDICINE | Age: 73
End: 2023-03-07
Payer: MEDICARE

## 2023-03-07 ENCOUNTER — OFFICE VISIT (OUTPATIENT)
Dept: ONCOLOGY | Age: 73
End: 2023-03-07
Payer: MEDICARE

## 2023-03-07 VITALS
HEART RATE: 58 BPM | DIASTOLIC BLOOD PRESSURE: 74 MMHG | SYSTOLIC BLOOD PRESSURE: 132 MMHG | HEIGHT: 68 IN | OXYGEN SATURATION: 98 % | TEMPERATURE: 98.3 F | WEIGHT: 209 LBS | BODY MASS INDEX: 31.67 KG/M2

## 2023-03-07 VITALS
DIASTOLIC BLOOD PRESSURE: 80 MMHG | OXYGEN SATURATION: 98 % | SYSTOLIC BLOOD PRESSURE: 134 MMHG | HEART RATE: 68 BPM | BODY MASS INDEX: 31.67 KG/M2 | WEIGHT: 209 LBS | HEIGHT: 68 IN | RESPIRATION RATE: 16 BRPM

## 2023-03-07 DIAGNOSIS — G62.9 NEUROPATHY: ICD-10-CM

## 2023-03-07 DIAGNOSIS — D64.9 ANEMIA, UNSPECIFIED TYPE: ICD-10-CM

## 2023-03-07 DIAGNOSIS — I25.10 CORONARY ARTERY DISEASE WITHOUT ANGINA PECTORIS, UNSPECIFIED VESSEL OR LESION TYPE, UNSPECIFIED WHETHER NATIVE OR TRANSPLANTED HEART: ICD-10-CM

## 2023-03-07 DIAGNOSIS — R76.8 ELEVATED SERUM IMMUNOGLOBULIN FREE LIGHT CHAIN LEVEL: ICD-10-CM

## 2023-03-07 DIAGNOSIS — D47.2 SMOLDERING MYELOMA: Primary | ICD-10-CM

## 2023-03-07 DIAGNOSIS — R73.03 PREDIABETES: ICD-10-CM

## 2023-03-07 DIAGNOSIS — D47.2 SMOLDERING MYELOMA: ICD-10-CM

## 2023-03-07 DIAGNOSIS — E78.2 MIXED HYPERLIPIDEMIA: ICD-10-CM

## 2023-03-07 DIAGNOSIS — I10 ESSENTIAL HYPERTENSION: Primary | ICD-10-CM

## 2023-03-07 DIAGNOSIS — M10.9 GOUT, UNSPECIFIED CAUSE, UNSPECIFIED CHRONICITY, UNSPECIFIED SITE: ICD-10-CM

## 2023-03-07 LAB
ABSOLUTE EOS #: 0.12 K/UL (ref 0–0.44)
ABSOLUTE IMMATURE GRANULOCYTE: <0.03 K/UL (ref 0–0.3)
ABSOLUTE LYMPH #: 1.26 K/UL (ref 1.1–3.7)
ABSOLUTE MONO #: 0.46 K/UL (ref 0.1–1.2)
ALBUMIN SERPL-MCNC: 4.1 G/DL (ref 3.5–5.2)
ALBUMIN/GLOBULIN RATIO: 1.1 (ref 1–2.5)
ALP SERPL-CCNC: 85 U/L (ref 40–129)
ALT SERPL-CCNC: 49 U/L (ref 5–41)
ANION GAP SERPL CALCULATED.3IONS-SCNC: 10 MMOL/L (ref 9–17)
AST SERPL-CCNC: 36 U/L
BASOPHILS # BLD: 1 % (ref 0–2)
BASOPHILS ABSOLUTE: 0.03 K/UL (ref 0–0.2)
BILIRUB SERPL-MCNC: 0.7 MG/DL (ref 0.3–1.2)
BUN SERPL-MCNC: 29 MG/DL (ref 8–23)
BUN/CREAT BLD: 29 (ref 9–20)
CALCIUM SERPL-MCNC: 9.6 MG/DL (ref 8.6–10.4)
CHLORIDE SERPL-SCNC: 97 MMOL/L (ref 98–107)
CO2 SERPL-SCNC: 28 MMOL/L (ref 20–31)
CREAT SERPL-MCNC: 1 MG/DL (ref 0.7–1.2)
EOSINOPHILS RELATIVE PERCENT: 3 % (ref 1–4)
GFR SERPL CREATININE-BSD FRML MDRD: >60 ML/MIN/1.73M2
GLUCOSE SERPL-MCNC: 139 MG/DL (ref 70–99)
HCT VFR BLD AUTO: 35.5 % (ref 40.7–50.3)
HGB BLD-MCNC: 12.2 G/DL (ref 13–17)
IMMATURE GRANULOCYTES: 0 %
LYMPHOCYTES # BLD: 30 % (ref 24–43)
MCH RBC QN AUTO: 33.5 PG (ref 25.2–33.5)
MCHC RBC AUTO-ENTMCNC: 34.4 G/DL (ref 25.2–33.5)
MCV RBC AUTO: 97.5 FL (ref 82.6–102.9)
MONOCYTES # BLD: 11 % (ref 3–12)
NRBC AUTOMATED: 0 PER 100 WBC
PDW BLD-RTO: 12.5 % (ref 11.8–14.4)
PLATELET # BLD AUTO: 178 K/UL (ref 138–453)
PMV BLD AUTO: 11.8 FL (ref 8.1–13.5)
POTASSIUM SERPL-SCNC: 4.3 MMOL/L (ref 3.7–5.3)
PROT SERPL-MCNC: 7.9 G/DL (ref 6.4–8.3)
RBC # BLD: 3.64 M/UL (ref 4.21–5.77)
SEG NEUTROPHILS: 55 % (ref 36–65)
SEGMENTED NEUTROPHILS ABSOLUTE COUNT: 2.39 K/UL (ref 1.5–8.1)
SODIUM SERPL-SCNC: 135 MMOL/L (ref 135–144)
WBC # BLD AUTO: 4.3 K/UL (ref 3.5–11.3)

## 2023-03-07 PROCEDURE — 84155 ASSAY OF PROTEIN SERUM: CPT

## 2023-03-07 PROCEDURE — 83521 IG LIGHT CHAINS FREE EACH: CPT

## 2023-03-07 PROCEDURE — 1123F ACP DISCUSS/DSCN MKR DOCD: CPT | Performed by: INTERNAL MEDICINE

## 2023-03-07 PROCEDURE — 3078F DIAST BP <80 MM HG: CPT | Performed by: INTERNAL MEDICINE

## 2023-03-07 PROCEDURE — G8417 CALC BMI ABV UP PARAM F/U: HCPCS | Performed by: INTERNAL MEDICINE

## 2023-03-07 PROCEDURE — 84165 PROTEIN E-PHORESIS SERUM: CPT

## 2023-03-07 PROCEDURE — G8427 DOCREV CUR MEDS BY ELIG CLIN: HCPCS | Performed by: INTERNAL MEDICINE

## 2023-03-07 PROCEDURE — 1036F TOBACCO NON-USER: CPT | Performed by: INTERNAL MEDICINE

## 2023-03-07 PROCEDURE — 3017F COLORECTAL CA SCREEN DOC REV: CPT | Performed by: INTERNAL MEDICINE

## 2023-03-07 PROCEDURE — 3075F SYST BP GE 130 - 139MM HG: CPT | Performed by: INTERNAL MEDICINE

## 2023-03-07 PROCEDURE — 80053 COMPREHEN METABOLIC PANEL: CPT

## 2023-03-07 PROCEDURE — 82784 ASSAY IGA/IGD/IGG/IGM EACH: CPT

## 2023-03-07 PROCEDURE — 99214 OFFICE O/P EST MOD 30 MIN: CPT | Performed by: INTERNAL MEDICINE

## 2023-03-07 PROCEDURE — G8484 FLU IMMUNIZE NO ADMIN: HCPCS | Performed by: INTERNAL MEDICINE

## 2023-03-07 PROCEDURE — 3074F SYST BP LT 130 MM HG: CPT | Performed by: INTERNAL MEDICINE

## 2023-03-07 PROCEDURE — 85025 COMPLETE CBC W/AUTO DIFF WBC: CPT

## 2023-03-07 PROCEDURE — 36415 COLL VENOUS BLD VENIPUNCTURE: CPT

## 2023-03-07 PROCEDURE — 86334 IMMUNOFIX E-PHORESIS SERUM: CPT

## 2023-03-07 PROCEDURE — 99213 OFFICE O/P EST LOW 20 MIN: CPT | Performed by: INTERNAL MEDICINE

## 2023-03-07 PROCEDURE — 99212 OFFICE O/P EST SF 10 MIN: CPT | Performed by: INTERNAL MEDICINE

## 2023-03-07 RX ORDER — TIZANIDINE 4 MG/1
4 TABLET ORAL 3 TIMES DAILY PRN
Qty: 90 TABLET | Refills: 3 | Status: CANCELLED | OUTPATIENT
Start: 2023-03-07

## 2023-03-07 RX ORDER — GEMFIBROZIL 600 MG/1
TABLET, FILM COATED ORAL
Qty: 90 TABLET | Refills: 3 | Status: SHIPPED | OUTPATIENT
Start: 2023-03-07

## 2023-03-07 RX ORDER — PRAVASTATIN SODIUM 10 MG
TABLET ORAL
Qty: 90 TABLET | Refills: 3 | Status: SHIPPED | OUTPATIENT
Start: 2023-03-07

## 2023-03-07 RX ORDER — ALLOPURINOL 300 MG/1
TABLET ORAL
Qty: 90 TABLET | Refills: 3 | Status: SHIPPED | OUTPATIENT
Start: 2023-03-07

## 2023-03-07 RX ORDER — ISOSORBIDE MONONITRATE 30 MG/1
TABLET, EXTENDED RELEASE ORAL
Qty: 90 TABLET | Refills: 3 | Status: SHIPPED | OUTPATIENT
Start: 2023-03-07

## 2023-03-07 SDOH — ECONOMIC STABILITY: HOUSING INSECURITY
IN THE LAST 12 MONTHS, WAS THERE A TIME WHEN YOU DID NOT HAVE A STEADY PLACE TO SLEEP OR SLEPT IN A SHELTER (INCLUDING NOW)?: NO

## 2023-03-07 SDOH — ECONOMIC STABILITY: FOOD INSECURITY: WITHIN THE PAST 12 MONTHS, THE FOOD YOU BOUGHT JUST DIDN'T LAST AND YOU DIDN'T HAVE MONEY TO GET MORE.: NEVER TRUE

## 2023-03-07 SDOH — ECONOMIC STABILITY: FOOD INSECURITY: WITHIN THE PAST 12 MONTHS, YOU WORRIED THAT YOUR FOOD WOULD RUN OUT BEFORE YOU GOT MONEY TO BUY MORE.: NEVER TRUE

## 2023-03-07 SDOH — ECONOMIC STABILITY: INCOME INSECURITY: HOW HARD IS IT FOR YOU TO PAY FOR THE VERY BASICS LIKE FOOD, HOUSING, MEDICAL CARE, AND HEATING?: NOT VERY HARD

## 2023-03-07 ASSESSMENT — PATIENT HEALTH QUESTIONNAIRE - PHQ9
2. FEELING DOWN, DEPRESSED OR HOPELESS: 0
1. LITTLE INTEREST OR PLEASURE IN DOING THINGS: 0
SUM OF ALL RESPONSES TO PHQ QUESTIONS 1-9: 0
DEPRESSION UNABLE TO ASSESS: FUNCTIONAL CAPACITY MOTIVATION LIMITS ACCURACY
SUM OF ALL RESPONSES TO PHQ QUESTIONS 1-9: 0
SUM OF ALL RESPONSES TO PHQ9 QUESTIONS 1 & 2: 0
SUM OF ALL RESPONSES TO PHQ QUESTIONS 1-9: 0
SUM OF ALL RESPONSES TO PHQ QUESTIONS 1-9: 0

## 2023-03-07 NOTE — PROGRESS NOTES
[address] [facility] INTERNAL MEDICINE    Visit Date:  3/7/2023  Patient:  Geoffrey Tavarez  YOB: 1950    Assessment & Plan     Bilateral knee osteoarthritis: Appears to be better with injections. We will continue to monitor. Hypertension: Blood pressure much better today. Continue amlodipine 10 mg as well as lisinopril 10 mg. Smoldering myeloma: Follows with oncology. Will defer management. Gout: Continue allopurinol. Uric acid level under control. Hyperlipidemia: Continue gemfibrozil as well as pravastatin. We will continue to monitor     CVA: Continue aspirin. Residual vision loss in the right eye. Age-related macular degeneration: Follows with ophthalmology. Can start PreserVision. We will continue to monitor. Diagnosis Orders   1. Essential hypertension        2. Coronary artery disease without angina pectoris, unspecified vessel or lesion type, unspecified whether native or transplanted heart  pravastatin (PRAVACHOL) 10 MG tablet    isosorbide mononitrate (IMDUR) 30 MG extended release tablet      3. Mixed hyperlipidemia  Lipid Panel      4. Prediabetes  Hemoglobin A1C      5. Gout, unspecified cause, unspecified chronicity, unspecified site  Uric Acid          Chief Complaint  3 Month Follow-Up    History of Present Illness   Presents to follow-up. Last visit, he was referred to pain management for knee and back osteoarthritis, he received injections in his knees as well as his back, and he says that he they seem to help at the time, however he says that the pain is back now, and I advised that he can make an appointment because it has been almost 3 months since his last injection. Denies fever, chills, weight loss, bowel/bladder incontinence at this time. Has a history of hypertension, smoldering myeloma, gout, hyperlipidemia, CVA that are unchanged.     Objective  /80 (Site: Left Upper Arm, Position: Sitting, Cuff Size: Medium Adult)   Pulse 68 Resp 16   Ht 5' 8\" (1.727 m)   Wt 209 lb (94.8 kg)   SpO2 98%   BMI 31.78 kg/m²   Constitutional: No acute distress. Sits in chair comfortably  Eyes: Sclerae nonicteric. No lid lag or proptosis  HENT: External ears normal. No external lesions on the nose  Neck: No gross masses. Trachea visibly midline  Respiratory: Good air entry bilaterally. No wheezing or crackles  Cardiovascular: Normal S1-S2. No murmurs. No lower extremity edema  Gastrointestinal: No visible masses. No visible hernias  Skin: No abnormal rashes. No abnormal masses  Neurologic: Cranial nerves grossly intact  Psychiatric: Normal affect.  Alert and oriented    Medications  Current Outpatient Medications:     allopurinol (ZYLOPRIM) 300 MG tablet, Take 1 tablet by mouth once daily, Disp: 90 tablet, Rfl: 3    pravastatin (PRAVACHOL) 10 MG tablet, Take 1 tablet by mouth once daily, Disp: 90 tablet, Rfl: 3    gemfibrozil (LOPID) 600 MG tablet, TAKE 1 TABLET BY MOUTH ONCE DAILY, Disp: 90 tablet, Rfl: 3    isosorbide mononitrate (IMDUR) 30 MG extended release tablet, Take 1 tablet by mouth once daily, Disp: 90 tablet, Rfl: 3    bisoprolol (ZEBETA) 10 MG tablet, Take 1 tablet by mouth once daily, Disp: 90 tablet, Rfl: 3    nitroGLYCERIN (NITROSTAT) 0.4 MG SL tablet, Place 0.4 mg under the tongue every 5 minutes as needed, Disp: , Rfl:     lisinopril (PRINIVIL;ZESTRIL) 10 MG tablet, Take 1 tablet by mouth daily, Disp: 90 tablet, Rfl: 1    amLODIPine (NORVASC) 10 MG tablet, Take 1 tablet by mouth once daily, Disp: 90 tablet, Rfl: 3    tiZANidine (ZANAFLEX) 4 MG tablet, Take 1 tablet by mouth 3 times daily as needed (muscle spasm), Disp: 30 tablet, Rfl: 0    TURMERIC PO, Take 1,000 mg by mouth, Disp: , Rfl:     aspirin 81 MG tablet, Take 81 mg by mouth , Disp: , Rfl:     Cyanocobalamin (B-12) 2500 MCG TABS, Take 2,500 mcg by mouth daily , Disp: , Rfl:     Cholecalciferol (D3-1000 PO), Take 1,000 Units by mouth daily , Disp: , Rfl:     b complex vitamins capsule, Take 1 capsule by mouth daily, Disp: , Rfl:     gabapentin (NEURONTIN) 100 MG capsule, Take 1 capsule by mouth daily for 30 days. , Disp: 90 capsule, Rfl: 0    gabapentin (NEURONTIN) 100 MG capsule, Take 1 capsule by mouth 3 times daily for 30 days. , Disp: 90 capsule, Rfl: 0    gabapentin (NEURONTIN) 100 MG capsule, Take 1 capsule by mouth 3 times daily for 31 days. , Disp: 90 capsule, Rfl: 0    Allergies  Tetanus toxoids, Codeine, Dtap-ipv vaccine, Penicillins, and Tetanus antitoxin    Past Medical History:   Diagnosis Date    Angina at rest [facility])     Arthritis     CAD (coronary artery disease)     angina    Hyperlipidemia     Hypertension     Skin cancer      Past Surgical History:   Procedure Laterality Date    CARDIAC CATHETERIZATION      x 2    COLONOSCOPY      COLONOSCOPY N/A 9/12/2018    COLONOSCOPY WITH BIOPSY performed by Eileen Price DO at [address] [facility]      forehead and  mouth in [facility]  10/14/2021    CT BONE MARROW BIOPSY 10/14/2021 [facility] CT SCAN    HERNIA REPAIR      PAIN MANAGEMENT PROCEDURE Left 1/7/2022    Left L4 L5 TRANSFORAMINAL performed by Alona Severin, MD at [address] [place] [address] Left 2/23/2023    Left L4-L5 L5-S1 FACET performed by Alona Severin, MD at [place] [address] History  This patient's family history is not on file. Social History  Sheila Steiner  reports that he quit smoking about 26 years ago. His smoking use included cigarettes. He started smoking about 52 years ago. He has a 13.00 pack-year smoking history. He has never used smokeless tobacco. He reports current alcohol use of about 1.0 standard drink per week. He reports that he does not use drugs. Discussed use, benefit, and side effects of prescribed medications. All questions answered. Patient voiced understanding. Reviewed health maintenance.       Electronically signed Elizabeth Garcia MD on 3/7/2023 at 2:29 PM    This note has been created using the Epic electronic health record, and dictated in part by [facility] dictation system. Despite the documenting physician's best efforts, there may be errors in spelling, grammar or syntax.

## 2023-03-07 NOTE — PROGRESS NOTES
risk 1q gain-diagnosed 10/2021  Normocytic anemia, chronic  Acute stroke (while at [place])-6/2022  Chronic arthritis  Coronary artery disease  Hypertension  Diabetes mellitus  Status post polypectomy 2018      Plan:  Reviewed results of lab work-up and other relevant clinical data  Reviewed patient's paraproteinemia profile results.  M protein remained stable.  Clinically patient denies any concerning symptoms.  Lab work-up does not show hypercalcemia.  Hemoglobin above 12, no  Thrombocytopenia.  Kidney function is stable.  Discussed natural history of smoldering myeloma.  Recommend continued surveillance.    Discussed results of bone marrow biopsy which confirmed monoclonal plasma cells greater than 10% consistent with myeloma.  I believe patient has a smoldering myeloma discussed risk of progression to an active myeloma recommend close monitoring and surveillance.  Cytogenetics consistent with high risk  Discussed indications for treatment for myeloma  NCCN guidelines were reviewed and discussed with the patient.  The diagnosis and care plan were discussed with the patient in detail. I discussed the natural history of the disease, prognosis, risks and goals of therapy and answered all the patients questions to the best of my ability.  Patient expressed understanding and was in agreement.    HEMANT BECK MD        This note is created with the assistance of a speech recognition program.  While intending to generate a document that actually reflects the content of the visit, the document can still have some errors including those of syntax and sound a like substitutions which may escape proof reading.  It such instances, actual meaning can be extrapolated by contextual diversion.

## 2023-03-08 LAB
ALBUMIN (CALCULATED): 4.6 G/DL (ref 3.2–5.2)
ALBUMIN PERCENT: 57 % (ref 45–65)
ALPHA 1 PERCENT: 2 % (ref 3–6)
ALPHA 2 PERCENT: 10 % (ref 6–13)
ALPHA1 GLOB SERPL ELPH-MCNC: 0.2 G/DL (ref 0.1–0.4)
ALPHA2 GLOB SERPL ELPH-MCNC: 0.8 G/DL (ref 0.5–0.9)
B-GLOBULIN SERPL ELPH-MCNC: 0.5 G/DL (ref 0.5–1.1)
BETA PERCENT: 7 % (ref 11–19)
FREE KAPPA/LAMBDA RATIO: 0.01 (ref 0.26–1.65)
GAMMA GLOB SERPL ELPH-MCNC: 2 G/DL (ref 0.5–1.5)
GAMMA GLOBULIN %: 25 % (ref 9–20)
IGA SERPL-MCNC: ABNORMAL MG/DL (ref 70–400)
IGA, LOW RANGE: 22 MG/DL (ref 70–400)
IGG: 1905 MG/DL (ref 700–1600)
IGM: <25 MG/DL (ref 40–230)
KAPPA LC FREE SER-MCNC: 0.78 MG/DL (ref 0.37–1.94)
LAMBDA LC FREE SERPL-MCNC: 61.37 MG/DL (ref 0.57–2.63)
PATHOLOGIST: ABNORMAL
PATHOLOGIST: ABNORMAL
PROT SERPL-MCNC: 8.1 G/DL (ref 6.4–8.3)
PROTEIN ELECTROPHORESIS, SERUM: ABNORMAL
SERUM IFX INTERP: ABNORMAL
TOTAL PROT. SUM,%: 101 % (ref 98–102)
TOTAL PROT. SUM: 8.1 G/DL (ref 6.3–8.2)

## 2023-03-22 ENCOUNTER — TELEPHONE (OUTPATIENT)
Dept: ONCOLOGY | Age: 73
End: 2023-03-22

## 2023-03-22 DIAGNOSIS — D47.2 SMOLDERING MYELOMA: Primary | ICD-10-CM

## 2023-04-26 ENCOUNTER — HOSPITAL ENCOUNTER (OUTPATIENT)
Dept: GENERAL RADIOLOGY | Age: 73
Discharge: HOME | End: 2023-04-28
Payer: MEDICARE

## 2023-04-26 ENCOUNTER — OFFICE VISIT (OUTPATIENT)
Dept: PRIMARY CARE CLINIC | Age: 73
End: 2023-04-26
Payer: MEDICARE

## 2023-04-26 VITALS
OXYGEN SATURATION: 96 % | RESPIRATION RATE: 20 BRPM | WEIGHT: 208.13 LBS | HEIGHT: 68 IN | HEART RATE: 62 BPM | BODY MASS INDEX: 31.54 KG/M2 | SYSTOLIC BLOOD PRESSURE: 142 MMHG | DIASTOLIC BLOOD PRESSURE: 76 MMHG | TEMPERATURE: 97.9 F

## 2023-04-26 DIAGNOSIS — M79.642 PAIN OF LEFT HAND: ICD-10-CM

## 2023-04-26 DIAGNOSIS — S63.502A SPRAIN OF LEFT WRIST, INITIAL ENCOUNTER: Primary | ICD-10-CM

## 2023-04-26 DIAGNOSIS — M25.532 PAIN IN LEFT WRIST: ICD-10-CM

## 2023-04-26 DIAGNOSIS — M19.042 ARTHRITIS OF FINGER OF LEFT HAND: ICD-10-CM

## 2023-04-26 DIAGNOSIS — M19.032 ARTHRITIS OF WRIST, LEFT: ICD-10-CM
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
## 2023-04-26 PROCEDURE — 3017F COLORECTAL CA SCREEN DOC REV: CPT | Performed by: NURSE PRACTITIONER

## 2023-04-26 PROCEDURE — 1123F ACP DISCUSS/DSCN MKR DOCD: CPT | Performed by: NURSE PRACTITIONER

## 2023-04-26 PROCEDURE — 3078F DIAST BP <80 MM HG: CPT | Performed by: NURSE PRACTITIONER

## 2023-04-26 PROCEDURE — 73110 X-RAY EXAM OF WRIST: CPT

## 2023-04-26 PROCEDURE — 99214 OFFICE O/P EST MOD 30 MIN: CPT | Performed by: NURSE PRACTITIONER

## 2023-04-26 PROCEDURE — G8427 DOCREV CUR MEDS BY ELIG CLIN: HCPCS | Performed by: NURSE PRACTITIONER

## 2023-04-26 PROCEDURE — L3908 WHO COCK-UP NONMOLDE PRE OTS: HCPCS | Performed by: NURSE PRACTITIONER

## 2023-04-26 PROCEDURE — 3077F SYST BP >= 140 MM HG: CPT | Performed by: NURSE PRACTITIONER

## 2023-04-26 PROCEDURE — G8417 CALC BMI ABV UP PARAM F/U: HCPCS | Performed by: NURSE PRACTITIONER

## 2023-04-26 PROCEDURE — 99203 OFFICE O/P NEW LOW 30 MIN: CPT | Performed by: NURSE PRACTITIONER

## 2023-04-26 PROCEDURE — 1036F TOBACCO NON-USER: CPT | Performed by: NURSE PRACTITIONER

## 2023-04-26 PROCEDURE — 73130 X-RAY EXAM OF HAND: CPT

## 2023-04-26 ASSESSMENT — ENCOUNTER SYMPTOMS: RESPIRATORY NEGATIVE: 1

## 2023-04-26 NOTE — PROGRESS NOTES
[facility]             [address], [address]                        Telephone [phone]             Fax [phone]     Stevenson Tai  1950  PTL:[id number]   Date of visit:  4/26/2023    Subjective:    Stevenson Tai is a 67 y.o.  male who presents to [facility] today (4/26/2023) for evaluation of:    Chief Complaint   Patient presents with    Hand Pain     Fell onto left hand yesterday morning. States left leg gives out and caused him to fall. States he has trouble making a fist.     Wrist Pain     Left, fall yesterday. Wrist Pain   The pain is present in the left wrist and left hand. This is a new problem. The current episode started yesterday. There has been a history of trauma (fall yesterday while ambulating, left leg \"gave out\"; denies hitting head or LOC). The problem occurs constantly. The problem has been unchanged. The pain is at a severity of 10/10. Associated symptoms include a limited range of motion. Pertinent negatives include no fever, inability to bear weight, numbness, stiffness or tingling. Associated symptoms comments: Swelling and pain left hand and wrist. He attempted to catch himself with outstretched hand. . Exacerbated by: grasping with left hand, movement of left hand and wrist. He has tried cold (tylenol) for the symptoms. The treatment provided no relief. His past medical history is significant for osteoarthritis.      He has the following problem list:  Patient Active Problem List   Diagnosis    Skin cancer    Hyperlipidemia    Hypertension    Coronary artery disease    GI bleed    Gastroesophageal reflux disease without esophagitis    Tremor    Fatty liver    Arthritis    Smoldering multiple myeloma    Lumbar radiculitis    Lumbar spondylosis    Early dry stage nonexudative age-related macular degeneration of both eyes    Hypertensive retinopathy of both eyes
Orders   1. Sprain of left wrist, initial encounter  Who cock-up nonmolde pre ots      2. Pain of left hand  XR HAND LEFT (MIN 3 VIEWS)      3. Pain in left wrist  XR WRIST LEFT (MIN 3 VIEWS)      4. Arthritis of wrist, left        5. Arthritis of finger of left hand        6. Fall, initial encounter          I discussed radiology report with patient during visit. Take Tylenol as needed for pain. Wear left wrist brace for 1 week and then as needed. Rest and elevate the affected painful area. Apply cold compresses intermittently as needed. As pain recedes, begin normal activities slowly as tolerated. Follow up with PCP if symptoms do not improve or worsen. The use, risks, benefits, and side effects of prescribed or recommended medications were discussed. All questions were answered and the patient/caregiver voiced understanding. Procedures    Who cock-up nonmolde pre ots     Patient was prescribed a [employer] Comfort Form Wrist brace. The left wrist will require stabilization / immobilization from this semi-rigid / rigid orthosis to improve their function. The orthosis will assist in protecting the affected area, provide functional support and facilitate healing. The patient was educated and fit by a healthcare professional with expert knowledge and specialization in brace application while under the direct supervision of the treating physician. Verbal and written instructions for the use of and application of this item were provided. They were instructed to contact the office immediately should the brace result in increased pain, decreased sensation, increased swelling or worsening of the condition.          Electronically signed by CEM Farfan CNP on 4/26/23 at 12:38 PM EDT

## 2023-07-05 ENCOUNTER — HOSPITAL ENCOUNTER (OUTPATIENT)
Age: 73
Discharge: HOME | End: 2023-07-05
Payer: MEDICARE

## 2023-07-05 DIAGNOSIS — G62.9 NEUROPATHY: ICD-10-CM

## 2023-07-05 DIAGNOSIS — D47.2 SMOLDERING MYELOMA: ICD-10-CM

## 2023-07-05 DIAGNOSIS — R76.8 ELEVATED SERUM IMMUNOGLOBULIN FREE LIGHT CHAIN LEVEL: ICD-10-CM

## 2023-07-05 LAB
ALBUMIN SERPL-MCNC: 4 G/DL (ref 3.5–5.2)
ALBUMIN/GLOB SERPL: 1.1 {RATIO} (ref 1–2.5)
ALP SERPL-CCNC: 87 U/L (ref 40–129)
ALT SERPL-CCNC: 24 U/L (ref 5–41)
ANION GAP SERPL CALCULATED.3IONS-SCNC: 13 MMOL/L (ref 9–17)
AST SERPL-CCNC: 25 U/L
BASOPHILS # BLD: 0.03 K/UL (ref 0–0.2)
BASOPHILS NFR BLD: 1 % (ref 0–2)
BILIRUB SERPL-MCNC: 0.4 MG/DL (ref 0.3–1.2)
BUN SERPL-MCNC: 29 MG/DL (ref 8–23)
BUN/CREAT SERPL: 25 (ref 9–20)
CALCIUM SERPL-MCNC: 9.4 MG/DL (ref 8.6–10.4)
CHLORIDE SERPL-SCNC: 100 MMOL/L (ref 98–107)
CO2 SERPL-SCNC: 25 MMOL/L (ref 20–31)
CREAT SERPL-MCNC: 1.16 MG/DL (ref 0.7–1.2)
EOSINOPHIL # BLD: 0.11 K/UL (ref 0–0.44)
EOSINOPHILS RELATIVE PERCENT: 3 % (ref 1–4)
ERYTHROCYTE [DISTWIDTH] IN BLOOD BY AUTOMATED COUNT: 12 % (ref 11.8–14.4)
GFR SERPL CREATININE-BSD FRML MDRD: >60 ML/MIN/1.73M2
GLUCOSE SERPL-MCNC: 151 MG/DL (ref 70–99)
HCT VFR BLD AUTO: 34.6 % (ref 40.7–50.3)
HGB BLD-MCNC: 12.1 G/DL (ref 13–17)
IGA SERPL-MCNC: ABNORMAL MG/DL (ref 70–400)
IGA, LOW RANGE: 20 MG/DL (ref 70–400)
IGG SERPL-MCNC: 1699 MG/DL (ref 700–1600)
IGM SERPL-MCNC: <25 MG/DL (ref 40–230)
IMM GRANULOCYTES # BLD AUTO: <0.03 K/UL (ref 0–0.3)
IMM GRANULOCYTES NFR BLD: 1 %
LYMPHOCYTES # BLD: 28 % (ref 24–43)
LYMPHOCYTES NFR BLD: 1.03 K/UL (ref 1.1–3.7)
MCH RBC QN AUTO: 32.3 PG (ref 25.2–33.5)
MCHC RBC AUTO-ENTMCNC: 35 G/DL (ref 25.2–33.5)
MCV RBC AUTO: 92.3 FL (ref 82.6–102.9)
MONOCYTES NFR BLD: 0.42 K/UL (ref 0.1–1.2)
MONOCYTES NFR BLD: 11 % (ref 3–12)
NEUTROPHILS NFR BLD: 56 % (ref 36–65)
NEUTS SEG NFR BLD: 2.14 K/UL (ref 1.5–8.1)
NRBC BLD-RTO: 0 PER 100 WBC
PLATELET # BLD AUTO: 153 K/UL (ref 138–453)
PMV BLD AUTO: 11.6 FL (ref 8.1–13.5)
POTASSIUM SERPL-SCNC: 4 MMOL/L (ref 3.7–5.3)
PROT SERPL-MCNC: 7.8 G/DL (ref 6.4–8.3)
RBC # BLD AUTO: 3.75 M/UL (ref 4.21–5.77)
SODIUM SERPL-SCNC: 138 MMOL/L (ref 135–144)
WBC OTHER # BLD: 3.8 K/UL (ref 3.5–11.3)

## 2023-07-05 PROCEDURE — 83521 IG LIGHT CHAINS FREE EACH: CPT

## 2023-07-05 PROCEDURE — 36415 COLL VENOUS BLD VENIPUNCTURE: CPT

## 2023-07-05 PROCEDURE — 85027 COMPLETE CBC AUTOMATED: CPT

## 2023-07-05 PROCEDURE — 84165 PROTEIN E-PHORESIS SERUM: CPT

## 2023-07-05 PROCEDURE — 80053 COMPREHEN METABOLIC PANEL: CPT

## 2023-07-05 PROCEDURE — 84155 ASSAY OF PROTEIN SERUM: CPT

## 2023-07-05 PROCEDURE — 86334 IMMUNOFIX E-PHORESIS SERUM: CPT

## 2023-07-05 PROCEDURE — 82784 ASSAY IGA/IGD/IGG/IGM EACH: CPT

## 2023-07-07 LAB
ALBUMIN PERCENT: 56 % (ref 45–65)
ALBUMIN SERPL-MCNC: 4.3 G/DL (ref 3.2–5.2)
ALPHA 2 PERCENT: 10 % (ref 6–13)
ALPHA1 GLOB SERPL ELPH-MCNC: 0.2 G/DL (ref 0.1–0.4)
ALPHA1 GLOB SERPL ELPH-MCNC: 2 % (ref 3–6)
ALPHA2 GLOB SERPL ELPH-MCNC: 0.7 G/DL (ref 0.5–0.9)
B-GLOBULIN SERPL ELPH-MCNC: 0.5 G/DL (ref 0.5–1.1)
B-GLOBULIN SERPL ELPH-MCNC: 6 % (ref 11–19)
FREE KAPPA/LAMBDA RATIO: 0.02 (ref 0.26–1.65)
GAMMA GLOB SERPL ELPH-MCNC: 2 G/DL (ref 0.5–1.5)
GAMMA GLOBULIN %: 26 % (ref 9–20)
INTERPRETATION SERPL IFE-IMP: ABNORMAL
KAPPA LC FREE SER-MCNC: 0.92 MG/DL (ref 0.37–1.94)
LAMBDA LC FREE SERPL-MCNC: 56.2 MG/DL (ref 0.57–2.63)
PATHOLOGIST: ABNORMAL
PATHOLOGIST: ABNORMAL
PROT PATTERN SERPL ELPH-IMP: ABNORMAL
PROT SERPL-MCNC: 7.6 G/DL (ref 6.4–8.3)
TOTAL PROT. SUM,%: 100 % (ref 98–102)
TOTAL PROT. SUM: 7.7 G/DL (ref 6.3–8.2)

## 2023-07-10 RX ORDER — LISINOPRIL 10 MG/1
TABLET ORAL
Qty: 90 TABLET | Refills: 0 | Status: SHIPPED | OUTPATIENT
Start: 2023-07-10

## 2023-07-11 ENCOUNTER — OFFICE VISIT (OUTPATIENT)
Dept: ONCOLOGY | Age: 73
End: 2023-07-11
Payer: MEDICARE

## 2023-07-11 VITALS
HEART RATE: 52 BPM | HEIGHT: 68 IN | TEMPERATURE: 98.4 F | WEIGHT: 199.4 LBS | DIASTOLIC BLOOD PRESSURE: 70 MMHG | OXYGEN SATURATION: 97 % | SYSTOLIC BLOOD PRESSURE: 124 MMHG | BODY MASS INDEX: 30.22 KG/M2

## 2023-07-11 DIAGNOSIS — G62.9 NEUROPATHY: ICD-10-CM

## 2023-07-11 DIAGNOSIS — D64.9 ANEMIA, UNSPECIFIED TYPE: ICD-10-CM

## 2023-07-11 DIAGNOSIS — R76.8 ELEVATED SERUM IMMUNOGLOBULIN FREE LIGHT CHAIN LEVEL: ICD-10-CM

## 2023-07-11 DIAGNOSIS — D47.2 SMOLDERING MYELOMA: Primary | ICD-10-CM

## 2023-07-11 PROCEDURE — G8427 DOCREV CUR MEDS BY ELIG CLIN: HCPCS | Performed by: INTERNAL MEDICINE

## 2023-07-11 PROCEDURE — 99213 OFFICE O/P EST LOW 20 MIN: CPT | Performed by: INTERNAL MEDICINE

## 2023-07-11 PROCEDURE — 99214 OFFICE O/P EST MOD 30 MIN: CPT | Performed by: INTERNAL MEDICINE

## 2023-07-11 PROCEDURE — 1036F TOBACCO NON-USER: CPT | Performed by: INTERNAL MEDICINE

## 2023-07-11 PROCEDURE — G8417 CALC BMI ABV UP PARAM F/U: HCPCS | Performed by: INTERNAL MEDICINE

## 2023-07-11 PROCEDURE — 3078F DIAST BP <80 MM HG: CPT | Performed by: INTERNAL MEDICINE

## 2023-07-11 PROCEDURE — 3017F COLORECTAL CA SCREEN DOC REV: CPT | Performed by: INTERNAL MEDICINE

## 2023-07-11 PROCEDURE — 3074F SYST BP LT 130 MM HG: CPT | Performed by: INTERNAL MEDICINE

## 2023-07-11 PROCEDURE — 1123F ACP DISCUSS/DSCN MKR DOCD: CPT | Performed by: INTERNAL MEDICINE

## 2023-07-11 NOTE — PROGRESS NOTES
Tyron Mendieta                                                                                                                  7/11/2023  MRN:   [id number]  YOB: 1950  PCP:                           Sukumar Frost MD  Referring Physician: No ref. 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 Overall patient continues to do well. Denies any focal area of bone pain. M protein remained stable. During this visit patient's allergy, social, medical, surgical history and medications were reviewed and updated. Summary of Case/History:    Tyron sal 51 y.o.male is a patient who presents to the clinic to establish care and for further work-up and evaluation. Patient was noted to have a hemoglobin of 11.6 with MCV 92 on his routine work-up done earlier in July. Review of patient's record revealed he has had mild anemia since 2018 with hemoglobin around 11.8 in August 2018 12.4 in August 2020. Work-up done so far shows adequate kidney function patient was noted to have positive stool occult blood test back in 2018 patient has had colonoscopy done back in 2018 due to findings of positive stool occult blood test and underwent polypectomy. Pathology from the polypectomy came back as hyperplastic polyp and tubular adenoma.     Past Medical History:   Past Medical History:   Diagnosis Date    Angina at rest [facility])     Arthritis     CAD (coronary artery disease)     angina    Hyperlipidemia

## 2023-07-12 RX ORDER — LISINOPRIL 10 MG/1
TABLET ORAL
Qty: 90 TABLET | Refills: 0 | OUTPATIENT
Start: 2023-07-12

## 2023-09-11 ENCOUNTER — OFFICE VISIT (OUTPATIENT)
Dept: INTERNAL MEDICINE | Age: 73
End: 2023-09-11
Payer: MEDICARE

## 2023-09-11 VITALS
SYSTOLIC BLOOD PRESSURE: 138 MMHG | HEIGHT: 68 IN | OXYGEN SATURATION: 97 % | DIASTOLIC BLOOD PRESSURE: 88 MMHG | HEART RATE: 68 BPM | WEIGHT: 197 LBS | RESPIRATION RATE: 16 BRPM | BODY MASS INDEX: 29.86 KG/M2

## 2023-09-11 DIAGNOSIS — R10.9 ABDOMINAL PAIN, UNSPECIFIED ABDOMINAL LOCATION: ICD-10-CM

## 2023-09-11 DIAGNOSIS — I10 ESSENTIAL HYPERTENSION: Primary | ICD-10-CM

## 2023-09-11 DIAGNOSIS — R73.03 PREDIABETES: ICD-10-CM

## 2023-09-11 DIAGNOSIS — M10.9 GOUT, UNSPECIFIED CAUSE, UNSPECIFIED CHRONICITY, UNSPECIFIED SITE: ICD-10-CM

## 2023-09-11 DIAGNOSIS — E78.2 MIXED HYPERLIPIDEMIA: ICD-10-CM

## 2023-09-11 PROCEDURE — 1036F TOBACCO NON-USER: CPT | Performed by: INTERNAL MEDICINE

## 2023-09-11 PROCEDURE — G8427 DOCREV CUR MEDS BY ELIG CLIN: HCPCS | Performed by: INTERNAL MEDICINE

## 2023-09-11 PROCEDURE — 3079F DIAST BP 80-89 MM HG: CPT | Performed by: INTERNAL MEDICINE

## 2023-09-11 PROCEDURE — 99214 OFFICE O/P EST MOD 30 MIN: CPT | Performed by: INTERNAL MEDICINE

## 2023-09-11 PROCEDURE — 3017F COLORECTAL CA SCREEN DOC REV: CPT | Performed by: INTERNAL MEDICINE

## 2023-09-11 PROCEDURE — 1123F ACP DISCUSS/DSCN MKR DOCD: CPT | Performed by: INTERNAL MEDICINE

## 2023-09-11 PROCEDURE — G8417 CALC BMI ABV UP PARAM F/U: HCPCS | Performed by: INTERNAL MEDICINE

## 2023-09-11 PROCEDURE — 3075F SYST BP GE 130 - 139MM HG: CPT | Performed by: INTERNAL MEDICINE

## 2023-09-11 PROCEDURE — 99212 OFFICE O/P EST SF 10 MIN: CPT | Performed by: INTERNAL MEDICINE

## 2023-09-11 RX ORDER — BLOOD PRESSURE TEST KIT
KIT MISCELLANEOUS
Qty: 1 KIT | Refills: 0 | Status: SHIPPED | OUTPATIENT
Start: 2023-09-11

## 2023-09-11 NOTE — PROGRESS NOTES
prescribed medications. All questions answered. Patient voiced understanding. Reviewed health maintenance. Electronically signed Xin Del Angel MD on 9/11/2023 at 4:29 PM    This note has been created using the Epic electronic health record, and dictated in part by [employer] dictation system. Despite the documenting physician's best efforts, there may be errors in spelling, grammar or syntax.

## 2023-10-02 RX ORDER — LISINOPRIL 10 MG/1
TABLET ORAL
Qty: 90 TABLET | Refills: 0 | Status: SHIPPED | OUTPATIENT
Start: 2023-10-02

## 2023-10-02 NOTE — TELEPHONE ENCOUNTER
Pedro Easton called requesting a refill of the below medication which has been pended for you:     Requested Prescriptions     Pending Prescriptions Disp Refills    lisinopril (PRINIVIL;ZESTRIL) 10 MG tablet [Pharmacy Med Name: Lisinopril 10 MG Oral Tablet] 90 tablet 0     Sig: Take 1 tablet by mouth once daily       Last Appointment Date: 9/11/2023  Next Appointment Date: 3/11/2024    Allergies   Allergen Reactions    Tetanus Toxoids Hives    Codeine Nausea Only    Dtap-Ipv Vaccine Rash    Penicillins Rash    Tetanus Antitoxin Rash

## 2023-11-02 ENCOUNTER — HOSPITAL ENCOUNTER (OUTPATIENT)
Age: 73
Discharge: HOME | End: 2023-11-02
Payer: MEDICARE

## 2023-11-02 DIAGNOSIS — G62.9 NEUROPATHY: ICD-10-CM

## 2023-11-02 DIAGNOSIS — D47.2 SMOLDERING MYELOMA: ICD-10-CM

## 2023-11-02 DIAGNOSIS — R76.8 ELEVATED SERUM IMMUNOGLOBULIN FREE LIGHT CHAIN LEVEL: ICD-10-CM

## 2023-11-02 LAB
ALBUMIN SERPL-MCNC: 4 G/DL (ref 3.5–5.2)
ALBUMIN/GLOB SERPL: 1 {RATIO} (ref 1–2.5)
ALP SERPL-CCNC: 80 U/L (ref 40–129)
ALT SERPL-CCNC: 30 U/L (ref 5–41)
ANION GAP SERPL CALCULATED.3IONS-SCNC: 8 MMOL/L (ref 9–17)
AST SERPL-CCNC: 30 U/L
BASOPHILS # BLD: <0.03 K/UL (ref 0–0.2)
BASOPHILS NFR BLD: 1 % (ref 0–2)
BILIRUB SERPL-MCNC: 0.7 MG/DL (ref 0.3–1.2)
BUN SERPL-MCNC: 28 MG/DL (ref 8–23)
BUN/CREAT SERPL: 31 (ref 9–20)
CALCIUM SERPL-MCNC: 9.4 MG/DL (ref 8.6–10.4)
CHLORIDE SERPL-SCNC: 99 MMOL/L (ref 98–107)
CO2 SERPL-SCNC: 29 MMOL/L (ref 20–31)
CREAT SERPL-MCNC: 0.9 MG/DL (ref 0.7–1.2)
EOSINOPHIL # BLD: 0.09 K/UL (ref 0–0.44)
EOSINOPHILS RELATIVE PERCENT: 2 % (ref 1–4)
ERYTHROCYTE [DISTWIDTH] IN BLOOD BY AUTOMATED COUNT: 13.1 % (ref 11.8–14.4)
GFR SERPL CREATININE-BSD FRML MDRD: >60 ML/MIN/1.73M2
GLUCOSE SERPL-MCNC: 121 MG/DL (ref 70–99)
HCT VFR BLD AUTO: 34 % (ref 40.7–50.3)
HGB BLD-MCNC: 11.5 G/DL (ref 13–17)
IMM GRANULOCYTES # BLD AUTO: <0.03 K/UL (ref 0–0.3)
IMM GRANULOCYTES NFR BLD: 0 %
LYMPHOCYTES NFR BLD: 1.11 K/UL (ref 1.1–3.7)
LYMPHOCYTES RELATIVE PERCENT: 30 % (ref 24–43)
MCH RBC QN AUTO: 32.5 PG (ref 25.2–33.5)
MCHC RBC AUTO-ENTMCNC: 33.8 G/DL (ref 25.2–33.5)
MCV RBC AUTO: 96 FL (ref 82.6–102.9)
MONOCYTES NFR BLD: 0.46 K/UL (ref 0.1–1.2)
MONOCYTES NFR BLD: 12 % (ref 3–12)
NEUTROPHILS NFR BLD: 55 % (ref 36–65)
NEUTS SEG NFR BLD: 2.01 K/UL (ref 1.5–8.1)
NRBC BLD-RTO: 0 PER 100 WBC
PLATELET # BLD AUTO: 173 K/UL (ref 138–453)
PMV BLD AUTO: 11.5 FL (ref 8.1–13.5)
POTASSIUM SERPL-SCNC: 4.2 MMOL/L (ref 3.7–5.3)
PROT SERPL-MCNC: 7.9 G/DL (ref 6.4–8.3)
RBC # BLD AUTO: 3.54 M/UL (ref 4.21–5.77)
SODIUM SERPL-SCNC: 136 MMOL/L (ref 135–144)
WBC OTHER # BLD: 3.7 K/UL (ref 3.5–11.3)

## 2023-11-02 PROCEDURE — 85025 COMPLETE CBC W/AUTO DIFF WBC: CPT

## 2023-11-02 PROCEDURE — 83521 IG LIGHT CHAINS FREE EACH: CPT

## 2023-11-02 PROCEDURE — 84155 ASSAY OF PROTEIN SERUM: CPT

## 2023-11-02 PROCEDURE — 80053 COMPREHEN METABOLIC PANEL: CPT

## 2023-11-02 PROCEDURE — 86334 IMMUNOFIX E-PHORESIS SERUM: CPT

## 2023-11-02 PROCEDURE — 84165 PROTEIN E-PHORESIS SERUM: CPT

## 2023-11-02 PROCEDURE — 36415 COLL VENOUS BLD VENIPUNCTURE: CPT

## 2023-11-03 LAB
ALBUMIN PERCENT: 57 % (ref 45–65)
ALBUMIN SERPL-MCNC: 4.5 G/DL (ref 3.2–5.2)
ALPHA 2 PERCENT: 9 % (ref 6–13)
ALPHA1 GLOB SERPL ELPH-MCNC: 0.2 G/DL (ref 0.1–0.4)
ALPHA1 GLOB SERPL ELPH-MCNC: 2 % (ref 3–6)
ALPHA2 GLOB SERPL ELPH-MCNC: 0.7 G/DL (ref 0.5–0.9)
B-GLOBULIN SERPL ELPH-MCNC: 0.5 G/DL (ref 0.5–1.1)
B-GLOBULIN SERPL ELPH-MCNC: 6 % (ref 11–19)
FREE KAPPA/LAMBDA RATIO: 0.02 (ref 0.26–1.65)
GAMMA GLOB SERPL ELPH-MCNC: 2 G/DL (ref 0.5–1.5)
GAMMA GLOBULIN %: 26 % (ref 9–20)
INTERPRETATION SERPL IFE-IMP: ABNORMAL
KAPPA LC FREE SER-MCNC: 8.9 MG/L (ref 3.7–19.4)
LAMBDA LC FREE SERPL-MCNC: 483.5 MG/L (ref 5.7–26.3)
PATH REV: ABNORMAL
PATHOLOGIST: ABNORMAL
PROT PATTERN SERPL ELPH-IMP: ABNORMAL
PROT SERPL-MCNC: 7.8 G/DL (ref 6.4–8.3)
TOTAL PROT. SUM,%: 100 % (ref 98–102)
TOTAL PROT. SUM: 7.9 G/DL (ref 6.3–8.2)

## 2023-11-07 ENCOUNTER — OFFICE VISIT (OUTPATIENT)
Dept: ONCOLOGY | Age: 73
End: 2023-11-07
Payer: MEDICARE

## 2023-11-07 VITALS
TEMPERATURE: 98.1 F | HEIGHT: 68 IN | SYSTOLIC BLOOD PRESSURE: 128 MMHG | BODY MASS INDEX: 30.31 KG/M2 | DIASTOLIC BLOOD PRESSURE: 64 MMHG | OXYGEN SATURATION: 97 % | HEART RATE: 58 BPM | WEIGHT: 200 LBS

## 2023-11-07 DIAGNOSIS — G62.9 NEUROPATHY: ICD-10-CM

## 2023-11-07 DIAGNOSIS — D47.2 SMOLDERING MYELOMA: Primary | ICD-10-CM

## 2023-11-07 DIAGNOSIS — D64.9 ANEMIA, UNSPECIFIED TYPE: ICD-10-CM

## 2023-11-07 PROCEDURE — 3017F COLORECTAL CA SCREEN DOC REV: CPT | Performed by: INTERNAL MEDICINE

## 2023-11-07 PROCEDURE — G8417 CALC BMI ABV UP PARAM F/U: HCPCS | Performed by: INTERNAL MEDICINE

## 2023-11-07 PROCEDURE — 1036F TOBACCO NON-USER: CPT | Performed by: INTERNAL MEDICINE

## 2023-11-07 PROCEDURE — 99214 OFFICE O/P EST MOD 30 MIN: CPT | Performed by: INTERNAL MEDICINE

## 2023-11-07 PROCEDURE — 3078F DIAST BP <80 MM HG: CPT | Performed by: INTERNAL MEDICINE

## 2023-11-07 PROCEDURE — G8484 FLU IMMUNIZE NO ADMIN: HCPCS | Performed by: INTERNAL MEDICINE

## 2023-11-07 PROCEDURE — 3074F SYST BP LT 130 MM HG: CPT | Performed by: INTERNAL MEDICINE

## 2023-11-07 PROCEDURE — G8427 DOCREV CUR MEDS BY ELIG CLIN: HCPCS | Performed by: INTERNAL MEDICINE

## 2023-11-07 PROCEDURE — 99213 OFFICE O/P EST LOW 20 MIN: CPT | Performed by: INTERNAL MEDICINE

## 2023-11-07 PROCEDURE — 1123F ACP DISCUSS/DSCN MKR DOCD: CPT | Performed by: INTERNAL MEDICINE

## 2023-11-07 NOTE — PROGRESS NOTES
- normal coloration and turgor, no rashes, no suspicious skin lesions noted       DATA:    Results for orders placed or performed during the hospital encounter of 11/02/23   CBC with Auto Differential   Result Value Ref Range    WBC 3.7 3.5 - 11.3 k/uL    RBC 3.54 (L) 4.21 - 5.77 m/uL    Hemoglobin 11.5 (L) 13.0 - 17.0 g/dL    Hematocrit 34.0 (L) 40.7 - 50.3 %    MCV 96.0 82.6 - 102.9 fL    MCH 32.5 25.2 - 33.5 pg    MCHC 33.8 (H) 25.2 - 33.5 g/dL    RDW 13.1 11.8 - 14.4 %    Platelets 259 961 - 938 k/uL    MPV 11.5 8.1 - 13.5 fL    NRBC Automated 0.0 0.0 per 100 WBC    Neutrophils % 55 36 - 65 %    Lymphocytes % 30 24 - 43 %    Monocytes % 12 3 - 12 %    Eosinophils % 2 1 - 4 %    Basophils % 1 0 - 2 %    Immature Granulocytes 0 0 %    Neutrophils Absolute 2.01 1.50 - 8.10 k/uL    Lymphocytes Absolute 1.11 1.10 - 3.70 k/uL    Monocytes Absolute 0.46 0.10 - 1.20 k/uL    Eosinophils Absolute 0.09 0.00 - 0.44 k/uL    Basophils Absolute <0.03 0.00 - 0.20 k/uL    Absolute Immature Granulocyte <0.03 0.00 - 0.30 k/uL   Comprehensive Metabolic Panel   Result Value Ref Range    Sodium 136 135 - 144 mmol/L    Potassium 4.2 3.7 - 5.3 mmol/L    Chloride 99 98 - 107 mmol/L    CO2 29 20 - 31 mmol/L    Anion Gap 8 (L) 9 - 17 mmol/L    Glucose 121 (H) 70 - 99 mg/dL    BUN 28 (H) 8 - 23 mg/dL    Creatinine 0.9 0.7 - 1.2 mg/dL    Est, Glom Filt Rate >60 >60 mL/min/1.73m2    Bun/Cre Ratio 31 (H) 9 - 20    Calcium 9.4 8.6 - 10.4 mg/dL    Total Protein 7.9 6.4 - 8.3 g/dL    Albumin 4.0 3.5 - 5.2 g/dL    Albumin/Globulin Ratio 1.0 1.0 - 2.5    Total Bilirubin 0.7 0.3 - 1.2 mg/dL    Alkaline Phosphatase 80 40 - 129 U/L    ALT 30 5 - 41 U/L    AST 30 <40 U/L   Monoclonal Panel   Result Value Ref Range    Kappa Free Light Chains QNT 8.9 3.7 - 19.4 mg/L    Lambda Free Light Chains .5 (H) 5.7 - 26.3 mg/L    Free Kappa/Lambda Ratio 0.02 (L) 0.26 - 1.65    Serum IFX Interp       A ZONE OF RESTRICTION IS PRESENT IN THE GAMMAGLOBULIN

## 2023-12-15 DIAGNOSIS — I25.10 CORONARY ARTERY DISEASE WITHOUT ANGINA PECTORIS, UNSPECIFIED VESSEL OR LESION TYPE, UNSPECIFIED WHETHER NATIVE OR TRANSPLANTED HEART: ICD-10-CM

## 2023-12-15 RX ORDER — LISINOPRIL 10 MG/1
TABLET ORAL
Qty: 90 TABLET | Refills: 0 | Status: SHIPPED | OUTPATIENT
Start: 2023-12-15

## 2023-12-15 RX ORDER — AMLODIPINE BESYLATE 10 MG/1
TABLET ORAL
Qty: 90 TABLET | Refills: 0 | Status: SHIPPED | OUTPATIENT
Start: 2023-12-15

## 2024-01-08 ENCOUNTER — OFFICE VISIT (OUTPATIENT)
Dept: INTERNAL MEDICINE | Age: 74
End: 2024-01-08
Payer: MEDICARE

## 2024-01-08 VITALS
RESPIRATION RATE: 18 BRPM | OXYGEN SATURATION: 97 % | TEMPERATURE: 97.6 F | DIASTOLIC BLOOD PRESSURE: 88 MMHG | HEART RATE: 86 BPM | SYSTOLIC BLOOD PRESSURE: 138 MMHG | BODY MASS INDEX: 29.55 KG/M2 | WEIGHT: 195 LBS | HEIGHT: 68 IN

## 2024-01-08 DIAGNOSIS — R05.9 COUGH, UNSPECIFIED TYPE: Primary | ICD-10-CM

## 2024-01-08 DIAGNOSIS — E78.2 MIXED HYPERLIPIDEMIA: ICD-10-CM

## 2024-01-08 DIAGNOSIS — I10 ESSENTIAL HYPERTENSION: ICD-10-CM

## 2024-01-08 DIAGNOSIS — I25.10 CORONARY ARTERY DISEASE WITHOUT ANGINA PECTORIS, UNSPECIFIED VESSEL OR LESION TYPE, UNSPECIFIED WHETHER NATIVE OR TRANSPLANTED HEART: ICD-10-CM

## 2024-01-08 DIAGNOSIS — M10.9 GOUT, UNSPECIFIED CAUSE, UNSPECIFIED CHRONICITY, UNSPECIFIED SITE: ICD-10-CM

## 2024-01-08 PROCEDURE — 3075F SYST BP GE 130 - 139MM HG: CPT | Performed by: INTERNAL MEDICINE

## 2024-01-08 PROCEDURE — 3079F DIAST BP 80-89 MM HG: CPT | Performed by: INTERNAL MEDICINE

## 2024-01-08 PROCEDURE — 1123F ACP DISCUSS/DSCN MKR DOCD: CPT | Performed by: INTERNAL MEDICINE

## 2024-01-08 PROCEDURE — 99212 OFFICE O/P EST SF 10 MIN: CPT | Performed by: INTERNAL MEDICINE

## 2024-01-08 PROCEDURE — 99214 OFFICE O/P EST MOD 30 MIN: CPT | Performed by: INTERNAL MEDICINE

## 2024-01-08 RX ORDER — CODEINE PHOSPHATE/GUAIFENESIN 10-100MG/5
5 LIQUID (ML) ORAL 2 TIMES DAILY PRN
Qty: 118 ML | Refills: 0 | Status: SHIPPED | OUTPATIENT
Start: 2024-01-08 | End: 2024-01-20

## 2024-01-08 ASSESSMENT — PATIENT HEALTH QUESTIONNAIRE - PHQ9
1. LITTLE INTEREST OR PLEASURE IN DOING THINGS: 0
DEPRESSION UNABLE TO ASSESS: FUNCTIONAL CAPACITY MOTIVATION LIMITS ACCURACY
SUM OF ALL RESPONSES TO PHQ9 QUESTIONS 1 & 2: 0
SUM OF ALL RESPONSES TO PHQ QUESTIONS 1-9: 0
2. FEELING DOWN, DEPRESSED OR HOPELESS: 0
SUM OF ALL RESPONSES TO PHQ QUESTIONS 1-9: 0

## 2024-01-08 NOTE — PROGRESS NOTES
[facility] INTERNAL MEDICINE    Visit Date:  1/8/2024  Patient:  Bandar Gabriel  YOB: 1950    Assessment & Plan    Cough: Post infectious.  Will treat with Robitussin with codeine.  He says that Tessalon Perles did not help.  I advised that he call the office if he does not improve    Bilateral knee osteoarthritis: Can Follow with orthopedics for injections, which helped him in the past.  Will prescribe Voltaren gel.  Reassess next visit.    Hypertension: Blood pressure controlled today.  Continue amlodipine 10 mg as well as lisinopril 10 mg.    Smoldering myeloma: Follows with oncology.  Will defer management.    Gout: Continue allopurinol.  Uric acid level under control.    Hyperlipidemia: Continue gemfibrozil as well as pravastatin.  We will continue to monitor     CVA: Continue aspirin.  Residual vision loss in the right eye.    Age-related macular degeneration: Follows with ophthalmology.  Can start PreserVision.  We will continue to monitor.     Diagnosis Orders   1. Cough, unspecified type  guaiFENesin-codeine (TUSSI-ORGANIDIN NR) 100-10 MG/5ML syrup      2. Coronary artery disease without angina pectoris, unspecified vessel or lesion type, unspecified whether native or transplanted heart        3. Essential hypertension        4. Mixed hyperlipidemia        5. Gout, unspecified cause, unspecified chronicity, unspecified site            Chief Complaint  Cough (Coughing up a lot of mucus , having issue sleeping , runny nose been going on for a few days. )      History of Present Illness   Reports that he had a cold several days ago, which seems to be getting better.  However, he says that he has been having cough that does not seem to be getting better but has been going on for the last several days.  Denies fever, chills, chest pain, shortness of breath.  No wheezing at this time.  Advised that he might have postinfectious cough, they will can treated with cough medicine.  He

## 2024-02-27 LAB
ANION GAP SERPL CALCULATED.3IONS-SCNC: 7.8 MMOL/L (ref 3–11)
BASOPHILS %: 1.27 (ref 0–3)
BASOPHILS ABSOLUTE: 0.04 (ref 0–0.3)
BUN BLDV-MCNC: 33 MG/DL (ref 9–20)
CALCIUM SERPL-MCNC: 9.1 MG/DL (ref 8.4–10.2)
CHLORIDE BLD-SCNC: 105 MMOL/L (ref 98–120)
CO2: 25 MMOL/L (ref 22–31)
CREAT SERPL-MCNC: 0.9 MG/DL (ref 0.7–1.3)
EOSINOPHILS %: 2.5 (ref 0–10)
EOSINOPHILS ABSOLUTE: 0.08 (ref 0–1.1)
GFR CALCULATED: > 60
GLUCOSE: 145 MG/DL (ref 75–110)
HCT VFR BLD CALC: 35.1 % (ref 42–52)
HEMOGLOBIN: 11.4 (ref 13.8–17.8)
LYMPHOCYTE %: 34.15 (ref 20–51.1)
LYMPHOCYTES ABSOLUTE: 1.04 (ref 1–5.5)
MCH RBC QN AUTO: 32 PG (ref 28.5–32.5)
MCHC RBC AUTO-ENTMCNC: 32.5 G/DL (ref 32–37)
MCV RBC AUTO: 98.5 FL (ref 80–94)
MONOCYTES %: 11.24 (ref 1.7–9.3)
MONOCYTES ABSOLUTE: 0.34 (ref 0.1–1)
NEUTROPHILS %: 50.83 (ref 42.2–75.2)
NEUTROPHILS ABSOLUTE: 1.55 (ref 2–8.1)
PDW BLD-RTO: 13 % (ref 10–15.5)
PLATELET # BLD: 147.7 THOU/MM3 (ref 130–400)
POTASSIUM SERPL-SCNC: 4.1 MMOL/L (ref 3.6–5)
RBC: 3.57 M/UL (ref 4.7–6.1)
SODIUM BLD-SCNC: 138 MMOL/L (ref 135–145)
WBC: 3.1 THOU/ML3 (ref 4.8–10.8)

## 2024-03-01 LAB
ALBUMIN SERPL-MCNC: 4.1 G/DL (ref 3.2–5.2)
ALPHA 1 GLOBULIN PERCENT: 2 % (ref 3–6)
ALPHA 2 GLOBULIN PERCENT: 9 % (ref 6–13)
ALPHA 2 GLOBULIN: 0.7 G/DL (ref 0.5–0.9)
ALPHA-1-GLOBULIN: 0.2 G/DL (ref 0.1–0.4)
BETA GLOBULIN PERCENT: 34 % (ref 11–19)
BETA GLOBULIN: 2.6 G/DL (ref 0.7–1.4)
FREE KAPPA LIGHT CHAINS: 9.9 MG/L (ref 3.7–19.4)
FREE KAPPA/LAMBDA RATIO: 0.02 (ref 0.26–1.65)
FREE LAMBDA LIGHT CHAINS: 427.3 MG/L (ref 5.7–26.3)
GAMMA GLOBULIN %: 1 % (ref 9–20)
GAMMA GLOBULIN: 0.1 G/DL (ref 0.5–1.5)
INTERPRETATION IMMUNOFIXATION: NORMAL
INTERPRETATION: ABNORMAL
M SPIKE 1 SERUM PROTEIN ELEC: ABNORMAL G/DL
M SPIKE 2 PROTEIN ELECTROPHORESIS SERUM: ABNORMAL G/DL
PATHOLOGIST REVIEW: NORMAL
PATHOLOGY REVIEW: ABNORMAL
TOTAL PROTEIN SUM PERCENT: 100 % (ref 98–102)
TOTAL PROTEIN SUM: 7.7 G/DL (ref 6.3–8.2)
TOTAL PROTEIN: 7.6 G/DL (ref 6.4–8.3)

## 2024-03-07 ENCOUNTER — OFFICE VISIT (OUTPATIENT)
Age: 74
End: 2024-03-07
Payer: MEDICARE

## 2024-03-07 VITALS
OXYGEN SATURATION: 94 % | HEART RATE: 57 BPM | SYSTOLIC BLOOD PRESSURE: 138 MMHG | BODY MASS INDEX: 30.89 KG/M2 | RESPIRATION RATE: 16 BRPM | HEIGHT: 68 IN | DIASTOLIC BLOOD PRESSURE: 70 MMHG | WEIGHT: 203.8 LBS

## 2024-03-07 DIAGNOSIS — D47.2 SMOLDERING MYELOMA: Primary | ICD-10-CM

## 2024-03-07 DIAGNOSIS — D64.9 ANEMIA, UNSPECIFIED TYPE: ICD-10-CM

## 2024-03-07 PROCEDURE — 99215 OFFICE O/P EST HI 40 MIN: CPT | Performed by: INTERNAL MEDICINE

## 2024-03-07 PROCEDURE — 3078F DIAST BP <80 MM HG: CPT | Performed by: INTERNAL MEDICINE

## 2024-03-07 PROCEDURE — 3075F SYST BP GE 130 - 139MM HG: CPT | Performed by: INTERNAL MEDICINE

## 2024-03-07 PROCEDURE — G8427 DOCREV CUR MEDS BY ELIG CLIN: HCPCS | Performed by: INTERNAL MEDICINE

## 2024-03-07 PROCEDURE — 1036F TOBACCO NON-USER: CPT | Performed by: INTERNAL MEDICINE

## 2024-03-07 PROCEDURE — 99214 OFFICE O/P EST MOD 30 MIN: CPT | Performed by: INTERNAL MEDICINE

## 2024-03-07 PROCEDURE — G8417 CALC BMI ABV UP PARAM F/U: HCPCS | Performed by: INTERNAL MEDICINE

## 2024-03-07 PROCEDURE — 1123F ACP DISCUSS/DSCN MKR DOCD: CPT | Performed by: INTERNAL MEDICINE

## 2024-03-07 PROCEDURE — 3017F COLORECTAL CA SCREEN DOC REV: CPT | Performed by: INTERNAL MEDICINE

## 2024-03-07 PROCEDURE — G8484 FLU IMMUNIZE NO ADMIN: HCPCS | Performed by: INTERNAL MEDICINE

## 2024-03-07 RX ORDER — ACYCLOVIR 400 MG/1
400 TABLET ORAL 2 TIMES DAILY
Qty: 60 TABLET | Refills: 5 | Status: SHIPPED | OUTPATIENT
Start: 2024-03-07 | End: 2024-09-03

## 2024-03-07 RX ORDER — LENALIDOMIDE 25 MG/1
25 CAPSULE ORAL DAILY
Qty: 21 CAPSULE | Refills: 5 | Status: SHIPPED | OUTPATIENT
Start: 2024-03-07

## 2024-03-07 RX ORDER — SULFAMETHOXAZOLE AND TRIMETHOPRIM 800; 160 MG/1; MG/1
1 TABLET ORAL 2 TIMES DAILY
Qty: 14 TABLET | Refills: 0 | Status: SHIPPED | OUTPATIENT
Start: 2024-03-07 | End: 2024-03-14

## 2024-03-07 RX ORDER — DEXAMETHASONE 4 MG/1
12 TABLET ORAL WEEKLY
Qty: 120 TABLET | Refills: 1 | Status: SHIPPED | OUTPATIENT
Start: 2024-03-07

## 2024-03-07 NOTE — PROGRESS NOTES
patient, face-to-face patient care, completing clinical documentation, obtaining and/or reviewing separately obtained history, performing a medically appropriate examination, counseling and educating the patient/family/caregiver, ordering medications, tests, or procedures, communicating with other HCPs (not separately reported), independently interpreting results (not separately reported), communicating results to the patient/family/caregiver and care coordination (not separately reported).    This note is created with the assistance of a speech recognition program.  While intending to generate a document that actually reflects the content of the visit, the document can still have some errors including those of syntax and sound a like substitutions which may escape proof reading.  It such instances, actual meaning can be extrapolated by contextual diversion.

## 2024-03-11 ENCOUNTER — OFFICE VISIT (OUTPATIENT)
Dept: INTERNAL MEDICINE | Age: 74
End: 2024-03-11
Payer: MEDICARE

## 2024-03-11 ENCOUNTER — HOSPITAL ENCOUNTER (OUTPATIENT)
Dept: GENERAL RADIOLOGY | Age: 74
Discharge: HOME | End: 2024-03-13

## 2024-03-11 VITALS
SYSTOLIC BLOOD PRESSURE: 130 MMHG | WEIGHT: 204 LBS | HEIGHT: 68 IN | RESPIRATION RATE: 16 BRPM | BODY MASS INDEX: 30.92 KG/M2 | DIASTOLIC BLOOD PRESSURE: 72 MMHG | OXYGEN SATURATION: 97 % | HEART RATE: 78 BPM

## 2024-03-11 DIAGNOSIS — C90.00 MULTIPLE MYELOMA, REMISSION STATUS UNSPECIFIED (HCC): ICD-10-CM

## 2024-03-11 DIAGNOSIS — M25.561 RIGHT KNEE PAIN, UNSPECIFIED CHRONICITY: ICD-10-CM

## 2024-03-11 DIAGNOSIS — E78.2 MIXED HYPERLIPIDEMIA: ICD-10-CM

## 2024-03-11 DIAGNOSIS — Z00.00 MEDICARE ANNUAL WELLNESS VISIT, SUBSEQUENT: Primary | ICD-10-CM

## 2024-03-11 DIAGNOSIS — R07.89 CHEST WALL PAIN: ICD-10-CM

## 2024-03-11 DIAGNOSIS — I25.10 CORONARY ARTERY DISEASE WITHOUT ANGINA PECTORIS, UNSPECIFIED VESSEL OR LESION TYPE, UNSPECIFIED WHETHER NATIVE OR TRANSPLANTED HEART: ICD-10-CM

## 2024-03-11 DIAGNOSIS — I10 ESSENTIAL HYPERTENSION: ICD-10-CM

## 2024-03-11 PROBLEM — I25.119 ATHEROSCLEROTIC HEART DISEASE OF NATIVE CORONARY ARTERY WITH UNSPECIFIED ANGINA PECTORIS (HCC): Status: ACTIVE | Noted: 2024-03-11

## 2024-03-11 PROBLEM — I20.9 ANGINA PECTORIS, UNSPECIFIED (HCC): Status: RESOLVED | Noted: 2024-03-11 | Resolved: 2024-03-11

## 2024-03-11 PROBLEM — I20.9 ANGINA PECTORIS, UNSPECIFIED (HCC): Status: ACTIVE | Noted: 2024-03-11

## 2024-03-11 PROCEDURE — G8417 CALC BMI ABV UP PARAM F/U: HCPCS | Performed by: INTERNAL MEDICINE

## 2024-03-11 PROCEDURE — 1036F TOBACCO NON-USER: CPT | Performed by: INTERNAL MEDICINE

## 2024-03-11 PROCEDURE — 3078F DIAST BP <80 MM HG: CPT | Performed by: INTERNAL MEDICINE

## 2024-03-11 PROCEDURE — 3075F SYST BP GE 130 - 139MM HG: CPT | Performed by: INTERNAL MEDICINE

## 2024-03-11 PROCEDURE — G8484 FLU IMMUNIZE NO ADMIN: HCPCS | Performed by: INTERNAL MEDICINE

## 2024-03-11 PROCEDURE — 99214 OFFICE O/P EST MOD 30 MIN: CPT | Performed by: INTERNAL MEDICINE

## 2024-03-11 PROCEDURE — 1123F ACP DISCUSS/DSCN MKR DOCD: CPT | Performed by: INTERNAL MEDICINE

## 2024-03-11 PROCEDURE — 99212 OFFICE O/P EST SF 10 MIN: CPT | Performed by: INTERNAL MEDICINE

## 2024-03-11 PROCEDURE — G0439 PPPS, SUBSEQ VISIT: HCPCS | Performed by: INTERNAL MEDICINE

## 2024-03-11 PROCEDURE — G8427 DOCREV CUR MEDS BY ELIG CLIN: HCPCS | Performed by: INTERNAL MEDICINE

## 2024-03-11 PROCEDURE — 3017F COLORECTAL CA SCREEN DOC REV: CPT | Performed by: INTERNAL MEDICINE

## 2024-03-11 SDOH — ECONOMIC STABILITY: FOOD INSECURITY: WITHIN THE PAST 12 MONTHS, THE FOOD YOU BOUGHT JUST DIDN'T LAST AND YOU DIDN'T HAVE MONEY TO GET MORE.: NEVER TRUE

## 2024-03-11 SDOH — ECONOMIC STABILITY: INCOME INSECURITY: HOW HARD IS IT FOR YOU TO PAY FOR THE VERY BASICS LIKE FOOD, HOUSING, MEDICAL CARE, AND HEATING?: NOT HARD AT ALL

## 2024-03-11 SDOH — ECONOMIC STABILITY: FOOD INSECURITY: WITHIN THE PAST 12 MONTHS, YOU WORRIED THAT YOUR FOOD WOULD RUN OUT BEFORE YOU GOT MONEY TO BUY MORE.: NEVER TRUE

## 2024-03-11 ASSESSMENT — PATIENT HEALTH QUESTIONNAIRE - PHQ9
SUM OF ALL RESPONSES TO PHQ QUESTIONS 1-9: 0
SUM OF ALL RESPONSES TO PHQ QUESTIONS 1-9: 0
1. LITTLE INTEREST OR PLEASURE IN DOING THINGS: 0
SUM OF ALL RESPONSES TO PHQ QUESTIONS 1-9: 0
DEPRESSION UNABLE TO ASSESS: FUNCTIONAL CAPACITY MOTIVATION LIMITS ACCURACY
2. FEELING DOWN, DEPRESSED OR HOPELESS: 0
SUM OF ALL RESPONSES TO PHQ9 QUESTIONS 1 & 2: 0
SUM OF ALL RESPONSES TO PHQ QUESTIONS 1-9: 0

## 2024-03-11 ASSESSMENT — LIFESTYLE VARIABLES
HOW OFTEN DO YOU HAVE A DRINK CONTAINING ALCOHOL: 2-4 TIMES A MONTH
HOW MANY STANDARD DRINKS CONTAINING ALCOHOL DO YOU HAVE ON A TYPICAL DAY: 1 OR 2

## 2024-03-11 NOTE — PATIENT INSTRUCTIONS
benefits. Some of the tests and screenings are paid in full while other may be subject to a deductible, co-insurance, and/or copay.    Some of these benefits include a comprehensive review of your medical history including lifestyle, illnesses that may run in your family, and various assessments and screenings as appropriate.    After reviewing your medical record and screening and assessments performed today your provider may have ordered immunizations, labs, imaging, and/or referrals for you.  A list of these orders (if applicable) as well as your Preventive Care list are included within your After Visit Summary for your review.    Other Preventive Recommendations:    A preventive eye exam performed by an eye specialist is recommended every 1-2 years to screen for glaucoma; cataracts, macular degeneration, and other eye disorders.  A preventive dental visit is recommended every 6 months.  Try to get at least 150 minutes of exercise per week or 10,000 steps per day on a pedometer .  Order or download the FREE \"Exercise & Physical Activity: Your Everyday Guide\" from [employer]. Call 1-627.503.1782 or search [employer] online.  You need 3466-4229 mg of calcium and 3788-9460 IU of vitamin D per day. It is possible to meet your calcium requirement with diet alone, but a vitamin D supplement is usually necessary to meet this goal.  When exposed to the sun, use a sunscreen that protects against both UVA and UVB radiation with an SPF of 30 or greater. Reapply every 2 to 3 hours or after sweating, drying off with a towel, or swimming.  Always wear a seat belt when traveling in a car. Always wear a helmet when riding a bicycle or motorcycle.

## 2024-03-11 NOTE — PROGRESS NOTES
[facility] INTERNAL MEDICINE    Visit Date:  3/11/2024  Patient:  Bandar Gabriel  YOB: 1950    Assessment & Plan    Chest wall pain: Possibly musculoskeletal versus intercostal neuritis.  He already receives Decadron for smoldering myeloma.  Can treat with Voltaren gel.  Reassess next visit.    Bilateral knee osteoarthritis: Can Follow with orthopedics for injections, which helped him in the past.  Will prescribe Voltaren gel.  Reassess next visit.    Hypertension: Blood pressure controlled today.  Continue amlodipine 10 mg as well as lisinopril 10 mg.    Smoldering myeloma: Follows with oncology.  Will defer management.    Gout: Continue allopurinol.  Uric acid level under control.    Hyperlipidemia: Continue gemfibrozil as well as pravastatin.  We will continue to monitor     CVA: Continue aspirin.  Residual vision loss in the right eye.    Age-related macular degeneration: Follows with ophthalmology.  Can start PreserVision.  We will continue to monitor.     Diagnosis Orders   1. Medicare annual wellness visit, subsequent        2. Essential hypertension        3. Multiple myeloma, remission status unspecified (HCC)        4. Mixed hyperlipidemia        5. Coronary artery disease without angina pectoris, unspecified vessel or lesion type, unspecified whether native or transplanted heart        6. Right knee pain, unspecified chronicity            Chief Complaint  Medicare AW      History of Present Illness   Reports that he has pain in the right side of his chest that has been going on for the last week.  Says that it hurts him with certain movements.  Denies fever, chills.  No recent trauma.    Has a history of hypertension, smoldering myeloma, gout, hyperlipidemia, CVA that are unchanged.    Objective  /72 (Site: Left Upper Arm, Position: Sitting, Cuff Size: Medium Adult)   Pulse 78   Resp 16   Ht 1.727 m (5' 8\")   Wt 92.5 kg (204 lb)   SpO2 97%   BMI 31.02 kg/m² 
tablet Place 0.4 mg under the tongue every 5 minutes as needed  Provider, MD Vikas   lisinopril-hydroCHLOROthiazide (PRINZIDE;ZESTORETIC) 20-25 MG per tablet Take 1 tablet by mouth once daily  Rodolfo Ramires MD       [facility] (Including outside providers/suppliers regularly involved in providing care):   Patient Care Team:  Beth Holm MD as PCP - General (Internal Medicine)  Beth Holm MD as PCP - Empaneled Provider     Reviewed and updated this visit:  Tobacco  Allergies  Meds  Problems  Med Hx  Surg Hx  Soc Hx  Fam Hx        I, Evangelina Yadav LPN, 3/11/2024, performed the documented evaluation under the direct supervision of the attending physician.

## 2024-03-18 DIAGNOSIS — D47.2 SMOLDERING MYELOMA: ICD-10-CM

## 2024-03-19 DIAGNOSIS — I25.10 CORONARY ARTERY DISEASE WITHOUT ANGINA PECTORIS, UNSPECIFIED VESSEL OR LESION TYPE, UNSPECIFIED WHETHER NATIVE OR TRANSPLANTED HEART: ICD-10-CM

## 2024-03-19 DIAGNOSIS — I10 ESSENTIAL HYPERTENSION: ICD-10-CM

## 2024-03-19 RX ORDER — AMLODIPINE BESYLATE 10 MG/1
10 TABLET ORAL DAILY
Qty: 90 TABLET | Refills: 3 | Status: SHIPPED | OUTPATIENT
Start: 2024-03-19

## 2024-03-19 RX ORDER — ISOSORBIDE MONONITRATE 30 MG/1
TABLET, EXTENDED RELEASE ORAL
Qty: 90 TABLET | Refills: 3 | Status: SHIPPED | OUTPATIENT
Start: 2024-03-19

## 2024-03-19 RX ORDER — PRAVASTATIN SODIUM 10 MG
TABLET ORAL
Qty: 90 TABLET | Refills: 3 | Status: SHIPPED | OUTPATIENT
Start: 2024-03-19

## 2024-03-19 RX ORDER — GEMFIBROZIL 600 MG/1
TABLET, FILM COATED ORAL
Qty: 90 TABLET | Refills: 3 | Status: SHIPPED | OUTPATIENT
Start: 2024-03-19

## 2024-03-19 RX ORDER — BISOPROLOL FUMARATE 10 MG/1
TABLET, FILM COATED ORAL
Qty: 90 TABLET | Refills: 3 | Status: SHIPPED | OUTPATIENT
Start: 2024-03-19

## 2024-03-19 RX ORDER — LENALIDOMIDE 25 MG/1
25 CAPSULE ORAL DAILY
Qty: 21 CAPSULE | Refills: 5 | Status: ACTIVE | OUTPATIENT
Start: 2024-03-19

## 2024-03-19 RX ORDER — LISINOPRIL 10 MG/1
10 TABLET ORAL DAILY
Qty: 90 TABLET | Refills: 3 | Status: SHIPPED | OUTPATIENT
Start: 2024-03-19

## 2024-03-26 ENCOUNTER — HOSPITAL ENCOUNTER (OUTPATIENT)
Age: 74
Discharge: HOME | End: 2024-03-26
Payer: MEDICARE

## 2024-03-26 ENCOUNTER — TELEPHONE (OUTPATIENT)
Dept: ONCOLOGY | Age: 74
End: 2024-03-26

## 2024-03-26 ENCOUNTER — OFFICE VISIT (OUTPATIENT)
Dept: ONCOLOGY | Age: 74
End: 2024-03-26
Payer: MEDICARE

## 2024-03-26 VITALS
TEMPERATURE: 97.1 F | HEART RATE: 52 BPM | HEIGHT: 68 IN | OXYGEN SATURATION: 97 % | BODY MASS INDEX: 30.62 KG/M2 | WEIGHT: 202 LBS | DIASTOLIC BLOOD PRESSURE: 68 MMHG | RESPIRATION RATE: 12 BRPM | SYSTOLIC BLOOD PRESSURE: 136 MMHG

## 2024-03-26 DIAGNOSIS — D64.9 ANEMIA, UNSPECIFIED TYPE: ICD-10-CM

## 2024-03-26 DIAGNOSIS — D47.2 SMOLDERING MYELOMA: Primary | ICD-10-CM

## 2024-03-26 DIAGNOSIS — D47.2 SMOLDERING MYELOMA: ICD-10-CM

## 2024-03-26 DIAGNOSIS — G62.9 NEUROPATHY: ICD-10-CM

## 2024-03-26 LAB
ALBUMIN SERPL-MCNC: 4 G/DL (ref 3.5–5.2)
ALBUMIN/GLOB SERPL: 0.9 {RATIO} (ref 1–2.5)
ALP SERPL-CCNC: 93 U/L (ref 40–129)
ALT SERPL-CCNC: 26 U/L (ref 5–41)
ANION GAP SERPL CALCULATED.3IONS-SCNC: 10 MMOL/L (ref 9–17)
AST SERPL-CCNC: 31 U/L
BASOPHILS # BLD: <0.03 K/UL (ref 0–0.2)
BASOPHILS NFR BLD: 1 % (ref 0–2)
BILIRUB SERPL-MCNC: 0.7 MG/DL (ref 0.3–1.2)
BUN SERPL-MCNC: 34 MG/DL (ref 8–23)
BUN/CREAT SERPL: 34 (ref 9–20)
CALCIUM SERPL-MCNC: 9.3 MG/DL (ref 8.6–10.4)
CHLORIDE SERPL-SCNC: 100 MMOL/L (ref 98–107)
CO2 SERPL-SCNC: 27 MMOL/L (ref 20–31)
CREAT SERPL-MCNC: 1 MG/DL (ref 0.7–1.2)
EOSINOPHIL # BLD: 0.05 K/UL (ref 0–0.44)
EOSINOPHILS RELATIVE PERCENT: 1 % (ref 1–4)
ERYTHROCYTE [DISTWIDTH] IN BLOOD BY AUTOMATED COUNT: 12.3 % (ref 11.8–14.4)
GFR SERPL CREATININE-BSD FRML MDRD: 79 ML/MIN/1.73M2
GLUCOSE SERPL-MCNC: 130 MG/DL (ref 70–99)
HCT VFR BLD AUTO: 33.1 % (ref 40.7–50.3)
HGB BLD-MCNC: 11.6 G/DL (ref 13–17)
IMM GRANULOCYTES # BLD AUTO: <0.03 K/UL (ref 0–0.3)
IMM GRANULOCYTES NFR BLD: 0 %
LYMPHOCYTES NFR BLD: 1.1 K/UL (ref 1.1–3.7)
LYMPHOCYTES RELATIVE PERCENT: 29 % (ref 24–43)
MCH RBC QN AUTO: 33.4 PG (ref 25.2–33.5)
MCHC RBC AUTO-ENTMCNC: 35 G/DL (ref 25.2–33.5)
MCV RBC AUTO: 95.4 FL (ref 82.6–102.9)
MONOCYTES NFR BLD: 0.42 K/UL (ref 0.1–1.2)
MONOCYTES NFR BLD: 11 % (ref 3–12)
NEUTROPHILS NFR BLD: 58 % (ref 36–65)
NEUTS SEG NFR BLD: 2.15 K/UL (ref 1.5–8.1)
NRBC BLD-RTO: 0 PER 100 WBC
PLATELET # BLD AUTO: 159 K/UL (ref 138–453)
PMV BLD AUTO: 11.1 FL (ref 8.1–13.5)
POTASSIUM SERPL-SCNC: 4.4 MMOL/L (ref 3.7–5.3)
PROT SERPL-MCNC: 8.3 G/DL (ref 6.4–8.3)
RBC # BLD AUTO: 3.47 M/UL (ref 4.21–5.77)
SODIUM SERPL-SCNC: 137 MMOL/L (ref 135–144)
WBC OTHER # BLD: 3.7 K/UL (ref 3.5–11.3)

## 2024-03-26 PROCEDURE — 99214 OFFICE O/P EST MOD 30 MIN: CPT | Performed by: INTERNAL MEDICINE

## 2024-03-26 PROCEDURE — 3078F DIAST BP <80 MM HG: CPT | Performed by: INTERNAL MEDICINE

## 2024-03-26 PROCEDURE — 3075F SYST BP GE 130 - 139MM HG: CPT | Performed by: INTERNAL MEDICINE

## 2024-03-26 PROCEDURE — 85025 COMPLETE CBC W/AUTO DIFF WBC: CPT

## 2024-03-26 PROCEDURE — G8484 FLU IMMUNIZE NO ADMIN: HCPCS | Performed by: INTERNAL MEDICINE

## 2024-03-26 PROCEDURE — 1123F ACP DISCUSS/DSCN MKR DOCD: CPT | Performed by: INTERNAL MEDICINE

## 2024-03-26 PROCEDURE — 84165 PROTEIN E-PHORESIS SERUM: CPT

## 2024-03-26 PROCEDURE — 80053 COMPREHEN METABOLIC PANEL: CPT

## 2024-03-26 PROCEDURE — 84155 ASSAY OF PROTEIN SERUM: CPT

## 2024-03-26 PROCEDURE — 36415 COLL VENOUS BLD VENIPUNCTURE: CPT

## 2024-03-26 PROCEDURE — 99213 OFFICE O/P EST LOW 20 MIN: CPT | Performed by: INTERNAL MEDICINE

## 2024-03-26 PROCEDURE — 1036F TOBACCO NON-USER: CPT | Performed by: INTERNAL MEDICINE

## 2024-03-26 PROCEDURE — G8417 CALC BMI ABV UP PARAM F/U: HCPCS | Performed by: INTERNAL MEDICINE

## 2024-03-26 PROCEDURE — 83521 IG LIGHT CHAINS FREE EACH: CPT

## 2024-03-26 PROCEDURE — G8427 DOCREV CUR MEDS BY ELIG CLIN: HCPCS | Performed by: INTERNAL MEDICINE

## 2024-03-26 PROCEDURE — 86334 IMMUNOFIX E-PHORESIS SERUM: CPT

## 2024-03-26 PROCEDURE — 3017F COLORECTAL CA SCREEN DOC REV: CPT | Performed by: INTERNAL MEDICINE

## 2024-03-26 RX ORDER — FAMOTIDINE 20 MG/1
TABLET, FILM COATED ORAL
COMMUNITY
Start: 2019-12-16

## 2024-03-26 NOTE — PROGRESS NOTES
Bandar Gabriel                                                                                                                  3/26/2024  MRN:   [id number]  YOB: 1950  PCP:                           Beth Holm MD  Referring Physician: No ref. provider found  Treating Physician Name: HEMANT BECK MD      Reason for visit:  Chief Complaint   Patient presents with    Cancer     smoldering myeloma discuss different tx options   Reviewed labs.  Discussed treatment plan.    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  Patient has received shipment of the Revlimid.  Patient also took it for a few days and did well.  However patient was confused about the treatment regimen and therefore was advised to stop taking the medication and repeat visit was scheduled to go over treatment plan.  Patient's niece could not make it to the appointment.  Patient overall doing well.   During this visit patient's allergy, social, medical, surgical history and medications were reviewed and updated.    Summary of Case/History:    Bandar Gabriel a 73 y.o.male is a patient who presents to the clinic to establish care and for further work-up and evaluation.  Patient was noted to have a hemoglobin of 11.6 with MCV 92 on his routine work-up done earlier in July.  Review of patient's record revealed he has had mild anemia since 2018 with hemoglobin around 11.8 in August 2018 12.4 in August 2020.  Work-up done so far shows adequate kidney function patient was noted to have positive stool occult blood test back in 2018 patient has had colonoscopy done back in 2018 due to findings of positive stool occult blood test and underwent

## 2024-03-26 NOTE — TELEPHONE ENCOUNTER
Name: Bandar Gabriel  : 1950  MRN: [id number]    Oncology Navigation- Initial Note:    Intake-  Contact Type: Telephone    Continuum of Care: Diagnosis/Active Treatment    Smoking hx: former smoker. 13 pk-yrs    Notes:   Writer receives referral for ONN from Dr. Stewart. Reviewing chart.   Patient was in office today for appt. He is currently prescribe Revelimid but has difficulty following the regimen.    Phone call attempted to initiate oncology nurse navigation to assist with regimen compliance. No answer at patient's number. Per HIPAA, no permission to contact another person.  Navigator will follow.  Care team updated.      Electronically signed by Meghna Austin RN on 3/26/2024 at 2:59 PM

## 2024-03-27 LAB
ALBUMIN PERCENT: 53 % (ref 45–65)
ALBUMIN SERPL-MCNC: 4.2 G/DL (ref 3.2–5.2)
ALPHA 2 PERCENT: 9 % (ref 6–13)
ALPHA1 GLOB SERPL ELPH-MCNC: 0.2 G/DL (ref 0.1–0.4)
ALPHA1 GLOB SERPL ELPH-MCNC: 2 % (ref 3–6)
ALPHA2 GLOB SERPL ELPH-MCNC: 0.7 G/DL (ref 0.5–0.9)
B-GLOBULIN SERPL ELPH-MCNC: 2.6 G/DL (ref 0.5–1.1)
B-GLOBULIN SERPL ELPH-MCNC: 33 % (ref 11–19)
FREE KAPPA/LAMBDA RATIO: 0.02 (ref 0.22–1.74)
GAMMA GLOB SERPL ELPH-MCNC: 0.2 G/DL (ref 0.5–1.5)
GAMMA GLOBULIN %: 2 % (ref 9–20)
INTERPRETATION SERPL IFE-IMP: ABNORMAL
KAPPA LC FREE SER-MCNC: 16.8 MG/L
LAMBDA LC FREE SERPL-MCNC: 941 MG/L (ref 4.2–27.7)
PATH REV: ABNORMAL
PATHOLOGIST: ABNORMAL
PROT PATTERN SERPL ELPH-IMP: ABNORMAL
PROT SERPL-MCNC: 7.9 G/DL (ref 6.6–8.7)
TOTAL PROT. SUM,%: 99 % (ref 98–102)
TOTAL PROT. SUM: 7.9 G/DL (ref 6.3–8.2)

## 2024-03-28 ENCOUNTER — HOSPITAL ENCOUNTER (OUTPATIENT)
Dept: GENERAL RADIOLOGY | Age: 74
Discharge: HOME | End: 2024-03-30
Attending: INTERNAL MEDICINE
Payer: MEDICARE

## 2024-03-28 DIAGNOSIS — D47.2 SMOLDERING MYELOMA: ICD-10-CM

## 2024-03-28 PROCEDURE — 77075 RADEX OSSEOUS SURVEY COMPL: CPT

## 2024-03-28 RX ORDER — SULFAMETHOXAZOLE AND TRIMETHOPRIM 800; 160 MG/1; MG/1
1 TABLET ORAL 2 TIMES DAILY
Qty: 14 TABLET | Refills: 5 | Status: CANCELLED | OUTPATIENT
Start: 2024-03-28 | End: 2024-04-04

## 2024-03-29 DIAGNOSIS — D47.2 SMOLDERING MYELOMA: ICD-10-CM

## 2024-04-01 ENCOUNTER — HOSPITAL ENCOUNTER (OUTPATIENT)
Dept: INFUSION THERAPY | Age: 74
Discharge: HOME | End: 2024-04-01
Payer: COMMERCIAL

## 2024-04-01 PROCEDURE — 99212 OFFICE O/P EST SF 10 MIN: CPT

## 2024-04-01 RX ORDER — SULFAMETHOXAZOLE AND TRIMETHOPRIM 800; 160 MG/1; MG/1
1 TABLET ORAL
Qty: 30 TABLET | Refills: 1 | Status: SHIPPED | OUTPATIENT
Start: 2024-04-01 | End: 2024-08-19

## 2024-04-01 RX ORDER — SULFAMETHOXAZOLE AND TRIMETHOPRIM 800; 160 MG/1; MG/1
1 TABLET ORAL
COMMUNITY

## 2024-04-01 RX ORDER — ONDANSETRON 4 MG/1
4 TABLET, FILM COATED ORAL
COMMUNITY

## 2024-04-01 NOTE — PROGRESS NOTES
Patient here for teaching for oral chemotherapy revlimid.  Spent 300 minutes with him. Handouts given on revlimid and reviewed them with him.   He has been taught by Revlimid drug company via phone prior to appointment.  Discussed oral chemotherapy and side effects of revlimid.  Called Dr. Stewart and got verbal order to call in prescription for Zofran 4 mg PO ever 4-6 hours PRN nausea disp #30 refill #2  Bactrim DS One tablet daily PO every other day three times weekly(Monday Wednesday Friday) Disp #12 with #4 refills. Were called to [facility].  Patient verbalized understanding.  Did provide patient with unit, office and [facility] Answering service for [facility].

## 2024-04-02 ENCOUNTER — TELEPHONE (OUTPATIENT)
Dept: ONCOLOGY | Age: 74
End: 2024-04-02

## 2024-04-02 NOTE — TELEPHONE ENCOUNTER
Name: Bandar Gabriel  : 1950  MRN: [id number]    Oncology Navigation Follow-Up Note    Contact Type:  Telephone    Notes:   Second attempt to contact patient to introduce ONN. No answer and no voice mail available.  Reviewed chart. Patient had a teaching session on Revlimid 24. Next Dr. Stewart appt 24 with labs needed prior.   Letter of introduction, business card and ONN flyer mailed to patient with request to call navigator.        Electronically signed by Meghna Austin RN on 2024 at 9:50 AM

## 2024-04-12 LAB
ANION GAP SERPL CALCULATED.3IONS-SCNC: 13.8 MMOL/L (ref 3–11)
BASOPHILS %: 0.65 (ref 0–3)
BASOPHILS ABSOLUTE: 0.04 (ref 0–0.3)
BUN BLDV-MCNC: 33 MG/DL (ref 9–20)
CALCIUM SERPL-MCNC: 9.7 MG/DL (ref 8.4–10.2)
CHLORIDE BLD-SCNC: 99 MMOL/L (ref 98–120)
CO2: 29 MMOL/L (ref 22–31)
CREAT SERPL-MCNC: 1.8 MG/DL (ref 0.7–1.3)
EOSINOPHILS %: 2.09 (ref 0–10)
EOSINOPHILS ABSOLUTE: 0.12 (ref 0–1.1)
GFR CALCULATED: 39.4
GLUCOSE: 122 MG/DL (ref 75–110)
HCT VFR BLD CALC: 35.6 % (ref 42–52)
HEMOGLOBIN: 11.4 (ref 13.8–17.8)
LYMPHOCYTE %: 27.37 (ref 20–51.1)
LYMPHOCYTES ABSOLUTE: 1.56 (ref 1–5.5)
MCH RBC QN AUTO: 32.2 PG (ref 28.5–32.5)
MCHC RBC AUTO-ENTMCNC: 32 G/DL (ref 32–37)
MCV RBC AUTO: 100.5 FL (ref 80–94)
MONOCYTES %: 16.89 (ref 1.7–9.3)
MONOCYTES ABSOLUTE: 0.97 (ref 0.1–1)
NEUTROPHILS ABSOLUTE: 3.03 (ref 2–8.1)
NEUTROPHILS RELATIVE PERCENT: 53.01 (ref 42.2–75.2)
PDW BLD-RTO: 12.4 % (ref 10–15.5)
PLATELET # BLD: 139 THOU/MM3 (ref 130–400)
POTASSIUM SERPL-SCNC: 4.8 MMOL/L (ref 3.6–5)
RBC: 3.54 M/UL (ref 4.7–6.1)
SODIUM BLD-SCNC: 141 MMOL/L (ref 135–145)
WBC: 5.7 THOU/ML3 (ref 4.8–10.8)

## 2024-04-15 LAB
ALBUMIN PERCENT: 56 % (ref 45–65)
ALBUMIN SERPL-MCNC: 4.4 G/DL (ref 3.2–5.2)
ALPHA 1 GLOBULIN PERCENT: 2 % (ref 3–6)
ALPHA 2 GLOBULIN PERCENT: 9 % (ref 6–13)
ALPHA 2 GLOBULIN: 0.7 G/DL (ref 0.5–0.9)
ALPHA-1-GLOBULIN: 0.2 G/DL (ref 0.1–0.4)
BETA GLOBULIN PERCENT: 31 % (ref 11–19)
BETA GLOBULIN: 2.4 G/DL (ref 0.7–1.4)
FREE KAPPA LIGHT CHAINS: 24.8 MG/L
FREE KAPPA/LAMBDA RATIO: 0.03 (ref 0.22–1.74)
FREE LAMBDA LIGHT CHAINS: 762 MG/L (ref 4.2–27.7)
GAMMA GLOBULIN %: 2 % (ref 9–20)
GAMMA GLOBULIN: 0.1 G/DL (ref 0.5–1.5)
INTERPRETATION IMMUNOFIXATION: NORMAL
INTERPRETATION: ABNORMAL
M SPIKE 1 SERUM PROTEIN ELEC: ABNORMAL G/DL
M SPIKE 2 PROTEIN ELECTROPHORESIS SERUM: ABNORMAL G/DL
PATHOLOGIST REVIEW: NORMAL
PATHOLOGY REVIEW: ABNORMAL
TOTAL PROTEIN SUM PERCENT: 100 % (ref 98–102)
TOTAL PROTEIN SUM: 7.8 G/DL (ref 6.3–8.2)
TOTAL PROTEIN: 7.9 G/DL (ref 6.6–8.7)

## 2024-04-18 ENCOUNTER — HOSPITAL ENCOUNTER (OUTPATIENT)
Age: 74
Setting detail: SPECIMEN
Discharge: HOME | End: 2024-04-18
Payer: COMMERCIAL

## 2024-04-18 ENCOUNTER — APPOINTMENT (OUTPATIENT)
Dept: CT IMAGING | Age: 74
DRG: 683 | End: 2024-04-18
Payer: COMMERCIAL

## 2024-04-18 ENCOUNTER — OFFICE VISIT (OUTPATIENT)
Age: 74
End: 2024-04-18
Payer: COMMERCIAL

## 2024-04-18 ENCOUNTER — TELEPHONE (OUTPATIENT)
Age: 74
End: 2024-04-18

## 2024-04-18 ENCOUNTER — HOSPITAL ENCOUNTER (OUTPATIENT)
Age: 74
Discharge: HOME | End: 2024-04-18
Payer: COMMERCIAL

## 2024-04-18 ENCOUNTER — TELEPHONE (OUTPATIENT)
Dept: ONCOLOGY | Age: 74
End: 2024-04-18

## 2024-04-18 ENCOUNTER — HOSPITAL ENCOUNTER (INPATIENT)
Age: 74
LOS: 2 days | Discharge: HOME | DRG: 683 | End: 2024-04-20
Attending: EMERGENCY MEDICINE | Admitting: INTERNAL MEDICINE
Payer: COMMERCIAL

## 2024-04-18 VITALS
OXYGEN SATURATION: 95 % | RESPIRATION RATE: 16 BRPM | SYSTOLIC BLOOD PRESSURE: 138 MMHG | BODY MASS INDEX: 30.25 KG/M2 | DIASTOLIC BLOOD PRESSURE: 70 MMHG | HEART RATE: 64 BPM | WEIGHT: 199.6 LBS | HEIGHT: 68 IN

## 2024-04-18 DIAGNOSIS — D47.2 SMOLDERING MYELOMA: Primary | ICD-10-CM

## 2024-04-18 DIAGNOSIS — C90.00 MULTIPLE MYELOMA, REMISSION STATUS UNSPECIFIED (HCC): ICD-10-CM

## 2024-04-18 DIAGNOSIS — D47.2 SMOLDERING MYELOMA: ICD-10-CM

## 2024-04-18 DIAGNOSIS — D64.9 ANEMIA, UNSPECIFIED TYPE: ICD-10-CM

## 2024-04-18 DIAGNOSIS — G62.9 NEUROPATHY: ICD-10-CM

## 2024-04-18 DIAGNOSIS — N17.9 ACUTE KIDNEY INJURY (HCC): Primary | ICD-10-CM

## 2024-04-18 PROBLEM — E86.0 DEHYDRATION WITH HYPONATREMIA: Status: ACTIVE | Noted: 2024-04-18

## 2024-04-18 PROBLEM — D70.9 NEUTROPENIA (HCC): Status: ACTIVE | Noted: 2024-04-18

## 2024-04-18 PROBLEM — R04.2 HEMOPTYSIS: Status: RESOLVED | Noted: 2024-04-02 | Resolved: 2024-04-18

## 2024-04-18 PROBLEM — L03.113 CELLULITIS OF RIGHT HAND: Status: RESOLVED | Noted: 2023-11-21 | Resolved: 2024-04-18

## 2024-04-18 PROBLEM — M54.14 THORACIC NEURITIS: Status: ACTIVE | Noted: 2024-04-18

## 2024-04-18 PROBLEM — E87.1 DEHYDRATION WITH HYPONATREMIA: Status: ACTIVE | Noted: 2024-04-18

## 2024-04-18 PROBLEM — M25.539 PAIN IN WRIST: Status: RESOLVED | Noted: 2023-11-21 | Resolved: 2024-04-18

## 2024-04-18 PROBLEM — M25.569 KNEE PAIN: Status: RESOLVED | Noted: 2024-04-18 | Resolved: 2024-04-18

## 2024-04-18 LAB
ALBUMIN SERPL-MCNC: 3.6 G/DL (ref 3.5–5.2)
ALBUMIN/GLOB SERPL: 1 {RATIO} (ref 1–2.5)
ALP SERPL-CCNC: 67 U/L (ref 40–129)
ALT SERPL-CCNC: 24 U/L (ref 5–41)
ANION GAP SERPL CALCULATED.3IONS-SCNC: 14 MMOL/L (ref 9–17)
ANION GAP SERPL CALCULATED.3IONS-SCNC: 15 MMOL/L (ref 9–17)
AST SERPL-CCNC: 24 U/L
BACTERIA URNS QL MICRO: ABNORMAL
BASOPHILS # BLD: 0 K/UL (ref 0–0.2)
BASOPHILS # BLD: 0 K/UL (ref 0–0.2)
BASOPHILS NFR BLD: 0 % (ref 0–2)
BASOPHILS NFR BLD: 0 % (ref 0–2)
BILIRUB SERPL-MCNC: 0.4 MG/DL (ref 0.3–1.2)
BILIRUB UR QL STRIP: NEGATIVE
BUN SERPL-MCNC: 44 MG/DL (ref 8–23)
BUN SERPL-MCNC: 45 MG/DL (ref 8–23)
BUN/CREAT SERPL: 20 (ref 9–20)
BUN/CREAT SERPL: 22 (ref 9–20)
CALCIUM SERPL-MCNC: 9.2 MG/DL (ref 8.6–10.4)
CALCIUM SERPL-MCNC: 9.3 MG/DL (ref 8.6–10.4)
CHARACTER UR: ABNORMAL
CHLORIDE SERPL-SCNC: 94 MMOL/L (ref 98–107)
CHLORIDE SERPL-SCNC: 96 MMOL/L (ref 98–107)
CHLORIDE UR-SCNC: <20 MMOL/L
CLARITY UR: CLEAR
CO2 SERPL-SCNC: 23 MMOL/L (ref 20–31)
CO2 SERPL-SCNC: 24 MMOL/L (ref 20–31)
COLOR UR: YELLOW
CREAT SERPL-MCNC: 2 MG/DL (ref 0.7–1.2)
CREAT SERPL-MCNC: 2.2 MG/DL (ref 0.7–1.2)
CREAT UR-MCNC: 57 MG/DL (ref 39–259)
EOSINOPHIL # BLD: 0 K/UL (ref 0–0.4)
EOSINOPHIL # BLD: 0.04 K/UL (ref 0–0.4)
EOSINOPHILS RELATIVE PERCENT: 0 % (ref 1–8)
EOSINOPHILS RELATIVE PERCENT: 1 % (ref 1–8)
EPI CELLS #/AREA URNS HPF: ABNORMAL /HPF (ref 0–5)
ERYTHROCYTE [DISTWIDTH] IN BLOOD BY AUTOMATED COUNT: 12.6 % (ref 11.8–14.4)
ERYTHROCYTE [DISTWIDTH] IN BLOOD BY AUTOMATED COUNT: 12.8 % (ref 11.8–14.4)
GFR SERPL CREATININE-BSD FRML MDRD: 31 ML/MIN/1.73M2
GFR SERPL CREATININE-BSD FRML MDRD: 34 ML/MIN/1.73M2
GLUCOSE SERPL-MCNC: 161 MG/DL (ref 70–99)
GLUCOSE SERPL-MCNC: 213 MG/DL (ref 70–99)
GLUCOSE UR STRIP-MCNC: NEGATIVE MG/DL
HCT VFR BLD AUTO: 26 % (ref 40.7–50.3)
HCT VFR BLD AUTO: 29.2 % (ref 40.7–50.3)
HGB BLD-MCNC: 10.2 G/DL (ref 13–17)
HGB BLD-MCNC: 9.1 G/DL (ref 13–17)
HGB UR QL STRIP.AUTO: NEGATIVE
IMM GRANULOCYTES # BLD AUTO: 0 K/UL (ref 0–0.3)
IMM GRANULOCYTES # BLD AUTO: 0 K/UL (ref 0–0.3)
IMM GRANULOCYTES NFR BLD: 0 %
IMM GRANULOCYTES NFR BLD: 0 %
KETONES UR STRIP-MCNC: NEGATIVE MG/DL
LEUKOCYTE ESTERASE UR QL STRIP: NEGATIVE
LIPASE SERPL-CCNC: 33 U/L (ref 13–60)
LYMPHOCYTES NFR BLD: 0.62 K/UL (ref 1–4.8)
LYMPHOCYTES NFR BLD: 0.75 K/UL (ref 1–4.8)
LYMPHOCYTES RELATIVE PERCENT: 17 % (ref 15–43)
LYMPHOCYTES RELATIVE PERCENT: 8 % (ref 15–43)
MCH RBC QN AUTO: 33.3 PG (ref 25.2–33.5)
MCH RBC QN AUTO: 33.7 PG (ref 25.2–33.5)
MCHC RBC AUTO-ENTMCNC: 34.9 G/DL (ref 25.2–33.5)
MCHC RBC AUTO-ENTMCNC: 35 G/DL (ref 25.2–33.5)
MCV RBC AUTO: 95.2 FL (ref 82.6–102.9)
MCV RBC AUTO: 96.4 FL (ref 82.6–102.9)
MONOCYTES NFR BLD: 0.53 K/UL (ref 0.1–1.2)
MONOCYTES NFR BLD: 1.09 K/UL (ref 0.1–1.2)
MONOCYTES NFR BLD: 12 % (ref 6–14)
MONOCYTES NFR BLD: 14 % (ref 6–14)
MORPHOLOGY: ABNORMAL
NEUTROPHILS NFR BLD: 70 % (ref 44–74)
NEUTROPHILS NFR BLD: 78 % (ref 44–74)
NEUTS SEG NFR BLD: 3.08 K/UL (ref 1.5–8.1)
NEUTS SEG NFR BLD: 6.09 K/UL (ref 1.5–8.1)
NITRITE UR QL STRIP: NEGATIVE
NRBC BLD-RTO: 0 PER 100 WBC
NRBC BLD-RTO: 0 PER 100 WBC
PH UR STRIP: 6 [PH] (ref 5–6)
PLATELET # BLD AUTO: ABNORMAL K/UL (ref 138–453)
PLATELET # BLD AUTO: ABNORMAL K/UL (ref 138–453)
PLATELET, FLUORESCENCE: 104 K/UL (ref 138–453)
PLATELET, FLUORESCENCE: 118 K/UL (ref 138–453)
PLATELETS.RETICULATED NFR BLD AUTO: 13.1 % (ref 1.1–10.3)
PLATELETS.RETICULATED NFR BLD AUTO: 18 % (ref 1.1–10.3)
POTASSIUM SERPL-SCNC: 4 MMOL/L (ref 3.7–5.3)
POTASSIUM SERPL-SCNC: 4.2 MMOL/L (ref 3.7–5.3)
POTASSIUM, UR: 15.8 MMOL/L
PROT SERPL-MCNC: 7.3 G/DL (ref 6.4–8.3)
PROT UR STRIP-MCNC: ABNORMAL MG/DL
RBC # BLD AUTO: 2.73 M/UL (ref 4.21–5.77)
RBC # BLD AUTO: 3.03 M/UL (ref 4.21–5.77)
RBC #/AREA URNS HPF: ABNORMAL /HPF (ref 0–4)
SODIUM SERPL-SCNC: 132 MMOL/L (ref 135–144)
SODIUM SERPL-SCNC: 134 MMOL/L (ref 135–144)
SODIUM UR-SCNC: 34 MMOL/L
SP GR UR STRIP: 1.01 (ref 1.01–1.02)
TOTAL PROTEIN, URINE: 29 MG/DL
URINE TOTAL PROTEIN CREATININE RATIO: 0.51 (ref 0–0.2)
UROBILINOGEN UR STRIP-ACNC: NORMAL EU/DL (ref 0–1)
WBC #/AREA URNS HPF: ABNORMAL /HPF (ref 0–4)
WBC OTHER # BLD: 4.4 K/UL (ref 3.5–11.3)
WBC OTHER # BLD: 7.8 K/UL (ref 3.5–11.3)

## 2024-04-18 PROCEDURE — 2580000003 HC RX 258: Performed by: EMERGENCY MEDICINE

## 2024-04-18 PROCEDURE — 84540 ASSAY OF URINE/UREA-N: CPT

## 2024-04-18 PROCEDURE — 99285 EMERGENCY DEPT VISIT HI MDM: CPT

## 2024-04-18 PROCEDURE — 3075F SYST BP GE 130 - 139MM HG: CPT | Performed by: INTERNAL MEDICINE

## 2024-04-18 PROCEDURE — 83690 ASSAY OF LIPASE: CPT

## 2024-04-18 PROCEDURE — 1123F ACP DISCUSS/DSCN MKR DOCD: CPT | Performed by: INTERNAL MEDICINE

## 2024-04-18 PROCEDURE — 3078F DIAST BP <80 MM HG: CPT | Performed by: INTERNAL MEDICINE

## 2024-04-18 PROCEDURE — 99214 OFFICE O/P EST MOD 30 MIN: CPT | Performed by: INTERNAL MEDICINE

## 2024-04-18 PROCEDURE — 74176 CT ABD & PELVIS W/O CONTRAST: CPT

## 2024-04-18 PROCEDURE — 99215 OFFICE O/P EST HI 40 MIN: CPT | Performed by: INTERNAL MEDICINE

## 2024-04-18 PROCEDURE — 84300 ASSAY OF URINE SODIUM: CPT

## 2024-04-18 PROCEDURE — 99222 1ST HOSP IP/OBS MODERATE 55: CPT

## 2024-04-18 PROCEDURE — 80053 COMPREHEN METABOLIC PANEL: CPT

## 2024-04-18 PROCEDURE — 85025 COMPLETE CBC W/AUTO DIFF WBC: CPT

## 2024-04-18 PROCEDURE — 84133 ASSAY OF URINE POTASSIUM: CPT

## 2024-04-18 PROCEDURE — 6360000002 HC RX W HCPCS: Performed by: INTERNAL MEDICINE

## 2024-04-18 PROCEDURE — 2580000003 HC RX 258

## 2024-04-18 PROCEDURE — 80048 BASIC METABOLIC PNL TOTAL CA: CPT

## 2024-04-18 PROCEDURE — 2060000000 HC ICU INTERMEDIATE R&B

## 2024-04-18 PROCEDURE — 82436 ASSAY OF URINE CHLORIDE: CPT

## 2024-04-18 PROCEDURE — 83935 ASSAY OF URINE OSMOLALITY: CPT

## 2024-04-18 PROCEDURE — 84156 ASSAY OF PROTEIN URINE: CPT

## 2024-04-18 PROCEDURE — 81001 URINALYSIS AUTO W/SCOPE: CPT

## 2024-04-18 PROCEDURE — 82570 ASSAY OF URINE CREATININE: CPT

## 2024-04-18 RX ORDER — DIPHENHYDRAMINE HYDROCHLORIDE 50 MG/ML
50 INJECTION INTRAMUSCULAR; INTRAVENOUS
OUTPATIENT
Start: 2024-04-18

## 2024-04-18 RX ORDER — EPINEPHRINE 1 MG/ML
0.3 INJECTION, SOLUTION, CONCENTRATE INTRAVENOUS PRN
OUTPATIENT
Start: 2024-04-18

## 2024-04-18 RX ORDER — ACETAMINOPHEN 325 MG/1
650 TABLET ORAL
OUTPATIENT
Start: 2024-04-25

## 2024-04-18 RX ORDER — SODIUM CHLORIDE 0.9 % (FLUSH) 0.9 %
5-40 SYRINGE (ML) INJECTION PRN
OUTPATIENT
Start: 2024-05-09

## 2024-04-18 RX ORDER — DIPHENHYDRAMINE HYDROCHLORIDE 50 MG/ML
50 INJECTION INTRAMUSCULAR; INTRAVENOUS
OUTPATIENT
Start: 2024-04-25

## 2024-04-18 RX ORDER — SODIUM CHLORIDE 9 MG/ML
INJECTION, SOLUTION INTRAVENOUS CONTINUOUS
OUTPATIENT
Start: 2024-05-02

## 2024-04-18 RX ORDER — HEPARIN SODIUM (PORCINE) LOCK FLUSH IV SOLN 100 UNIT/ML 100 UNIT/ML
500 SOLUTION INTRAVENOUS PRN
OUTPATIENT
Start: 2024-05-09

## 2024-04-18 RX ORDER — ALBUTEROL SULFATE 90 UG/1
4 AEROSOL, METERED RESPIRATORY (INHALATION) PRN
OUTPATIENT
Start: 2024-04-18

## 2024-04-18 RX ORDER — FAMOTIDINE 10 MG/ML
20 INJECTION, SOLUTION INTRAVENOUS
OUTPATIENT
Start: 2024-05-02

## 2024-04-18 RX ORDER — POTASSIUM CHLORIDE 7.45 MG/ML
10 INJECTION INTRAVENOUS PRN
Status: DISCONTINUED | OUTPATIENT
Start: 2024-04-18 | End: 2024-04-20 | Stop reason: HOSPADM

## 2024-04-18 RX ORDER — FAMOTIDINE 10 MG/ML
20 INJECTION, SOLUTION INTRAVENOUS
OUTPATIENT
Start: 2024-05-09

## 2024-04-18 RX ORDER — FAMOTIDINE 10 MG/ML
20 INJECTION, SOLUTION INTRAVENOUS
OUTPATIENT
Start: 2024-04-25

## 2024-04-18 RX ORDER — ACETAMINOPHEN 325 MG/1
650 TABLET ORAL EVERY 6 HOURS PRN
Status: DISCONTINUED | OUTPATIENT
Start: 2024-04-18 | End: 2024-04-20 | Stop reason: HOSPADM

## 2024-04-18 RX ORDER — METOPROLOL SUCCINATE 50 MG/1
100 TABLET, EXTENDED RELEASE ORAL DAILY
Status: DISCONTINUED | OUTPATIENT
Start: 2024-04-18 | End: 2024-04-20 | Stop reason: HOSPADM

## 2024-04-18 RX ORDER — SODIUM CHLORIDE 0.9 % (FLUSH) 0.9 %
5-40 SYRINGE (ML) INJECTION PRN
OUTPATIENT
Start: 2024-04-18

## 2024-04-18 RX ORDER — SODIUM CHLORIDE 9 MG/ML
INJECTION, SOLUTION INTRAVENOUS CONTINUOUS
Status: DISCONTINUED | OUTPATIENT
Start: 2024-04-18 | End: 2024-04-20 | Stop reason: HOSPADM

## 2024-04-18 RX ORDER — POLYETHYLENE GLYCOL 3350 17 G/17G
17 POWDER, FOR SOLUTION ORAL DAILY PRN
Status: DISCONTINUED | OUTPATIENT
Start: 2024-04-18 | End: 2024-04-20 | Stop reason: HOSPADM

## 2024-04-18 RX ORDER — ONDANSETRON 2 MG/ML
4 INJECTION INTRAMUSCULAR; INTRAVENOUS EVERY 6 HOURS PRN
Status: DISCONTINUED | OUTPATIENT
Start: 2024-04-18 | End: 2024-04-20 | Stop reason: HOSPADM

## 2024-04-18 RX ORDER — ALBUTEROL SULFATE 90 UG/1
4 AEROSOL, METERED RESPIRATORY (INHALATION) PRN
OUTPATIENT
Start: 2024-04-25

## 2024-04-18 RX ORDER — SODIUM CHLORIDE 9 MG/ML
5-250 INJECTION, SOLUTION INTRAVENOUS PRN
OUTPATIENT
Start: 2024-04-18

## 2024-04-18 RX ORDER — MEPERIDINE HYDROCHLORIDE 50 MG/ML
12.5 INJECTION INTRAMUSCULAR; INTRAVENOUS; SUBCUTANEOUS PRN
OUTPATIENT
Start: 2024-04-25

## 2024-04-18 RX ORDER — EPINEPHRINE 1 MG/ML
0.3 INJECTION, SOLUTION, CONCENTRATE INTRAVENOUS PRN
OUTPATIENT
Start: 2024-05-09

## 2024-04-18 RX ORDER — MEPERIDINE HYDROCHLORIDE 50 MG/ML
12.5 INJECTION INTRAMUSCULAR; INTRAVENOUS; SUBCUTANEOUS PRN
OUTPATIENT
Start: 2024-05-09

## 2024-04-18 RX ORDER — ONDANSETRON 2 MG/ML
8 INJECTION INTRAMUSCULAR; INTRAVENOUS
OUTPATIENT
Start: 2024-05-02

## 2024-04-18 RX ORDER — ONDANSETRON 4 MG/1
8 TABLET, ORALLY DISINTEGRATING ORAL
OUTPATIENT
Start: 2024-04-25

## 2024-04-18 RX ORDER — ONDANSETRON 4 MG/1
8 TABLET, ORALLY DISINTEGRATING ORAL
OUTPATIENT
Start: 2024-04-18

## 2024-04-18 RX ORDER — HEPARIN SODIUM (PORCINE) LOCK FLUSH IV SOLN 100 UNIT/ML 100 UNIT/ML
500 SOLUTION INTRAVENOUS PRN
OUTPATIENT
Start: 2024-04-25

## 2024-04-18 RX ORDER — SODIUM CHLORIDE 0.9 % (FLUSH) 0.9 %
5-40 SYRINGE (ML) INJECTION PRN
OUTPATIENT
Start: 2024-04-25

## 2024-04-18 RX ORDER — ACETAMINOPHEN 325 MG/1
650 TABLET ORAL
OUTPATIENT
Start: 2024-05-02

## 2024-04-18 RX ORDER — SODIUM CHLORIDE 9 MG/ML
5-250 INJECTION, SOLUTION INTRAVENOUS PRN
OUTPATIENT
Start: 2024-05-09

## 2024-04-18 RX ORDER — ONDANSETRON 4 MG/1
8 TABLET, ORALLY DISINTEGRATING ORAL
OUTPATIENT
Start: 2024-05-09

## 2024-04-18 RX ORDER — ONDANSETRON 2 MG/ML
8 INJECTION INTRAMUSCULAR; INTRAVENOUS
OUTPATIENT
Start: 2024-05-09

## 2024-04-18 RX ORDER — ONDANSETRON 2 MG/ML
8 INJECTION INTRAMUSCULAR; INTRAVENOUS
OUTPATIENT
Start: 2024-04-25

## 2024-04-18 RX ORDER — MEPERIDINE HYDROCHLORIDE 50 MG/ML
12.5 INJECTION INTRAMUSCULAR; INTRAVENOUS; SUBCUTANEOUS PRN
OUTPATIENT
Start: 2024-04-18

## 2024-04-18 RX ORDER — ENOXAPARIN SODIUM 100 MG/ML
40 INJECTION SUBCUTANEOUS DAILY
Status: DISCONTINUED | OUTPATIENT
Start: 2024-04-18 | End: 2024-04-20 | Stop reason: HOSPADM

## 2024-04-18 RX ORDER — MAGNESIUM SULFATE IN WATER 40 MG/ML
2000 INJECTION, SOLUTION INTRAVENOUS PRN
Status: DISCONTINUED | OUTPATIENT
Start: 2024-04-18 | End: 2024-04-20 | Stop reason: HOSPADM

## 2024-04-18 RX ORDER — EPINEPHRINE 1 MG/ML
0.3 INJECTION, SOLUTION, CONCENTRATE INTRAVENOUS PRN
OUTPATIENT
Start: 2024-05-02

## 2024-04-18 RX ORDER — FAMOTIDINE 10 MG/ML
20 INJECTION, SOLUTION INTRAVENOUS
OUTPATIENT
Start: 2024-04-18

## 2024-04-18 RX ORDER — MEPERIDINE HYDROCHLORIDE 50 MG/ML
12.5 INJECTION INTRAMUSCULAR; INTRAVENOUS; SUBCUTANEOUS PRN
OUTPATIENT
Start: 2024-05-02

## 2024-04-18 RX ORDER — ALBUTEROL SULFATE 90 UG/1
4 AEROSOL, METERED RESPIRATORY (INHALATION) PRN
OUTPATIENT
Start: 2024-05-09

## 2024-04-18 RX ORDER — DIPHENHYDRAMINE HYDROCHLORIDE 50 MG/ML
50 INJECTION INTRAMUSCULAR; INTRAVENOUS
OUTPATIENT
Start: 2024-05-09

## 2024-04-18 RX ORDER — AMLODIPINE BESYLATE 5 MG/1
10 TABLET ORAL DAILY
Status: DISCONTINUED | OUTPATIENT
Start: 2024-04-18 | End: 2024-04-20 | Stop reason: HOSPADM

## 2024-04-18 RX ORDER — POTASSIUM CHLORIDE 20 MEQ/1
40 TABLET, EXTENDED RELEASE ORAL PRN
Status: DISCONTINUED | OUTPATIENT
Start: 2024-04-18 | End: 2024-04-20 | Stop reason: HOSPADM

## 2024-04-18 RX ORDER — SODIUM CHLORIDE 0.9 % (FLUSH) 0.9 %
5-40 SYRINGE (ML) INJECTION EVERY 12 HOURS SCHEDULED
Status: DISCONTINUED | OUTPATIENT
Start: 2024-04-18 | End: 2024-04-20 | Stop reason: HOSPADM

## 2024-04-18 RX ORDER — LENALIDOMIDE 25 MG/1
CAPSULE ORAL
Qty: 21 CAPSULE | Refills: 5 | OUTPATIENT
Start: 2024-04-18

## 2024-04-18 RX ORDER — SODIUM CHLORIDE 0.9 % (FLUSH) 0.9 %
5-40 SYRINGE (ML) INJECTION PRN
Status: DISCONTINUED | OUTPATIENT
Start: 2024-04-18 | End: 2024-04-20 | Stop reason: HOSPADM

## 2024-04-18 RX ORDER — ONDANSETRON 2 MG/ML
8 INJECTION INTRAMUSCULAR; INTRAVENOUS
OUTPATIENT
Start: 2024-04-18

## 2024-04-18 RX ORDER — ONDANSETRON 4 MG/1
4 TABLET, ORALLY DISINTEGRATING ORAL EVERY 8 HOURS PRN
Status: DISCONTINUED | OUTPATIENT
Start: 2024-04-18 | End: 2024-04-20 | Stop reason: HOSPADM

## 2024-04-18 RX ORDER — SODIUM CHLORIDE 9 MG/ML
5-250 INJECTION, SOLUTION INTRAVENOUS PRN
OUTPATIENT
Start: 2024-04-25

## 2024-04-18 RX ORDER — ACETAMINOPHEN 325 MG/1
650 TABLET ORAL
OUTPATIENT
Start: 2024-05-09

## 2024-04-18 RX ORDER — HEPARIN SODIUM (PORCINE) LOCK FLUSH IV SOLN 100 UNIT/ML 100 UNIT/ML
500 SOLUTION INTRAVENOUS PRN
OUTPATIENT
Start: 2024-04-18

## 2024-04-18 RX ORDER — ONDANSETRON 4 MG/1
8 TABLET, ORALLY DISINTEGRATING ORAL
OUTPATIENT
Start: 2024-05-02

## 2024-04-18 RX ORDER — ACETAMINOPHEN 650 MG/1
650 SUPPOSITORY RECTAL EVERY 6 HOURS PRN
Status: DISCONTINUED | OUTPATIENT
Start: 2024-04-18 | End: 2024-04-20 | Stop reason: HOSPADM

## 2024-04-18 RX ORDER — HEPARIN SODIUM (PORCINE) LOCK FLUSH IV SOLN 100 UNIT/ML 100 UNIT/ML
500 SOLUTION INTRAVENOUS PRN
OUTPATIENT
Start: 2024-05-02

## 2024-04-18 RX ORDER — SODIUM CHLORIDE 9 MG/ML
INJECTION, SOLUTION INTRAVENOUS CONTINUOUS
OUTPATIENT
Start: 2024-05-09

## 2024-04-18 RX ORDER — SODIUM CHLORIDE 9 MG/ML
INJECTION, SOLUTION INTRAVENOUS CONTINUOUS
OUTPATIENT
Start: 2024-04-18

## 2024-04-18 RX ORDER — SODIUM CHLORIDE 9 MG/ML
INJECTION, SOLUTION INTRAVENOUS CONTINUOUS
OUTPATIENT
Start: 2024-04-25

## 2024-04-18 RX ORDER — SODIUM CHLORIDE 9 MG/ML
INJECTION, SOLUTION INTRAVENOUS PRN
Status: DISCONTINUED | OUTPATIENT
Start: 2024-04-18 | End: 2024-04-20 | Stop reason: HOSPADM

## 2024-04-18 RX ORDER — ALBUTEROL SULFATE 90 UG/1
4 AEROSOL, METERED RESPIRATORY (INHALATION) PRN
OUTPATIENT
Start: 2024-05-02

## 2024-04-18 RX ORDER — GEMFIBROZIL 600 MG/1
600 TABLET, FILM COATED ORAL DAILY
Status: DISCONTINUED | OUTPATIENT
Start: 2024-04-18 | End: 2024-04-20 | Stop reason: HOSPADM

## 2024-04-18 RX ORDER — SODIUM CHLORIDE 0.9 % (FLUSH) 0.9 %
5-40 SYRINGE (ML) INJECTION PRN
OUTPATIENT
Start: 2024-05-02

## 2024-04-18 RX ORDER — DIPHENHYDRAMINE HYDROCHLORIDE 50 MG/ML
50 INJECTION INTRAMUSCULAR; INTRAVENOUS
OUTPATIENT
Start: 2024-05-02

## 2024-04-18 RX ORDER — EPINEPHRINE 1 MG/ML
0.3 INJECTION, SOLUTION, CONCENTRATE INTRAVENOUS PRN
OUTPATIENT
Start: 2024-04-25

## 2024-04-18 RX ORDER — 0.9 % SODIUM CHLORIDE 0.9 %
1000 INTRAVENOUS SOLUTION INTRAVENOUS ONCE
Status: COMPLETED | OUTPATIENT
Start: 2024-04-18 | End: 2024-04-18

## 2024-04-18 RX ORDER — PRAVASTATIN SODIUM 20 MG
10 TABLET ORAL DAILY
Status: DISCONTINUED | OUTPATIENT
Start: 2024-04-18 | End: 2024-04-20 | Stop reason: HOSPADM

## 2024-04-18 RX ORDER — SODIUM CHLORIDE 9 MG/ML
5-250 INJECTION, SOLUTION INTRAVENOUS PRN
OUTPATIENT
Start: 2024-05-02

## 2024-04-18 RX ORDER — ISOSORBIDE MONONITRATE 30 MG/1
30 TABLET, EXTENDED RELEASE ORAL DAILY
Status: DISCONTINUED | OUTPATIENT
Start: 2024-04-18 | End: 2024-04-20 | Stop reason: HOSPADM

## 2024-04-18 RX ORDER — ACETAMINOPHEN 325 MG/1
650 TABLET ORAL
OUTPATIENT
Start: 2024-04-18

## 2024-04-18 RX ADMIN — SODIUM CHLORIDE: 9 INJECTION, SOLUTION INTRAVENOUS at 21:02

## 2024-04-18 RX ADMIN — ENOXAPARIN SODIUM 40 MG: 100 INJECTION SUBCUTANEOUS at 21:45

## 2024-04-18 RX ADMIN — SODIUM CHLORIDE 1000 ML: 9 INJECTION, SOLUTION INTRAVENOUS at 16:57

## 2024-04-18 ASSESSMENT — PAIN - FUNCTIONAL ASSESSMENT: PAIN_FUNCTIONAL_ASSESSMENT: NONE - DENIES PAIN

## 2024-04-18 NOTE — PROGRESS NOTES
Writer notified Carmen for need for Velcade  
  Musculoskeletal: Positive for joint pain.  Positive for back pain  Neurological: negative for headaches, dizziness, seizures, weakness, numbness.  Positive for tremors        Physical Exam:  Vitals: /70 (Site: Left Upper Arm, Position: Sitting, Cuff Size: Medium Adult)   Pulse 64   Resp 16   Ht 1.727 m (5' 8\")   Wt 90.5 kg (199 lb 9.6 oz)   SpO2 95%   BMI 30.35 kg/m²   General appearance - well appearing, no in pain or distress  Mental status - AAO X3  Eyes - pupils equal and reactive, extraocular eye movements intact  Mouth - mucous membranes moist, pharynx normal without lesions  Neck - supple, no significant adenopathy  Lymphatics - no palpable lymphadenopathy, no hepatosplenomegaly  Chest - clear to auscultation, no wheezes, rales or rhonchi, symmetric air entry  Heart - normal rate, regular rhythm, normal S1, S2, no murmurs  Abdomen - soft, nontender, nondistended, no masses or organomegaly  Neurological - alert, oriented, normal speech, no focal findings or movement disorder noted  Extremities - peripheral pulses normal, no pedal edema, no clubbing or cyanosis  Skin - normal coloration and turgor, no rashes, no suspicious skin lesions noted       DATA:    Results for orders placed or performed during the hospital encounter of 03/26/24   CBC with Auto Differential   Result Value Ref Range    WBC 3.7 3.5 - 11.3 k/uL    RBC 3.47 (L) 4.21 - 5.77 m/uL    Hemoglobin 11.6 (L) 13.0 - 17.0 g/dL    Hematocrit 33.1 (L) 40.7 - 50.3 %    MCV 95.4 82.6 - 102.9 fL    MCH 33.4 25.2 - 33.5 pg    MCHC 35.0 (H) 25.2 - 33.5 g/dL    RDW 12.3 11.8 - 14.4 %    Platelets 159 138 - 453 k/uL    MPV 11.1 8.1 - 13.5 fL    NRBC Automated 0.0 0.0 per 100 WBC    Neutrophils % 58 36 - 65 %    Lymphocytes % 29 24 - 43 %    Monocytes % 11 3 - 12 %    Eosinophils % 1 1 - 4 %    Basophils % 1 0 - 2 %    Immature Granulocytes % 0 0 %    Neutrophils Absolute 2.15 1.50 - 8.10 k/uL    Lymphocytes Absolute 1.10 1.10 - 3.70 k/uL

## 2024-04-18 NOTE — TELEPHONE ENCOUNTER
Writer called pts Meredith to notify per Dr. Stewart that pt needed to go to ER to be admitted due to creatinine rising. Shahnaz stated understanding stating that she would take him to [facility]

## 2024-04-18 NOTE — ED PROVIDER NOTES
[facility]  EMERGENCY DEPARTMENT ENCOUNTER      Pt Name: Bandar Gabriel  MRN: [id number]  Birthdate 1950  Date of evaluation: 4/18/2024  Provider: Eliseo Hernandez MD    CHIEF COMPLAINT     Chief Complaint   Patient presents with    Abnormal Lab     Told to come in due to high levels per Dr. Stewart.         HISTORY OF PRESENT ILLNESS   (Location/Symptom, Timing/Onset, Context/Setting,Quality, Duration, Modifying Factors, Severity)  Note limiting factors.   Bandar Gabriel is a 73 y.o. male who presents to the emergency department stating he was directed to the ED by his oncologist because of abnormal lab work.  Patient has been diagnosed with smoldering myeloma and today's lab work showed a rise in his creatinine level.  Among other meds he is also on Revlimid.  Patient reports pain in the right side of his abdomen of 2 days duration that has not resulted in vomiting or diarrhea or urinary symptoms.  Pain does not radiate and is not related to meals.  He states that he has arthritis in his lower back which causes numbness of the left leg.  This problem started 2 years ago.    The history is provided by the patient.       Nursing Notes werereviewed.    REVIEW OF SYSTEMS    (2-9 systems for level 4, 10 or more for level 5)     Review of Systems   All other systems reviewed and are negative.      Except as noted above the remainder of the review of systems was reviewed and negative.       PAST MEDICAL HISTORY     Past Medical History:   Diagnosis Date    Angina at rest (HCC)     Arthritis     CAD (coronary artery disease)     angina    Hyperlipidemia     Hypertension     Skin cancer          SURGICALHISTORY       Past Surgical History:   Procedure Laterality Date    CARDIAC CATHETERIZATION      x 2    COLONOSCOPY      COLONOSCOPY N/A 9/12/2018    COLONOSCOPY WITH BIOPSY performed by Luis Felipe Park DO at [facility]    COSMETIC SURGERY      forehead and  mouth in 1987    CT BONE MARROW BIOPSY   - none    LABS:  Labs Reviewed   CBC WITH AUTO DIFFERENTIAL - Abnormal; Notable for the following components:       Result Value    RBC 2.73 (*)     Hemoglobin 9.1 (*)     Hematocrit 26.0 (*)     MCHC 35.0 (*)     Platelet, Fluorescence 104 (*)     Platelet, Immature Fraction 13.1 (*)     Lymphocytes Absolute 0.75 (*)     All other components within normal limits   COMPREHENSIVE METABOLIC PANEL - Abnormal; Notable for the following components:    Sodium 134 (*)     Chloride 96 (*)     Glucose 161 (*)     BUN 45 (*)     Creatinine 2.2 (*)     Est, Glom Filt Rate 31 (*)     All other components within normal limits   LIPASE   URINALYSIS       All other labs were within normal range ornot returned as of this dictation.    EMERGENCY DEPARTMENT COURSE and DIFFERENTIAL DIAGNOSIS/MDM:   Vitals:    Vitals:    04/18/24 1626   BP: (!) 150/46   Pulse: 70   Resp: 17   Temp: 99 °F (37.2 °C)   TempSrc: Tympanic   SpO2: 96%   Weight: 92.1 kg (203 lb)   Height: 1.727 m (5' 8\")            Patient's BUN and creatinine are steadily rising over the last few days.  She is started on IV hydration in the ED.  The plan is to admit him for hydration and monitoring of his kidney function.  Findings were discussed with Dr. Cho who is admitting him.    CONSULTS:  None    PROCEDURES:  Unlessotherwise noted below, none     Procedures    FINAL IMPRESSION      1. Acute kidney injury (HCC)          DISPOSITION/PLAN   DISPOSITION Decision To Admit 04/18/2024 05:19:36 PM      PATIENT REFERRED TO:  No follow-up provider specified.    DISCHARGE MEDICATIONS:         Problem List:  Patient Active Problem List   Diagnosis Code    Skin cancer C44.90    Hyperlipidemia E78.5    Hypertension I10    Coronary artery disease I25.10    GI bleed K92.2    Gastroesophageal reflux disease without esophagitis K21.9    Tremor R25.1    Fatty liver K76.0    Arthritis M19.90    Smoldering multiple myeloma D47.2    Lumbar radiculitis M54.16    Lumbar spondylosis M47.816

## 2024-04-18 NOTE — TELEPHONE ENCOUNTER
Called patient and niece to remind that needs to get the STAT bmp today. N/a and unable to leave message.

## 2024-04-18 NOTE — TELEPHONE ENCOUNTER
Name: Bandar Gabriel  : 1950  MRN: [id number]    Oncology Navigation- Initial Note:  Writer met with patient while in office and was present during med onc visit.  Patient states he did not receive the nurse navigation information mailed to him.   Intake-  Contact Type: Medical Oncology    Diagnosis: Heme- malignant    Home Disposition: Lives alone    Patient needs and barriers to care: Distance for Care, Knowledge deficit, Financial Concerns/ Disability, and Practical needs/ Family problems (housing/ living alone)   Patient lives alone. He has a niece who helps him and assists as needed. He states he is struggling financially. He lost his wife and due to medical bills, needed to sell his home and move to low income housing. He has filed for [employer] financial assistance but has not received notification of acceptance.   Referral Source: Health Professional    Receptive to Advanced Care Planning/ Palliative Care:  not addressed at this time    Interventions-   General Interventions: Introduced oncology nurse navigation      Education/Screenings:  yes - Instructed about oncology nurse navigation. Provided resources folder and printed contact information. Instructed on navigator availability and how to access back up assistance for urgent needs.   Substance Use screening:   Tobacco- Former, 0 ppd, 13 pack-years    Alcohol- rarely   Drugs- smokes marijuana to help him sleep at night     Currently on HRT: no     Referrals: recommended patient contact [facility] or [facility] for assistance with bills related to utilities. Patient states his niece has those contacts.     Biopsy site status: NA     Continuum of Care: Diagnosis/Active Treatment    Notes:   During visit, Dr. Stewart changes his treatment from revelimid to velcade weekly. Also ordered BMP today. Per Mignon PATEL, she will contact [facility] pharmacy to put med on hold and printed lab order for patient to take to [facility]. She also contacted

## 2024-04-19 LAB
ALBUMIN SERPL-MCNC: 3.2 G/DL (ref 3.5–5.2)
ALBUMIN/GLOB SERPL: 1 {RATIO} (ref 1–2.5)
ALP SERPL-CCNC: 60 U/L (ref 40–129)
ALT SERPL-CCNC: 21 U/L (ref 5–41)
ANION GAP SERPL CALCULATED.3IONS-SCNC: 10 MMOL/L (ref 9–17)
AST SERPL-CCNC: 22 U/L
BASOPHILS # BLD: 0 K/UL (ref 0–0.2)
BASOPHILS NFR BLD: 0 % (ref 0–2)
BILIRUB SERPL-MCNC: 0.3 MG/DL (ref 0.3–1.2)
BUN SERPL-MCNC: 37 MG/DL (ref 8–23)
BUN/CREAT SERPL: 25 (ref 9–20)
CALCIUM SERPL-MCNC: 8.8 MG/DL (ref 8.6–10.4)
CHLORIDE SERPL-SCNC: 102 MMOL/L (ref 98–107)
CO2 SERPL-SCNC: 26 MMOL/L (ref 20–31)
CREAT SERPL-MCNC: 1.5 MG/DL (ref 0.7–1.2)
EOSINOPHIL # BLD: 0.02 K/UL (ref 0–0.4)
EOSINOPHILS RELATIVE PERCENT: 1 % (ref 1–8)
ERYTHROCYTE [DISTWIDTH] IN BLOOD BY AUTOMATED COUNT: 12.9 % (ref 11.8–14.4)
GFR SERPL CREATININE-BSD FRML MDRD: 49 ML/MIN/1.73M2
GLUCOSE SERPL-MCNC: 108 MG/DL (ref 70–99)
HCT VFR BLD AUTO: 24.4 % (ref 40.7–50.3)
HGB BLD-MCNC: 8.3 G/DL (ref 13–17)
IMM GRANULOCYTES # BLD AUTO: 0 K/UL (ref 0–0.3)
IMM GRANULOCYTES NFR BLD: 0 %
LYMPHOCYTES NFR BLD: 0.7 K/UL (ref 1–4.8)
LYMPHOCYTES RELATIVE PERCENT: 32 % (ref 15–43)
MCH RBC QN AUTO: 33.3 PG (ref 25.2–33.5)
MCHC RBC AUTO-ENTMCNC: 34 G/DL (ref 25.2–33.5)
MCV RBC AUTO: 98 FL (ref 82.6–102.9)
MONOCYTES NFR BLD: 0.31 K/UL (ref 0.1–1.2)
MONOCYTES NFR BLD: 14 % (ref 6–14)
MORPHOLOGY: ABNORMAL
MORPHOLOGY: ABNORMAL
NEUTROPHILS NFR BLD: 53 % (ref 44–74)
NEUTS SEG NFR BLD: 1.17 K/UL (ref 1.5–8.1)
NRBC BLD-RTO: 0 PER 100 WBC
OSMOLALITY UR: 286 MOSM/KG (ref 80–1300)
PLATELET # BLD AUTO: ABNORMAL K/UL (ref 138–453)
PLATELET, FLUORESCENCE: 75 K/UL (ref 138–453)
PLATELETS.RETICULATED NFR BLD AUTO: 15.8 % (ref 1.1–10.3)
POTASSIUM SERPL-SCNC: 4.1 MMOL/L (ref 3.7–5.3)
PROT SERPL-MCNC: 6.5 G/DL (ref 6.4–8.3)
RBC # BLD AUTO: 2.49 M/UL (ref 4.21–5.77)
SODIUM SERPL-SCNC: 138 MMOL/L (ref 135–144)
UUN UR-MCNC: 408 MG/DL (ref 800–1666)
WBC OTHER # BLD: 2.2 K/UL (ref 3.5–11.3)

## 2024-04-19 PROCEDURE — 85025 COMPLETE CBC W/AUTO DIFF WBC: CPT

## 2024-04-19 PROCEDURE — 93005 ELECTROCARDIOGRAM TRACING: CPT

## 2024-04-19 PROCEDURE — 2060000000 HC ICU INTERMEDIATE R&B

## 2024-04-19 PROCEDURE — 80053 COMPREHEN METABOLIC PANEL: CPT

## 2024-04-19 PROCEDURE — 6370000000 HC RX 637 (ALT 250 FOR IP)

## 2024-04-19 PROCEDURE — 2580000003 HC RX 258

## 2024-04-19 PROCEDURE — 36415 COLL VENOUS BLD VENIPUNCTURE: CPT

## 2024-04-19 PROCEDURE — 6370000000 HC RX 637 (ALT 250 FOR IP): Performed by: INTERNAL MEDICINE

## 2024-04-19 PROCEDURE — 99231 SBSQ HOSP IP/OBS SF/LOW 25: CPT | Performed by: INTERNAL MEDICINE

## 2024-04-19 RX ADMIN — SODIUM CHLORIDE: 9 INJECTION, SOLUTION INTRAVENOUS at 14:09

## 2024-04-19 RX ADMIN — PRAVASTATIN SODIUM 10 MG: 20 TABLET ORAL at 09:37

## 2024-04-19 RX ADMIN — GEMFIBROZIL 600 MG: 600 TABLET ORAL at 09:37

## 2024-04-19 RX ADMIN — ACETAMINOPHEN 650 MG: 325 TABLET ORAL at 21:51

## 2024-04-19 RX ADMIN — AMLODIPINE BESYLATE 10 MG: 5 TABLET ORAL at 09:36

## 2024-04-19 RX ADMIN — SODIUM CHLORIDE: 9 INJECTION, SOLUTION INTRAVENOUS at 05:10

## 2024-04-19 RX ADMIN — ISOSORBIDE MONONITRATE 30 MG: 30 TABLET, EXTENDED RELEASE ORAL at 09:37

## 2024-04-19 RX ADMIN — SODIUM CHLORIDE: 9 INJECTION, SOLUTION INTRAVENOUS at 22:01

## 2024-04-19 RX ADMIN — METOPROLOL SUCCINATE 100 MG: 50 TABLET, EXTENDED RELEASE ORAL at 09:36

## 2024-04-19 ASSESSMENT — PAIN SCALES - GENERAL
PAINLEVEL_OUTOF10: 2
PAINLEVEL_OUTOF10: 2

## 2024-04-19 ASSESSMENT — PAIN DESCRIPTION - LOCATION: LOCATION: HEAD

## 2024-04-19 ASSESSMENT — PAIN - FUNCTIONAL ASSESSMENT: PAIN_FUNCTIONAL_ASSESSMENT: ACTIVITIES ARE NOT PREVENTED

## 2024-04-19 ASSESSMENT — PAIN DESCRIPTION - DESCRIPTORS: DESCRIPTORS: ACHING

## 2024-04-19 NOTE — H&P
HOSPITALIST ADMISSION H&P      REASON FOR ADMISSION:  DESTINEY, Dehydration with hyponatremia  ESTIMATED LENGTH OF STAY:>2 midnights, 3-4 days    ATTENDING/ADMITTING PHYSICIAN: Anuel Cho MD  PCP: Beth Holm MD    HISTORY OF PRESENT ILLNESS:      The patient is a 73 y.o. male patient of Beth Holm MD who presents from ER with c/o Meds reaction. Patient reports he went to the doctors office today and the doctor told him to get some labs drawn, patient had his labs drawn and later received a phone call and was told to come to the ER for evaluation due to \"kidney blood count went way up\" from his \"cancer pill\". Patient denies loss of appetite or issue with drinking fluids, states he has some feelings of nausea but no vomiting, also c/o dizziness-light headed \"head spinning\" at times, none at this time. C/O back and right side pain-denies injury, has had for about 3-4 days, CT A&P shows right nephrolithiasis. Describes pain as sharp and burning, rates 7/10 at times improves to 2/10 with extra strength tylenol.    In ER: IVF bolus.  CT A&P w/o:   IMPRESSION:  1. No acute intra-abdominal or pelvic process.  2. Right nephrolithiasis.  3. Diverticulosis but no acute diverticulitis.  4. Normal appendix.  5. Fat containing right inguinal hernia but no bowel involvement.  6. Minimal fat containing periumbilical hernia.    Wounds and LDAs present prior to admission: None     See below for additional PMH.    Patient hjjr-lhwvnmmpki-qxcygvso-available records reviewed, including, but not limited to ER records, imaging results, lab results, office records, personal records, and OARRS -- no signs of abuse or diversion. 3 Prescriptions, 2 Private pay, 2 Prescribers, 1 Pharmacy in 2 years.     Past Medical History:   Diagnosis Date    Angina at rest (HCC)     Arthritis     CAD (coronary artery disease)     angina    Cellulitis of right hand 11/21/2023    Hyperlipidemia     Hypertension     Knee pain 04/18/2024    Pain in wrist  extremities, and Strength equal bilaterally  Psychiatric: Normal mood and affect    LABS:    HgBA1c:    Lab Results   Component Value Date/Time    LABA1C 5.9 12/13/2022 03:45 PM     TSH:    Lab Results   Component Value Date/Time    TSH 3.17 10/25/2021 03:35 PM      Latest Reference Range & Units 04/18/24 14:18 04/18/24 16:54   Sodium 135 - 144 mmol/L 132 (L) 134 (L)   Potassium 3.7 - 5.3 mmol/L 4.0 4.2   Chloride 98 - 107 mmol/L 94 (L) 96 (L)   CARBON DIOXIDE 20 - 31 mmol/L 24 23   BUN,BUNPL 8 - 23 mg/dL 44 (H) 45 (H)   Creatinine 0.7 - 1.2 mg/dL 2.0 (H) 2.2 (H)   Bun/Cre Ratio 9 - 20  22 (H) 20   Anion Gap 9 - 17 mmol/L 14 15   Est, Glom Filt Rate >60 mL/min/1.73m2 34 (L) 31 (L)   Glucose, Random 70 - 99 mg/dL 213 (H) 161 (H)   Calcium, Total 8.6 - 10.4 mg/dL 9.3 9.2      Latest Reference Range & Units 04/18/24 16:54   Albumin 3.5 - 5.2 g/dL 3.6   ALBUMIN/GLOBULIN RATIO 1.0 - 2.5  1.0   Alk Phos 40 - 129 U/L 67   ALT 5 - 41 U/L 24   AST <40 U/L 24   Total Bilirubin 0.3 - 1.2 mg/dL 0.4   Lipase 13 - 60 U/L 33   Total Protein, Serum 6.4 - 8.3 g/dL 7.3      Latest Reference Range & Units 04/18/24 11:25 04/18/24 16:54   WBC 3.5 - 11.3 k/uL 7.8 4.4   RBC 4.21 - 5.77 m/uL 3.03 (L) 2.73 (L)   Hemoglobin Quant 13.0 - 17.0 g/dL 10.2 (L) 9.1 (L)   Hematocrit 40.7 - 50.3 % 29.2 (L) 26.0 (L)   MCV 82.6 - 102.9 fL 96.4 95.2   MCH 25.2 - 33.5 pg 33.7 (H) 33.3   MCHC 25.2 - 33.5 g/dL 34.9 (H) 35.0 (H)   RDW 11.8 - 14.4 % 12.8 12.6   Platelet Count 138 - 453 k/uL See Reflexed IPF Result See Reflexed IPF Result   Platelet, Fluorescence 138 - 453 k/uL 118 (L) 104 (L)   Neutrophils/100 leukocytes 44 - 74 % 78 (H) 70   Lymphocyte % 15 - 43 % 8 (L) 17   Monocytes % 6 - 14 % 14 12   Eosinophils % 1 - 8 % 0 (L) 1   Basophils % 0 - 2 % 0 0   Neutrophils Absolute 1.50 - 8.10 k/uL 6.09 3.08   Lymphocytes Absolute 1.0 - 4.8 k/uL 0.62 (L) 0.75 (L)   Monocytes Absolute 0.1 - 1.2 k/uL 1.09 0.53   Eosinophils Absolute 0.0 - 0.4 k/uL 0.00 0.04

## 2024-04-19 NOTE — CARE COORDINATION
DISCHARGE BARRIERS       Reason for Referral:  SW completed a Psychosocial Assessment for evaluation of patient's mental health, social status, and functional capacity within the community. Bandar Gabriel is a 73 y.o. male admitted due to Acute kidney injury (HCC). Patient alone. SW provided supportive listening while patient discussed past medical history and events leading up to hospitalization.       Mental Status:  Alert, oriented, and engaging during assessment.     Decision Making:  Makes own decisions.     Family/Social/Home Environment: lives alone    Employment status: Retired     Health Insurance:     Active Insurance as of 4/18/2024       Primary Coverage       Payor Plan Insurance Group Employer/Plan Group    DEVOTED HEALTH PLAN DEVOTED HEALTH PLANS        Payor Plan Address Payor Plan Phone Number Payor Plan Fax Number Effective Dates    [address] [phone]  4/1/2024 - None Entered    [place] [id number]         Subscriber Name Subscriber Birth Date Member ID       BANDAR GABRIEL [id number]                     Support: Discussed a good social support network     Current Services:  None      Current DMEs: Cane     PCP: Beth Holm MD and repots no issues affording medication.      status:   No     ADLs and means of transportation: Independent in ADLs/IADLs in ADLs prior to hospitalization and able to transport self.    Food insecurity or needed financial assistance: Denies any food insecurity or financial concerns at this time.    Income Source: social security and pension     ACP and Code Status:  SW discussed an Advance Directive which included the patient's choices for care and treatment in the case of a health event that adversely affects decision-making abilities. SW provided education and resources. Bandar Gabriel has no questions at this time and has agreed to keep me up-to-date should anything change.    Bandar Gabriel is a Full Code status and has NO advanced directive -

## 2024-04-19 NOTE — FLOWSHEET NOTE
04/18/24 2123   Vital Signs   Orthostatic B/P and Pulse? Yes   Blood Pressure Lying 133/70   Pulse Lying 49 PER MINUTE   Blood Pressure Sitting 155/69   Pulse Sitting 49 PER MINUTE   Blood Pressure Standing 149/61   Pulse Standing 51 PER MINUTE     Pt asymptomatic

## 2024-04-19 NOTE — CARE COORDINATION
Case Management Assessment  Initial Evaluation    Date/Time of Evaluation: 4/19/2024 9:48 AM  Assessment Completed by: Marta Peguero RN    If patient is discharged prior to next notation, then this note serves as note for discharge by case management.    Patient Name: Bandar Gabriel                   YOB: 1950  Diagnosis: Dehydration with hyponatremia [E86.0, E87.1]  Acute kidney injury (HCC) [N17.9]                   Date / Time: 4/18/2024  4:23 PM    Patient Admission Status: Inpatient   Readmission Risk (Low < 19, Mod (19-27), High > 27): Readmission Risk Score: 15.9    Current PCP: Beth Holm MD  PCP verified by CM? Yes    Chart Reviewed: Yes      History Provided by: Patient  Patient Orientation: Alert and Oriented    Patient Cognition: Alert    Hospitalization in the last 30 days (Readmission):  No    If yes, Readmission Assessment in CM Navigator will be completed.    Advance Directives:      Code Status: Full Code   Patient's Primary Decision Maker is: Legal Next of Kin    Primary Decision Maker: Shahnaz Mulligan - Niece/Nephew - [phone]    Discharge Planning:    Patient lives with: Alone Type of Home: Apartment  Primary Care Giver: Self  Patient Support Systems include: Family Members   Current Financial resources: Medicare  Current community resources: None  Current services prior to admission: Durable Medical Equipment            Current DME: Cane            Type of Home Care services:  None    ADLS  Prior functional level: Independent in ADLs/IADLs  Current functional level: Independent in ADLs/IADLs    PT AM-PAC:   /24  OT AM-PAC:   /24    Family can provide assistance at DC: Yes  Would you like Case Management to discuss the discharge plan with any other family members/significant others, and if so, who? No  Plans to Return to Present Housing: Yes  Other Identified Issues/Barriers to RETURNING to current housing: yes  Potential Assistance needed at discharge: Durable Medical      The Patient and/or Patient Representative Agree with the Discharge Plan?      Marta Peguero RN  Case Management Department

## 2024-04-20 VITALS
BODY MASS INDEX: 31.07 KG/M2 | HEIGHT: 68 IN | HEART RATE: 53 BPM | SYSTOLIC BLOOD PRESSURE: 147 MMHG | WEIGHT: 205 LBS | OXYGEN SATURATION: 98 % | RESPIRATION RATE: 16 BRPM | DIASTOLIC BLOOD PRESSURE: 64 MMHG | TEMPERATURE: 98.8 F

## 2024-04-20 LAB
ANION GAP SERPL CALCULATED.3IONS-SCNC: 8 MMOL/L (ref 9–17)
BASOPHILS # BLD: 0 K/UL (ref 0–0.2)
BASOPHILS NFR BLD: 0 % (ref 0–2)
BUN SERPL-MCNC: 27 MG/DL (ref 8–23)
BUN/CREAT SERPL: 25 (ref 9–20)
CALCIUM SERPL-MCNC: 8.3 MG/DL (ref 8.6–10.4)
CHLORIDE SERPL-SCNC: 104 MMOL/L (ref 98–107)
CO2 SERPL-SCNC: 25 MMOL/L (ref 20–31)
CREAT SERPL-MCNC: 1.1 MG/DL (ref 0.7–1.2)
EOSINOPHIL # BLD: 0.13 K/UL (ref 0–0.4)
EOSINOPHILS RELATIVE PERCENT: 6 % (ref 1–8)
ERYTHROCYTE [DISTWIDTH] IN BLOOD BY AUTOMATED COUNT: 13.1 % (ref 11.8–14.4)
GFR SERPL CREATININE-BSD FRML MDRD: 70 ML/MIN/1.73M2
GLUCOSE SERPL-MCNC: 122 MG/DL (ref 70–99)
HCT VFR BLD AUTO: 24.8 % (ref 40.7–50.3)
HGB BLD-MCNC: 8.4 G/DL (ref 13–17)
IMM GRANULOCYTES # BLD AUTO: 0 K/UL (ref 0–0.3)
IMM GRANULOCYTES NFR BLD: 0 %
LYMPHOCYTES NFR BLD: 0.55 K/UL (ref 1–4.8)
LYMPHOCYTES RELATIVE PERCENT: 25 % (ref 15–43)
MCH RBC QN AUTO: 33.5 PG (ref 25.2–33.5)
MCHC RBC AUTO-ENTMCNC: 33.9 G/DL (ref 25.2–33.5)
MCV RBC AUTO: 98.8 FL (ref 82.6–102.9)
MONOCYTES NFR BLD: 0.29 K/UL (ref 0.1–1.2)
MONOCYTES NFR BLD: 13 % (ref 6–14)
MORPHOLOGY: ABNORMAL
MORPHOLOGY: ABNORMAL
NEUTROPHILS NFR BLD: 56 % (ref 44–74)
NEUTS SEG NFR BLD: 1.23 K/UL (ref 1.5–8.1)
NRBC BLD-RTO: 0 PER 100 WBC
PLATELET # BLD AUTO: ABNORMAL K/UL (ref 138–453)
PLATELET, FLUORESCENCE: 69 K/UL (ref 138–453)
PLATELETS.RETICULATED NFR BLD AUTO: 13.8 % (ref 1.1–10.3)
POTASSIUM SERPL-SCNC: 4.1 MMOL/L (ref 3.7–5.3)
RBC # BLD AUTO: 2.51 M/UL (ref 4.21–5.77)
SODIUM SERPL-SCNC: 137 MMOL/L (ref 135–144)
WBC OTHER # BLD: 2.2 K/UL (ref 3.5–11.3)

## 2024-04-20 PROCEDURE — 6370000000 HC RX 637 (ALT 250 FOR IP)

## 2024-04-20 PROCEDURE — 2580000003 HC RX 258

## 2024-04-20 PROCEDURE — 99239 HOSP IP/OBS DSCHRG MGMT >30: CPT | Performed by: FAMILY MEDICINE

## 2024-04-20 PROCEDURE — 94760 N-INVAS EAR/PLS OXIMETRY 1: CPT

## 2024-04-20 PROCEDURE — 36415 COLL VENOUS BLD VENIPUNCTURE: CPT

## 2024-04-20 PROCEDURE — 85025 COMPLETE CBC W/AUTO DIFF WBC: CPT

## 2024-04-20 PROCEDURE — 80048 BASIC METABOLIC PNL TOTAL CA: CPT

## 2024-04-20 RX ADMIN — GEMFIBROZIL 600 MG: 600 TABLET ORAL at 07:53

## 2024-04-20 RX ADMIN — SODIUM CHLORIDE: 9 INJECTION, SOLUTION INTRAVENOUS at 06:05

## 2024-04-20 RX ADMIN — ISOSORBIDE MONONITRATE 30 MG: 30 TABLET, EXTENDED RELEASE ORAL at 07:54

## 2024-04-20 RX ADMIN — PRAVASTATIN SODIUM 10 MG: 20 TABLET ORAL at 07:53

## 2024-04-20 RX ADMIN — AMLODIPINE BESYLATE 10 MG: 5 TABLET ORAL at 07:54

## 2024-04-20 RX ADMIN — METOPROLOL SUCCINATE 100 MG: 50 TABLET, EXTENDED RELEASE ORAL at 07:54

## 2024-04-20 NOTE — DISCHARGE SUMMARY
Hospitalist Discharge Summary    Bandar Gabriel  :  1950  MRN:  [id number]    Admit date:  2024  Discharge date:  24    Admitting Physician:  Anuel Cho MD    Discharge Diagnoses:   Patient Active Problem List   Diagnosis    Skin cancer    Hyperlipidemia    Hypertension    Coronary artery disease    GI bleed    Gastroesophageal reflux disease without esophagitis    Tremor    Fatty liver    Arthritis    Smoldering multiple myeloma    Lumbar radiculitis    Lumbar spondylosis    Early dry stage nonexudative age-related macular degeneration of both eyes    Hypertensive retinopathy of both eyes    NAION (non-arteritic anterior ischemic optic neuropathy), right eye    Nuclear sclerotic cataract of both eyes    Bilateral carotid artery stenosis    Gout    Strain of muscle, fascia and tendon of lower back, initial encounter    Coronary atherosclerosis    Multiple myeloma, remission status unspecified (HCC)    Atherosclerotic heart disease of native coronary artery with unspecified angina pectoris    Dehydration with hyponatremia    Neutropenia (HCC)    Monoclonal gammopathy    Thoracic neuritis    Acute kidney injury (HCC)        Admission Condition:  fair      Discharged Condition:  good    Hospital Course/Treatments   admitted with ac kidney injury, pt has been started on chemo meds as per oncology, pt had reg labs deawn and reflected slight kidney injury, pt was later referred to Er and admission, pt had iv fluids and cr slightly improved, pt will be d/c home with following meds and advised to drink more fluids    Discharge Medications:         Medication List        CONTINUE taking these medications      allopurinol 300 MG tablet  Commonly known as: ZYLOPRIM  Take 1 tablet by mouth once daily     amLODIPine 10 MG tablet  Commonly known as: NORVASC  Take 1 tablet by mouth daily     aspirin 81 MG tablet     bisoprolol 10 MG tablet  Commonly known as: ZEBETA  Take 1 tablet by mouth once daily     Blood

## 2024-04-20 NOTE — PLAN OF CARE
Problem: Discharge Planning  Goal: Discharge to home or other facility with appropriate resources  4/19/2024 2335 by Ese Avery, RN  Outcome: Progressing  Flowsheets (Taken 4/19/2024 2030)  Discharge to home or other facility with appropriate resources:   Identify barriers to discharge with patient and caregiver   Arrange for needed discharge resources and transportation as appropriate   Identify discharge learning needs (meds, wound care, etc)   Refer to discharge planning if patient needs post-hospital services based on physician order or complex needs related to functional status, cognitive ability or social support system  4/19/2024 1749 by Guerita Macdonald, RN  Outcome: Progressing     Problem: ABCDS Injury Assessment  Goal: Absence of physical injury  4/19/2024 2335 by Ese Avery, RN  Outcome: Progressing  4/19/2024 1749 by Guerita Macdonald, RN  Outcome: Progressing     Problem: Safety - Adult  Goal: Free from fall injury  4/19/2024 2335 by Ese Avery, RN  Outcome: Progressing  4/19/2024 1749 by Guerita Macdonald, RN  Outcome: Progressing

## 2024-04-20 NOTE — PLAN OF CARE
Problem: Discharge Planning  Goal: Discharge to home or other facility with appropriate resources  4/20/2024 0758 by Chato Rhoades RN  Outcome: Progressing  4/19/2024 2335 by Ees Avery RN  Outcome: Progressing  Flowsheets (Taken 4/19/2024 2030)  Discharge to home or other facility with appropriate resources:   Identify barriers to discharge with patient and caregiver   Arrange for needed discharge resources and transportation as appropriate   Identify discharge learning needs (meds, wound care, etc)   Refer to discharge planning if patient needs post-hospital services based on physician order or complex needs related to functional status, cognitive ability or social support system     Problem: ABCDS Injury Assessment  Goal: Absence of physical injury  4/20/2024 0758 by Chato Rhoades RN  Outcome: Progressing  4/19/2024 2335 by Ese Avery RN  Outcome: Progressing     Problem: Safety - Adult  Goal: Free from fall injury  4/20/2024 0758 by Chato Rhoades RN  Outcome: Progressing  4/19/2024 2335 by Ese Avery RN  Outcome: Progressing

## 2024-04-20 NOTE — FLOWSHEET NOTE
04/20/24 0614   Vital Signs   Orthostatic B/P and Pulse? Yes   Blood Pressure Lying 143/56   Pulse Lying 96 PER MINUTE   Blood Pressure Sitting 143/91   Pulse Sitting 98 PER MINUTE   Blood Pressure Standing 157/71   Pulse Standing 97 PER MINUTE

## 2024-04-20 NOTE — FLOWSHEET NOTE
04/20/24 0614   Vital Signs   Orthostatic B/P and Pulse? Yes   Blood Pressure Lying 143/56   Pulse Lying 56 PER MINUTE   Blood Pressure Sitting 143/91   Pulse Sitting 57 PER MINUTE   Blood Pressure Standing 157/71   Pulse Standing 58 PER MINUTE

## 2024-04-21 LAB
EKG ATRIAL RATE: 52 BPM
EKG P AXIS: 40 DEGREES
EKG P-R INTERVAL: 126 MS
EKG Q-T INTERVAL: 484 MS
EKG QRS DURATION: 90 MS
EKG QTC CALCULATION (BAZETT): 450 MS
EKG R AXIS: -6 DEGREES
EKG T AXIS: 28 DEGREES
EKG VENTRICULAR RATE: 52 BPM

## 2024-04-22 ENCOUNTER — CARE COORDINATION (OUTPATIENT)
Dept: CARE COORDINATION | Age: 74
End: 2024-04-22

## 2024-04-22 ENCOUNTER — CARE COORDINATION (OUTPATIENT)
Dept: CASE MANAGEMENT | Age: 74
End: 2024-04-22

## 2024-04-22 DIAGNOSIS — N17.9 AKI (ACUTE KIDNEY INJURY) (HCC): Primary | ICD-10-CM

## 2024-04-22 PROCEDURE — 1111F DSCHRG MED/CURRENT MED MERGE: CPT | Performed by: INTERNAL MEDICINE

## 2024-04-22 NOTE — CARE COORDINATION
Writer mailed out an ACP PACKET from [address] [place] [address] on 04/22/2024 to Patient.

## 2024-04-22 NOTE — CARE COORDINATION
Care Transitions Initial Follow Up Call    Call within 2 business days of discharge: Yes    Patient Current Location:  Home: [address] [place] [address]  [address] [place], [place] [id number]    LPN Care Coordinator contacted the patient by telephone to perform post hospital discharge assessment. Verified name and  with patient as identifiers. Provided introduction to self, and explanation of the LPN Care Coordinator role.     Patient: Bandar Gabriel Patient : 1950   MRN: [id number]  Reason for Admission: Acute kidney injury   Discharge Date: 24 RARS: Readmission Risk Score: 15.5      Last Discharge Facility       Date Complaint Diagnosis Description Type Department Provider    24 Abnormal Lab Acute kidney injury (HCC) ED to Hosp-Admission (Discharged) (ADMITTED) Anuel Donohue MD; Eliseo Hernandez ...            Was this an external facility discharge? No Discharge Facility: [facility]    Challenges to be reviewed by the provider   Additional needs identified to be addressed with provider: No  none               Method of communication with provider: none.    Transitions of Care Initial Call:  Explained the role of Care Transition Nurse and the Transition program, patient is agreeable to follow up calls Post discharge from the Hospital.     Spoke to Yang today he reports he's doing okay . He still has some R sided flank pain. He stated that he has no other concerns denies N/V/D fever or chills. He is staying hydrated. B/B wnl. Med rec completed reviewed stopped meds via AVS. Writer scheduled HFU 24.     LPN Care Coordinator reviewed discharge instructions, medical action plan, and red flags with patient who verbalized understanding. The patient was given an opportunity to ask questions and does not have any further questions or concerns at this time. Were discharge instructions available to patient? Yes. Reviewed appropriate site of care based on symptoms and resources available to patient including:

## 2024-04-23 ENCOUNTER — HOSPITAL ENCOUNTER (OUTPATIENT)
Age: 74
Discharge: HOME | End: 2024-04-23
Payer: COMMERCIAL

## 2024-04-23 ENCOUNTER — OFFICE VISIT (OUTPATIENT)
Dept: INTERNAL MEDICINE | Age: 74
End: 2024-04-23

## 2024-04-23 VITALS
WEIGHT: 198 LBS | HEART RATE: 89 BPM | SYSTOLIC BLOOD PRESSURE: 120 MMHG | HEIGHT: 68 IN | DIASTOLIC BLOOD PRESSURE: 62 MMHG | BODY MASS INDEX: 30.01 KG/M2 | RESPIRATION RATE: 16 BRPM | OXYGEN SATURATION: 97 %

## 2024-04-23 DIAGNOSIS — I25.119 ATHEROSCLEROSIS OF NATIVE CORONARY ARTERY WITH ANGINA PECTORIS, UNSPECIFIED WHETHER NATIVE OR TRANSPLANTED HEART (HCC): ICD-10-CM

## 2024-04-23 DIAGNOSIS — D70.9 NEUTROPENIA, UNSPECIFIED TYPE (HCC): ICD-10-CM

## 2024-04-23 DIAGNOSIS — I10 ESSENTIAL HYPERTENSION: Primary | ICD-10-CM

## 2024-04-23 DIAGNOSIS — C90.00 MULTIPLE MYELOMA, REMISSION STATUS UNSPECIFIED (HCC): ICD-10-CM

## 2024-04-23 DIAGNOSIS — I10 ESSENTIAL HYPERTENSION: ICD-10-CM

## 2024-04-23 DIAGNOSIS — Z09 HOSPITAL DISCHARGE FOLLOW-UP: ICD-10-CM

## 2024-04-23 LAB
ALBUMIN SERPL-MCNC: 3.6 G/DL (ref 3.5–5.2)
ALBUMIN/GLOB SERPL: 0.9 {RATIO} (ref 1–2.5)
ALP SERPL-CCNC: 80 U/L (ref 40–129)
ALT SERPL-CCNC: 23 U/L (ref 5–41)
ANION GAP SERPL CALCULATED.3IONS-SCNC: 12 MMOL/L (ref 9–17)
AST SERPL-CCNC: 24 U/L
BASOPHILS # BLD: <0.03 K/UL (ref 0–0.2)
BASOPHILS NFR BLD: 1 % (ref 0–2)
BILIRUB SERPL-MCNC: 0.5 MG/DL (ref 0.3–1.2)
BUN SERPL-MCNC: 24 MG/DL (ref 8–23)
BUN/CREAT SERPL: 20 (ref 9–20)
CALCIUM SERPL-MCNC: 9 MG/DL (ref 8.6–10.4)
CHLORIDE SERPL-SCNC: 98 MMOL/L (ref 98–107)
CO2 SERPL-SCNC: 25 MMOL/L (ref 20–31)
CREAT SERPL-MCNC: 1.2 MG/DL (ref 0.7–1.2)
EOSINOPHIL # BLD: 0.12 K/UL (ref 0–0.44)
EOSINOPHILS RELATIVE PERCENT: 4 % (ref 1–4)
ERYTHROCYTE [DISTWIDTH] IN BLOOD BY AUTOMATED COUNT: 13.2 % (ref 11.8–14.4)
GFR SERPL CREATININE-BSD FRML MDRD: 63 ML/MIN/1.73M2
GLUCOSE SERPL-MCNC: 110 MG/DL (ref 70–99)
HCT VFR BLD AUTO: 28.4 % (ref 40.7–50.3)
HGB BLD-MCNC: 9.6 G/DL (ref 13–17)
IMM GRANULOCYTES # BLD AUTO: <0.03 K/UL (ref 0–0.3)
IMM GRANULOCYTES NFR BLD: 0 %
LYMPHOCYTES NFR BLD: 0.76 K/UL (ref 1.1–3.7)
LYMPHOCYTES RELATIVE PERCENT: 23 % (ref 24–43)
MCH RBC QN AUTO: 33 PG (ref 25.2–33.5)
MCHC RBC AUTO-ENTMCNC: 33.8 G/DL (ref 25.2–33.5)
MCV RBC AUTO: 97.6 FL (ref 82.6–102.9)
MONOCYTES NFR BLD: 0.79 K/UL (ref 0.1–1.2)
MONOCYTES NFR BLD: 23 % (ref 3–12)
NEUTROPHILS NFR BLD: 50 % (ref 36–65)
NEUTS SEG NFR BLD: 1.68 K/UL (ref 1.5–8.1)
NRBC BLD-RTO: 0 PER 100 WBC
PLATELET # BLD AUTO: 150 K/UL (ref 138–453)
PMV BLD AUTO: 11.8 FL (ref 8.1–13.5)
POTASSIUM SERPL-SCNC: 4 MMOL/L (ref 3.7–5.3)
PROT SERPL-MCNC: 7.6 G/DL (ref 6.4–8.3)
RBC # BLD AUTO: 2.91 M/UL (ref 4.21–5.77)
SODIUM SERPL-SCNC: 135 MMOL/L (ref 135–144)
WBC OTHER # BLD: 3.4 K/UL (ref 3.5–11.3)

## 2024-04-23 PROCEDURE — 80053 COMPREHEN METABOLIC PANEL: CPT

## 2024-04-23 PROCEDURE — 36415 COLL VENOUS BLD VENIPUNCTURE: CPT

## 2024-04-23 PROCEDURE — 85025 COMPLETE CBC W/AUTO DIFF WBC: CPT

## 2024-04-23 NOTE — PROGRESS NOTES
daily 90 tablet 3    isosorbide mononitrate (IMDUR) 30 MG extended release tablet Take 1 tablet by mouth once daily 90 tablet 3    lisinopril (PRINIVIL;ZESTRIL) 10 MG tablet Take 1 tablet by mouth daily 90 tablet 3    amLODIPine (NORVASC) 10 MG tablet Take 1 tablet by mouth daily 90 tablet 3    bisoprolol (ZEBETA) 10 MG tablet Take 1 tablet by mouth once daily 90 tablet 3    gemfibrozil (LOPID) 600 MG tablet TAKE 1 TABLET BY MOUTH ONCE DAILY 90 tablet 3    Blood Pressure KIT 1 blood pressure kit 1 kit 0    allopurinol (ZYLOPRIM) 300 MG tablet Take 1 tablet by mouth once daily 90 tablet 3    aspirin 81 MG tablet Take 1 tablet by mouth          Medications patient taking as of now reconciled against medications ordered at time of hospital discharge: Yes    Review of Systems    Objective:    /62 (Site: Left Upper Arm, Position: Sitting, Cuff Size: Large Adult)   Pulse 89   Resp 16   Ht 1.727 m (5' 8\")   Wt 89.8 kg (198 lb)   SpO2 97%   BMI 30.11 kg/m²   Physical Exam    An electronic signature was used to authenticate this note.  --LINDA MEDINA MD

## 2024-04-24 ENCOUNTER — CARE COORDINATION (OUTPATIENT)
Dept: CARE COORDINATION | Age: 74
End: 2024-04-24

## 2024-04-24 NOTE — CARE COORDINATION
BARRON Hernandez mailed ACP packet.    will follow up in 2 weeks to confirm packet was received and start the process of completing ACP documents.

## 2024-04-29 ENCOUNTER — OFFICE VISIT (OUTPATIENT)
Dept: INTERNAL MEDICINE | Age: 74
End: 2024-04-29
Payer: COMMERCIAL

## 2024-04-29 ENCOUNTER — TELEPHONE (OUTPATIENT)
Dept: ONCOLOGY | Age: 74
End: 2024-04-29

## 2024-04-29 VITALS
SYSTOLIC BLOOD PRESSURE: 144 MMHG | HEIGHT: 68 IN | DIASTOLIC BLOOD PRESSURE: 64 MMHG | BODY MASS INDEX: 30.01 KG/M2 | WEIGHT: 198 LBS | OXYGEN SATURATION: 98 % | HEART RATE: 65 BPM | RESPIRATION RATE: 16 BRPM

## 2024-04-29 DIAGNOSIS — I10 ESSENTIAL HYPERTENSION: Primary | ICD-10-CM

## 2024-04-29 DIAGNOSIS — I25.10 CORONARY ARTERY DISEASE WITHOUT ANGINA PECTORIS, UNSPECIFIED VESSEL OR LESION TYPE, UNSPECIFIED WHETHER NATIVE OR TRANSPLANTED HEART: ICD-10-CM

## 2024-04-29 DIAGNOSIS — R07.89 CHEST WALL PAIN: ICD-10-CM

## 2024-04-29 DIAGNOSIS — E78.2 MIXED HYPERLIPIDEMIA: ICD-10-CM

## 2024-04-29 DIAGNOSIS — C90.00 MULTIPLE MYELOMA, REMISSION STATUS UNSPECIFIED (HCC): ICD-10-CM

## 2024-04-29 PROCEDURE — 99214 OFFICE O/P EST MOD 30 MIN: CPT | Performed by: INTERNAL MEDICINE

## 2024-04-29 PROCEDURE — 99212 OFFICE O/P EST SF 10 MIN: CPT | Performed by: INTERNAL MEDICINE

## 2024-04-29 PROCEDURE — 3078F DIAST BP <80 MM HG: CPT | Performed by: INTERNAL MEDICINE

## 2024-04-29 PROCEDURE — 1123F ACP DISCUSS/DSCN MKR DOCD: CPT | Performed by: INTERNAL MEDICINE

## 2024-04-29 PROCEDURE — G2211 COMPLEX E/M VISIT ADD ON: HCPCS | Performed by: INTERNAL MEDICINE

## 2024-04-29 PROCEDURE — 3077F SYST BP >= 140 MM HG: CPT | Performed by: INTERNAL MEDICINE

## 2024-04-29 NOTE — PATIENT INSTRUCTIONS
Please do warm compresses on the affected eye for five minutes three times a day until it's better

## 2024-04-29 NOTE — PROGRESS NOTES
Adult)   Pulse 65   Resp 16   Ht 1.727 m (5' 8\")   Wt 89.8 kg (198 lb)   SpO2 98%   BMI 30.11 kg/m²   Constitutional: No acute distress.  Sits in chair comfortably  Eyes: Sclerae nonicteric. No lid lag or proptosis  HENT: External ears normal. No external lesions on the nose  Neck: No gross masses. Trachea visibly midline  Respiratory: Good air entry bilaterally.  No wheezing or crackles  Cardiovascular: Normal S1-S2.  No murmurs.  No lower extremity edema  Gastrointestinal: No visible masses. No visible hernias  Skin: No abnormal rashes. No abnormal masses  Neurologic: Cranial nerves grossly intact  Psychiatric: Normal affect. Alert and oriented    Medications  Current Outpatient Medications:     pravastatin (PRAVACHOL) 10 MG tablet, Take 1 tablet by mouth once daily, Disp: 90 tablet, Rfl: 3    isosorbide mononitrate (IMDUR) 30 MG extended release tablet, Take 1 tablet by mouth once daily, Disp: 90 tablet, Rfl: 3    lisinopril (PRINIVIL;ZESTRIL) 10 MG tablet, Take 1 tablet by mouth daily, Disp: 90 tablet, Rfl: 3    amLODIPine (NORVASC) 10 MG tablet, Take 1 tablet by mouth daily, Disp: 90 tablet, Rfl: 3    bisoprolol (ZEBETA) 10 MG tablet, Take 1 tablet by mouth once daily, Disp: 90 tablet, Rfl: 3    gemfibrozil (LOPID) 600 MG tablet, TAKE 1 TABLET BY MOUTH ONCE DAILY, Disp: 90 tablet, Rfl: 3    Blood Pressure KIT, 1 blood pressure kit, Disp: 1 kit, Rfl: 0    allopurinol (ZYLOPRIM) 300 MG tablet, Take 1 tablet by mouth once daily, Disp: 90 tablet, Rfl: 3    aspirin 81 MG tablet, Take 1 tablet by mouth, Disp: , Rfl:     nitroGLYCERIN (NITROSTAT) 0.4 MG SL tablet, Place 0.4 mg under the tongue every 5 minutes as needed, Disp: , Rfl:     Allergies  Tetanus toxoids, Codeine, Dtap-ipv vaccine, Penicillins, and Tetanus antitoxin    Past Medical History:   Diagnosis Date    Angina at rest (HCC)     Arthritis     CAD (coronary artery disease)     angina    Cellulitis of right hand 11/21/2023    Hyperlipidemia

## 2024-04-29 NOTE — TELEPHONE ENCOUNTER
Name: Bandar Gabriel  : 1950  MRN: [id number]    Oncology Navigation Follow-Up Note    Contact Type:  Telephone    Notes:   Reviewing chart. Phone call to Infusion regarding process of authorizing Velcade and spoke to aCrmen. Process is continuing. Informed her it would be beneficial to coordinate teaching and infusion appts with niece due to patient sometimes having memory issues.      Electronically signed by Meghna Austin RN on 2024 at 11:45 AM

## 2024-04-30 ENCOUNTER — CARE COORDINATION (OUTPATIENT)
Dept: CASE MANAGEMENT | Age: 74
End: 2024-04-30

## 2024-04-30 NOTE — CARE COORDINATION
Care Transitions Outreach Attempt    Call within 2 business days of discharge: Yes   Attempted to reach patient for transitions of care follow up. Unable to reach patient.    Patient: Bandar Gabriel Patient : 1950 MRN: [id number]    Last Discharge Facility       Date Complaint Diagnosis Description Type Department Provider    24 Abnormal Lab Acute kidney injury (HCC) ED to Hosp-Admission (Discharged) (ADMITTED) Anuel Donohue MD; Eliseo Hernandez ...              Was this an external facility discharge? No Discharge Facility Name: [facility]    Noted following upcoming appointments from discharge chart review:   [facility] follow up appointment(s):   Future Appointments   Date Time Provider Department Center   2024 12:00 PM Joey Stewart MD [facility]   2024 11:20 AM Beth Holm MD [facility]     Non-[facility]  follow up appointment(s): n/a

## 2024-05-01 ENCOUNTER — CARE COORDINATION (OUTPATIENT)
Dept: CASE MANAGEMENT | Age: 74
End: 2024-05-01

## 2024-05-01 NOTE — CARE COORDINATION
Care Transitions Outreach Attempt    Call within 2 business days of discharge: Yes   Attempted to reach patient for transitions of care follow up. Unable to reach patient. Final attempt to reach, calls go directly to  but no vm box set up, unable to leave any messages.  Patient did have follow up with PCP.  Episode resolved.     Patient: Bandar Gabriel Patient : 1950 MRN: [id number]    Last Discharge Facility       Date Complaint Diagnosis Description Type Department Provider    24 Abnormal Lab Acute kidney injury (HCC) ED to Hosp-Admission (Discharged) (ADMITTED) Anuel Donohue MD; Eliseo Hernandez ...              Was this an external facility discharge? No Discharge Facility Name: [facility]    Noted following upcoming appointments from discharge chart review:   [facility] follow up appointment(s):   Future Appointments   Date Time Provider Department Center   [facility]24 12:00 PM [facility]   [facility] 11[facility]20 [facility]     Non-[facility]  follow up appointment(s): n/a

## 2024-05-03 ENCOUNTER — CLINICAL DOCUMENTATION (OUTPATIENT)
Facility: HOSPITAL | Age: 74
End: 2024-05-03

## 2024-05-07 ENCOUNTER — TELEPHONE (OUTPATIENT)
Dept: ONCOLOGY | Age: 74
End: 2024-05-07

## 2024-05-07 ENCOUNTER — OFFICE VISIT (OUTPATIENT)
Dept: ONCOLOGY | Age: 74
End: 2024-05-07
Payer: COMMERCIAL

## 2024-05-07 VITALS
SYSTOLIC BLOOD PRESSURE: 126 MMHG | RESPIRATION RATE: 16 BRPM | WEIGHT: 196.2 LBS | OXYGEN SATURATION: 96 % | DIASTOLIC BLOOD PRESSURE: 74 MMHG | HEIGHT: 68 IN | BODY MASS INDEX: 29.73 KG/M2 | HEART RATE: 58 BPM

## 2024-05-07 DIAGNOSIS — D47.2 SMOLDERING MYELOMA: Primary | ICD-10-CM

## 2024-05-07 DIAGNOSIS — D64.9 ANEMIA, UNSPECIFIED TYPE: ICD-10-CM

## 2024-05-07 DIAGNOSIS — R76.8 ELEVATED SERUM IMMUNOGLOBULIN FREE LIGHT CHAIN LEVEL: ICD-10-CM

## 2024-05-07 PROCEDURE — 3074F SYST BP LT 130 MM HG: CPT | Performed by: INTERNAL MEDICINE

## 2024-05-07 PROCEDURE — 99214 OFFICE O/P EST MOD 30 MIN: CPT | Performed by: INTERNAL MEDICINE

## 2024-05-07 PROCEDURE — 99213 OFFICE O/P EST LOW 20 MIN: CPT | Performed by: INTERNAL MEDICINE

## 2024-05-07 PROCEDURE — 1123F ACP DISCUSS/DSCN MKR DOCD: CPT | Performed by: INTERNAL MEDICINE

## 2024-05-07 PROCEDURE — 3078F DIAST BP <80 MM HG: CPT | Performed by: INTERNAL MEDICINE

## 2024-05-07 NOTE — TELEPHONE ENCOUNTER
Name: Bandar Gabriel  : 1950  MRN: [id number]    Oncology Navigation Follow-Up Note    Contact Type:  Medical Oncology    Notes:   Reviewing chart. Spoke with Carmen in infusion regarding Velcade auth process. She is following up with insurance today.  Writer met with patient while in office for med onc appt. Discussed with him the possibility of his niece attending the Velcade teaching session once approved. He thinks that she would be able to do so.  Encouraged him to call if he has questions or concerns.  Navigator following for continuity of care.        Electronically signed by Meghna Austin RN on 2024 at 1:16 PM

## 2024-05-07 NOTE — PROGRESS NOTES
cyanosis  Skin - normal coloration and turgor, no rashes, no suspicious skin lesions noted       DATA:    Results for orders placed or performed during the hospital encounter of 04/23/24   Comprehensive Metabolic Panel   Result Value Ref Range    Sodium 135 135 - 144 mmol/L    Potassium 4.0 3.7 - 5.3 mmol/L    Chloride 98 98 - 107 mmol/L    CO2 25 20 - 31 mmol/L    Anion Gap 12 9 - 17 mmol/L    Glucose 110 (H) 70 - 99 mg/dL    BUN 24 (H) 8 - 23 mg/dL    Creatinine 1.2 0.7 - 1.2 mg/dL    Est, Glom Filt Rate 63 >60 mL/min/1.73m2    Bun/Cre Ratio 20 9 - 20    Calcium 9.0 8.6 - 10.4 mg/dL    Total Protein 7.6 6.4 - 8.3 g/dL    Albumin 3.6 3.5 - 5.2 g/dL    Albumin/Globulin Ratio 0.9 (L) 1.0 - 2.5    Total Bilirubin 0.5 0.3 - 1.2 mg/dL    Alkaline Phosphatase 80 40 - 129 U/L    ALT 23 5 - 41 U/L    AST 24 <40 U/L   CBC with Auto Differential   Result Value Ref Range    WBC 3.4 (L) 3.5 - 11.3 k/uL    RBC 2.91 (L) 4.21 - 5.77 m/uL    Hemoglobin 9.6 (L) 13.0 - 17.0 g/dL    Hematocrit 28.4 (L) 40.7 - 50.3 %    MCV 97.6 82.6 - 102.9 fL    MCH 33.0 25.2 - 33.5 pg    MCHC 33.8 (H) 25.2 - 33.5 g/dL    RDW 13.2 11.8 - 14.4 %    Platelets 150 138 - 453 k/uL    MPV 11.8 8.1 - 13.5 fL    NRBC Automated 0.0 0.0 per 100 WBC    Neutrophils % 50 36 - 65 %    Lymphocytes % 23 (L) 24 - 43 %    Monocytes % 23 (H) 3 - 12 %    Eosinophils % 4 1 - 4 %    Basophils % 1 0 - 2 %    Immature Granulocytes % 0 0 %    Neutrophils Absolute 1.68 1.50 - 8.10 k/uL    Lymphocytes Absolute 0.76 (L) 1.10 - 3.70 k/uL    Monocytes Absolute 0.79 0.10 - 1.20 k/uL    Eosinophils Absolute 0.12 0.00 - 0.44 k/uL    Basophils Absolute <0.03 0.00 - 0.20 k/uL    Immature Granulocytes Absolute <0.03 0.00 - 0.30 k/uL     Results for orders placed or performed during the hospital encounter of 04/23/24   Comprehensive Metabolic Panel   Result Value Ref Range    Sodium 135 135 - 144 mmol/L    Potassium 4.0 3.7 - 5.3 mmol/L    Chloride 98 98 - 107 mmol/L    CO2 25 20 - 31

## 2024-05-08 ENCOUNTER — CARE COORDINATION (OUTPATIENT)
Dept: CARE COORDINATION | Age: 74
End: 2024-05-08

## 2024-05-08 NOTE — CARE COORDINATION
attempted to contact Bandar.  Kenton's phone is not working and in EPIC states to contact niEmma lockett.     contacted Emma who informed  that Bandar's phone only works when he is at the store or library and hooked up to [employer].  It was difficult to hear Emma due to loud noise.  Emma noted that she care for her grandchildren.     Emma confirmed that Bandar has the paper work.  Emma feels it would be helpful to meet with someone to review paperwork and assist.    noted that she lives in [place] but would place a referral to see if someone could meet with them in their area.      spoke with Reena who informed  of pastoral care, David Vallejo.  Reena reports that he can meet with individuals     attempted to contact David.   left message requesting call back to [phone].    Plan:    will follow up with David regarding assisting with ACP packet.     sent David Silva SECURE email requesting assistance.

## 2024-05-09 ENCOUNTER — TELEPHONE (OUTPATIENT)
Dept: SPIRITUAL SERVICES | Age: 74
End: 2024-05-09

## 2024-05-09 NOTE — ACP (ADVANCE CARE PLANNING)
Advance Care Planning   Advance Care Planning Note  [facility]    Date:  5/9/2024    Received request from patient.    Consultation conversation participants:    Spoke with patient's niece      Goals of ACP Conversation:  Facilitate a discussion related to patient's goals of care as they align with the patient's values and beliefs.    Health Care Decision Makers:      Primary Decision Maker: Shahnaz Mulligan - Niece/Nephew - [phone]   Summary:  No Decision Maker named by patient at this time    Advance Care Planning Documents (Patient Wishes)  Currently on file:   None    Assessment:   Patient's PCP requested consult. He was concerned and not understanding why this was brought up at this time. At his request his niece is requesting a meeting with an ACP Specialist to explain and hopefully complete Advance directive documents. This  initiated a phone call to the niece at the request of , Micki Baig. on 5/7/24. Conversation by phone took place at 0904 5/8/24.  explained Advance Care Planning, Advance Directives and Code Status during this conversation.  suggested an appointment with her and the patient at the Spiritual Care Office in [facility] She anjana speak with her uncle and call this office to make an appointment. Patient has adult children but family dynamics are such that he does not wish them to be his decision makers according to his niece, Emam Arias. .    Interventions:  Provided education on documents for clarity and greater understanding  Encouraged ongoing ACP conversation with future decision makers and loved ones    Care Preferences Communicated:   No    Outcomes:  ACP Discussion: Postponed    Patient / Healthcare Decision Maker Instructions:  Follow up with  to continue their ACP conversation.    Electronically signed by Chaplain Marge on 5/9/2024 at 9:35 AM.

## 2024-05-09 NOTE — FLOWSHEET NOTE
Assessment:   Patient's PCP requested consult. He was concerned and not understanding why this was brought up at this time. At his request his niece is requesting a meeting with an ACP Specialist to explain and hopefully complete Advance directive documents. This  initiated a phone call to the niece at the request of , Micki Baig. on 5/7/24. Conversation by phone took place at 0904 5/8/24.  explained Advance Care Planning, Advance Directives and Code Status during this conversation.  suggested an appointment with her and the patient at the Spiritual Care Office in [facility] She anjana speak with her uncle and call this office to make an appointment. Patient has adult children but family dynamics are such that he does not wish them to be his decision makers according to his niece, Emma Arias.     05/09/24 0952   Encounter Summary   Encounter Overview/Reason Advance Care Planning   Service Provided For Family   Referral/Consult From Other (comment)  (Social Work)   Support System Family members   Last Encounter  05/09/24   Complexity of Encounter Moderate   Begin Time 0904   End Time  0953   Total Time Calculated 49 min   Advance Care Planning   Type ACP conversation

## 2024-05-09 NOTE — TELEPHONE ENCOUNTER
Assessment:   Patient's PCP requested consult. He was concerned and not understanding why this was brought up at this time. At his request his niece is requesting a meeting with an ACP Specialist to explain and hopefully complete Advance directive documents. This  initiated a phone call to the niece at the request of , Micki Baig. on 5/7/24. Conversation by phone took place at 0904 5/8/24.  explained Advance Care Planning, Advance Directives and Code Status during this conversation.  suggested an appointment with her and the patient at the Spiritual Care Office in [facility] She anjana speak with her uncle and call this office to make an appointment. Patient has adult children but family dynamics are such that he does not wish them to be his decision makers according to his niece, Emma Arias.  See ACP note.

## 2024-05-10 ENCOUNTER — CARE COORDINATION (OUTPATIENT)
Dept: CARE COORDINATION | Age: 74
End: 2024-05-10

## 2024-05-10 NOTE — CARE COORDINATION
David Silva accepted referral to assist with advanced care directives.      noted that David called and spoke with Connor.      will sign off.

## 2024-05-15 ENCOUNTER — CLINICAL DOCUMENTATION (OUTPATIENT)
Dept: SPIRITUAL SERVICES | Age: 74
End: 2024-05-15

## 2024-05-15 NOTE — ACP (ADVANCE CARE PLANNING)
Advance Care Planning   Advance Care Planning Note  Ambulatory [facility]    Date:  5/15/2024    Received request from patient.    Consultation conversation participants:   Patient who understands ACP conversation  Patient's niece.      Goals of ACP Conversation:  Discuss advance care planning documents  Facilitate a discussion related to patient's goals of care as they align with the patient's values and beliefs.    Health Care Decision Makers:      Primary Decision Maker: NickyShahnaz wright - Niece/Nephew - [phone]   Summary:  Completed New Documents    Advance Care Planning Documents (Patient Wishes)  Currently on file:   Healthcare Power of /Advance Directive Appointment of Health Care Agent  Living Will/Advance Directive    Assessment:   Patient came to Spiritual Care office for a meeting scheduled for this date at 9:00 am.. He had his niece, Yumiko Arias with him. He named her as HCPOA primary and a daughter as secondary. He appeared to be alert and oriented.    Interventions:  Provided education on documents for clarity and greater understanding  Discussed and provided education on state decision maker hierarchy  Assisted in the completion of documents according to patient's wishes at this time    Care Preferences Communicated:   No    Outcomes:  ACP Discussion: Completed    Patient / Healthcare Decision Maker Instructions:  Return a copy of Advance Directive Form(s) to medical office.  Upload completed ACP document(s) in their linkedFAt ACP page.    Electronically signed by Chaplain Marge on 5/15/2024 at 10:25 AM.

## 2024-05-15 NOTE — FLOWSHEET NOTE
05/15/24 1028   Encounter Summary   Encounter Overview/Reason Advance Care Planning   Service Provided For Patient and family together   Referral/Consult From Physician   Support System Family members   Last Encounter  05/15/24   Complexity of Encounter Moderate   Begin Time 0855   End Time  1029   Total Time Calculated 94 min   Advance Care Planning   Type Completed AD/ACP document(s)

## 2024-05-15 NOTE — PROGRESS NOTES
Patient came to Spiritual Care office for a meeting scheduled for this date at 9:00 am.. He had his niece, Yumiko Arias with him. He named her as HCPOA primary and a daughter as secondary. He appeared to be alert and oriented.

## 2024-05-16 ENCOUNTER — TELEPHONE (OUTPATIENT)
Dept: ONCOLOGY | Age: 74
End: 2024-05-16

## 2024-05-16 NOTE — TELEPHONE ENCOUNTER
Name: Bandar Gabriel  : 1950  MRN: [id number]    Oncology Navigation Follow-Up Note    Contact Type:   chart review    Notes:   Reviewing chart. Patient is off revlimid, with close monitoring. Revisit appt 24 with labs prior.   Navigator will remain on team at this time as needed. Will evaluate ongoing need at next med onc visit.  Navigator following for continuity of care as needed.        Electronically signed by Meghna Austin RN on 2024 at 11:36 AM

## 2024-05-22 ENCOUNTER — HOSPITAL ENCOUNTER (OUTPATIENT)
Age: 74
Setting detail: SPECIMEN
Discharge: HOME | End: 2024-05-22
Payer: COMMERCIAL

## 2024-05-22 DIAGNOSIS — D64.9 ANEMIA, UNSPECIFIED TYPE: ICD-10-CM

## 2024-05-22 DIAGNOSIS — R76.8 ELEVATED SERUM IMMUNOGLOBULIN FREE LIGHT CHAIN LEVEL: ICD-10-CM

## 2024-05-22 DIAGNOSIS — D47.2 SMOLDERING MYELOMA: ICD-10-CM

## 2024-05-22 LAB
ANION GAP SERPL CALCULATED.3IONS-SCNC: 11 MMOL/L (ref 9–17)
BASOPHILS # BLD: <0.03 K/UL (ref 0–0.2)
BASOPHILS NFR BLD: 1 % (ref 0–2)
BUN SERPL-MCNC: 27 MG/DL (ref 8–23)
BUN/CREAT SERPL: 25 (ref 9–20)
CALCIUM SERPL-MCNC: 9.1 MG/DL (ref 8.6–10.4)
CHLORIDE SERPL-SCNC: 101 MMOL/L (ref 98–107)
CO2 SERPL-SCNC: 26 MMOL/L (ref 20–31)
CREAT SERPL-MCNC: 1.1 MG/DL (ref 0.7–1.2)
EOSINOPHIL # BLD: 0.09 K/UL (ref 0–0.44)
EOSINOPHILS RELATIVE PERCENT: 4 % (ref 1–4)
ERYTHROCYTE [DISTWIDTH] IN BLOOD BY AUTOMATED COUNT: 13.4 % (ref 11.8–14.4)
GFR, ESTIMATED: 70 ML/MIN/1.73M2
GLUCOSE SERPL-MCNC: 173 MG/DL (ref 70–99)
HCT VFR BLD AUTO: 32.7 % (ref 40.7–50.3)
HGB BLD-MCNC: 11.5 G/DL (ref 13–17)
IMM GRANULOCYTES # BLD AUTO: <0.03 K/UL (ref 0–0.3)
IMM GRANULOCYTES NFR BLD: 0 %
LYMPHOCYTES NFR BLD: 0.98 K/UL (ref 1.1–3.7)
LYMPHOCYTES RELATIVE PERCENT: 42 % (ref 24–43)
MCH RBC QN AUTO: 33.5 PG (ref 25.2–33.5)
MCHC RBC AUTO-ENTMCNC: 35.2 G/DL (ref 25.2–33.5)
MCV RBC AUTO: 95.3 FL (ref 82.6–102.9)
MONOCYTES NFR BLD: 0.24 K/UL (ref 0.1–1.2)
MONOCYTES NFR BLD: 10 % (ref 3–12)
NEUTROPHILS NFR BLD: 43 % (ref 36–65)
NEUTS SEG NFR BLD: 1.01 K/UL (ref 1.5–8.1)
NRBC BLD-RTO: 0 PER 100 WBC
PLATELET # BLD AUTO: 142 K/UL (ref 138–453)
PMV BLD AUTO: 11.8 FL (ref 8.1–13.5)
POTASSIUM SERPL-SCNC: 4.1 MMOL/L (ref 3.7–5.3)
RBC # BLD AUTO: 3.43 M/UL (ref 4.21–5.77)
SODIUM SERPL-SCNC: 138 MMOL/L (ref 135–144)
WBC OTHER # BLD: 2.4 K/UL (ref 3.5–11.3)

## 2024-05-22 PROCEDURE — 83521 IG LIGHT CHAINS FREE EACH: CPT

## 2024-05-22 PROCEDURE — 85025 COMPLETE CBC W/AUTO DIFF WBC: CPT

## 2024-05-22 PROCEDURE — 82728 ASSAY OF FERRITIN: CPT

## 2024-05-22 PROCEDURE — 80048 BASIC METABOLIC PNL TOTAL CA: CPT

## 2024-05-22 PROCEDURE — 82784 ASSAY IGA/IGD/IGG/IGM EACH: CPT

## 2024-05-22 PROCEDURE — 86334 IMMUNOFIX E-PHORESIS SERUM: CPT

## 2024-05-22 PROCEDURE — 84165 PROTEIN E-PHORESIS SERUM: CPT

## 2024-05-22 PROCEDURE — 84155 ASSAY OF PROTEIN SERUM: CPT

## 2024-05-22 PROCEDURE — 83540 ASSAY OF IRON: CPT

## 2024-05-22 PROCEDURE — 83550 IRON BINDING TEST: CPT

## 2024-05-23 LAB
FERRITIN SERPL-MCNC: 474 NG/ML (ref 30–400)
IGA SERPL-MCNC: <50 MG/DL (ref 70–400)
IGA, LOW RANGE: 20 MG/DL (ref 70–400)
IGG SERPL-MCNC: 2003 MG/DL (ref 700–1600)
IGM SERPL-MCNC: <25 MG/DL (ref 40–230)
IRON SATN MFR SERPL: 43 % (ref 20–55)
IRON SERPL-MCNC: 121 UG/DL (ref 61–157)
TIBC SERPL-MCNC: 283 UG/DL (ref 250–450)
UNSATURATED IRON BINDING CAPACITY: 162 UG/DL (ref 112–347)

## 2024-05-24 LAB
ALBUMIN PERCENT: 47 % (ref 45–65)
ALBUMIN SERPL-MCNC: 3.6 G/DL (ref 3.2–5.2)
ALPHA 2 PERCENT: 10 % (ref 6–13)
ALPHA1 GLOB SERPL ELPH-MCNC: 0.3 G/DL (ref 0.1–0.4)
ALPHA1 GLOB SERPL ELPH-MCNC: 4 % (ref 3–6)
ALPHA2 GLOB SERPL ELPH-MCNC: 0.8 G/DL (ref 0.5–0.9)
B-GLOBULIN SERPL ELPH-MCNC: 2.9 G/DL (ref 0.5–1.1)
B-GLOBULIN SERPL ELPH-MCNC: 38 % (ref 11–19)
FREE KAPPA/LAMBDA RATIO: 0.02 (ref 0.22–1.74)
GAMMA GLOB SERPL ELPH-MCNC: 0.2 G/DL (ref 0.5–1.5)
GAMMA GLOBULIN %: 2 % (ref 9–20)
ITYP INTERPRETATION: ABNORMAL
KAPPA LC FREE SER-MCNC: 16.9 MG/L
LAMBDA LC FREE SERPL-MCNC: 937 MG/L (ref 4.2–27.7)
PATH REV: ABNORMAL
PATHOLOGIST: ABNORMAL
PROT PATTERN SERPL ELPH-IMP: ABNORMAL
PROT SERPL-MCNC: 7.7 G/DL (ref 6.6–8.7)
TOTAL PROT. SUM,%: 101 % (ref 98–102)
TOTAL PROT. SUM: 7.8 G/DL (ref 6.3–8.2)

## 2024-06-04 ENCOUNTER — OFFICE VISIT (OUTPATIENT)
Dept: INTERNAL MEDICINE | Age: 74
End: 2024-06-04
Payer: COMMERCIAL

## 2024-06-04 VITALS
HEIGHT: 68 IN | RESPIRATION RATE: 16 BRPM | SYSTOLIC BLOOD PRESSURE: 130 MMHG | WEIGHT: 202 LBS | OXYGEN SATURATION: 96 % | HEART RATE: 72 BPM | BODY MASS INDEX: 30.62 KG/M2 | DIASTOLIC BLOOD PRESSURE: 82 MMHG

## 2024-06-04 DIAGNOSIS — I25.119 ATHEROSCLEROSIS OF NATIVE CORONARY ARTERY WITH ANGINA PECTORIS, UNSPECIFIED WHETHER NATIVE OR TRANSPLANTED HEART (HCC): ICD-10-CM

## 2024-06-04 DIAGNOSIS — E78.2 MIXED HYPERLIPIDEMIA: ICD-10-CM

## 2024-06-04 DIAGNOSIS — I25.10 CORONARY ARTERY DISEASE WITHOUT ANGINA PECTORIS, UNSPECIFIED VESSEL OR LESION TYPE, UNSPECIFIED WHETHER NATIVE OR TRANSPLANTED HEART: ICD-10-CM

## 2024-06-04 DIAGNOSIS — M17.11 OSTEOARTHRITIS OF RIGHT KNEE, UNSPECIFIED OSTEOARTHRITIS TYPE: ICD-10-CM

## 2024-06-04 DIAGNOSIS — I10 ESSENTIAL HYPERTENSION: ICD-10-CM

## 2024-06-04 DIAGNOSIS — M79.604 RIGHT LEG PAIN: Primary | ICD-10-CM

## 2024-06-04 PROCEDURE — 99212 OFFICE O/P EST SF 10 MIN: CPT | Performed by: INTERNAL MEDICINE

## 2024-06-04 NOTE — PROGRESS NOTES
[facility] INTERNAL MEDICINE    Visit Date:  6/4/2024  Patient:  Bandar Gabriel  YOB: 1950    Assessment & Plan    Osteoarthritis: Will order x-rays of the right knee and right hip.  Will refer to orthopedics for injections.  He says that injections helped him in the past.  Reassess afterwards.    Hypertension: Blood pressure controlled today.  Continue amlodipine 10 mg as well as lisinopril 10 mg.    Smoldering myeloma: Follows with oncology.  Will defer management.    Gout: Continue allopurinol.  Uric acid level under control.    Hyperlipidemia: Continue gemfibrozil as well as pravastatin.  We will continue to monitor     CVA: Continue aspirin.  Residual vision loss in the right eye.    Age-related macular degeneration: Follows with ophthalmology.  Can start PreserVision.  We will continue to monitor.     Diagnosis Orders   1. Right leg pain  XR KNEE RIGHT (3 VIEWS)    XR HIP RIGHT (2-3 VIEWS)      2. Osteoarthritis of right knee, unspecified osteoarthritis type  Sreekanth Collins MD, Orthopedic Surgery, [place]      3. Atherosclerosis of native coronary artery with angina pectoris, unspecified whether native or transplanted heart (HCC)        4. Essential hypertension        5. Coronary artery disease without angina pectoris, unspecified vessel or lesion type, unspecified whether native or transplanted heart        6. Mixed hyperlipidemia              Chief Complaint  Swelling (Patient is think he is swelling a lot . Knee swelling , pain in right knee joint. Pain in back and hip ) and Other (Patient was wondering why he was taken off of his supplements vit b12 and vit d )      History of Present Illness   Reports that he has pain and swelling in his right knee that has been going on for the last couple weeks.  Says that he is wearing a brace now and seems that it helps.  Denies fever, chills.  No recent trauma.  He has a history of osteoarthritis, says that he received

## 2024-06-18 ENCOUNTER — OFFICE VISIT (OUTPATIENT)
Dept: ORTHOPEDIC SURGERY | Age: 74
End: 2024-06-18
Attending: INTERNAL MEDICINE
Payer: COMMERCIAL

## 2024-06-18 ENCOUNTER — HOSPITAL ENCOUNTER (OUTPATIENT)
Dept: GENERAL RADIOLOGY | Age: 74
Discharge: HOME | End: 2024-06-20
Attending: INTERNAL MEDICINE
Payer: COMMERCIAL

## 2024-06-18 VITALS
DIASTOLIC BLOOD PRESSURE: 79 MMHG | HEIGHT: 68 IN | WEIGHT: 202 LBS | SYSTOLIC BLOOD PRESSURE: 161 MMHG | HEART RATE: 61 BPM | BODY MASS INDEX: 30.62 KG/M2

## 2024-06-18 DIAGNOSIS — M79.604 RIGHT LEG PAIN: ICD-10-CM

## 2024-06-18 DIAGNOSIS — M25.561 RIGHT KNEE PAIN, UNSPECIFIED CHRONICITY: Primary | ICD-10-CM

## 2024-06-18 PROCEDURE — 73502 X-RAY EXAM HIP UNI 2-3 VIEWS: CPT

## 2024-06-18 PROCEDURE — 73562 X-RAY EXAM OF KNEE 3: CPT

## 2024-06-18 PROCEDURE — 99213 OFFICE O/P EST LOW 20 MIN: CPT | Performed by: NURSE PRACTITIONER

## 2024-06-18 PROCEDURE — 20610 DRAIN/INJ JOINT/BURSA W/O US: CPT | Performed by: NURSE PRACTITIONER

## 2024-06-18 RX ORDER — TRIAMCINOLONE ACETONIDE 40 MG/ML
80 INJECTION, SUSPENSION INTRA-ARTICULAR; INTRAMUSCULAR ONCE
Status: COMPLETED | OUTPATIENT
Start: 2024-06-18 | End: 2024-06-18

## 2024-06-18 RX ORDER — MELOXICAM 15 MG/1
15 TABLET ORAL DAILY
Qty: 30 TABLET | Refills: 3 | Status: SHIPPED | OUTPATIENT
Start: 2024-06-18

## 2024-06-18 RX ORDER — BUPIVACAINE HYDROCHLORIDE 5 MG/ML
5 INJECTION, SOLUTION PERINEURAL ONCE
Status: COMPLETED | OUTPATIENT
Start: 2024-06-18 | End: 2024-06-18

## 2024-06-18 RX ADMIN — TRIAMCINOLONE ACETONIDE 80 MG: 200 INJECTION, SUSPENSION INTRA-ARTICULAR; INTRAMUSCULAR at 09:46

## 2024-06-18 RX ADMIN — BUPIVACAINE HYDROCHLORIDE 25 MG: 5 INJECTION, SOLUTION PERINEURAL at 09:46

## 2024-06-18 NOTE — PROGRESS NOTES
Orthopaedic Progress Note      CHIEF COMPLAINT: Right knee pain    HISTORY OF PRESENT ILLNESS:       Mr. Gabriel  is a 74 y.o. male  who presents with right knee pain.  Patient has had a increase in right knee pain over the past 2 weeks.  He states that he has had a history of osteoarthritis and has previously had an injection back in 2022.  He states that was very helpful for his pain.  He has been taking over-the-counter pain medicine and using a knee brace.  He has having increased pain with prolonged walking and standing.  He is here today for orthopedic evaluation.      Past Medical History:    Past Medical History:   Diagnosis Date    Angina at rest (HCC)     Arthritis     CAD (coronary artery disease)     angina    Cellulitis of right hand 11/21/2023    Hyperlipidemia     Hypertension     Knee pain 04/18/2024    Pain in wrist 11/21/2023    Skin cancer        Past Surgical History:    Past Surgical History:   Procedure Laterality Date    CARDIAC CATHETERIZATION      x 2    COLONOSCOPY      COLONOSCOPY N/A 9/12/2018    COLONOSCOPY WITH BIOPSY performed by Luis Felipe Park DO at [facility] OR    COSMETIC SURGERY      forehead and  mouth in 1987    CT BONE MARROW BIOPSY  10/14/2021    CT BONE MARROW BIOPSY 10/14/2021 [facility] CT SCAN    HERNIA REPAIR      PAIN MANAGEMENT PROCEDURE Left 1/7/2022    Left L4 L5 TRANSFORAMINAL performed by Ashwin Portillo MD at [facility] OR    PAIN MANAGEMENT PROCEDURE Left 2/23/2023    Left L4-L5 L5-S1 FACET performed by Ashwin Portillo MD at [facility] OR         Current  Medications:  Current Outpatient Medications   Medication Sig Dispense Refill    meloxicam (MOBIC) 15 MG tablet Take 1 tablet by mouth daily 30 tablet 3    pravastatin (PRAVACHOL) 10 MG tablet Take 1 tablet by mouth once daily 90 tablet 3    isosorbide mononitrate (IMDUR) 30 MG extended release tablet Take 1 tablet by mouth once daily 90 tablet 3    lisinopril (PRINIVIL;ZESTRIL) 10 MG tablet Take 1 tablet by mouth daily 90

## 2024-07-09 ENCOUNTER — HOSPITAL ENCOUNTER (OUTPATIENT)
Age: 74
Discharge: HOME | End: 2024-07-09
Payer: COMMERCIAL

## 2024-07-09 ENCOUNTER — OFFICE VISIT (OUTPATIENT)
Dept: ONCOLOGY | Age: 74
End: 2024-07-09
Payer: COMMERCIAL

## 2024-07-09 VITALS
HEART RATE: 57 BPM | HEIGHT: 68 IN | SYSTOLIC BLOOD PRESSURE: 136 MMHG | OXYGEN SATURATION: 96 % | TEMPERATURE: 98.1 F | BODY MASS INDEX: 30.58 KG/M2 | WEIGHT: 201.8 LBS | DIASTOLIC BLOOD PRESSURE: 60 MMHG | RESPIRATION RATE: 16 BRPM

## 2024-07-09 DIAGNOSIS — R76.8 ELEVATED SERUM IMMUNOGLOBULIN FREE LIGHT CHAIN LEVEL: ICD-10-CM

## 2024-07-09 DIAGNOSIS — G62.9 NEUROPATHY: ICD-10-CM

## 2024-07-09 DIAGNOSIS — D47.2 SMOLDERING MYELOMA: ICD-10-CM

## 2024-07-09 DIAGNOSIS — D47.2 SMOLDERING MYELOMA: Primary | ICD-10-CM

## 2024-07-09 DIAGNOSIS — D64.9 ANEMIA, UNSPECIFIED TYPE: ICD-10-CM

## 2024-07-09 LAB
ANION GAP SERPL CALCULATED.3IONS-SCNC: 12 MMOL/L (ref 9–17)
BASOPHILS # BLD: <0.03 K/UL (ref 0–0.2)
BASOPHILS NFR BLD: 0 % (ref 0–2)
BUN SERPL-MCNC: 28 MG/DL (ref 8–23)
BUN/CREAT SERPL: 28 (ref 9–20)
CALCIUM SERPL-MCNC: 10 MG/DL (ref 8.6–10.4)
CHLORIDE SERPL-SCNC: 98 MMOL/L (ref 98–107)
CO2 SERPL-SCNC: 27 MMOL/L (ref 20–31)
CREAT SERPL-MCNC: 1 MG/DL (ref 0.7–1.2)
EOSINOPHIL # BLD: 0.03 K/UL (ref 0–0.44)
EOSINOPHILS RELATIVE PERCENT: 1 % (ref 1–4)
ERYTHROCYTE [DISTWIDTH] IN BLOOD BY AUTOMATED COUNT: 12.9 % (ref 11.8–14.4)
GFR, ESTIMATED: 78 ML/MIN/1.73M2
GLUCOSE SERPL-MCNC: 116 MG/DL (ref 70–99)
HCT VFR BLD AUTO: 33.1 % (ref 40.7–50.3)
HGB BLD-MCNC: 11.5 G/DL (ref 13–17)
IGA SERPL-MCNC: <50 MG/DL (ref 70–400)
IGG SERPL-MCNC: 2016 MG/DL (ref 700–1600)
IGM SERPL-MCNC: 29 MG/DL (ref 40–230)
IMM GRANULOCYTES # BLD AUTO: <0.03 K/UL (ref 0–0.3)
IMM GRANULOCYTES NFR BLD: 0 %
LYMPHOCYTES NFR BLD: 1.04 K/UL (ref 1.1–3.7)
LYMPHOCYTES RELATIVE PERCENT: 16 % (ref 24–43)
MCH RBC QN AUTO: 33.8 PG (ref 25.2–33.5)
MCHC RBC AUTO-ENTMCNC: 34.7 G/DL (ref 25.2–33.5)
MCV RBC AUTO: 97.4 FL (ref 82.6–102.9)
MONOCYTES NFR BLD: 0.5 K/UL (ref 0.1–1.2)
MONOCYTES NFR BLD: 8 % (ref 3–12)
NEUTROPHILS NFR BLD: 75 % (ref 36–65)
NEUTS SEG NFR BLD: 4.91 K/UL (ref 1.5–8.1)
NRBC BLD-RTO: 0 PER 100 WBC
PLATELET # BLD AUTO: 171 K/UL (ref 138–453)
PMV BLD AUTO: 11.6 FL (ref 8.1–13.5)
POTASSIUM SERPL-SCNC: 4.2 MMOL/L (ref 3.7–5.3)
RBC # BLD AUTO: 3.4 M/UL (ref 4.21–5.77)
SODIUM SERPL-SCNC: 137 MMOL/L (ref 135–144)
WBC OTHER # BLD: 6.5 K/UL (ref 3.5–11.3)

## 2024-07-09 PROCEDURE — 3075F SYST BP GE 130 - 139MM HG: CPT | Performed by: INTERNAL MEDICINE

## 2024-07-09 PROCEDURE — 1123F ACP DISCUSS/DSCN MKR DOCD: CPT | Performed by: INTERNAL MEDICINE

## 2024-07-09 PROCEDURE — 83521 IG LIGHT CHAINS FREE EACH: CPT

## 2024-07-09 PROCEDURE — 99214 OFFICE O/P EST MOD 30 MIN: CPT | Performed by: INTERNAL MEDICINE

## 2024-07-09 PROCEDURE — 36415 COLL VENOUS BLD VENIPUNCTURE: CPT

## 2024-07-09 PROCEDURE — 84155 ASSAY OF PROTEIN SERUM: CPT

## 2024-07-09 PROCEDURE — 99213 OFFICE O/P EST LOW 20 MIN: CPT | Performed by: INTERNAL MEDICINE

## 2024-07-09 PROCEDURE — 80048 BASIC METABOLIC PNL TOTAL CA: CPT

## 2024-07-09 PROCEDURE — 86334 IMMUNOFIX E-PHORESIS SERUM: CPT

## 2024-07-09 PROCEDURE — 85025 COMPLETE CBC W/AUTO DIFF WBC: CPT

## 2024-07-09 PROCEDURE — 84165 PROTEIN E-PHORESIS SERUM: CPT

## 2024-07-09 PROCEDURE — 82784 ASSAY IGA/IGD/IGG/IGM EACH: CPT

## 2024-07-09 PROCEDURE — 3078F DIAST BP <80 MM HG: CPT | Performed by: INTERNAL MEDICINE

## 2024-07-09 NOTE — PROGRESS NOTES
Bandar Gabriel                                                                                                                  7/9/2024  MRN:   [id number]  YOB: 1950  PCP:                           Beth Holm MD  Referring Physician: No ref. 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  Discontinued due to adverse events    Interval history:  Patient presents to the clinic for a follow-up visit and to discuss results of his lab workup.  Patient does not have any repeat lab workup.  Clinically doing well.  Tremors have resolved.  Labs are pending right now.  Patient drove himself to the appointment.  Continues to be independent in activities of daily life    During this visit patient's allergy, social, medical, surgical history and medications were reviewed and updated.    Summary of Case/History:    Bandar Gabriel a 74 y.o.male is a patient who presents to the clinic to establish care and for further work-up and evaluation.  Patient was noted to have a hemoglobin of 11.6 with MCV 92 on his routine work-up done earlier in July.  Review of patient's record revealed he has had mild anemia since 2018 with hemoglobin around 11.8 in August 2018 12.4 in August 2020.  Work-up done so far shows adequate kidney function patient was noted to have positive stool occult blood test back in 2018 patient has had colonoscopy done back in 2018 due to findings of positive stool occult blood test and underwent polypectomy.  Pathology from the polypectomy came back as hyperplastic polyp and tubular

## 2024-07-10 LAB
ALBUMIN PERCENT: 47 % (ref 45–65)
ALBUMIN SERPL-MCNC: 3.7 G/DL (ref 3.2–5.2)
ALPHA 2 PERCENT: 10 % (ref 6–13)
ALPHA1 GLOB SERPL ELPH-MCNC: 0.3 G/DL (ref 0.1–0.4)
ALPHA1 GLOB SERPL ELPH-MCNC: 4 % (ref 3–6)
ALPHA2 GLOB SERPL ELPH-MCNC: 0.8 G/DL (ref 0.5–0.9)
B-GLOBULIN SERPL ELPH-MCNC: 3 G/DL (ref 0.5–1.1)
B-GLOBULIN SERPL ELPH-MCNC: 38 % (ref 11–19)
FREE KAPPA/LAMBDA RATIO: 0.01 (ref 0.22–1.74)
GAMMA GLOB SERPL ELPH-MCNC: 0.2 G/DL (ref 0.5–1.5)
GAMMA GLOBULIN %: 2 % (ref 9–20)
ITYP INTERPRETATION: ABNORMAL
KAPPA LC FREE SER-MCNC: 13.7 MG/L
LAMBDA LC FREE SERPL-MCNC: 979 MG/L (ref 4.2–27.7)
PATH REV: ABNORMAL
PATHOLOGIST: ABNORMAL
PROT PATTERN SERPL ELPH-IMP: ABNORMAL
PROT SERPL-MCNC: 7.9 G/DL (ref 6.6–8.7)
TOTAL PROT. SUM,%: 101 % (ref 98–102)
TOTAL PROT. SUM: 8 G/DL (ref 6.3–8.2)

## 2024-07-11 ENCOUNTER — TELEPHONE (OUTPATIENT)
Dept: ONCOLOGY | Age: 74
End: 2024-07-11

## 2024-07-11 LAB — IGA, LOW RANGE: 13 MG/DL (ref 70–400)

## 2024-07-11 NOTE — TELEPHONE ENCOUNTER
Name: Bandar Gabriel  : 1950  MRN: [id number]    Oncology Navigation Follow-Up Note    Contact Type:  Telephone    Notes:   Reviewing chart. Patient continues on surveillance for smoldering myeloma.  Phone call to discuss discontinuing navigation.   Explained the reason for stopping navigation. Informed him that he may call navigator if he has questions or concerns in the future. He will continue to see Dr. Stewart for monitoring. Patient verbalized understanding and agreement.   Care team updated.    Electronically signed by Meghna Austin RN on 2024 at 8:55 AM

## 2024-09-16 ENCOUNTER — OFFICE VISIT (OUTPATIENT)
Dept: INTERNAL MEDICINE | Age: 74
End: 2024-09-16
Payer: COMMERCIAL

## 2024-09-16 VITALS
DIASTOLIC BLOOD PRESSURE: 78 MMHG | SYSTOLIC BLOOD PRESSURE: 156 MMHG | HEART RATE: 64 BPM | WEIGHT: 205 LBS | BODY MASS INDEX: 31.07 KG/M2 | RESPIRATION RATE: 16 BRPM | HEIGHT: 68 IN

## 2024-09-16 DIAGNOSIS — C90.00 MULTIPLE MYELOMA, REMISSION STATUS UNSPECIFIED (HCC): ICD-10-CM

## 2024-09-16 DIAGNOSIS — I25.10 CORONARY ARTERY DISEASE WITHOUT ANGINA PECTORIS, UNSPECIFIED VESSEL OR LESION TYPE, UNSPECIFIED WHETHER NATIVE OR TRANSPLANTED HEART: ICD-10-CM

## 2024-09-16 DIAGNOSIS — M17.11 OSTEOARTHRITIS OF RIGHT KNEE, UNSPECIFIED OSTEOARTHRITIS TYPE: Primary | ICD-10-CM

## 2024-09-16 DIAGNOSIS — I10 ESSENTIAL HYPERTENSION: ICD-10-CM

## 2024-09-16 DIAGNOSIS — E78.2 MIXED HYPERLIPIDEMIA: ICD-10-CM

## 2024-09-16 PROCEDURE — 3078F DIAST BP <80 MM HG: CPT | Performed by: INTERNAL MEDICINE

## 2024-09-16 PROCEDURE — 1123F ACP DISCUSS/DSCN MKR DOCD: CPT | Performed by: INTERNAL MEDICINE

## 2024-09-16 PROCEDURE — 3077F SYST BP >= 140 MM HG: CPT | Performed by: INTERNAL MEDICINE

## 2024-09-16 PROCEDURE — 99214 OFFICE O/P EST MOD 30 MIN: CPT | Performed by: INTERNAL MEDICINE

## 2024-09-16 PROCEDURE — 99212 OFFICE O/P EST SF 10 MIN: CPT | Performed by: INTERNAL MEDICINE

## 2024-09-16 PROCEDURE — G2211 COMPLEX E/M VISIT ADD ON: HCPCS | Performed by: INTERNAL MEDICINE

## 2024-09-16 RX ORDER — NITROGLYCERIN 0.4 MG/1
0.4 TABLET SUBLINGUAL EVERY 5 MIN PRN
Qty: 25 TABLET | Refills: 0 | Status: SHIPPED | OUTPATIENT
Start: 2024-09-16 | End: 2025-09-16

## 2024-09-16 RX ORDER — AMLODIPINE BESYLATE 5 MG/1
5 TABLET ORAL DAILY
Qty: 90 TABLET | Refills: 1 | Status: SHIPPED | OUTPATIENT
Start: 2024-09-16

## 2024-10-07 DIAGNOSIS — D64.9 ANEMIA, UNSPECIFIED TYPE: ICD-10-CM

## 2024-10-07 DIAGNOSIS — C90.00 MULTIPLE MYELOMA, REMISSION STATUS UNSPECIFIED (HCC): Primary | ICD-10-CM

## 2024-10-07 LAB
ANION GAP SERPL CALCULATED.3IONS-SCNC: 10.8 MMOL/L (ref 3–11)
BASOPHILS ABSOLUTE: 0.02 X10E3/?L (ref 0–0.3)
BASOPHILS RELATIVE PERCENT: 0.69 % (ref 0–3)
BUN BLDV-MCNC: 28 MG/DL (ref 9–20)
CALCIUM SERPL-MCNC: 9.2 MG/DL (ref 8.4–10.2)
CHLORIDE BLD-SCNC: 103 MMOL/L (ref 98–120)
CO2: 28 MMOL/L (ref 22–31)
CREAT SERPL-MCNC: 1.1 MG/DL (ref 0.7–1.3)
EOSINOPHILS ABSOLUTE: 0.05 X10E3/?L (ref 0–1.1)
EOSINOPHILS RELATIVE PERCENT: 2.21 % (ref 0–10)
GFR, ESTIMATED: > 60
GLUCOSE: 135 MG/DL (ref 75–110)
HCT VFR BLD CALC: 35.2 % (ref 42–52)
HEMOGLOBIN: 12.1 G/DL (ref 13.8–17.8)
LYMPHOCYTES ABSOLUTE: 0.83 X10E3/?L (ref 1–5.5)
LYMPHOCYTES RELATIVE PERCENT: 33.96 % (ref 20–51.1)
MCH RBC QN AUTO: 33.4 PG (ref 28.5–32.5)
MCHC RBC AUTO-ENTMCNC: 34.3 G/DL (ref 32–37)
MCV RBC AUTO: 97.6 FL (ref 80–94)
MONOCYTES ABSOLUTE: 0.31 X10E3/?L (ref 0.1–1)
MONOCYTES RELATIVE PERCENT: 12.73 % (ref 1.7–9.3)
NEUTROPHILS ABSOLUTE: 1.23 X10E3/?L (ref 2–8.1)
NEUTROPHILS RELATIVE PERCENT: 50.42 % (ref 42.2–75.2)
PDW BLD-RTO: 11.3 % (ref 10–15.5)
PLATELET # BLD: 151.2 THOU/MM3 (ref 130–400)
POTASSIUM SERPL-SCNC: 4.1 MMOL/L (ref 3.6–5)
RBC # BLD: 3.61 M/UL (ref 4.7–6.1)
SODIUM BLD-SCNC: 141 MMOL/L (ref 135–145)
WBC # BLD: 2.4 THOU/ML3 (ref 4.8–10.8)

## 2024-10-15 ENCOUNTER — OFFICE VISIT (OUTPATIENT)
Dept: ONCOLOGY | Age: 74
End: 2024-10-15
Payer: COMMERCIAL

## 2024-10-15 VITALS
WEIGHT: 205.6 LBS | OXYGEN SATURATION: 97 % | DIASTOLIC BLOOD PRESSURE: 76 MMHG | HEIGHT: 68 IN | BODY MASS INDEX: 31.16 KG/M2 | TEMPERATURE: 97.1 F | SYSTOLIC BLOOD PRESSURE: 168 MMHG | HEART RATE: 51 BPM

## 2024-10-15 DIAGNOSIS — R79.89 ABNORMAL CBC: ICD-10-CM

## 2024-10-15 DIAGNOSIS — G62.9 NEUROPATHY: ICD-10-CM

## 2024-10-15 DIAGNOSIS — D64.9 ANEMIA, UNSPECIFIED TYPE: ICD-10-CM

## 2024-10-15 DIAGNOSIS — D47.2 SMOLDERING MYELOMA: Primary | ICD-10-CM

## 2024-10-15 PROCEDURE — 99214 OFFICE O/P EST MOD 30 MIN: CPT | Performed by: INTERNAL MEDICINE

## 2024-10-15 PROCEDURE — 99213 OFFICE O/P EST LOW 20 MIN: CPT | Performed by: INTERNAL MEDICINE

## 2024-10-15 PROCEDURE — 1123F ACP DISCUSS/DSCN MKR DOCD: CPT | Performed by: INTERNAL MEDICINE

## 2024-10-15 PROCEDURE — 3078F DIAST BP <80 MM HG: CPT | Performed by: INTERNAL MEDICINE

## 2024-10-15 PROCEDURE — 3077F SYST BP >= 140 MM HG: CPT | Performed by: INTERNAL MEDICINE

## 2024-10-15 NOTE — PROGRESS NOTES
Bandar Gabriel                                                                                                                  10/15/2024  MRN:   [id number]  YOB: 1950  PCP:                           Beth Holm MD  Referring Physician: No ref. 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  Discontinued due to adverse events    Interval history:  Patient presents to the clinic for a follow-up visit and to discuss results of his lab workup.  Patient denies hospitalization since last office visit.  Patient M protein stable.  Free light chain ratio relatively stable as well.  Hemoglobin within range.  No evidence of kidney damage or hypercalcemia.    During this visit patient's allergy, social, medical, surgical history and medications were reviewed and updated.    Summary of Case/History:    Bandar Gabriel a 74 y.o.male is a patient who presents to the clinic to establish care and for further work-up and evaluation.  Patient was noted to have a hemoglobin of 11.6 with MCV 92 on his routine work-up done earlier in July.  Review of patient's record revealed he has had mild anemia since 2018 with hemoglobin around 11.8 in August 2018 12.4 in August 2020.  Work-up done so far shows adequate kidney function patient was noted to have positive stool occult blood test back in 2018 patient has had colonoscopy done back in 2018 due to findings of positive stool occult blood test and underwent polypectomy.  Pathology from the polypectomy came back as hyperplastic polyp and

## 2024-10-28 RX ORDER — MELOXICAM 15 MG/1
15 TABLET ORAL DAILY
Qty: 30 TABLET | Refills: 0 | Status: SHIPPED | OUTPATIENT
Start: 2024-10-28

## 2024-12-16 ENCOUNTER — OFFICE VISIT (OUTPATIENT)
Dept: INTERNAL MEDICINE | Age: 74
End: 2024-12-16
Payer: COMMERCIAL

## 2024-12-16 ENCOUNTER — HOSPITAL ENCOUNTER (OUTPATIENT)
Age: 74
Discharge: HOME | End: 2024-12-16
Payer: COMMERCIAL

## 2024-12-16 VITALS
HEART RATE: 78 BPM | WEIGHT: 205 LBS | OXYGEN SATURATION: 98 % | DIASTOLIC BLOOD PRESSURE: 80 MMHG | BODY MASS INDEX: 31.07 KG/M2 | HEIGHT: 68 IN | RESPIRATION RATE: 16 BRPM | SYSTOLIC BLOOD PRESSURE: 138 MMHG

## 2024-12-16 DIAGNOSIS — R73.01 IFG (IMPAIRED FASTING GLUCOSE): ICD-10-CM

## 2024-12-16 DIAGNOSIS — R51.9 NONINTRACTABLE HEADACHE, UNSPECIFIED CHRONICITY PATTERN, UNSPECIFIED HEADACHE TYPE: ICD-10-CM

## 2024-12-16 DIAGNOSIS — E78.2 MIXED HYPERLIPIDEMIA: ICD-10-CM

## 2024-12-16 DIAGNOSIS — I10 ESSENTIAL HYPERTENSION: Primary | ICD-10-CM

## 2024-12-16 DIAGNOSIS — C90.00 MULTIPLE MYELOMA, REMISSION STATUS UNSPECIFIED (HCC): ICD-10-CM

## 2024-12-16 DIAGNOSIS — I25.10 CORONARY ARTERY DISEASE WITHOUT ANGINA PECTORIS, UNSPECIFIED VESSEL OR LESION TYPE, UNSPECIFIED WHETHER NATIVE OR TRANSPLANTED HEART: ICD-10-CM

## 2024-12-16 DIAGNOSIS — M17.11 OSTEOARTHRITIS OF RIGHT KNEE, UNSPECIFIED OSTEOARTHRITIS TYPE: ICD-10-CM

## 2024-12-16 LAB
ALBUMIN SERPL-MCNC: 3.7 G/DL (ref 3.5–5.2)
ALBUMIN/GLOB SERPL: 0.8 {RATIO} (ref 1–2.5)
ALP SERPL-CCNC: 89 U/L (ref 40–129)
ALT SERPL-CCNC: 20 U/L (ref 5–41)
ANION GAP SERPL CALCULATED.3IONS-SCNC: 11 MMOL/L (ref 9–17)
AST SERPL-CCNC: 26 U/L
BASOPHILS # BLD: <0.03 K/UL (ref 0–0.2)
BASOPHILS NFR BLD: 1 % (ref 0–2)
BILIRUB SERPL-MCNC: 0.6 MG/DL (ref 0.3–1.2)
BUN SERPL-MCNC: 27 MG/DL (ref 8–23)
BUN/CREAT SERPL: 30 (ref 9–20)
CALCIUM SERPL-MCNC: 8.9 MG/DL (ref 8.6–10.4)
CHLORIDE SERPL-SCNC: 101 MMOL/L (ref 98–107)
CHOLEST SERPL-MCNC: 142 MG/DL (ref 0–199)
CHOLESTEROL/HDL RATIO: 4.3
CO2 SERPL-SCNC: 25 MMOL/L (ref 20–31)
CREAT SERPL-MCNC: 0.9 MG/DL (ref 0.7–1.2)
EOSINOPHIL # BLD: 0.06 K/UL (ref 0–0.44)
EOSINOPHILS RELATIVE PERCENT: 2 % (ref 1–4)
ERYTHROCYTE [DISTWIDTH] IN BLOOD BY AUTOMATED COUNT: 12.1 % (ref 11.8–14.4)
EST. AVERAGE GLUCOSE BLD GHB EST-MCNC: 97 MG/DL
GFR, ESTIMATED: 89 ML/MIN/1.73M2
GLUCOSE SERPL-MCNC: 135 MG/DL (ref 70–99)
HBA1C MFR BLD: 5 % (ref 4–6)
HCT VFR BLD AUTO: 36.1 % (ref 40.7–50.3)
HDLC SERPL-MCNC: 33 MG/DL
HGB BLD-MCNC: 12.6 G/DL (ref 13–17)
IMM GRANULOCYTES # BLD AUTO: <0.03 K/UL (ref 0–0.3)
IMM GRANULOCYTES NFR BLD: 0 %
LDLC SERPL CALC-MCNC: 82 MG/DL (ref 0–100)
LYMPHOCYTES NFR BLD: 0.73 K/UL (ref 1.1–3.7)
LYMPHOCYTES RELATIVE PERCENT: 21 % (ref 24–43)
MCH RBC QN AUTO: 33.2 PG (ref 25.2–33.5)
MCHC RBC AUTO-ENTMCNC: 34.9 G/DL (ref 25.2–33.5)
MCV RBC AUTO: 95.3 FL (ref 82.6–102.9)
MONOCYTES NFR BLD: 0.42 K/UL (ref 0.1–1.2)
MONOCYTES NFR BLD: 12 % (ref 3–12)
NEUTROPHILS NFR BLD: 64 % (ref 36–65)
NEUTS SEG NFR BLD: 2.17 K/UL (ref 1.5–8.1)
NRBC BLD-RTO: 0 PER 100 WBC
PLATELET # BLD AUTO: 168 K/UL (ref 138–453)
PMV BLD AUTO: 11.7 FL (ref 8.1–13.5)
POTASSIUM SERPL-SCNC: 3.9 MMOL/L (ref 3.7–5.3)
PROT SERPL-MCNC: 8.1 G/DL (ref 6.4–8.3)
RBC # BLD AUTO: 3.79 M/UL (ref 4.21–5.77)
SODIUM SERPL-SCNC: 137 MMOL/L (ref 135–144)
TRIGL SERPL-MCNC: 134 MG/DL
VLDLC SERPL CALC-MCNC: 27 MG/DL (ref 1–30)
WBC OTHER # BLD: 3.4 K/UL (ref 3.5–11.3)

## 2024-12-16 PROCEDURE — 36415 COLL VENOUS BLD VENIPUNCTURE: CPT

## 2024-12-16 PROCEDURE — 83036 HEMOGLOBIN GLYCOSYLATED A1C: CPT

## 2024-12-16 PROCEDURE — 85025 COMPLETE CBC W/AUTO DIFF WBC: CPT

## 2024-12-16 PROCEDURE — 80061 LIPID PANEL: CPT

## 2024-12-16 PROCEDURE — 99212 OFFICE O/P EST SF 10 MIN: CPT | Performed by: INTERNAL MEDICINE

## 2024-12-16 PROCEDURE — 80053 COMPREHEN METABOLIC PANEL: CPT

## 2024-12-16 RX ORDER — MELOXICAM 15 MG/1
15 TABLET ORAL DAILY
Qty: 90 TABLET | Refills: 3 | Status: SHIPPED | OUTPATIENT
Start: 2024-12-16

## 2024-12-20 NOTE — PROGRESS NOTES
MD on 12/16/2024 at 6:26 PM    This note has been created using the Epic electronic health record, and dictated in part by Dragon Medical One dictation system. Despite the documenting physician's best efforts, there may be errors in spelling, grammar or syntax.

## 2024-12-21 ENCOUNTER — OFFICE VISIT (OUTPATIENT)
Dept: PRIMARY CARE CLINIC | Age: 74
End: 2024-12-21
Payer: COMMERCIAL

## 2024-12-21 VITALS
WEIGHT: 205 LBS | BODY MASS INDEX: 31.17 KG/M2 | OXYGEN SATURATION: 97 % | SYSTOLIC BLOOD PRESSURE: 120 MMHG | TEMPERATURE: 99.2 F | HEART RATE: 68 BPM | DIASTOLIC BLOOD PRESSURE: 80 MMHG

## 2024-12-21 DIAGNOSIS — J06.9 VIRAL URI: Primary | ICD-10-CM

## 2024-12-21 DIAGNOSIS — J02.9 SORE THROAT: ICD-10-CM

## 2024-12-21 LAB
INFLUENZA A ANTIGEN, POC: NEGATIVE
INFLUENZA B ANTIGEN, POC: NEGATIVE
LOT EXPIRE DATE: NORMAL
LOT KIT NUMBER: NORMAL
SARS-COV-2, POC: NORMAL
VALID INTERNAL CONTROL: NORMAL
VENDOR AND KIT NAME POC: NORMAL

## 2024-12-21 PROCEDURE — 3074F SYST BP LT 130 MM HG: CPT

## 2024-12-21 PROCEDURE — 1123F ACP DISCUSS/DSCN MKR DOCD: CPT

## 2024-12-21 PROCEDURE — 3079F DIAST BP 80-89 MM HG: CPT

## 2024-12-21 PROCEDURE — 87428 SARSCOV & INF VIR A&B AG IA: CPT

## 2024-12-21 PROCEDURE — 99213 OFFICE O/P EST LOW 20 MIN: CPT

## 2024-12-21 PROCEDURE — 1159F MED LIST DOCD IN RCRD: CPT

## 2024-12-21 PROCEDURE — 99214 OFFICE O/P EST MOD 30 MIN: CPT

## 2024-12-21 PROCEDURE — PBSHW POCT COVID-19 & INFLUENZA A/B

## 2024-12-21 PROCEDURE — 1160F RVW MEDS BY RX/DR IN RCRD: CPT

## 2024-12-21 RX ORDER — FLUTICASONE PROPIONATE 50 MCG
2 SPRAY, SUSPENSION (ML) NASAL DAILY
Qty: 16 G | Refills: 0 | Status: SHIPPED | OUTPATIENT
Start: 2024-12-21

## 2024-12-21 RX ORDER — BENZONATATE 100 MG/1
100 CAPSULE ORAL 3 TIMES DAILY PRN
Qty: 30 CAPSULE | Refills: 0 | Status: SHIPPED | OUTPATIENT
Start: 2024-12-21 | End: 2024-12-31

## 2024-12-21 RX ORDER — PREDNISONE 20 MG/1
40 TABLET ORAL DAILY
Qty: 10 TABLET | Refills: 0 | Status: SHIPPED | OUTPATIENT
Start: 2024-12-21 | End: 2024-12-26

## 2024-12-21 ASSESSMENT — ENCOUNTER SYMPTOMS
ABDOMINAL PAIN: 0
DIARRHEA: 0
RHINORRHEA: 1
SHORTNESS OF BREATH: 0
NAUSEA: 0
VOMITING: 0
SINUS PAIN: 0
COUGH: 1
SORE THROAT: 1

## 2024-12-21 ASSESSMENT — PATIENT HEALTH QUESTIONNAIRE - PHQ9
SUM OF ALL RESPONSES TO PHQ QUESTIONS 1-9: 0
1. LITTLE INTEREST OR PLEASURE IN DOING THINGS: NOT AT ALL
SUM OF ALL RESPONSES TO PHQ9 QUESTIONS 1 & 2: 0
SUM OF ALL RESPONSES TO PHQ QUESTIONS 1-9: 0
2. FEELING DOWN, DEPRESSED OR HOPELESS: NOT AT ALL

## 2024-12-21 NOTE — PROGRESS NOTES
[facility] Walk In department of [facility]  [address] [facility] [address]  Phone: [phone]  Fax: [phone]      Bandar Gabriel  1950  MRN: [id number]  Date of visit: 12/21/2024    Chief Complaint:     Bandar Gabriel is here for c/o of Sore Throat (Slight cough started Friday )      HPI:     Bandar Gabriel is a 74 y.o. male who presents to the [facility] Walk-In Care today for his medical conditions/complaints as noted below.    Cold Symptoms   This is a new problem. Episode onset: 2 days. The problem has been gradually worsening. There has been no fever. Associated symptoms include congestion, coughing, headaches, rhinorrhea and a sore throat. Pertinent negatives include no abdominal pain, chest pain, diarrhea, ear pain, nausea, plugged ear sensation, sinus pain or vomiting. He has tried nothing for the symptoms.   No sick contacts    Past Medical History:   Diagnosis Date    Angina at rest (HCC)     Arthritis     CAD (coronary artery disease)     angina    Cellulitis of right hand 11/21/2023    Hyperlipidemia     Hypertension     Knee pain 04/18/2024    Pain in wrist 11/21/2023    Skin cancer         Allergies   Allergen Reactions    Tetanus Toxoids Hives    Codeine Nausea Only    Dtap-Ipv Vaccine Rash    Penicillins Rash    Tetanus Antitoxin Rash         Subjective:      Review of Systems   HENT:  Positive for congestion, rhinorrhea and sore throat. Negative for ear pain and sinus pain.    Respiratory:  Positive for cough. Negative for shortness of breath.    Cardiovascular:  Negative for chest pain.   Gastrointestinal:  Negative for abdominal pain, diarrhea, nausea and vomiting.   Neurological:  Positive for headaches.       Objective:     Vitals:    12/21/24 1301   BP: 120/80   Site: Right Upper Arm   Position: Sitting   Cuff Size: Large Adult   Pulse: 68   Temp: 99.2 °F (37.3 °C)   TempSrc: Tympanic   SpO2: 97%   Weight: 93 kg (205 lb)     Body mass index

## 2024-12-21 NOTE — PATIENT INSTRUCTIONS
Steroid x 5 days  Discussed taking medication as prescribed in AM with food  Avoid NSAIDs while taking prescription steroid  Flonase daily  Benzonatate as needed for cough  Patient has signs and symptoms of upper respiratory infection / viral illness.   Antibiotics are not indicated at the present time.  May be treated with over the counter medications. (Sudafed, cold/ sinus)  If high blood pressure may use Corcidin OTC cold medications  Patient can use Tylenol and/or OTC cough syrup.   Advised to follow up with family doctor or return to urgent care if does not get better or symptoms worsen.  Pt can go to ER if high fever >102 , vomiting, breathing difficulty, lethargy.   Patient/ parents understands this approach of home management and agrees with it.

## 2024-12-24 DIAGNOSIS — R05.9 COUGH, UNSPECIFIED TYPE: ICD-10-CM

## 2024-12-26 ENCOUNTER — HOSPITAL ENCOUNTER (OUTPATIENT)
Dept: MRI IMAGING | Age: 74
Discharge: HOME | End: 2024-12-28
Attending: INTERNAL MEDICINE
Payer: COMMERCIAL

## 2024-12-26 DIAGNOSIS — R51.9 NONINTRACTABLE HEADACHE, UNSPECIFIED CHRONICITY PATTERN, UNSPECIFIED HEADACHE TYPE: ICD-10-CM

## 2024-12-26 PROCEDURE — 70551 MRI BRAIN STEM W/O DYE: CPT

## 2024-12-26 RX ORDER — CODEINE PHOSPHATE AND GUAIFENESIN 10; 100 MG/5ML; MG/5ML
5 SOLUTION ORAL 2 TIMES DAILY PRN
Qty: 48 ML | Refills: 0 | Status: SHIPPED | OUTPATIENT
Start: 2024-12-26 | End: 2025-01-07

## 2025-01-07 ENCOUNTER — OFFICE VISIT (OUTPATIENT)
Dept: INTERNAL MEDICINE | Age: 75
End: 2025-01-07
Payer: COMMERCIAL

## 2025-01-07 ENCOUNTER — HOSPITAL ENCOUNTER (OUTPATIENT)
Age: 75
Discharge: HOME | End: 2025-01-07
Payer: COMMERCIAL

## 2025-01-07 VITALS
OXYGEN SATURATION: 96 % | RESPIRATION RATE: 16 BRPM | HEART RATE: 69 BPM | SYSTOLIC BLOOD PRESSURE: 128 MMHG | BODY MASS INDEX: 30.77 KG/M2 | WEIGHT: 203 LBS | HEIGHT: 68 IN | DIASTOLIC BLOOD PRESSURE: 72 MMHG

## 2025-01-07 DIAGNOSIS — E78.2 MIXED HYPERLIPIDEMIA: ICD-10-CM

## 2025-01-07 DIAGNOSIS — D47.2 SMOLDERING MYELOMA: ICD-10-CM

## 2025-01-07 DIAGNOSIS — C90.00 MULTIPLE MYELOMA, REMISSION STATUS UNSPECIFIED (HCC): ICD-10-CM

## 2025-01-07 DIAGNOSIS — I10 ESSENTIAL HYPERTENSION: Primary | ICD-10-CM

## 2025-01-07 DIAGNOSIS — D64.9 ANEMIA, UNSPECIFIED TYPE: ICD-10-CM

## 2025-01-07 DIAGNOSIS — I25.119 ATHEROSCLEROSIS OF NATIVE CORONARY ARTERY WITH ANGINA PECTORIS, UNSPECIFIED WHETHER NATIVE OR TRANSPLANTED HEART (HCC): ICD-10-CM

## 2025-01-07 DIAGNOSIS — R79.89 ABNORMAL CBC: ICD-10-CM

## 2025-01-07 DIAGNOSIS — I25.10 CORONARY ARTERY DISEASE WITHOUT ANGINA PECTORIS, UNSPECIFIED VESSEL OR LESION TYPE, UNSPECIFIED WHETHER NATIVE OR TRANSPLANTED HEART: ICD-10-CM

## 2025-01-07 DIAGNOSIS — G62.9 NEUROPATHY: ICD-10-CM

## 2025-01-07 LAB
ANION GAP SERPL CALCULATED.3IONS-SCNC: 10 MMOL/L (ref 9–17)
BASOPHILS # BLD: 0.03 K/UL (ref 0–0.2)
BASOPHILS NFR BLD: 1 % (ref 0–2)
BUN SERPL-MCNC: 25 MG/DL (ref 8–23)
BUN/CREAT SERPL: 23 (ref 9–20)
CALCIUM SERPL-MCNC: 9.1 MG/DL (ref 8.6–10.4)
CHLORIDE SERPL-SCNC: 101 MMOL/L (ref 98–107)
CO2 SERPL-SCNC: 28 MMOL/L (ref 20–31)
CREAT SERPL-MCNC: 1.1 MG/DL (ref 0.7–1.2)
EOSINOPHIL # BLD: 0.04 K/UL (ref 0–0.44)
EOSINOPHILS RELATIVE PERCENT: 1 % (ref 1–4)
ERYTHROCYTE [DISTWIDTH] IN BLOOD BY AUTOMATED COUNT: 12.1 % (ref 11.8–14.4)
GFR, ESTIMATED: 70 ML/MIN/1.73M2
GLUCOSE SERPL-MCNC: 140 MG/DL (ref 70–99)
HCT VFR BLD AUTO: 35.7 % (ref 40.7–50.3)
HGB BLD-MCNC: 12.6 G/DL (ref 13–17)
IGA SERPL-MCNC: <50 MG/DL (ref 70–400)
IGA, LOW RANGE: 14 MG/DL (ref 70–400)
IGG SERPL-MCNC: 1918 MG/DL (ref 700–1600)
IGM SERPL-MCNC: 30 MG/DL (ref 40–230)
IMM GRANULOCYTES # BLD AUTO: <0.03 K/UL (ref 0–0.3)
IMM GRANULOCYTES NFR BLD: 0 %
LYMPHOCYTES NFR BLD: 0.88 K/UL (ref 1.1–3.7)
LYMPHOCYTES RELATIVE PERCENT: 21 % (ref 24–43)
MCH RBC QN AUTO: 33.1 PG (ref 25.2–33.5)
MCHC RBC AUTO-ENTMCNC: 35.3 G/DL (ref 25.2–33.5)
MCV RBC AUTO: 93.7 FL (ref 82.6–102.9)
MONOCYTES NFR BLD: 0.53 K/UL (ref 0.1–1.2)
MONOCYTES NFR BLD: 12 % (ref 3–12)
NEUTROPHILS NFR BLD: 65 % (ref 36–65)
NEUTS SEG NFR BLD: 2.79 K/UL (ref 1.5–8.1)
NRBC BLD-RTO: 0 PER 100 WBC
PLATELET # BLD AUTO: 177 K/UL (ref 138–453)
PMV BLD AUTO: 11.5 FL (ref 8.1–13.5)
POTASSIUM SERPL-SCNC: 4.2 MMOL/L (ref 3.7–5.3)
RBC # BLD AUTO: 3.81 M/UL (ref 4.21–5.77)
SODIUM SERPL-SCNC: 139 MMOL/L (ref 135–144)
WBC OTHER # BLD: 4.3 K/UL (ref 3.5–11.3)

## 2025-01-07 PROCEDURE — 36415 COLL VENOUS BLD VENIPUNCTURE: CPT

## 2025-01-07 PROCEDURE — 85025 COMPLETE CBC W/AUTO DIFF WBC: CPT

## 2025-01-07 PROCEDURE — 3078F DIAST BP <80 MM HG: CPT | Performed by: INTERNAL MEDICINE

## 2025-01-07 PROCEDURE — G2211 COMPLEX E/M VISIT ADD ON: HCPCS | Performed by: INTERNAL MEDICINE

## 2025-01-07 PROCEDURE — 99214 OFFICE O/P EST MOD 30 MIN: CPT | Performed by: INTERNAL MEDICINE

## 2025-01-07 PROCEDURE — 1123F ACP DISCUSS/DSCN MKR DOCD: CPT | Performed by: INTERNAL MEDICINE

## 2025-01-07 PROCEDURE — 3074F SYST BP LT 130 MM HG: CPT | Performed by: INTERNAL MEDICINE

## 2025-01-07 PROCEDURE — 84165 PROTEIN E-PHORESIS SERUM: CPT

## 2025-01-07 PROCEDURE — 83521 IG LIGHT CHAINS FREE EACH: CPT

## 2025-01-07 PROCEDURE — 1159F MED LIST DOCD IN RCRD: CPT | Performed by: INTERNAL MEDICINE

## 2025-01-07 PROCEDURE — 84155 ASSAY OF PROTEIN SERUM: CPT

## 2025-01-07 PROCEDURE — 80048 BASIC METABOLIC PNL TOTAL CA: CPT

## 2025-01-07 PROCEDURE — 82784 ASSAY IGA/IGD/IGG/IGM EACH: CPT

## 2025-01-07 PROCEDURE — 86334 IMMUNOFIX E-PHORESIS SERUM: CPT

## 2025-01-07 RX ORDER — AZITHROMYCIN 250 MG/1
TABLET, FILM COATED ORAL
Qty: 6 TABLET | Refills: 0 | Status: SHIPPED | OUTPATIENT
Start: 2025-01-07 | End: 2025-01-17

## 2025-01-07 RX ORDER — BENZONATATE 100 MG/1
100 CAPSULE ORAL 3 TIMES DAILY PRN
Qty: 30 CAPSULE | Refills: 0 | Status: SHIPPED | OUTPATIENT
Start: 2025-01-07 | End: 2025-01-17

## 2025-01-07 ASSESSMENT — PATIENT HEALTH QUESTIONNAIRE - PHQ9
SUM OF ALL RESPONSES TO PHQ QUESTIONS 1-9: 0
SUM OF ALL RESPONSES TO PHQ9 QUESTIONS 1 & 2: 0
1. LITTLE INTEREST OR PLEASURE IN DOING THINGS: NOT AT ALL
2. FEELING DOWN, DEPRESSED OR HOPELESS: NOT AT ALL
SUM OF ALL RESPONSES TO PHQ QUESTIONS 1-9: 0
DEPRESSION UNABLE TO ASSESS: FUNCTIONAL CAPACITY MOTIVATION LIMITS ACCURACY
SUM OF ALL RESPONSES TO PHQ QUESTIONS 1-9: 0
SUM OF ALL RESPONSES TO PHQ QUESTIONS 1-9: 0

## 2025-01-07 NOTE — PROGRESS NOTES
reports current drug use. Frequency: 3.00 times per week. Drug: Marijuana (Weed).    Discussed use, benefit, and side effects of prescribed medications.  All questions answered.  Patient voiced understanding.  Reviewed health maintenance.      Electronically signed byLINDA MEDINA MD on 1/7/2025 at 5:51 PM    This note has been created using the Epic electronic health record, and dictated in part by Dragon Medical One dictation system. Despite the documenting physician's best efforts, there may be errors in spelling, grammar or syntax.

## 2025-01-08 LAB
ALBUMIN PERCENT: 47 % (ref 45–65)
ALBUMIN SERPL-MCNC: 3.5 G/DL (ref 3.2–5.2)
ALPHA 2 PERCENT: 10 % (ref 6–13)
ALPHA1 GLOB SERPL ELPH-MCNC: 0.3 G/DL (ref 0.1–0.4)
ALPHA1 GLOB SERPL ELPH-MCNC: 4 % (ref 3–6)
ALPHA2 GLOB SERPL ELPH-MCNC: 0.8 G/DL (ref 0.5–0.9)
B-GLOBULIN SERPL ELPH-MCNC: 2.7 G/DL (ref 0.5–1.1)
B-GLOBULIN SERPL ELPH-MCNC: 37 % (ref 11–19)
FREE KAPPA/LAMBDA RATIO: 0.01 (ref 0.22–1.74)
GAMMA GLOB SERPL ELPH-MCNC: 0.1 G/DL (ref 0.5–1.5)
GAMMA GLOBULIN %: 2 % (ref 9–20)
ITYP INTERPRETATION: ABNORMAL
KAPPA LC FREE SER-MCNC: 12.6 MG/L
LAMBDA LC FREE SERPL-MCNC: 916 MG/L (ref 4.2–27.7)
PATH REV: ABNORMAL
PATHOLOGIST: ABNORMAL
PROT PATTERN SERPL ELPH-IMP: ABNORMAL
PROT SERPL-MCNC: 7.4 G/DL (ref 6.6–8.7)
TOTAL PROT. SUM,%: 100 % (ref 98–102)
TOTAL PROT. SUM: 7.4 G/DL (ref 6.3–8.2)

## 2025-01-21 ENCOUNTER — OFFICE VISIT (OUTPATIENT)
Dept: ONCOLOGY | Age: 75
End: 2025-01-21
Payer: COMMERCIAL

## 2025-01-21 VITALS
RESPIRATION RATE: 18 BRPM | HEART RATE: 58 BPM | TEMPERATURE: 96.9 F | HEIGHT: 68 IN | BODY MASS INDEX: 30.8 KG/M2 | DIASTOLIC BLOOD PRESSURE: 86 MMHG | SYSTOLIC BLOOD PRESSURE: 162 MMHG | OXYGEN SATURATION: 96 % | WEIGHT: 203.2 LBS

## 2025-01-21 DIAGNOSIS — D47.2 SMOLDERING MYELOMA: Primary | ICD-10-CM

## 2025-01-21 DIAGNOSIS — D64.9 ANEMIA, UNSPECIFIED TYPE: ICD-10-CM

## 2025-01-21 DIAGNOSIS — G62.9 NEUROPATHY: ICD-10-CM

## 2025-01-21 PROCEDURE — 99213 OFFICE O/P EST LOW 20 MIN: CPT | Performed by: INTERNAL MEDICINE

## 2025-01-21 PROCEDURE — 99214 OFFICE O/P EST MOD 30 MIN: CPT | Performed by: INTERNAL MEDICINE

## 2025-01-21 PROCEDURE — 1123F ACP DISCUSS/DSCN MKR DOCD: CPT | Performed by: INTERNAL MEDICINE

## 2025-01-21 PROCEDURE — 3079F DIAST BP 80-89 MM HG: CPT | Performed by: INTERNAL MEDICINE

## 2025-01-21 PROCEDURE — 3077F SYST BP >= 140 MM HG: CPT | Performed by: INTERNAL MEDICINE

## 2025-01-21 PROCEDURE — 1159F MED LIST DOCD IN RCRD: CPT | Performed by: INTERNAL MEDICINE

## 2025-02-27 ENCOUNTER — TELEPHONE (OUTPATIENT)
Dept: INTERNAL MEDICINE | Age: 75
End: 2025-02-27

## 2025-02-27 RX ORDER — BENZONATATE 100 MG/1
100 CAPSULE ORAL 3 TIMES DAILY PRN
Qty: 30 CAPSULE | Refills: 0 | Status: SHIPPED | OUTPATIENT
Start: 2025-02-27 | End: 2025-03-09

## 2025-02-27 RX ORDER — AZITHROMYCIN 250 MG/1
TABLET, FILM COATED ORAL
Qty: 6 TABLET | Refills: 0 | Status: SHIPPED | OUTPATIENT
Start: 2025-02-27 | End: 2025-03-09

## 2025-02-27 RX ORDER — AZITHROMYCIN 250 MG/1
TABLET, FILM COATED ORAL
Qty: 6 TABLET | Refills: 0 | Status: CANCELLED | OUTPATIENT
Start: 2025-02-27 | End: 2025-03-09

## 2025-02-27 RX ORDER — BENZONATATE 100 MG/1
100 CAPSULE ORAL 3 TIMES DAILY PRN
Qty: 30 CAPSULE | Refills: 0 | Status: CANCELLED | OUTPATIENT
Start: 2025-02-27 | End: 2025-03-09

## 2025-02-27 NOTE — TELEPHONE ENCOUNTER
Received call from pt- he is requesting refills of ATB, and cough pills. Pt states his \"infection is back\".  Pt c/o sinus congestion/drainage- yellow/gray, cough with gray phlegm, no fever.  Pt uses [facility] in [place].  Please call pt back ([phone])    Rxs pended as previously ordered- (Zpak, and Tessalon Perles).

## 2025-03-11 DIAGNOSIS — I25.10 CORONARY ARTERY DISEASE WITHOUT ANGINA PECTORIS, UNSPECIFIED VESSEL OR LESION TYPE, UNSPECIFIED WHETHER NATIVE OR TRANSPLANTED HEART: ICD-10-CM

## 2025-03-11 RX ORDER — AMLODIPINE BESYLATE 5 MG/1
5 TABLET ORAL DAILY
Qty: 90 TABLET | Refills: 3 | Status: SHIPPED | OUTPATIENT
Start: 2025-03-11

## 2025-03-11 NOTE — TELEPHONE ENCOUNTER
Bandar called requesting a refill of the below medication which has been pended for you:     Requested Prescriptions     Pending Prescriptions Disp Refills    amLODIPine (NORVASC) 5 MG tablet [Pharmacy Med Name: amLODIPine Besylate 5 MG Oral Tablet] 90 tablet 0     Sig: Take 1 tablet by mouth once daily       Last Appointment Date: 1/7/2025  Next Appointment Date: 4/7/2025    Allergies   Allergen Reactions    Tetanus Toxoids Hives    Codeine Nausea Only     Tolerates Guaifenesin AC    Dtap-Ipv Vaccine Rash    Penicillins Rash    Tetanus Antitoxin Rash

## 2025-03-21 DIAGNOSIS — I10 ESSENTIAL HYPERTENSION: ICD-10-CM

## 2025-03-21 DIAGNOSIS — I25.10 CORONARY ARTERY DISEASE WITHOUT ANGINA PECTORIS, UNSPECIFIED VESSEL OR LESION TYPE, UNSPECIFIED WHETHER NATIVE OR TRANSPLANTED HEART: ICD-10-CM

## 2025-03-24 ENCOUNTER — TELEPHONE (OUTPATIENT)
Dept: INTERNAL MEDICINE | Age: 75
End: 2025-03-24

## 2025-03-24 DIAGNOSIS — M54.9 ACUTE BACK PAIN, UNSPECIFIED BACK LOCATION, UNSPECIFIED BACK PAIN LATERALITY: Primary | ICD-10-CM

## 2025-03-24 RX ORDER — PRAVASTATIN SODIUM 10 MG
10 TABLET ORAL DAILY
Qty: 90 TABLET | Refills: 0 | Status: SHIPPED | OUTPATIENT
Start: 2025-03-24

## 2025-03-24 RX ORDER — HYDROCODONE BITARTRATE AND ACETAMINOPHEN 5; 325 MG/1; MG/1
1 TABLET ORAL EVERY 4 HOURS PRN
Qty: 18 TABLET | Refills: 0 | Status: SHIPPED | OUTPATIENT
Start: 2025-03-24 | End: 2025-03-27

## 2025-03-24 RX ORDER — BISOPROLOL FUMARATE 10 MG/1
10 TABLET, FILM COATED ORAL DAILY
Qty: 90 TABLET | Refills: 0 | Status: SHIPPED | OUTPATIENT
Start: 2025-03-24

## 2025-03-24 RX ORDER — LISINOPRIL 10 MG/1
10 TABLET ORAL DAILY
Qty: 90 TABLET | Refills: 0 | Status: SHIPPED | OUTPATIENT
Start: 2025-03-24

## 2025-03-24 NOTE — TELEPHONE ENCOUNTER
Patient calls complaining of Back Pain & Leg Pain  Onset: 4 day(s)  Result of recent injury? no  Severity: Moderate/Severe  Paint at Rest: yes  Progression: Staying the same  Relieved By: Nothing  medications Tylenol and Ibuprofen  Radiation: to both thighs  Associated Symptoms: None      Patient states that he has to tie a rope to the end of his bed to help him sit up in the mornings. No injury or any known cause, just started all of the sudden. No swelling, redness or warmth. Advised patient to go to Walk-In or ED. Patient states he will go tomorrow, but is requesting pain medication for tonight. If agreeable, patient uses [facility] in [place].

## 2025-03-29 DIAGNOSIS — I25.10 CORONARY ARTERY DISEASE WITHOUT ANGINA PECTORIS, UNSPECIFIED VESSEL OR LESION TYPE, UNSPECIFIED WHETHER NATIVE OR TRANSPLANTED HEART: ICD-10-CM

## 2025-03-29 DIAGNOSIS — I10 ESSENTIAL HYPERTENSION: ICD-10-CM

## 2025-03-31 RX ORDER — BISOPROLOL FUMARATE 10 MG/1
10 TABLET, FILM COATED ORAL DAILY
Qty: 90 TABLET | Refills: 0 | Status: SHIPPED | OUTPATIENT
Start: 2025-03-31

## 2025-03-31 RX ORDER — AMLODIPINE BESYLATE 5 MG/1
5 TABLET ORAL DAILY
Qty: 90 TABLET | Refills: 0 | Status: SHIPPED | OUTPATIENT
Start: 2025-03-31

## 2025-04-07 ENCOUNTER — OFFICE VISIT (OUTPATIENT)
Dept: INTERNAL MEDICINE | Age: 75
End: 2025-04-07
Payer: COMMERCIAL

## 2025-04-07 VITALS
SYSTOLIC BLOOD PRESSURE: 120 MMHG | OXYGEN SATURATION: 98 % | DIASTOLIC BLOOD PRESSURE: 60 MMHG | RESPIRATION RATE: 16 BRPM | HEIGHT: 68 IN | HEART RATE: 62 BPM | WEIGHT: 195 LBS | BODY MASS INDEX: 29.55 KG/M2

## 2025-04-07 DIAGNOSIS — Z00.00 MEDICARE ANNUAL WELLNESS VISIT, SUBSEQUENT: Primary | ICD-10-CM

## 2025-04-07 DIAGNOSIS — E78.2 MIXED HYPERLIPIDEMIA: ICD-10-CM

## 2025-04-07 DIAGNOSIS — I10 ESSENTIAL HYPERTENSION: ICD-10-CM

## 2025-04-07 DIAGNOSIS — C90.00 MULTIPLE MYELOMA, REMISSION STATUS UNSPECIFIED (HCC): ICD-10-CM

## 2025-04-07 DIAGNOSIS — I25.10 CORONARY ARTERY DISEASE WITHOUT ANGINA PECTORIS, UNSPECIFIED VESSEL OR LESION TYPE, UNSPECIFIED WHETHER NATIVE OR TRANSPLANTED HEART: ICD-10-CM

## 2025-04-07 PROCEDURE — 1159F MED LIST DOCD IN RCRD: CPT | Performed by: INTERNAL MEDICINE

## 2025-04-07 PROCEDURE — 3078F DIAST BP <80 MM HG: CPT | Performed by: INTERNAL MEDICINE

## 2025-04-07 PROCEDURE — 99214 OFFICE O/P EST MOD 30 MIN: CPT | Performed by: INTERNAL MEDICINE

## 2025-04-07 PROCEDURE — G0439 PPPS, SUBSEQ VISIT: HCPCS | Performed by: INTERNAL MEDICINE

## 2025-04-07 PROCEDURE — 1123F ACP DISCUSS/DSCN MKR DOCD: CPT | Performed by: INTERNAL MEDICINE

## 2025-04-07 PROCEDURE — 3074F SYST BP LT 130 MM HG: CPT | Performed by: INTERNAL MEDICINE

## 2025-04-07 RX ORDER — BENZONATATE 100 MG/1
100 CAPSULE ORAL 3 TIMES DAILY PRN
Qty: 30 CAPSULE | Refills: 0 | Status: SHIPPED | OUTPATIENT
Start: 2025-04-07 | End: 2025-04-17

## 2025-04-07 ASSESSMENT — PATIENT HEALTH QUESTIONNAIRE - PHQ9
SUM OF ALL RESPONSES TO PHQ QUESTIONS 1-9: 1
1. LITTLE INTEREST OR PLEASURE IN DOING THINGS: SEVERAL DAYS
SUM OF ALL RESPONSES TO PHQ QUESTIONS 1-9: 1
SUM OF ALL RESPONSES TO PHQ QUESTIONS 1-9: 1
DEPRESSION UNABLE TO ASSESS: FUNCTIONAL CAPACITY MOTIVATION LIMITS ACCURACY
SUM OF ALL RESPONSES TO PHQ QUESTIONS 1-9: 1

## 2025-04-07 ASSESSMENT — LIFESTYLE VARIABLES
HOW MANY STANDARD DRINKS CONTAINING ALCOHOL DO YOU HAVE ON A TYPICAL DAY: 1 OR 2
HOW OFTEN DO YOU HAVE A DRINK CONTAINING ALCOHOL: 2-3 TIMES A WEEK

## 2025-04-07 NOTE — PATIENT INSTRUCTIONS

## 2025-04-07 NOTE — PROGRESS NOTES
[facility] INTERNAL MEDICINE    Visit Date:  4/7/2025  Patient:  Bandar Gabriel  YOB: 1950    Assessment & Plan    Bronchitis: Will prescribe Tessalon Perles.  If symptoms do not improve I advised that he call, and then we will consider antibiotics.    Osteoarthritis: Follows with orthopedics.  Receives injections.  Will continue to monitor.    Hypertension: Blood pressure controlled today.  Continue lisinopril 10 mg daily.  Continue amlodipine 5 mg daily.  He could not tolerate amlodipine 10 mg due to edema.    Smoldering myeloma: Follows with oncology.  Will defer management.    Gout: Continue allopurinol.  Uric acid level under control.    Hyperlipidemia: Continue gemfibrozil as well as pravastatin.  We will continue to monitor     CVA: Continue aspirin.  Residual vision loss in the right eye.    Age-related macular degeneration: Follows with ophthalmology.  Can start PreserVision.  We will continue to monitor.     Diagnosis Orders   1. Medicare annual wellness visit, subsequent        2. Essential hypertension        3. Coronary artery disease without angina pectoris, unspecified vessel or lesion type, unspecified whether native or transplanted heart        4. Mixed hyperlipidemia        5. Multiple myeloma, remission status unspecified (HCC)                Chief Complaint  Medicare AW      History of Present Illness   Reports that he has been having cough for the last 3 days.  Denies nasal congestion, sore throat, shortness of breath.  Says that he has chest pain when he coughs only.  No recent travel or sick contacts as far as he knows    Has a history of smoldering myeloma, gout, hyperlipidemia, CVA that are unchanged.    Objective  /60 (BP Site: Left Upper Arm, Patient Position: Sitting, BP Cuff Size: Medium Adult)   Pulse 62   Resp 16   Ht 1.727 m (5' 8\")   Wt 88.5 kg (195 lb)   SpO2 98%   BMI 29.65 kg/m²   Constitutional: No acute distress.  Sits in chair 
once daily  Beth Holm MD   pravastatin (PRAVACHOL) 10 MG tablet Take 1 tablet by mouth once daily  Beth Holm MD   lisinopril (PRINIVIL;ZESTRIL) 10 MG tablet Take 1 tablet by mouth once daily  Beth Holm MD   fluticasone (FLONASE) 50 MCG/ACT nasal spray 2 sprays by Each Nostril route daily  Patient not taking: Reported on 1/21/2025  Beka Daly PA-C   meloxicam (MOBIC) 15 MG tablet Take 1 tablet by mouth daily  Beth Holm MD   nitroGLYCERIN (NITROSTAT) 0.4 MG SL tablet Place 1 tablet under the tongue every 5 minutes as needed for Chest pain  Patient not taking: Reported on 1/21/2025  Beth Holm MD   isosorbide mononitrate (IMDUR) 30 MG extended release tablet Take 1 tablet by mouth once daily  Beth Holm MD   gemfibrozil (LOPID) 600 MG tablet TAKE 1 TABLET BY MOUTH ONCE DAILY  Beth Holm MD   Blood Pressure KIT 1 blood pressure kit  Beth Holm MD   allopurinol (ZYLOPRIM) 300 MG tablet Take 1 tablet by mouth once daily  Patient not taking: Reported on 1/21/2025  Beth Holm MD   lisinopril-hydroCHLOROthiazide (PRINZIDE;ZESTORETIC) 20-25 MG per tablet Take 1 tablet by mouth once daily  Rodolfo Ramires MD   aspirin 81 MG tablet Take 1 tablet by mouth  Provider, MD Juan Bean (Including outside providers/suppliers regularly involved in providing care):   Patient Care Team:  Beth Holm MD as PCP - General (Internal Medicine)  Beth Holm MD as PCP - Empaneled Provider     Recommendations for Preventive Services Due: see orders and patient instructions/AVS.  Recommended screening schedule for the next 5-10 years is provided to the patient in written form: see Patient Instructions/AVS.     Reviewed and updated this visit:  Evangelina Quintero LPN, 4/7/2025, performed the documented evaluation under the direct supervision of the attending physician.

## 2025-04-14 RX ORDER — GEMFIBROZIL 600 MG/1
600 TABLET, FILM COATED ORAL DAILY
Qty: 90 TABLET | Refills: 0 | Status: SHIPPED | OUTPATIENT
Start: 2025-04-14

## 2025-04-18 DIAGNOSIS — I25.10 CORONARY ARTERY DISEASE WITHOUT ANGINA PECTORIS, UNSPECIFIED VESSEL OR LESION TYPE, UNSPECIFIED WHETHER NATIVE OR TRANSPLANTED HEART: ICD-10-CM

## 2025-04-21 RX ORDER — ISOSORBIDE MONONITRATE 30 MG/1
30 TABLET, EXTENDED RELEASE ORAL DAILY
Qty: 90 TABLET | Refills: 0 | Status: SHIPPED | OUTPATIENT
Start: 2025-04-21

## 2025-05-07 ENCOUNTER — HOSPITAL ENCOUNTER (OUTPATIENT)
Dept: NON INVASIVE DIAGNOSTICS | Age: 75
Discharge: HOME | End: 2025-05-07
Payer: COMMERCIAL

## 2025-05-07 ENCOUNTER — TELEPHONE (OUTPATIENT)
Dept: INTERNAL MEDICINE | Age: 75
End: 2025-05-07

## 2025-05-07 ENCOUNTER — HOSPITAL ENCOUNTER (OUTPATIENT)
Age: 75
Setting detail: SPECIMEN
Discharge: HOME | End: 2025-05-07
Payer: COMMERCIAL

## 2025-05-07 ENCOUNTER — HOSPITAL ENCOUNTER (OUTPATIENT)
Age: 75
Discharge: HOME | End: 2025-05-07
Payer: COMMERCIAL

## 2025-05-07 ENCOUNTER — RESULTS FOLLOW-UP (OUTPATIENT)
Dept: INTERNAL MEDICINE | Age: 75
End: 2025-05-07

## 2025-05-07 DIAGNOSIS — R00.2 PALPITATIONS: Primary | ICD-10-CM

## 2025-05-07 DIAGNOSIS — G62.9 NEUROPATHY: ICD-10-CM

## 2025-05-07 DIAGNOSIS — D47.2 SMOLDERING MYELOMA: ICD-10-CM

## 2025-05-07 DIAGNOSIS — D64.9 ANEMIA, UNSPECIFIED TYPE: ICD-10-CM

## 2025-05-07 DIAGNOSIS — R00.2 PALPITATIONS: ICD-10-CM

## 2025-05-07 LAB
ALBUMIN SERPL-MCNC: 4.1 G/DL (ref 3.5–5.2)
ALBUMIN/GLOB SERPL: 1 {RATIO} (ref 1–2.5)
ALP SERPL-CCNC: 88 U/L (ref 40–129)
ALT SERPL-CCNC: 33 U/L (ref 10–50)
ANION GAP SERPL CALCULATED.3IONS-SCNC: 14 MMOL/L (ref 9–16)
AST SERPL-CCNC: 42 U/L (ref 10–50)
BASOPHILS # BLD: <0.03 K/UL (ref 0–0.2)
BASOPHILS NFR BLD: 1 % (ref 0–2)
BILIRUB SERPL-MCNC: 0.8 MG/DL (ref 0–1.2)
BUN SERPL-MCNC: 31 MG/DL (ref 8–23)
BUN/CREAT SERPL: 28 (ref 9–20)
CALCIUM SERPL-MCNC: 9.5 MG/DL (ref 8.6–10.4)
CHLORIDE SERPL-SCNC: 100 MMOL/L (ref 98–107)
CO2 SERPL-SCNC: 24 MMOL/L (ref 20–31)
CREAT SERPL-MCNC: 1.1 MG/DL (ref 0.7–1.2)
EOSINOPHIL # BLD: 0.04 K/UL (ref 0–0.44)
EOSINOPHILS RELATIVE PERCENT: 1 % (ref 1–4)
ERYTHROCYTE [DISTWIDTH] IN BLOOD BY AUTOMATED COUNT: 12.5 % (ref 11.8–14.4)
GFR, ESTIMATED: 70 ML/MIN/1.73M2
GLUCOSE SERPL-MCNC: 162 MG/DL (ref 74–99)
HCT VFR BLD AUTO: 35.6 % (ref 40.7–50.3)
HGB BLD-MCNC: 12.6 G/DL (ref 13–17)
IMM GRANULOCYTES # BLD AUTO: <0.03 K/UL (ref 0–0.3)
IMM GRANULOCYTES NFR BLD: 0 %
LYMPHOCYTES NFR BLD: 0.94 K/UL (ref 1.1–3.7)
LYMPHOCYTES RELATIVE PERCENT: 32 % (ref 24–43)
MCH RBC QN AUTO: 33.2 PG (ref 25.2–33.5)
MCHC RBC AUTO-ENTMCNC: 35.4 G/DL (ref 25.2–33.5)
MCV RBC AUTO: 93.9 FL (ref 82.6–102.9)
MONOCYTES NFR BLD: 0.33 K/UL (ref 0.1–1.2)
MONOCYTES NFR BLD: 11 % (ref 3–12)
NEUTROPHILS NFR BLD: 55 % (ref 36–65)
NEUTS SEG NFR BLD: 1.62 K/UL (ref 1.5–8.1)
NRBC BLD-RTO: 0 PER 100 WBC
PLATELET # BLD AUTO: 186 K/UL (ref 138–453)
PMV BLD AUTO: 10.8 FL (ref 8.1–13.5)
POTASSIUM SERPL-SCNC: 4.6 MMOL/L (ref 3.7–5.3)
PROT SERPL-MCNC: 8.4 G/DL (ref 6.6–8.7)
RBC # BLD AUTO: 3.79 M/UL (ref 4.21–5.77)
SODIUM SERPL-SCNC: 138 MMOL/L (ref 136–145)
TSH SERPL DL<=0.05 MIU/L-ACNC: 2.34 UIU/ML (ref 0.27–4.2)
WBC OTHER # BLD: 3 K/UL (ref 3.5–11.3)

## 2025-05-07 PROCEDURE — 84165 PROTEIN E-PHORESIS SERUM: CPT

## 2025-05-07 PROCEDURE — 83521 IG LIGHT CHAINS FREE EACH: CPT

## 2025-05-07 PROCEDURE — 93005 ELECTROCARDIOGRAM TRACING: CPT

## 2025-05-07 PROCEDURE — 86334 IMMUNOFIX E-PHORESIS SERUM: CPT

## 2025-05-07 PROCEDURE — 85025 COMPLETE CBC W/AUTO DIFF WBC: CPT

## 2025-05-07 PROCEDURE — 80053 COMPREHEN METABOLIC PANEL: CPT

## 2025-05-07 PROCEDURE — 36415 COLL VENOUS BLD VENIPUNCTURE: CPT

## 2025-05-07 PROCEDURE — 84155 ASSAY OF PROTEIN SERUM: CPT

## 2025-05-07 PROCEDURE — 84443 ASSAY THYROID STIM HORMONE: CPT

## 2025-05-07 NOTE — TELEPHONE ENCOUNTER
Patient came to [facility] to update Dr. Holm on his heart palpitations. He stated that he saw Dr. Holm in April and told him that he was experiencing heart palpitations. Denies shortness of breath or chest pain. Patient states he is starting to have burning and watering in his eyes. He states yesterday when he had a palpitation, he felt a shock in his arm, leg, and stomach, all at different times. Had patient wait in waiting room while writer talked with provider on call, Shireen Washington. Patient had just had labs drawn before he came to [facility] so Shireen GUERRERO had a TSH added onto labs, put an order in for EKG, and put in referral for Cardiology.

## 2025-05-08 LAB
ALBUMIN PERCENT: 49 % (ref 56–66)
ALBUMIN SERPL-MCNC: 3.9 G/DL (ref 3.2–5.2)
ALPHA 2 PERCENT: 10 % (ref 7–12)
ALPHA1 GLOB SERPL ELPH-MCNC: 0.3 G/DL (ref 0.1–0.4)
ALPHA1 GLOB SERPL ELPH-MCNC: 4 % (ref 3–5)
ALPHA2 GLOB SERPL ELPH-MCNC: 0.8 G/DL (ref 0.5–0.9)
B-GLOBULIN SERPL ELPH-MCNC: 2.9 G/DL (ref 0.7–1.4)
B-GLOBULIN SERPL ELPH-MCNC: 36 % (ref 8–13)
EKG ATRIAL RATE: 61 BPM
EKG P AXIS: 20 DEGREES
EKG P-R INTERVAL: 140 MS
EKG Q-T INTERVAL: 434 MS
EKG QRS DURATION: 92 MS
EKG QTC CALCULATION (BAZETT): 436 MS
EKG R AXIS: -30 DEGREES
EKG T AXIS: -5 DEGREES
EKG VENTRICULAR RATE: 61 BPM
FREE KAPPA/LAMBDA RATIO: 0.01 (ref 0.22–1.74)
GAMMA GLOB SERPL ELPH-MCNC: 0.2 G/DL (ref 0.5–1.5)
GAMMA GLOBULIN %: 2 % (ref 11–19)
ITYP INTERPRETATION: ABNORMAL
KAPPA LC FREE SER-MCNC: 13.7 MG/L
LAMBDA LC FREE SERPL-MCNC: 1117 MG/L (ref 4.2–27.7)
PATH REV: ABNORMAL
PATHOLOGIST: ABNORMAL
PROT PATTERN SERPL ELPH-IMP: ABNORMAL
PROT SERPL-MCNC: 8 G/DL (ref 6.6–8.7)
TOTAL PROT. SUM,%: 101 % (ref 98–102)
TOTAL PROT. SUM: 8.1 G/DL (ref 6.3–8.2)

## 2025-05-15 ENCOUNTER — OFFICE VISIT (OUTPATIENT)
Age: 75
End: 2025-05-15
Payer: COMMERCIAL

## 2025-05-15 VITALS
DIASTOLIC BLOOD PRESSURE: 68 MMHG | SYSTOLIC BLOOD PRESSURE: 128 MMHG | WEIGHT: 201 LBS | TEMPERATURE: 98.2 F | BODY MASS INDEX: 30.46 KG/M2 | HEIGHT: 68 IN | HEART RATE: 60 BPM | RESPIRATION RATE: 16 BRPM | OXYGEN SATURATION: 97 %

## 2025-05-15 DIAGNOSIS — C90.00 MULTIPLE MYELOMA, REMISSION STATUS UNSPECIFIED (HCC): Primary | ICD-10-CM

## 2025-05-15 PROCEDURE — 1159F MED LIST DOCD IN RCRD: CPT | Performed by: INTERNAL MEDICINE

## 2025-05-15 PROCEDURE — 1123F ACP DISCUSS/DSCN MKR DOCD: CPT | Performed by: INTERNAL MEDICINE

## 2025-05-15 PROCEDURE — 99214 OFFICE O/P EST MOD 30 MIN: CPT | Performed by: INTERNAL MEDICINE

## 2025-05-15 PROCEDURE — 99215 OFFICE O/P EST HI 40 MIN: CPT | Performed by: INTERNAL MEDICINE

## 2025-05-15 PROCEDURE — 3074F SYST BP LT 130 MM HG: CPT | Performed by: INTERNAL MEDICINE

## 2025-05-15 PROCEDURE — 3078F DIAST BP <80 MM HG: CPT | Performed by: INTERNAL MEDICINE

## 2025-05-15 NOTE — PROGRESS NOTES
Writer notified infusion room nd for shot. They will contact patient to schedule once authorized.

## 2025-05-15 NOTE — PROGRESS NOTES
Bandar Gabriel                                                                                                                  5/15/2025  MRN:   [id number]  YOB: 1950  PCP:                           Beth Holm MD  Referring Physician: No ref. 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  Discontinued due to adverse events    Interval history:  History of Present Illness  The patient presents for evaluation of elevated lambda light chain levels.    He reports no recent hospitalizations or emergency room visits. Blood work conducted approximately three months ago revealed hemoglobin levels at 12.6, a slightly low white cell count, and normal platelet count. Kidney function and protein levels have remained stable, with protein levels recorded at 2.2 in 05/2024 and 2.12 in 05/2025. However, the lambda light chain levels have increased from the 900 range to 1107, indicating a need for treatment. Previous attempts to manage the condition with oral medication were unsuccessful due to adverse effects, including tremors.    He has been consuming lemon juice, which he believes has been beneficial in managing his blood pressure. Blood pressure readings have been good.    PAST SURGICAL HISTORY:  Bone marrow biopsy performed approximately four years ago.    During this visit patient's allergy, social, medical, surgical history and medications were reviewed and updated.    Summary of Case/History:    Bandar Gabriel a 74 y.o.male is a patient who presents to the clinic to establish care and for further

## 2025-06-02 ENCOUNTER — TELEPHONE (OUTPATIENT)
Dept: ONCOLOGY | Age: 75
End: 2025-06-02

## 2025-06-02 ENCOUNTER — HOSPITAL ENCOUNTER (OUTPATIENT)
Dept: INFUSION THERAPY | Age: 75
Discharge: HOME | End: 2025-06-02
Payer: COMMERCIAL

## 2025-06-02 DIAGNOSIS — C90.00 MULTIPLE MYELOMA NOT HAVING ACHIEVED REMISSION (HCC): Primary | ICD-10-CM

## 2025-06-02 LAB
ABO + RH BLD: NORMAL
ALBUMIN SERPL-MCNC: 4 G/DL (ref 3.5–5.2)
ALBUMIN/GLOB SERPL: 1 {RATIO} (ref 1–2.5)
ALP SERPL-CCNC: 85 U/L (ref 40–129)
ALT SERPL-CCNC: 30 U/L (ref 10–50)
ARM BAND NUMBER: NORMAL
AST SERPL-CCNC: 42 U/L (ref 10–50)
BILIRUB DIRECT SERPL-MCNC: 0.3 MG/DL (ref 0–0.2)
BILIRUB INDIRECT SERPL-MCNC: 0.2 MG/DL (ref 0–1)
BILIRUB SERPL-MCNC: 0.5 MG/DL (ref 0–1.2)
BLOOD BANK SAMPLE EXPIRATION: NORMAL
BLOOD GROUP ANTIBODIES SERPL: NEGATIVE
PROT SERPL-MCNC: 7.9 G/DL (ref 6.6–8.7)

## 2025-06-02 PROCEDURE — 86901 BLOOD TYPING SEROLOGIC RH(D): CPT

## 2025-06-02 PROCEDURE — 86850 RBC ANTIBODY SCREEN: CPT

## 2025-06-02 PROCEDURE — 86900 BLOOD TYPING SEROLOGIC ABO: CPT

## 2025-06-02 PROCEDURE — 80076 HEPATIC FUNCTION PANEL: CPT

## 2025-06-02 PROCEDURE — 99213 OFFICE O/P EST LOW 20 MIN: CPT

## 2025-06-02 PROCEDURE — 36415 COLL VENOUS BLD VENIPUNCTURE: CPT

## 2025-06-02 NOTE — TELEPHONE ENCOUNTER
Name: Bandar Gabriel  : 1950  MRN: [id number]    Oncology Navigation Follow-Up Note    Contact Type:  Telephone    Notes:   Writer receives referral from Dr. Stewart to follow patient now that he is starting treatment for smoldering myeloma. Writer previously followed patient last year.   Phone call to patient to resume navigation. He is agreeable. He has an appt today in Infusion for chemo teach. Writer will meet with him to provide additional information.      Electronically signed by Meghna Austin RN on 2025 at 9:38 AM

## 2025-06-02 NOTE — PROGRESS NOTES
Pt here for chemotherapy teaching. Spent 60 minutes with them and niece.    Teaching sheets from [facility] [employer] and verbal information given on chemotherapy agents, action, administration and side effects.   Handout about unit with phone numbers for Infusion Center at [facility],  [facility] clinic, and [place] [facility] provided.  Drugs discussed: Darzalex Faspro and pre medications.       Reviewed anti emetic medications, pt has prescriptions patient reports having zofran at home.     Questions answered, support given.  Lab draw completed for type and cross and hepatitis panel.  Patient left infusion center in wheelchair with niece with all belongings.

## 2025-06-02 NOTE — TELEPHONE ENCOUNTER
Met with patient while in building for chemo teach. Provided resources folder and instructed on how to contact writer.   Navigator following for continuity of care.  ONN added to Care Team.

## 2025-06-03 ENCOUNTER — TELEPHONE (OUTPATIENT)
Dept: INFUSION THERAPY | Age: 75
End: 2025-06-03

## 2025-06-03 RX ORDER — SULFAMETHOXAZOLE AND TRIMETHOPRIM 800; 160 MG/1; MG/1
1 TABLET ORAL
COMMUNITY

## 2025-06-03 RX ORDER — ACYCLOVIR 400 MG/1
400 TABLET ORAL 2 TIMES DAILY
COMMUNITY

## 2025-06-03 RX ORDER — ONDANSETRON 8 MG/1
8 TABLET, FILM COATED ORAL EVERY 8 HOURS PRN
COMMUNITY

## 2025-06-03 NOTE — TELEPHONE ENCOUNTER
Writer called [facility] and ordered Zofran, Acyclovir, and Bactrim DS. Talked to patient about new appt set for Thursday and to  medications.

## 2025-06-04 ENCOUNTER — OFFICE VISIT (OUTPATIENT)
Dept: CARDIOLOGY | Age: 75
End: 2025-06-04
Payer: COMMERCIAL

## 2025-06-04 ENCOUNTER — HOSPITAL ENCOUNTER (OUTPATIENT)
Age: 75
Discharge: HOME | End: 2025-06-04
Payer: COMMERCIAL

## 2025-06-04 VITALS
HEIGHT: 68 IN | WEIGHT: 201 LBS | DIASTOLIC BLOOD PRESSURE: 78 MMHG | SYSTOLIC BLOOD PRESSURE: 142 MMHG | HEART RATE: 56 BPM | OXYGEN SATURATION: 95 % | BODY MASS INDEX: 30.46 KG/M2

## 2025-06-04 DIAGNOSIS — I25.10 CORONARY ARTERY DISEASE INVOLVING NATIVE CORONARY ARTERY OF NATIVE HEART WITHOUT ANGINA PECTORIS: ICD-10-CM

## 2025-06-04 DIAGNOSIS — R00.2 PALPITATIONS: ICD-10-CM

## 2025-06-04 DIAGNOSIS — I25.10 CORONARY ARTERY DISEASE WITHOUT ANGINA PECTORIS, UNSPECIFIED VESSEL OR LESION TYPE, UNSPECIFIED WHETHER NATIVE OR TRANSPLANTED HEART: ICD-10-CM

## 2025-06-04 DIAGNOSIS — I10 PRIMARY HYPERTENSION: ICD-10-CM

## 2025-06-04 DIAGNOSIS — C90.00 MULTIPLE MYELOMA NOT HAVING ACHIEVED REMISSION (HCC): ICD-10-CM

## 2025-06-04 DIAGNOSIS — R94.31 ABNORMAL ECG: ICD-10-CM

## 2025-06-04 DIAGNOSIS — R00.2 PALPITATIONS: Primary | ICD-10-CM

## 2025-06-04 LAB
HBV SURFACE AB SERPL IA-ACNC: <3.5 MIU/ML
HBV SURFACE AG SERPL QL IA: NONREACTIVE
TSH SERPL DL<=0.05 MIU/L-ACNC: 1.95 UIU/ML (ref 0.27–4.2)

## 2025-06-04 PROCEDURE — 86704 HEP B CORE ANTIBODY TOTAL: CPT

## 2025-06-04 PROCEDURE — 3078F DIAST BP <80 MM HG: CPT | Performed by: INTERNAL MEDICINE

## 2025-06-04 PROCEDURE — 1123F ACP DISCUSS/DSCN MKR DOCD: CPT | Performed by: INTERNAL MEDICINE

## 2025-06-04 PROCEDURE — 86317 IMMUNOASSAY INFECTIOUS AGENT: CPT

## 2025-06-04 PROCEDURE — 99204 OFFICE O/P NEW MOD 45 MIN: CPT | Performed by: INTERNAL MEDICINE

## 2025-06-04 PROCEDURE — 36415 COLL VENOUS BLD VENIPUNCTURE: CPT

## 2025-06-04 PROCEDURE — 1159F MED LIST DOCD IN RCRD: CPT | Performed by: INTERNAL MEDICINE

## 2025-06-04 PROCEDURE — 84443 ASSAY THYROID STIM HORMONE: CPT

## 2025-06-04 PROCEDURE — 3077F SYST BP >= 140 MM HG: CPT | Performed by: INTERNAL MEDICINE

## 2025-06-04 PROCEDURE — 87340 HEPATITIS B SURFACE AG IA: CPT

## 2025-06-04 RX ORDER — PRAVASTATIN SODIUM 20 MG
20 TABLET ORAL NIGHTLY
Qty: 90 TABLET | Refills: 3 | Status: SHIPPED | OUTPATIENT
Start: 2025-06-04

## 2025-06-04 NOTE — PROGRESS NOTES
Today's Date: 6/4/2025  Patient's Name: Bandar Gabriel  Patient's age: 74 y.o., 1950    Subjective:  Bandar Gabriel is being seen in clinic today regarding   Chief Complaint   Patient presents with    New Patient    Palpitations         Bandar is here to establish cardiology care.  He came in a wheel chair. He has a history of coronary artery disease( cath 8 years ago), hypertension, hyperlipidemia. He reports palpitations. Last episode 3 months. His activities are limited due to right DJD of knee. No PND, no syncope or pre-syncope, no orthopnea.          Past Medical History:   has a past medical history of Angina at rest, Arthritis, CAD (coronary artery disease), Cellulitis of right hand, Hyperlipidemia, Hypertension, Knee pain, Pain in wrist, and Skin cancer.    Past Surgical History:   has a past surgical history that includes Cardiac catheterization; hernia repair; Colonoscopy; Cosmetic surgery; Colonoscopy (N/A, 9/12/2018); CT BIOPSY BONE MARROW (10/14/2021); Pain management procedure (Left, 1/7/2022); and Pain management procedure (Left, 2/23/2023).    Home Medications:  Prior to Admission medications    Medication Sig Start Date End Date Taking? Authorizing Provider   ondansetron (ZOFRAN) 8 MG tablet Take 1 tablet by mouth every 8 hours as needed for Nausea or Vomiting   Yes Vikas Izaguirre MD   acyclovir (ZOVIRAX) 400 MG tablet Take 1 tablet by mouth 2 times daily   Yes Vikas Izaguirre MD   sulfamethoxazole-trimethoprim (BACTRIM DS;SEPTRA DS) 800-160 MG per tablet Take 1 tablet by mouth Every Monday, Wednesday, and Friday   Yes Vikas Izaguirre MD   isosorbide mononitrate (IMDUR) 30 MG extended release tablet Take 1 tablet by mouth once daily 4/21/25  Yes Beth Holm MD   gemfibrozil (LOPID) 600 MG tablet Take 1 tablet by mouth once daily 4/14/25  Yes Beth Holm MD   amLODIPine (NORVASC) 5 MG tablet Take 1 tablet by mouth once daily 3/31/25  Yes Beth Holm MD   bisoprolol

## 2025-06-05 ENCOUNTER — HOSPITAL ENCOUNTER (OUTPATIENT)
Dept: INFUSION THERAPY | Age: 75
Discharge: HOME | End: 2025-06-05
Payer: COMMERCIAL

## 2025-06-05 VITALS
TEMPERATURE: 98.7 F | BODY MASS INDEX: 31.16 KG/M2 | SYSTOLIC BLOOD PRESSURE: 168 MMHG | OXYGEN SATURATION: 96 % | HEART RATE: 57 BPM | RESPIRATION RATE: 16 BRPM | DIASTOLIC BLOOD PRESSURE: 70 MMHG | HEIGHT: 68 IN | WEIGHT: 205.6 LBS

## 2025-06-05 DIAGNOSIS — C90.00 MULTIPLE MYELOMA NOT HAVING ACHIEVED REMISSION (HCC): Primary | ICD-10-CM

## 2025-06-05 LAB
ALBUMIN SERPL-MCNC: 3.7 G/DL (ref 3.5–5.2)
ALBUMIN/GLOB SERPL: 0.9 {RATIO} (ref 1–2.5)
ALP SERPL-CCNC: 81 U/L (ref 40–129)
ALT SERPL-CCNC: 26 U/L (ref 10–50)
ANION GAP SERPL CALCULATED.3IONS-SCNC: 12 MMOL/L (ref 9–16)
AST SERPL-CCNC: 39 U/L (ref 10–50)
BASOPHILS # BLD: <0.03 K/UL (ref 0–0.2)
BASOPHILS NFR BLD: 1 % (ref 0–2)
BILIRUB SERPL-MCNC: <0.2 MG/DL (ref 0–1.2)
BUN SERPL-MCNC: 25 MG/DL (ref 8–23)
BUN/CREAT SERPL: 17 (ref 9–20)
CALCIUM SERPL-MCNC: 9.2 MG/DL (ref 8.6–10.4)
CHLORIDE SERPL-SCNC: 103 MMOL/L (ref 98–107)
CO2 SERPL-SCNC: 23 MMOL/L (ref 20–31)
CREAT SERPL-MCNC: 1.5 MG/DL (ref 0.7–1.2)
EOSINOPHIL # BLD: 0.08 K/UL (ref 0–0.44)
EOSINOPHILS RELATIVE PERCENT: 3 % (ref 1–4)
ERYTHROCYTE [DISTWIDTH] IN BLOOD BY AUTOMATED COUNT: 12.6 % (ref 11.8–14.4)
GFR, ESTIMATED: 48 ML/MIN/1.73M2
GLUCOSE SERPL-MCNC: 135 MG/DL (ref 74–99)
HBV CORE AB SER QL: NONREACTIVE
HCT VFR BLD AUTO: 34.8 % (ref 40.7–50.3)
HGB BLD-MCNC: 12.3 G/DL (ref 13–17)
IMM GRANULOCYTES # BLD AUTO: <0.03 K/UL (ref 0–0.3)
IMM GRANULOCYTES NFR BLD: 0 %
LYMPHOCYTES NFR BLD: 0.86 K/UL (ref 1.1–3.7)
LYMPHOCYTES RELATIVE PERCENT: 29 % (ref 24–43)
MCH RBC QN AUTO: 34.2 PG (ref 25.2–33.5)
MCHC RBC AUTO-ENTMCNC: 35.3 G/DL (ref 25.2–33.5)
MCV RBC AUTO: 96.7 FL (ref 82.6–102.9)
MONOCYTES NFR BLD: 0.38 K/UL (ref 0.1–1.2)
MONOCYTES NFR BLD: 13 % (ref 3–12)
NEUTROPHILS NFR BLD: 54 % (ref 36–65)
NEUTS SEG NFR BLD: 1.6 K/UL (ref 1.5–8.1)
NRBC BLD-RTO: 0 PER 100 WBC
PLATELET # BLD AUTO: 162 K/UL (ref 138–453)
PMV BLD AUTO: 11.9 FL (ref 8.1–13.5)
POTASSIUM SERPL-SCNC: 4.9 MMOL/L (ref 3.7–5.3)
PROT SERPL-MCNC: 7.6 G/DL (ref 6.6–8.7)
RBC # BLD AUTO: 3.6 M/UL (ref 4.21–5.77)
SODIUM SERPL-SCNC: 138 MMOL/L (ref 136–145)
WBC OTHER # BLD: 2.9 K/UL (ref 3.5–11.3)

## 2025-06-05 PROCEDURE — 84165 PROTEIN E-PHORESIS SERUM: CPT

## 2025-06-05 PROCEDURE — 36415 COLL VENOUS BLD VENIPUNCTURE: CPT

## 2025-06-05 PROCEDURE — 6370000000 HC RX 637 (ALT 250 FOR IP): Performed by: INTERNAL MEDICINE

## 2025-06-05 PROCEDURE — 80053 COMPREHEN METABOLIC PANEL: CPT

## 2025-06-05 PROCEDURE — 6360000002 HC RX W HCPCS: Performed by: INTERNAL MEDICINE

## 2025-06-05 PROCEDURE — 83521 IG LIGHT CHAINS FREE EACH: CPT

## 2025-06-05 PROCEDURE — 86334 IMMUNOFIX E-PHORESIS SERUM: CPT

## 2025-06-05 PROCEDURE — 84155 ASSAY OF PROTEIN SERUM: CPT

## 2025-06-05 PROCEDURE — 96401 CHEMO ANTI-NEOPL SQ/IM: CPT

## 2025-06-05 PROCEDURE — 85025 COMPLETE CBC W/AUTO DIFF WBC: CPT

## 2025-06-05 RX ORDER — DIPHENHYDRAMINE HYDROCHLORIDE 50 MG/ML
50 INJECTION, SOLUTION INTRAMUSCULAR; INTRAVENOUS
OUTPATIENT
Start: 2025-06-09

## 2025-06-05 RX ORDER — SODIUM CHLORIDE 9 MG/ML
5-250 INJECTION, SOLUTION INTRAVENOUS PRN
OUTPATIENT
Start: 2025-06-09

## 2025-06-05 RX ORDER — SODIUM CHLORIDE 9 MG/ML
5-250 INJECTION, SOLUTION INTRAVENOUS PRN
Status: CANCELLED | OUTPATIENT
Start: 2025-06-05

## 2025-06-05 RX ORDER — ONDANSETRON 2 MG/ML
8 INJECTION INTRAMUSCULAR; INTRAVENOUS
Status: CANCELLED | OUTPATIENT
Start: 2025-06-05

## 2025-06-05 RX ORDER — FAMOTIDINE 20 MG/1
20 TABLET, FILM COATED ORAL ONCE
Status: CANCELLED | OUTPATIENT
Start: 2025-06-05 | End: 2025-06-05

## 2025-06-05 RX ORDER — MONTELUKAST SODIUM 10 MG/1
10 TABLET ORAL ONCE
Status: CANCELLED | OUTPATIENT
Start: 2025-06-05 | End: 2025-06-05

## 2025-06-05 RX ORDER — DIPHENHYDRAMINE HCL 25 MG
25 TABLET ORAL ONCE
Status: COMPLETED | OUTPATIENT
Start: 2025-06-05 | End: 2025-06-05

## 2025-06-05 RX ORDER — ACETAMINOPHEN 325 MG/1
650 TABLET ORAL ONCE
OUTPATIENT
Start: 2025-06-16 | End: 2025-06-16

## 2025-06-05 RX ORDER — DIPHENHYDRAMINE HCL 25 MG
25 TABLET ORAL ONCE
OUTPATIENT
Start: 2025-06-09 | End: 2025-06-09

## 2025-06-05 RX ORDER — ACETAMINOPHEN 325 MG/1
650 TABLET ORAL
OUTPATIENT
Start: 2025-06-23

## 2025-06-05 RX ORDER — SODIUM CHLORIDE 0.9 % (FLUSH) 0.9 %
5-40 SYRINGE (ML) INJECTION PRN
OUTPATIENT
Start: 2025-06-23

## 2025-06-05 RX ORDER — ALBUTEROL SULFATE 90 UG/1
4 INHALANT RESPIRATORY (INHALATION) PRN
OUTPATIENT
Start: 2025-06-23

## 2025-06-05 RX ORDER — EPINEPHRINE 1 MG/ML
0.3 INJECTION, SOLUTION, CONCENTRATE INTRAVENOUS PRN
Status: CANCELLED | OUTPATIENT
Start: 2025-06-05

## 2025-06-05 RX ORDER — EPINEPHRINE 1 MG/ML
0.3 INJECTION, SOLUTION, CONCENTRATE INTRAVENOUS PRN
OUTPATIENT
Start: 2025-06-09

## 2025-06-05 RX ORDER — MONTELUKAST SODIUM 10 MG/1
10 TABLET ORAL ONCE
Status: COMPLETED | OUTPATIENT
Start: 2025-06-05 | End: 2025-06-05

## 2025-06-05 RX ORDER — ONDANSETRON 2 MG/ML
8 INJECTION INTRAMUSCULAR; INTRAVENOUS
OUTPATIENT
Start: 2025-06-09

## 2025-06-05 RX ORDER — FAMOTIDINE 10 MG/ML
20 INJECTION, SOLUTION INTRAVENOUS
OUTPATIENT
Start: 2025-06-09

## 2025-06-05 RX ORDER — HYDROCORTISONE SODIUM SUCCINATE 100 MG/2ML
100 INJECTION INTRAMUSCULAR; INTRAVENOUS
OUTPATIENT
Start: 2025-06-23

## 2025-06-05 RX ORDER — DIPHENHYDRAMINE HYDROCHLORIDE 50 MG/ML
50 INJECTION, SOLUTION INTRAMUSCULAR; INTRAVENOUS
OUTPATIENT
Start: 2025-06-16

## 2025-06-05 RX ORDER — DIPHENHYDRAMINE HCL 25 MG
25 TABLET ORAL ONCE
OUTPATIENT
Start: 2025-06-23 | End: 2025-06-23

## 2025-06-05 RX ORDER — DEXAMETHASONE 4 MG/1
20 TABLET ORAL ONCE
Status: COMPLETED | OUTPATIENT
Start: 2025-06-05 | End: 2025-06-05

## 2025-06-05 RX ORDER — DIPHENHYDRAMINE HCL 25 MG
25 TABLET ORAL ONCE
OUTPATIENT
Start: 2025-06-16 | End: 2025-06-16

## 2025-06-05 RX ORDER — MEPERIDINE HYDROCHLORIDE 50 MG/ML
12.5 INJECTION INTRAMUSCULAR; INTRAVENOUS; SUBCUTANEOUS PRN
OUTPATIENT
Start: 2025-06-09

## 2025-06-05 RX ORDER — HEPARIN SODIUM (PORCINE) LOCK FLUSH IV SOLN 100 UNIT/ML 100 UNIT/ML
500 SOLUTION INTRAVENOUS PRN
OUTPATIENT
Start: 2025-06-16

## 2025-06-05 RX ORDER — DEXAMETHASONE 0.5 MG/1
20 TABLET ORAL ONCE
OUTPATIENT
Start: 2025-06-16 | End: 2025-06-16

## 2025-06-05 RX ORDER — ONDANSETRON 4 MG/1
8 TABLET, ORALLY DISINTEGRATING ORAL
OUTPATIENT
Start: 2025-06-09

## 2025-06-05 RX ORDER — ONDANSETRON 4 MG/1
8 TABLET, ORALLY DISINTEGRATING ORAL
OUTPATIENT
Start: 2025-06-16

## 2025-06-05 RX ORDER — SODIUM CHLORIDE 9 MG/ML
INJECTION, SOLUTION INTRAVENOUS CONTINUOUS
OUTPATIENT
Start: 2025-06-16

## 2025-06-05 RX ORDER — ALBUTEROL SULFATE 90 UG/1
4 INHALANT RESPIRATORY (INHALATION) PRN
OUTPATIENT
Start: 2025-06-09

## 2025-06-05 RX ORDER — MEPERIDINE HYDROCHLORIDE 50 MG/ML
12.5 INJECTION INTRAMUSCULAR; INTRAVENOUS; SUBCUTANEOUS PRN
OUTPATIENT
Start: 2025-06-16

## 2025-06-05 RX ORDER — FAMOTIDINE 10 MG/ML
20 INJECTION, SOLUTION INTRAVENOUS
OUTPATIENT
Start: 2025-06-23

## 2025-06-05 RX ORDER — MEPERIDINE HYDROCHLORIDE 50 MG/ML
12.5 INJECTION INTRAMUSCULAR; INTRAVENOUS; SUBCUTANEOUS PRN
Status: CANCELLED | OUTPATIENT
Start: 2025-06-05

## 2025-06-05 RX ORDER — HEPARIN SODIUM (PORCINE) LOCK FLUSH IV SOLN 100 UNIT/ML 100 UNIT/ML
500 SOLUTION INTRAVENOUS PRN
OUTPATIENT
Start: 2025-06-23

## 2025-06-05 RX ORDER — EPINEPHRINE 1 MG/ML
0.3 INJECTION, SOLUTION, CONCENTRATE INTRAVENOUS PRN
OUTPATIENT
Start: 2025-06-16

## 2025-06-05 RX ORDER — DIPHENHYDRAMINE HCL 25 MG
25 TABLET ORAL ONCE
Status: CANCELLED | OUTPATIENT
Start: 2025-06-05 | End: 2025-06-05

## 2025-06-05 RX ORDER — FAMOTIDINE 10 MG/ML
20 INJECTION, SOLUTION INTRAVENOUS
OUTPATIENT
Start: 2025-06-16

## 2025-06-05 RX ORDER — HYDROCORTISONE SODIUM SUCCINATE 100 MG/2ML
100 INJECTION INTRAMUSCULAR; INTRAVENOUS
OUTPATIENT
Start: 2025-06-16

## 2025-06-05 RX ORDER — ACETAMINOPHEN 325 MG/1
650 TABLET ORAL
OUTPATIENT
Start: 2025-06-09

## 2025-06-05 RX ORDER — HEPARIN SODIUM (PORCINE) LOCK FLUSH IV SOLN 100 UNIT/ML 100 UNIT/ML
500 SOLUTION INTRAVENOUS PRN
Status: CANCELLED | OUTPATIENT
Start: 2025-06-05

## 2025-06-05 RX ORDER — SODIUM CHLORIDE 9 MG/ML
INJECTION, SOLUTION INTRAVENOUS CONTINUOUS
OUTPATIENT
Start: 2025-06-09

## 2025-06-05 RX ORDER — ONDANSETRON 2 MG/ML
8 INJECTION INTRAMUSCULAR; INTRAVENOUS
OUTPATIENT
Start: 2025-06-16

## 2025-06-05 RX ORDER — SODIUM CHLORIDE 9 MG/ML
5-250 INJECTION, SOLUTION INTRAVENOUS PRN
OUTPATIENT
Start: 2025-06-16

## 2025-06-05 RX ORDER — EPINEPHRINE 1 MG/ML
0.3 INJECTION, SOLUTION, CONCENTRATE INTRAVENOUS PRN
OUTPATIENT
Start: 2025-06-23

## 2025-06-05 RX ORDER — DEXAMETHASONE 0.5 MG/1
20 TABLET ORAL ONCE
Status: CANCELLED | OUTPATIENT
Start: 2025-06-05 | End: 2025-06-05

## 2025-06-05 RX ORDER — ACETAMINOPHEN 325 MG/1
650 TABLET ORAL
OUTPATIENT
Start: 2025-06-16

## 2025-06-05 RX ORDER — ACETAMINOPHEN 325 MG/1
650 TABLET ORAL ONCE
Status: CANCELLED | OUTPATIENT
Start: 2025-06-05 | End: 2025-06-05

## 2025-06-05 RX ORDER — HYDROCORTISONE SODIUM SUCCINATE 100 MG/2ML
100 INJECTION INTRAMUSCULAR; INTRAVENOUS
OUTPATIENT
Start: 2025-06-09

## 2025-06-05 RX ORDER — DEXAMETHASONE 0.5 MG/1
20 TABLET ORAL ONCE
OUTPATIENT
Start: 2025-06-23 | End: 2025-06-23

## 2025-06-05 RX ORDER — ONDANSETRON 4 MG/1
8 TABLET, ORALLY DISINTEGRATING ORAL
OUTPATIENT
Start: 2025-06-23

## 2025-06-05 RX ORDER — MEPERIDINE HYDROCHLORIDE 50 MG/ML
12.5 INJECTION INTRAMUSCULAR; INTRAVENOUS; SUBCUTANEOUS PRN
OUTPATIENT
Start: 2025-06-23

## 2025-06-05 RX ORDER — SODIUM CHLORIDE 9 MG/ML
INJECTION, SOLUTION INTRAVENOUS CONTINUOUS
Status: CANCELLED | OUTPATIENT
Start: 2025-06-05

## 2025-06-05 RX ORDER — DIPHENHYDRAMINE HYDROCHLORIDE 50 MG/ML
50 INJECTION, SOLUTION INTRAMUSCULAR; INTRAVENOUS
OUTPATIENT
Start: 2025-06-23

## 2025-06-05 RX ORDER — ALBUTEROL SULFATE 90 UG/1
4 INHALANT RESPIRATORY (INHALATION) PRN
OUTPATIENT
Start: 2025-06-16

## 2025-06-05 RX ORDER — DEXAMETHASONE 0.5 MG/1
20 TABLET ORAL ONCE
OUTPATIENT
Start: 2025-06-09 | End: 2025-06-09

## 2025-06-05 RX ORDER — SODIUM CHLORIDE 0.9 % (FLUSH) 0.9 %
5-40 SYRINGE (ML) INJECTION PRN
OUTPATIENT
Start: 2025-06-09

## 2025-06-05 RX ORDER — ACETAMINOPHEN 325 MG/1
650 TABLET ORAL ONCE
OUTPATIENT
Start: 2025-06-23 | End: 2025-06-23

## 2025-06-05 RX ORDER — SODIUM CHLORIDE 0.9 % (FLUSH) 0.9 %
5-40 SYRINGE (ML) INJECTION PRN
OUTPATIENT
Start: 2025-06-16

## 2025-06-05 RX ORDER — ONDANSETRON 2 MG/ML
8 INJECTION INTRAMUSCULAR; INTRAVENOUS
OUTPATIENT
Start: 2025-06-23

## 2025-06-05 RX ORDER — FAMOTIDINE 10 MG/ML
20 INJECTION, SOLUTION INTRAVENOUS
Status: CANCELLED | OUTPATIENT
Start: 2025-06-05

## 2025-06-05 RX ORDER — ONDANSETRON 4 MG/1
8 TABLET, ORALLY DISINTEGRATING ORAL
Status: CANCELLED | OUTPATIENT
Start: 2025-06-05

## 2025-06-05 RX ORDER — SODIUM CHLORIDE 9 MG/ML
INJECTION, SOLUTION INTRAVENOUS CONTINUOUS
OUTPATIENT
Start: 2025-06-23

## 2025-06-05 RX ORDER — HEPARIN SODIUM (PORCINE) LOCK FLUSH IV SOLN 100 UNIT/ML 100 UNIT/ML
500 SOLUTION INTRAVENOUS PRN
OUTPATIENT
Start: 2025-06-09

## 2025-06-05 RX ORDER — ALBUTEROL SULFATE 90 UG/1
4 INHALANT RESPIRATORY (INHALATION) PRN
Status: CANCELLED | OUTPATIENT
Start: 2025-06-05

## 2025-06-05 RX ORDER — ACETAMINOPHEN 325 MG/1
650 TABLET ORAL ONCE
OUTPATIENT
Start: 2025-06-09 | End: 2025-06-09

## 2025-06-05 RX ORDER — ACETAMINOPHEN 325 MG/1
650 TABLET ORAL
Status: CANCELLED | OUTPATIENT
Start: 2025-06-05

## 2025-06-05 RX ORDER — ACETAMINOPHEN 325 MG/1
650 TABLET ORAL ONCE
Status: COMPLETED | OUTPATIENT
Start: 2025-06-05 | End: 2025-06-05

## 2025-06-05 RX ORDER — FAMOTIDINE 20 MG/1
20 TABLET, FILM COATED ORAL ONCE
Status: COMPLETED | OUTPATIENT
Start: 2025-06-05 | End: 2025-06-05

## 2025-06-05 RX ORDER — HYDROCORTISONE SODIUM SUCCINATE 100 MG/2ML
100 INJECTION INTRAMUSCULAR; INTRAVENOUS
Status: CANCELLED | OUTPATIENT
Start: 2025-06-05

## 2025-06-05 RX ORDER — SODIUM CHLORIDE 9 MG/ML
5-250 INJECTION, SOLUTION INTRAVENOUS PRN
OUTPATIENT
Start: 2025-06-23

## 2025-06-05 RX ORDER — SODIUM CHLORIDE 0.9 % (FLUSH) 0.9 %
5-40 SYRINGE (ML) INJECTION PRN
Status: CANCELLED | OUTPATIENT
Start: 2025-06-05

## 2025-06-05 RX ORDER — DIPHENHYDRAMINE HYDROCHLORIDE 50 MG/ML
50 INJECTION, SOLUTION INTRAMUSCULAR; INTRAVENOUS
Status: CANCELLED | OUTPATIENT
Start: 2025-06-05

## 2025-06-05 RX ADMIN — DIPHENHYDRAMINE HYDROCHLORIDE 25 MG: 25 TABLET ORAL at 10:57

## 2025-06-05 RX ADMIN — DEXAMETHASONE 20 MG: 4 TABLET ORAL at 10:57

## 2025-06-05 RX ADMIN — DARATUMUMAB AND HYALURONIDASE-FIHJ (HUMAN RECOMBINANT) 1800 MG: 1800; 30000 INJECTION SUBCUTANEOUS at 11:55

## 2025-06-05 RX ADMIN — ACETAMINOPHEN 650 MG: 325 TABLET ORAL at 10:57

## 2025-06-05 RX ADMIN — MONTELUKAST 10 MG: 10 TABLET, FILM COATED ORAL at 10:57

## 2025-06-05 RX ADMIN — FAMOTIDINE 20 MG: 20 TABLET, FILM COATED ORAL at 10:57

## 2025-06-05 ASSESSMENT — PAIN SCALES - GENERAL: PAINLEVEL_OUTOF10: 8

## 2025-06-05 ASSESSMENT — PAIN DESCRIPTION - ORIENTATION: ORIENTATION: RIGHT

## 2025-06-05 ASSESSMENT — PAIN DESCRIPTION - PAIN TYPE: TYPE: CHRONIC PAIN

## 2025-06-05 ASSESSMENT — PAIN DESCRIPTION - LOCATION: LOCATION: LEG

## 2025-06-05 ASSESSMENT — PAIN DESCRIPTION - DESCRIPTORS: DESCRIPTORS: ACHING

## 2025-06-05 NOTE — PROGRESS NOTES
Patient arrived for Darzalex Faspro infusion.  VSS as charted.  Lab draw without complication.  Patient tolerated infusion well.  Patient left infusion center with all belongings and steady gate.  New appt set for next week and printed for patient.

## 2025-06-06 LAB
ALBUMIN PERCENT: 48 % (ref 56–66)
ALBUMIN PERCENT: 49 % (ref 56–66)
ALBUMIN SERPL-MCNC: 3.5 G/DL (ref 3.2–5.2)
ALBUMIN SERPL-MCNC: 3.9 G/DL (ref 3.2–5.2)
ALPHA 2 PERCENT: 10 % (ref 7–12)
ALPHA 2 PERCENT: 11 % (ref 7–12)
ALPHA1 GLOB SERPL ELPH-MCNC: 0.3 G/DL (ref 0.1–0.4)
ALPHA1 GLOB SERPL ELPH-MCNC: 0.3 G/DL (ref 0.1–0.4)
ALPHA1 GLOB SERPL ELPH-MCNC: 4 % (ref 3–5)
ALPHA1 GLOB SERPL ELPH-MCNC: 4 % (ref 3–5)
ALPHA2 GLOB SERPL ELPH-MCNC: 0.8 G/DL (ref 0.5–0.9)
ALPHA2 GLOB SERPL ELPH-MCNC: 0.8 G/DL (ref 0.5–0.9)
B-GLOBULIN SERPL ELPH-MCNC: 2.6 G/DL (ref 0.7–1.4)
B-GLOBULIN SERPL ELPH-MCNC: 2.9 G/DL (ref 0.7–1.4)
B-GLOBULIN SERPL ELPH-MCNC: 35 % (ref 8–13)
B-GLOBULIN SERPL ELPH-MCNC: 36 % (ref 8–13)
FREE KAPPA/LAMBDA RATIO: 0.01 (ref 0.22–1.74)
FREE KAPPA/LAMBDA RATIO: 0.01 (ref 0.22–1.74)
GAMMA GLOB SERPL ELPH-MCNC: 0.2 G/DL (ref 0.5–1.5)
GAMMA GLOB SERPL ELPH-MCNC: 0.2 G/DL (ref 0.5–1.5)
GAMMA GLOBULIN %: 2 % (ref 11–19)
GAMMA GLOBULIN %: 2 % (ref 11–19)
ITYP INTERPRETATION: ABNORMAL
ITYP INTERPRETATION: ABNORMAL
KAPPA LC FREE SER-MCNC: 13.7 MG/L
KAPPA LC FREE SER-MCNC: 15.2 MG/L
LAMBDA LC FREE SERPL-MCNC: 1117 MG/L (ref 4.2–27.7)
LAMBDA LC FREE SERPL-MCNC: 1131 MG/L (ref 4.2–27.7)
PATH REV: ABNORMAL
PATH REV: ABNORMAL
PATHOLOGIST: ABNORMAL
PATHOLOGIST: ABNORMAL
PROT PATTERN SERPL ELPH-IMP: ABNORMAL
PROT PATTERN SERPL ELPH-IMP: ABNORMAL
PROT SERPL-MCNC: 7.3 G/DL (ref 6.6–8.7)
PROT SERPL-MCNC: 8 G/DL (ref 6.6–8.7)
TOTAL PROT. SUM,%: 100 % (ref 98–102)
TOTAL PROT. SUM,%: 101 % (ref 98–102)
TOTAL PROT. SUM: 7.4 G/DL (ref 6.3–8.2)
TOTAL PROT. SUM: 8.1 G/DL (ref 6.3–8.2)

## 2025-06-11 ENCOUNTER — HOSPITAL ENCOUNTER (OUTPATIENT)
Dept: INFUSION THERAPY | Age: 75
Discharge: HOME | End: 2025-06-11
Payer: COMMERCIAL

## 2025-06-11 VITALS
BODY MASS INDEX: 30.31 KG/M2 | SYSTOLIC BLOOD PRESSURE: 162 MMHG | HEART RATE: 56 BPM | DIASTOLIC BLOOD PRESSURE: 70 MMHG | HEIGHT: 68 IN | WEIGHT: 200 LBS | RESPIRATION RATE: 16 BRPM | TEMPERATURE: 98 F | OXYGEN SATURATION: 98 %

## 2025-06-11 DIAGNOSIS — C90.00 MULTIPLE MYELOMA NOT HAVING ACHIEVED REMISSION (HCC): Primary | ICD-10-CM

## 2025-06-11 LAB
ALBUMIN SERPL-MCNC: 3.9 G/DL (ref 3.5–5.2)
ALBUMIN/GLOB SERPL: 1.1 {RATIO} (ref 1–2.5)
ALP SERPL-CCNC: 88 U/L (ref 40–129)
ALT SERPL-CCNC: 34 U/L (ref 10–50)
ANION GAP SERPL CALCULATED.3IONS-SCNC: 11 MMOL/L (ref 9–16)
AST SERPL-CCNC: 42 U/L (ref 10–50)
BASOPHILS # BLD: <0.03 K/UL (ref 0–0.2)
BASOPHILS NFR BLD: 0 % (ref 0–2)
BILIRUB SERPL-MCNC: 0.4 MG/DL (ref 0–1.2)
BUN SERPL-MCNC: 21 MG/DL (ref 8–23)
BUN/CREAT SERPL: 16 (ref 9–20)
CALCIUM SERPL-MCNC: 9 MG/DL (ref 8.6–10.4)
CHLORIDE SERPL-SCNC: 101 MMOL/L (ref 98–107)
CO2 SERPL-SCNC: 24 MMOL/L (ref 20–31)
CREAT SERPL-MCNC: 1.3 MG/DL (ref 0.7–1.2)
EOSINOPHIL # BLD: 0.05 K/UL (ref 0–0.44)
EOSINOPHILS RELATIVE PERCENT: 1 % (ref 1–4)
ERYTHROCYTE [DISTWIDTH] IN BLOOD BY AUTOMATED COUNT: 12.7 % (ref 11.8–14.4)
GFR, ESTIMATED: 57 ML/MIN/1.73M2
GLUCOSE SERPL-MCNC: 187 MG/DL (ref 74–99)
HCT VFR BLD AUTO: 34.6 % (ref 40.7–50.3)
HGB BLD-MCNC: 12.3 G/DL (ref 13–17)
IMM GRANULOCYTES # BLD AUTO: <0.03 K/UL (ref 0–0.3)
IMM GRANULOCYTES NFR BLD: 0 %
LYMPHOCYTES NFR BLD: 0.74 K/UL (ref 1.1–3.7)
LYMPHOCYTES RELATIVE PERCENT: 20 % (ref 24–43)
MCH RBC QN AUTO: 33.6 PG (ref 25.2–33.5)
MCHC RBC AUTO-ENTMCNC: 35.5 G/DL (ref 25.2–33.5)
MCV RBC AUTO: 94.5 FL (ref 82.6–102.9)
MONOCYTES NFR BLD: 0.35 K/UL (ref 0.1–1.2)
MONOCYTES NFR BLD: 9 % (ref 3–12)
NEUTROPHILS NFR BLD: 70 % (ref 36–65)
NEUTS SEG NFR BLD: 2.62 K/UL (ref 1.5–8.1)
NRBC BLD-RTO: 0 PER 100 WBC
PLATELET # BLD AUTO: 171 K/UL (ref 138–453)
PMV BLD AUTO: 11.2 FL (ref 8.1–13.5)
POTASSIUM SERPL-SCNC: 4.4 MMOL/L (ref 3.7–5.3)
PROT SERPL-MCNC: 7.6 G/DL (ref 6.6–8.7)
RBC # BLD AUTO: 3.66 M/UL (ref 4.21–5.77)
SODIUM SERPL-SCNC: 136 MMOL/L (ref 136–145)
WBC OTHER # BLD: 3.8 K/UL (ref 3.5–11.3)

## 2025-06-11 PROCEDURE — 80053 COMPREHEN METABOLIC PANEL: CPT

## 2025-06-11 PROCEDURE — 85025 COMPLETE CBC W/AUTO DIFF WBC: CPT

## 2025-06-11 PROCEDURE — 96401 CHEMO ANTI-NEOPL SQ/IM: CPT

## 2025-06-11 PROCEDURE — 36415 COLL VENOUS BLD VENIPUNCTURE: CPT

## 2025-06-11 PROCEDURE — 6370000000 HC RX 637 (ALT 250 FOR IP): Performed by: INTERNAL MEDICINE

## 2025-06-11 PROCEDURE — 6360000002 HC RX W HCPCS: Performed by: INTERNAL MEDICINE

## 2025-06-11 RX ORDER — DEXAMETHASONE 4 MG/1
20 TABLET ORAL ONCE
Status: COMPLETED | OUTPATIENT
Start: 2025-06-11 | End: 2025-06-11

## 2025-06-11 RX ORDER — ACETAMINOPHEN 325 MG/1
650 TABLET ORAL ONCE
Status: COMPLETED | OUTPATIENT
Start: 2025-06-11 | End: 2025-06-11

## 2025-06-11 RX ORDER — DIPHENHYDRAMINE HCL 25 MG
25 TABLET ORAL ONCE
Status: COMPLETED | OUTPATIENT
Start: 2025-06-11 | End: 2025-06-11

## 2025-06-11 RX ADMIN — DIPHENHYDRAMINE HYDROCHLORIDE 25 MG: 25 TABLET ORAL at 14:06

## 2025-06-11 RX ADMIN — DARATUMUMAB AND HYALURONIDASE-FIHJ (HUMAN RECOMBINANT) 1800 MG: 1800; 30000 INJECTION SUBCUTANEOUS at 14:58

## 2025-06-11 RX ADMIN — DEXAMETHASONE 20 MG: 4 TABLET ORAL at 14:06

## 2025-06-11 RX ADMIN — ACETAMINOPHEN 650 MG: 325 TABLET ORAL at 14:05

## 2025-06-11 ASSESSMENT — PAIN DESCRIPTION - DESCRIPTORS: DESCRIPTORS: ACHING

## 2025-06-11 ASSESSMENT — PAIN DESCRIPTION - PAIN TYPE: TYPE: CHRONIC PAIN

## 2025-06-11 ASSESSMENT — PAIN DESCRIPTION - ORIENTATION: ORIENTATION: RIGHT

## 2025-06-11 ASSESSMENT — PAIN SCALES - GENERAL
PAINLEVEL_OUTOF10: 8
PAINLEVEL_OUTOF10: 0

## 2025-06-11 ASSESSMENT — PAIN DESCRIPTION - LOCATION: LOCATION: KNEE

## 2025-06-11 ASSESSMENT — PAIN - FUNCTIONAL ASSESSMENT: PAIN_FUNCTIONAL_ASSESSMENT: ACTIVITIES ARE NOT PREVENTED

## 2025-06-11 NOTE — PROGRESS NOTES
Darzalex faspro given. No sign of reaction at this time. Pt ambulated out infusion center without difficulty in stable condition. Paperwork given regarding date and time of next treatment.    Patient made aware

## 2025-06-11 NOTE — FLOWSHEET NOTE
Spiritual Health History and Assessment/Progress Note  [facility]    Spiritual/Emotional Needs,  ,  ,      Name: Bandar Gabriel MRN: [id number]    Age: 74 y.o.     Sex: male   Language: English   Catholic: None   <principal problem not specified>     Date: 6/11/2025            Total Time Calculated: 18 min              Spiritual Assessment began in [facility]        Referral/Consult From: Rounding   Encounter Overview/Reason: Spiritual/Emotional Needs  Service Provided For: Patient    Patient was receiving his second treatment, reported that he was doing well, and indicated that he liked his oncologist. Patient is a  and enjoys living alone, has the support of his family and a few friends, and prefers to watch Christian on TV every Delio morning.     Eileen, Belief, Meaning:   Patient has beliefs or practices that help with coping during difficult times  Family/Friends No family/friends present      Importance and Influence:  Patient has spiritual/personal beliefs that influence decisions regarding their health  Family/Friends No family/friends present    Community:  Patient feels well-supported. Support system includes: Children, Friends, and Extended family  Family/Friends No family/friends present    Assessment and Plan of Care:     Patient Interventions include: Facilitated expression of thoughts and feelings, Affirmed coping skills/support systems, and Provided sacramental/Protestant ritual  Family/Friends Interventions include: No family/friends present    Patient Plan of Care: Spiritual Care available upon further referral  Family/Friends Plan of Care: No family/friends present    Electronically signed by Chaplain Susan on 6/11/2025 at 4:37 PM

## 2025-06-12 ENCOUNTER — OFFICE VISIT (OUTPATIENT)
Age: 75
End: 2025-06-12
Payer: COMMERCIAL

## 2025-06-12 ENCOUNTER — TELEPHONE (OUTPATIENT)
Dept: ONCOLOGY | Age: 75
End: 2025-06-12

## 2025-06-12 VITALS
OXYGEN SATURATION: 96 % | BODY MASS INDEX: 32.02 KG/M2 | RESPIRATION RATE: 16 BRPM | SYSTOLIC BLOOD PRESSURE: 138 MMHG | HEART RATE: 67 BPM | TEMPERATURE: 97.4 F | HEIGHT: 67 IN | DIASTOLIC BLOOD PRESSURE: 80 MMHG | WEIGHT: 204 LBS

## 2025-06-12 DIAGNOSIS — C90.00 MULTIPLE MYELOMA, REMISSION STATUS UNSPECIFIED (HCC): Primary | ICD-10-CM

## 2025-06-12 DIAGNOSIS — K21.9 GASTROESOPHAGEAL REFLUX DISEASE WITHOUT ESOPHAGITIS: ICD-10-CM

## 2025-06-12 DIAGNOSIS — R76.8 ELEVATED SERUM IMMUNOGLOBULIN FREE LIGHT CHAIN LEVEL: ICD-10-CM

## 2025-06-12 PROCEDURE — 99214 OFFICE O/P EST MOD 30 MIN: CPT | Performed by: INTERNAL MEDICINE

## 2025-06-12 PROCEDURE — 1123F ACP DISCUSS/DSCN MKR DOCD: CPT | Performed by: INTERNAL MEDICINE

## 2025-06-12 PROCEDURE — 3075F SYST BP GE 130 - 139MM HG: CPT | Performed by: INTERNAL MEDICINE

## 2025-06-12 PROCEDURE — 3079F DIAST BP 80-89 MM HG: CPT | Performed by: INTERNAL MEDICINE

## 2025-06-12 PROCEDURE — 1159F MED LIST DOCD IN RCRD: CPT | Performed by: INTERNAL MEDICINE

## 2025-06-12 RX ORDER — PANTOPRAZOLE SODIUM 40 MG/1
40 TABLET, DELAYED RELEASE ORAL DAILY
Qty: 30 TABLET | Refills: 3 | Status: SHIPPED | OUTPATIENT
Start: 2025-06-12

## 2025-06-12 NOTE — TELEPHONE ENCOUNTER
Name: Bandar Gabriel  : 1950  MRN: [id number]    Oncology Navigation Follow-Up Note    Contact Type:  Medical Oncology    Notes:   Writer met with patient while in office for oncology appt. He states treatment is going well. He denies any navigation needs at present.   Encouraged him to call with questions or concerns.     Navigator following for continuity of care.        Electronically signed by Meghna Austin RN on 2025 at 11:25 AM

## 2025-06-12 NOTE — PROGRESS NOTES
Bandar Gabriel                                                                                                                  6/12/2025  MRN:   [id number]  YOB: 1950  PCP:                           Beth Holm MD  Referring Physician: No ref. provider found  Treating Physician Name: HEMANT BECK MD      Reason for visit:  Chief Complaint   Patient presents with    Discuss Labs     Smoldering myeloma, follow up with labs   Reviewed labs    Current problems:  Activemyeloma, IgG lambda, M protein 1.26 g/dL, greater than 20% lambda restricted plasma cells, free light chain ratio greater than 100, high risk 1q gain  Normocytic anemia  CVA(while in [place])-6/2022     Active and recent treatments:  Revlimid 25 mg 3 weeks on 1 week off-3/2024 (x 1 cycle).  Discontinued due to adverse events  Anticipating Amber-PD-6/2025    Interval history:  History of Present Illness  Patient presents to the clinic for toxicity check.  Started on Darzalex.  Has not started Pomalyst yet due to concern regarding side effects.  Tolerating medication well so far.  Free light chain ratio remains significantly elevated.  Denies fever or chills.  Denies hospitalization or ER visit.      During this visit patient's allergy, social, medical, surgical history and medications were reviewed and updated.    Summary of Case/History:    Bandar Gabriel a 75 y.o.male is a patient who presents to the clinic to establish care and for further work-up and evaluation.  Patient was noted to have a hemoglobin of 11.6 with MCV 92 on his routine work-up done earlier in July.  Review of patient's record revealed he has had mild anemia since 2018 with hemoglobin around 11.8 in August 2018 12.4 in August 2020.  Work-up done so far shows adequate kidney function patient was noted to have positive stool occult blood test back in 2018 patient has had colonoscopy done back in 2018 due to findings of positive stool occult blood test and

## 2025-06-16 ENCOUNTER — CLINICAL DOCUMENTATION (OUTPATIENT)
Facility: HOSPITAL | Age: 75
End: 2025-06-16

## 2025-06-16 NOTE — PROGRESS NOTES
Patient Assistance    Writer spoke with patient regarding assistance. [employer] has open brenda; patient gave verbal consent to enroll. Unfortunately, [employer]'s website was down. Writer will try to enroll again.      Writer was able to get patient enrolled in assistance with [employer]. Patient notified today. He had no questions and thanked writer.

## 2025-06-17 RX ORDER — LISINOPRIL 10 MG/1
10 TABLET ORAL DAILY
Qty: 90 TABLET | Refills: 0 | Status: SHIPPED | OUTPATIENT
Start: 2025-06-17

## 2025-06-18 ENCOUNTER — HOSPITAL ENCOUNTER (OUTPATIENT)
Dept: INFUSION THERAPY | Age: 75
Discharge: HOME | End: 2025-06-18
Payer: COMMERCIAL

## 2025-06-18 VITALS
WEIGHT: 201.6 LBS | HEART RATE: 100 BPM | HEIGHT: 67 IN | OXYGEN SATURATION: 96 % | SYSTOLIC BLOOD PRESSURE: 163 MMHG | TEMPERATURE: 98.2 F | BODY MASS INDEX: 31.64 KG/M2 | RESPIRATION RATE: 16 BRPM | DIASTOLIC BLOOD PRESSURE: 93 MMHG

## 2025-06-18 DIAGNOSIS — C90.00 MULTIPLE MYELOMA NOT HAVING ACHIEVED REMISSION (HCC): Primary | ICD-10-CM

## 2025-06-18 LAB
ALBUMIN SERPL-MCNC: 4.1 G/DL (ref 3.5–5.2)
ALBUMIN/GLOB SERPL: 1.1 {RATIO} (ref 1–2.5)
ALP SERPL-CCNC: 88 U/L (ref 40–129)
ALT SERPL-CCNC: 36 U/L (ref 10–50)
ANION GAP SERPL CALCULATED.3IONS-SCNC: 15 MMOL/L (ref 9–16)
AST SERPL-CCNC: 41 U/L (ref 10–50)
BASOPHILS # BLD: <0.03 K/UL (ref 0–0.2)
BASOPHILS NFR BLD: 1 % (ref 0–2)
BILIRUB SERPL-MCNC: 0.5 MG/DL (ref 0–1.2)
BUN SERPL-MCNC: 25 MG/DL (ref 8–23)
BUN/CREAT SERPL: 19 (ref 9–20)
CALCIUM SERPL-MCNC: 9.6 MG/DL (ref 8.6–10.4)
CHLORIDE SERPL-SCNC: 102 MMOL/L (ref 98–107)
CO2 SERPL-SCNC: 22 MMOL/L (ref 20–31)
CREAT SERPL-MCNC: 1.3 MG/DL (ref 0.7–1.2)
EOSINOPHIL # BLD: <0.03 K/UL (ref 0–0.44)
EOSINOPHILS RELATIVE PERCENT: 1 % (ref 1–4)
ERYTHROCYTE [DISTWIDTH] IN BLOOD BY AUTOMATED COUNT: 12.9 % (ref 11.8–14.4)
GFR, ESTIMATED: 57 ML/MIN/1.73M2
GLUCOSE SERPL-MCNC: 163 MG/DL (ref 74–99)
HCT VFR BLD AUTO: 36.8 % (ref 40.7–50.3)
HGB BLD-MCNC: 12.5 G/DL (ref 13–17)
IMM GRANULOCYTES # BLD AUTO: <0.03 K/UL (ref 0–0.3)
IMM GRANULOCYTES NFR BLD: 0 %
LYMPHOCYTES NFR BLD: 0.72 K/UL (ref 1.1–3.7)
LYMPHOCYTES RELATIVE PERCENT: 16 % (ref 24–43)
MCH RBC QN AUTO: 32.6 PG (ref 25.2–33.5)
MCHC RBC AUTO-ENTMCNC: 34 G/DL (ref 25.2–33.5)
MCV RBC AUTO: 95.8 FL (ref 82.6–102.9)
MONOCYTES NFR BLD: 0.39 K/UL (ref 0.1–1.2)
MONOCYTES NFR BLD: 9 % (ref 3–12)
NEUTROPHILS NFR BLD: 74 % (ref 36–65)
NEUTS SEG NFR BLD: 3.27 K/UL (ref 1.5–8.1)
PLATELET # BLD AUTO: 179 K/UL (ref 138–453)
PMV BLD AUTO: 11.3 FL (ref 8.1–13.5)
POTASSIUM SERPL-SCNC: 4.3 MMOL/L (ref 3.7–5.3)
PROT SERPL-MCNC: 7.9 G/DL (ref 6.6–8.7)
RBC # BLD AUTO: 3.84 M/UL (ref 4.21–5.77)
SODIUM SERPL-SCNC: 139 MMOL/L (ref 136–145)
WBC OTHER # BLD: 4.4 K/UL (ref 3.5–11.3)

## 2025-06-18 PROCEDURE — 6370000000 HC RX 637 (ALT 250 FOR IP): Performed by: INTERNAL MEDICINE

## 2025-06-18 PROCEDURE — 83521 IG LIGHT CHAINS FREE EACH: CPT

## 2025-06-18 PROCEDURE — 86334 IMMUNOFIX E-PHORESIS SERUM: CPT

## 2025-06-18 PROCEDURE — 80053 COMPREHEN METABOLIC PANEL: CPT

## 2025-06-18 PROCEDURE — 6360000002 HC RX W HCPCS: Performed by: INTERNAL MEDICINE

## 2025-06-18 PROCEDURE — 96401 CHEMO ANTI-NEOPL SQ/IM: CPT

## 2025-06-18 PROCEDURE — 36415 COLL VENOUS BLD VENIPUNCTURE: CPT

## 2025-06-18 PROCEDURE — 84165 PROTEIN E-PHORESIS SERUM: CPT

## 2025-06-18 PROCEDURE — 84155 ASSAY OF PROTEIN SERUM: CPT

## 2025-06-18 PROCEDURE — 85025 COMPLETE CBC W/AUTO DIFF WBC: CPT

## 2025-06-18 RX ORDER — ACETAMINOPHEN 325 MG/1
650 TABLET ORAL ONCE
Status: COMPLETED | OUTPATIENT
Start: 2025-06-18 | End: 2025-06-18

## 2025-06-18 RX ORDER — DEXAMETHASONE 4 MG/1
20 TABLET ORAL ONCE
Status: COMPLETED | OUTPATIENT
Start: 2025-06-18 | End: 2025-06-18

## 2025-06-18 RX ORDER — DIPHENHYDRAMINE HCL 25 MG
25 TABLET ORAL ONCE
Status: COMPLETED | OUTPATIENT
Start: 2025-06-18 | End: 2025-06-18

## 2025-06-18 RX ADMIN — DARATUMUMAB AND HYALURONIDASE-FIHJ (HUMAN RECOMBINANT) 1800 MG: 1800; 30000 INJECTION SUBCUTANEOUS at 15:05

## 2025-06-18 RX ADMIN — DIPHENHYDRAMINE HYDROCHLORIDE 25 MG: 25 TABLET ORAL at 14:10

## 2025-06-18 RX ADMIN — ACETAMINOPHEN 650 MG: 325 TABLET ORAL at 14:10

## 2025-06-18 RX ADMIN — DEXAMETHASONE 20 MG: 4 TABLET ORAL at 14:10

## 2025-06-18 ASSESSMENT — PAIN DESCRIPTION - ORIENTATION: ORIENTATION: RIGHT

## 2025-06-18 ASSESSMENT — PAIN DESCRIPTION - PAIN TYPE: TYPE: CHRONIC PAIN

## 2025-06-18 ASSESSMENT — PAIN SCALES - GENERAL: PAINLEVEL_OUTOF10: 8

## 2025-06-18 ASSESSMENT — PAIN DESCRIPTION - LOCATION: LOCATION: KNEE

## 2025-06-18 NOTE — PROGRESS NOTES
Patient arrived for Darzalex Faspro injection.  VSS as charted.  Lab draw without complication.  Patient tolerated infusion well.  Patient left infusion center with all belongings in a wheel chair.

## 2025-06-20 LAB
ALBUMIN PERCENT: 49 % (ref 56–66)
ALBUMIN SERPL-MCNC: 3.7 G/DL (ref 3.2–5.2)
ALPHA 2 PERCENT: 11 % (ref 7–12)
ALPHA1 GLOB SERPL ELPH-MCNC: 0.3 G/DL (ref 0.1–0.4)
ALPHA1 GLOB SERPL ELPH-MCNC: 4 % (ref 3–5)
ALPHA2 GLOB SERPL ELPH-MCNC: 0.8 G/DL (ref 0.5–0.9)
B-GLOBULIN SERPL ELPH-MCNC: 2.5 G/DL (ref 0.7–1.4)
B-GLOBULIN SERPL ELPH-MCNC: 34 % (ref 8–13)
FREE KAPPA/LAMBDA RATIO: 0.01 (ref 0.22–1.74)
GAMMA GLOB SERPL ELPH-MCNC: 0.2 G/DL (ref 0.5–1.5)
GAMMA GLOBULIN %: 2 % (ref 11–19)
ITYP INTERPRETATION: ABNORMAL
KAPPA LC FREE SER-MCNC: 9.6 MG/L
LAMBDA LC FREE SERPL-MCNC: 926 MG/L (ref 4.2–27.7)
PATH REV: ABNORMAL
PATHOLOGIST: ABNORMAL
PROT PATTERN SERPL ELPH-IMP: ABNORMAL
PROT SERPL-MCNC: 7.4 G/DL (ref 6.6–8.7)
TOTAL PROT. SUM,%: 100 % (ref 98–102)
TOTAL PROT. SUM: 7.5 G/DL (ref 6.3–8.2)

## 2025-06-23 ENCOUNTER — OFFICE VISIT (OUTPATIENT)
Dept: FAMILY MEDICINE CLINIC | Age: 75
End: 2025-06-23
Payer: COMMERCIAL

## 2025-06-23 VITALS
DIASTOLIC BLOOD PRESSURE: 82 MMHG | OXYGEN SATURATION: 97 % | BODY MASS INDEX: 31.55 KG/M2 | WEIGHT: 201 LBS | HEIGHT: 67 IN | SYSTOLIC BLOOD PRESSURE: 180 MMHG | TEMPERATURE: 98.5 F | HEART RATE: 83 BPM

## 2025-06-23 DIAGNOSIS — R82.90 ABNORMAL URINE ODOR: ICD-10-CM

## 2025-06-23 DIAGNOSIS — J01.10 ACUTE NON-RECURRENT FRONTAL SINUSITIS: ICD-10-CM

## 2025-06-23 DIAGNOSIS — I10 PRIMARY HYPERTENSION: ICD-10-CM

## 2025-06-23 LAB
BILIRUBIN, POC: NORMAL
BLOOD URINE, POC: NORMAL
CLARITY, POC: CLEAR
COLOR, POC: YELLOW
GLUCOSE URINE, POC: NORMAL MG/DL
KETONES, POC: NORMAL MG/DL
LEUKOCYTE EST, POC: NORMAL
NITRITE, POC: NORMAL
PH, POC: 7
PROTEIN, POC: 100 MG/DL
SPECIFIC GRAVITY, POC: 1.03
UROBILINOGEN, POC: 0.2 MG/DL

## 2025-06-23 PROCEDURE — 1159F MED LIST DOCD IN RCRD: CPT | Performed by: FAMILY MEDICINE

## 2025-06-23 PROCEDURE — 1123F ACP DISCUSS/DSCN MKR DOCD: CPT | Performed by: FAMILY MEDICINE

## 2025-06-23 PROCEDURE — 3077F SYST BP >= 140 MM HG: CPT | Performed by: FAMILY MEDICINE

## 2025-06-23 PROCEDURE — 3079F DIAST BP 80-89 MM HG: CPT | Performed by: FAMILY MEDICINE

## 2025-06-23 PROCEDURE — PBSHW POCT URINALYSIS DIPSTICK: Performed by: FAMILY MEDICINE

## 2025-06-23 PROCEDURE — 99211 OFF/OP EST MAY X REQ PHY/QHP: CPT | Performed by: FAMILY MEDICINE

## 2025-06-23 PROCEDURE — 81002 URINALYSIS NONAUTO W/O SCOPE: CPT | Performed by: FAMILY MEDICINE

## 2025-06-23 PROCEDURE — 99214 OFFICE O/P EST MOD 30 MIN: CPT | Performed by: FAMILY MEDICINE

## 2025-06-23 RX ORDER — AZITHROMYCIN 250 MG/1
TABLET, FILM COATED ORAL
Qty: 6 TABLET | Refills: 0 | Status: SHIPPED | OUTPATIENT
Start: 2025-06-23 | End: 2025-07-03

## 2025-06-23 NOTE — PROGRESS NOTES
HPI:  Patient comes in today for   Chief Complaint   Patient presents with    Cough     Pt is here for cough,ear pain, headache. Pt states that the cough started yesterday with ear pain.     Ear Pain    Headache    urine odor     Pt states that his urine has been smelling like food.   Here with c/o pressure in his right  forehead and ear no visual changes,has cough with post nasal drainage ,no shortness of breath or fever.also has strong odor to urine no burning or frequency no flank pain.Has h/o Multiple myeloma on chemotherapy currently.H/O HTN ,CAD and prior diabetes with normal hgba1c recently.His BP is elevated here today. Has been following with cardiology recently for palpitations.No chest pain     HISTORY:  Past Medical History:   Diagnosis Date    Angina at rest     Arthritis     CAD (coronary artery disease)     angina    Cellulitis of right hand 11/21/2023    Hyperlipidemia     Hypertension     Knee pain 04/18/2024    Pain in wrist 11/21/2023    Skin cancer        Past Surgical History:   Procedure Laterality Date    CARDIAC CATHETERIZATION      x 2    COLONOSCOPY      COLONOSCOPY N/A 9/12/2018    COLONOSCOPY WITH BIOPSY performed by Luis Felipe Park DO at [facility] OR    COSMETIC SURGERY      forehead and  mouth in 1987    CT BIOPSY BONE MARROW  10/14/2021    CT BONE MARROW BIOPSY 10/14/2021 [facility] CT SCAN    HERNIA REPAIR      PAIN MANAGEMENT PROCEDURE Left 1/7/2022    Left L4 L5 TRANSFORAMINAL performed by Ashwin Portillo MD at [facility] OR    PAIN MANAGEMENT PROCEDURE Left 2/23/2023    Left L4-L5 L5-S1 FACET performed by Ashwin Portillo MD at [facility] OR        Family History   Problem Relation Age of Onset    Kidney Disease Mother     Cancer Mother     Diabetes Mother     Heart Attack Father        Social History     Socioeconomic History    Marital status:      Spouse name: Not on file    Number of children: Not on file    Years of education: Not on file    Highest education level: Not on file

## 2025-06-25 ENCOUNTER — HOSPITAL ENCOUNTER (OUTPATIENT)
Dept: INFUSION THERAPY | Age: 75
Discharge: HOME | End: 2025-06-25
Payer: COMMERCIAL

## 2025-06-25 VITALS
BODY MASS INDEX: 31.39 KG/M2 | RESPIRATION RATE: 16 BRPM | SYSTOLIC BLOOD PRESSURE: 139 MMHG | WEIGHT: 200 LBS | TEMPERATURE: 99.1 F | HEIGHT: 67 IN | OXYGEN SATURATION: 96 % | HEART RATE: 68 BPM | DIASTOLIC BLOOD PRESSURE: 64 MMHG

## 2025-06-25 DIAGNOSIS — C90.00 MULTIPLE MYELOMA NOT HAVING ACHIEVED REMISSION (HCC): Primary | ICD-10-CM

## 2025-06-25 LAB
ALBUMIN SERPL-MCNC: 3.7 G/DL (ref 3.5–5.2)
ALBUMIN/GLOB SERPL: 0.9 {RATIO} (ref 1–2.5)
ALP SERPL-CCNC: 86 U/L (ref 40–129)
ALT SERPL-CCNC: 23 U/L (ref 10–50)
ANION GAP SERPL CALCULATED.3IONS-SCNC: 13 MMOL/L (ref 9–16)
AST SERPL-CCNC: 27 U/L (ref 10–50)
BASOPHILS # BLD: <0.03 K/UL (ref 0–0.2)
BASOPHILS NFR BLD: 0 % (ref 0–2)
BILIRUB SERPL-MCNC: 0.7 MG/DL (ref 0–1.2)
BUN SERPL-MCNC: 19 MG/DL (ref 8–23)
BUN/CREAT SERPL: 15 (ref 9–20)
CALCIUM SERPL-MCNC: 10.5 MG/DL (ref 8.6–10.4)
CHLORIDE SERPL-SCNC: 95 MMOL/L (ref 98–107)
CO2 SERPL-SCNC: 24 MMOL/L (ref 20–31)
CREAT SERPL-MCNC: 1.3 MG/DL (ref 0.7–1.2)
EOSINOPHIL # BLD: <0.03 K/UL (ref 0–0.44)
EOSINOPHILS RELATIVE PERCENT: 0 % (ref 1–4)
ERYTHROCYTE [DISTWIDTH] IN BLOOD BY AUTOMATED COUNT: 12.9 % (ref 11.8–14.4)
GFR, ESTIMATED: 57 ML/MIN/1.73M2
GLUCOSE SERPL-MCNC: 198 MG/DL (ref 74–99)
HCT VFR BLD AUTO: 34.5 % (ref 40.7–50.3)
HGB BLD-MCNC: 12.4 G/DL (ref 13–17)
IMM GRANULOCYTES # BLD AUTO: 0.04 K/UL (ref 0–0.3)
IMM GRANULOCYTES NFR BLD: 0 %
LYMPHOCYTES NFR BLD: 0.67 K/UL (ref 1.1–3.7)
LYMPHOCYTES RELATIVE PERCENT: 7 % (ref 24–43)
MCH RBC QN AUTO: 33.7 PG (ref 25.2–33.5)
MCHC RBC AUTO-ENTMCNC: 35.9 G/DL (ref 25.2–33.5)
MCV RBC AUTO: 93.8 FL (ref 82.6–102.9)
MONOCYTES NFR BLD: 0.99 K/UL (ref 0.1–1.2)
MONOCYTES NFR BLD: 11 % (ref 3–12)
NEUTROPHILS NFR BLD: 82 % (ref 36–65)
NEUTS SEG NFR BLD: 7.58 K/UL (ref 1.5–8.1)
NRBC BLD-RTO: 0 PER 100 WBC
PLATELET # BLD AUTO: 173 K/UL (ref 138–453)
PMV BLD AUTO: 11.1 FL (ref 8.1–13.5)
POTASSIUM SERPL-SCNC: 4.5 MMOL/L (ref 3.7–5.3)
PROT SERPL-MCNC: 7.7 G/DL (ref 6.6–8.7)
RBC # BLD AUTO: 3.68 M/UL (ref 4.21–5.77)
SODIUM SERPL-SCNC: 132 MMOL/L (ref 136–145)
WBC OTHER # BLD: 9.3 K/UL (ref 3.5–11.3)

## 2025-06-25 PROCEDURE — 85025 COMPLETE CBC W/AUTO DIFF WBC: CPT

## 2025-06-25 PROCEDURE — 6360000002 HC RX W HCPCS: Performed by: INTERNAL MEDICINE

## 2025-06-25 PROCEDURE — 80053 COMPREHEN METABOLIC PANEL: CPT

## 2025-06-25 PROCEDURE — 36415 COLL VENOUS BLD VENIPUNCTURE: CPT

## 2025-06-25 PROCEDURE — 6370000000 HC RX 637 (ALT 250 FOR IP): Performed by: INTERNAL MEDICINE

## 2025-06-25 PROCEDURE — 96401 CHEMO ANTI-NEOPL SQ/IM: CPT

## 2025-06-25 RX ORDER — ACETAMINOPHEN 325 MG/1
650 TABLET ORAL ONCE
Status: COMPLETED | OUTPATIENT
Start: 2025-06-25 | End: 2025-06-25

## 2025-06-25 RX ORDER — DIPHENHYDRAMINE HCL 25 MG
25 TABLET ORAL ONCE
Status: COMPLETED | OUTPATIENT
Start: 2025-06-25 | End: 2025-06-25

## 2025-06-25 RX ORDER — DEXAMETHASONE 4 MG/1
20 TABLET ORAL ONCE
Status: COMPLETED | OUTPATIENT
Start: 2025-06-25 | End: 2025-06-25

## 2025-06-25 RX ADMIN — DIPHENHYDRAMINE HYDROCHLORIDE 25 MG: 25 TABLET ORAL at 14:08

## 2025-06-25 RX ADMIN — DEXAMETHASONE 20 MG: 4 TABLET ORAL at 14:08

## 2025-06-25 RX ADMIN — DARATUMUMAB AND HYALURONIDASE-FIHJ (HUMAN RECOMBINANT) 1800 MG: 1800; 30000 INJECTION SUBCUTANEOUS at 15:06

## 2025-06-25 RX ADMIN — ACETAMINOPHEN 650 MG: 325 TABLET ORAL at 14:08

## 2025-06-25 ASSESSMENT — PAIN DESCRIPTION - ORIENTATION: ORIENTATION: RIGHT

## 2025-06-25 ASSESSMENT — PAIN DESCRIPTION - PAIN TYPE: TYPE: CHRONIC PAIN

## 2025-06-25 ASSESSMENT — PAIN DESCRIPTION - LOCATION: LOCATION: KNEE

## 2025-06-25 ASSESSMENT — PAIN SCALES - GENERAL: PAINLEVEL_OUTOF10: 8

## 2025-06-25 NOTE — PROGRESS NOTES
Message to Dr. Stewart  Patient has been diagnosised on 06/23 with Sinus Infection.  He is taking Azithromycin 250mg Zpak.  He currently is tired, headache on right side, stabbing in nature, nausea, vomited once today.  Also reports flushing started over the weekend that comes and go.  Lab of concern Sodium 132.  Dr. Stewart states okay to give Darzalex Faspro today.

## 2025-06-25 NOTE — PROGRESS NOTES
Patient arrived for Darzalex Faspro.  VSS as charted.  Lab draw completed without complication.  Patient tolerated infusion well.  Patient left infusion center with all belongings and steady gate. New appts set for C2 and printed out for family.

## 2025-06-30 ENCOUNTER — HOSPITAL ENCOUNTER (OUTPATIENT)
Dept: NUCLEAR MEDICINE | Age: 75
Discharge: HOME | End: 2025-07-02
Attending: INTERNAL MEDICINE
Payer: COMMERCIAL

## 2025-06-30 ENCOUNTER — HOSPITAL ENCOUNTER (OUTPATIENT)
Age: 75
Discharge: HOME | End: 2025-07-02
Attending: INTERNAL MEDICINE
Payer: COMMERCIAL

## 2025-06-30 ENCOUNTER — TELEPHONE (OUTPATIENT)
Dept: CARDIOLOGY | Age: 75
End: 2025-06-30

## 2025-06-30 ENCOUNTER — OFFICE VISIT (OUTPATIENT)
Dept: PRIMARY CARE CLINIC | Age: 75
End: 2025-06-30
Payer: COMMERCIAL

## 2025-06-30 VITALS
HEART RATE: 54 BPM | BODY MASS INDEX: 31.79 KG/M2 | DIASTOLIC BLOOD PRESSURE: 82 MMHG | TEMPERATURE: 98.1 F | OXYGEN SATURATION: 98 % | WEIGHT: 203 LBS | SYSTOLIC BLOOD PRESSURE: 132 MMHG

## 2025-06-30 VITALS
BODY MASS INDEX: 31.39 KG/M2 | SYSTOLIC BLOOD PRESSURE: 207 MMHG | WEIGHT: 200 LBS | HEIGHT: 67 IN | DIASTOLIC BLOOD PRESSURE: 92 MMHG

## 2025-06-30 DIAGNOSIS — R00.2 PALPITATIONS: ICD-10-CM

## 2025-06-30 DIAGNOSIS — I25.10 CORONARY ARTERY DISEASE INVOLVING NATIVE CORONARY ARTERY OF NATIVE HEART WITHOUT ANGINA PECTORIS: ICD-10-CM

## 2025-06-30 DIAGNOSIS — I10 PRIMARY HYPERTENSION: ICD-10-CM

## 2025-06-30 DIAGNOSIS — H65.91 RIGHT NON-SUPPURATIVE OTITIS MEDIA: Primary | ICD-10-CM

## 2025-06-30 DIAGNOSIS — I25.10 CORONARY ARTERY DISEASE WITHOUT ANGINA PECTORIS, UNSPECIFIED VESSEL OR LESION TYPE, UNSPECIFIED WHETHER NATIVE OR TRANSPLANTED HEART: ICD-10-CM

## 2025-06-30 DIAGNOSIS — R94.31 ABNORMAL ECG: ICD-10-CM

## 2025-06-30 LAB
ECHO AO ASC DIAM: 3.4 CM
ECHO AO ASCENDING AORTA INDEX: 1.68 CM/M2
ECHO AO SINUS VALSALVA DIAM: 3.3 CM
ECHO AO SINUS VALSALVA INDEX: 1.63 CM/M2
ECHO AV AREA PEAK VELOCITY: 2.1 CM2
ECHO AV AREA VTI: 2.8 CM2
ECHO AV AREA/BSA PEAK VELOCITY: 1 CM2/M2
ECHO AV AREA/BSA VTI: 1.4 CM2/M2
ECHO AV CUSP MM: 2 CM
ECHO AV MEAN GRADIENT: 7 MMHG
ECHO AV MEAN VELOCITY: 1.2 M/S
ECHO AV PEAK GRADIENT: 14 MMHG
ECHO AV PEAK VELOCITY: 1.9 M/S
ECHO AV VELOCITY RATIO: 0.53
ECHO AV VTI: 41.8 CM
ECHO BSA: 2.07 M2
ECHO EST RA PRESSURE: 3 MMHG
ECHO LA AREA 2C: 23.1 CM2
ECHO LA AREA 4C: 26.2 CM2
ECHO LA DIAMETER INDEX: 2.03 CM/M2
ECHO LA DIAMETER: 4.1 CM
ECHO LA MAJOR AXIS: 7.3 CM
ECHO LA MINOR AXIS: 6.5 CM
ECHO LA VOL BP: 77 ML (ref 18–58)
ECHO LA VOL MOD A2C: 69 ML (ref 18–58)
ECHO LA VOL MOD A4C: 77 ML (ref 18–58)
ECHO LA VOL/BSA BIPLANE: 38 ML/M2 (ref 16–34)
ECHO LA VOLUME INDEX MOD A2C: 34 ML/M2 (ref 16–34)
ECHO LA VOLUME INDEX MOD A4C: 38 ML/M2 (ref 16–34)
ECHO LV E' LATERAL VELOCITY: 9.46 CM/S
ECHO LV E' SEPTAL VELOCITY: 4.57 CM/S
ECHO LV EDV A4C: 141 ML
ECHO LV EDV INDEX A4C: 70 ML/M2
ECHO LV EF PHYSICIAN: 58 %
ECHO LV EJECTION FRACTION A4C: 58 %
ECHO LV ESV A4C: 59 ML
ECHO LV ESV INDEX A4C: 29 ML/M2
ECHO LV FRACTIONAL SHORTENING: 26 % (ref 28–44)
ECHO LV INTERNAL DIMENSION DIASTOLE INDEX: 2.62 CM/M2
ECHO LV INTERNAL DIMENSION DIASTOLIC: 5.3 CM (ref 4.2–5.9)
ECHO LV INTERNAL DIMENSION SYSTOLIC INDEX: 1.93 CM/M2
ECHO LV INTERNAL DIMENSION SYSTOLIC: 3.9 CM
ECHO LV IVSD: 1.1 CM (ref 0.6–1)
ECHO LV MASS 2D: 227.7 G (ref 88–224)
ECHO LV MASS INDEX 2D: 112.7 G/M2 (ref 49–115)
ECHO LV POSTERIOR WALL DIASTOLIC: 1.1 CM (ref 0.6–1)
ECHO LV RELATIVE WALL THICKNESS RATIO: 0.42
ECHO LVOT AREA: 4.2 CM2
ECHO LVOT AV VTI INDEX: 0.68
ECHO LVOT DIAM: 2.3 CM
ECHO LVOT MEAN GRADIENT: 2 MMHG
ECHO LVOT PEAK GRADIENT: 4 MMHG
ECHO LVOT PEAK VELOCITY: 1 M/S
ECHO LVOT STROKE VOLUME INDEX: 58.6 ML/M2
ECHO LVOT SV: 118.4 ML
ECHO LVOT VTI: 28.5 CM
ECHO MV A VELOCITY: 1.02 M/S
ECHO MV AREA VTI: 2.8 CM2
ECHO MV E DECELERATION TIME (DT): 166 MS
ECHO MV E VELOCITY: 1.05 M/S
ECHO MV E/A RATIO: 1.03
ECHO MV E/E' LATERAL: 11.1
ECHO MV E/E' RATIO (AVERAGED): 17.04
ECHO MV E/E' SEPTAL: 22.98
ECHO MV LVOT VTI INDEX: 1.5
ECHO MV MAX VELOCITY: 1.3 M/S
ECHO MV MEAN GRADIENT: 2 MMHG
ECHO MV MEAN VELOCITY: 0.7 M/S
ECHO MV PEAK GRADIENT: 7 MMHG
ECHO MV REGURGITANT VTIA: 242 CM
ECHO MV VTI: 42.8 CM
ECHO PULMONARY ARTERY END DIASTOLIC PRESSURE: 18 MMHG
ECHO PV MAX VELOCITY: 1.2 M/S
ECHO PV MEAN GRADIENT: 3 MMHG
ECHO PV MEAN VELOCITY: 0.8 M/S
ECHO PV PEAK GRADIENT: 5 MMHG
ECHO PV REGURGITANT END DIASTOLIC VELOCITY: 2.1 M/S
ECHO PV VTI: 26.2 CM
ECHO RA AREA 4C: 12.6 CM2
ECHO RA END SYSTOLIC VOLUME APICAL 4 CHAMBER INDEX BSA: 12 ML/M2
ECHO RA VOLUME: 25 ML
ECHO RIGHT VENTRICULAR SYSTOLIC PRESSURE (RVSP): 55 MMHG
ECHO RV FREE WALL PEAK S': 18.6 CM/S
ECHO RV INTERNAL DIMENSION: 3.7 CM
ECHO RV TAPSE: 3 CM (ref 1.7–?)
ECHO TV E WAVE: 0.7 M/S
ECHO TV REGURGITANT MAX VELOCITY: 3.6 M/S
ECHO TV REGURGITANT PEAK GRADIENT: 52 MMHG
NUC STRESS EJECTION FRACTION: 31 %
STRESS BASELINE DIAS BP: 89 MMHG
STRESS BASELINE HR: 52 BPM
STRESS BASELINE ST DEPRESSION: 0 MM
STRESS BASELINE SYS BP: 211 MMHG
STRESS ESTIMATED WORKLOAD: 1 METS
STRESS PEAK DIAS BP: 89 MMHG
STRESS PEAK SYS BP: 211 MMHG
STRESS PERCENT HR ACHIEVED: 44 %
STRESS POST PEAK HR: 63 BPM
STRESS RATE PRESSURE PRODUCT: NORMAL BPM*MMHG
STRESS ST DEPRESSION: 0 MM
STRESS TARGET HR: 144 BPM
TID: 0.97

## 2025-06-30 PROCEDURE — 3075F SYST BP GE 130 - 139MM HG: CPT | Performed by: PHYSICIAN ASSISTANT

## 2025-06-30 PROCEDURE — 1159F MED LIST DOCD IN RCRD: CPT | Performed by: PHYSICIAN ASSISTANT

## 2025-06-30 PROCEDURE — A9500 TC99M SESTAMIBI: HCPCS | Performed by: INTERNAL MEDICINE

## 2025-06-30 PROCEDURE — 93306 TTE W/DOPPLER COMPLETE: CPT

## 2025-06-30 PROCEDURE — 99212 OFFICE O/P EST SF 10 MIN: CPT | Performed by: PHYSICIAN ASSISTANT

## 2025-06-30 PROCEDURE — 93017 CV STRESS TEST TRACING ONLY: CPT

## 2025-06-30 PROCEDURE — 93018 CV STRESS TEST I&R ONLY: CPT | Performed by: INTERNAL MEDICINE

## 2025-06-30 PROCEDURE — 3430000000 HC RX DIAGNOSTIC RADIOPHARMACEUTICAL: Performed by: INTERNAL MEDICINE

## 2025-06-30 PROCEDURE — 99213 OFFICE O/P EST LOW 20 MIN: CPT | Performed by: PHYSICIAN ASSISTANT

## 2025-06-30 PROCEDURE — 6360000002 HC RX W HCPCS: Performed by: INTERNAL MEDICINE

## 2025-06-30 PROCEDURE — 3079F DIAST BP 80-89 MM HG: CPT | Performed by: PHYSICIAN ASSISTANT

## 2025-06-30 PROCEDURE — 1123F ACP DISCUSS/DSCN MKR DOCD: CPT | Performed by: PHYSICIAN ASSISTANT

## 2025-06-30 PROCEDURE — 93306 TTE W/DOPPLER COMPLETE: CPT | Performed by: INTERNAL MEDICINE

## 2025-06-30 PROCEDURE — 93270 REMOTE 30 DAY ECG REV/REPORT: CPT

## 2025-06-30 PROCEDURE — 78452 HT MUSCLE IMAGE SPECT MULT: CPT

## 2025-06-30 RX ORDER — TETRAKIS(2-METHOXYISOBUTYLISOCYANIDE)COPPER(I) TETRAFLUOROBORATE 1 MG/ML
10 INJECTION, POWDER, LYOPHILIZED, FOR SOLUTION INTRAVENOUS
Status: COMPLETED | OUTPATIENT
Start: 2025-06-30 | End: 2025-06-30

## 2025-06-30 RX ORDER — REGADENOSON 0.08 MG/ML
0.4 INJECTION, SOLUTION INTRAVENOUS ONCE
Status: COMPLETED | OUTPATIENT
Start: 2025-06-30 | End: 2025-06-30

## 2025-06-30 RX ORDER — AZITHROMYCIN 250 MG/1
TABLET, FILM COATED ORAL
Qty: 6 TABLET | Refills: 0 | Status: SHIPPED | OUTPATIENT
Start: 2025-06-30 | End: 2025-07-10

## 2025-06-30 RX ORDER — TETRAKIS(2-METHOXYISOBUTYLISOCYANIDE)COPPER(I) TETRAFLUOROBORATE 1 MG/ML
30 INJECTION, POWDER, LYOPHILIZED, FOR SOLUTION INTRAVENOUS
Status: COMPLETED | OUTPATIENT
Start: 2025-06-30 | End: 2025-06-30

## 2025-06-30 RX ADMIN — Medication 10 MILLICURIE: at 13:03

## 2025-06-30 RX ADMIN — Medication 30 MILLICURIE: at 14:08

## 2025-06-30 RX ADMIN — REGADENOSON 0.4 MG: 0.08 INJECTION, SOLUTION INTRAVENOUS at 14:08

## 2025-06-30 ASSESSMENT — ENCOUNTER SYMPTOMS
DIARRHEA: 0
VOMITING: 0
SHORTNESS OF BREATH: 0
COUGH: 1
SORE THROAT: 1
NAUSEA: 0
SINUS PRESSURE: 0
WHEEZING: 0
SINUS PAIN: 0
RHINORRHEA: 1

## 2025-06-30 NOTE — PROGRESS NOTES
[facility]  [address]  [facility] [address]  Dept: [phone]  Dept Fax: [phone]    Bandar Gabriel is a 75 y.o. male who presents today for his medical conditions/complaints as noted below. Bandar Gabriel is c/o of   Chief Complaint   Patient presents with    Ear Fullness     Can't hear well from right ear. And is all congested    .    HPI:     Patient is a 74 yo male presenting today due to right ear fullness and congestion. He was seen in family medicine on 6/23/2025 where he was treated for sinusitis with Azithromycin. He states that his symptoms have not improved and he has now developed a burning sensation in his throat.     Ear Fullness   There is pain in the right ear. This is a new problem. The current episode started 1 to 4 weeks ago. The problem has been gradually worsening. There has been no fever. Associated symptoms include coughing (productive), headaches, hearing loss (right), rhinorrhea and a sore throat. Pertinent negatives include no diarrhea, rash or vomiting. He has tried antibiotics for the symptoms. The treatment provided no relief.         Past Medical History:   Diagnosis Date    Angina at rest     Arthritis     CAD (coronary artery disease)     angina    Cellulitis of right hand 11/21/2023    Hyperlipidemia     Hypertension     Knee pain 04/18/2024    Pain in wrist 11/21/2023    Skin cancer      Past Surgical History:   Procedure Laterality Date    CARDIAC CATHETERIZATION      x 2    COLONOSCOPY      COLONOSCOPY N/A 9/12/2018    COLONOSCOPY WITH BIOPSY performed by Luis Felipe Park,  at [facility] OR    COSMETIC SURGERY      forehead and  mouth in 1987    CT BIOPSY BONE MARROW  10/14/2021    CT BONE MARROW BIOPSY 10/14/2021 [facility] CT SCAN    HERNIA REPAIR      PAIN MANAGEMENT PROCEDURE Left 1/7/2022    Left L4 L5 TRANSFORAMINAL performed by Ashwin PATEL

## 2025-07-02 ENCOUNTER — HOSPITAL ENCOUNTER (OUTPATIENT)
Dept: INFUSION THERAPY | Age: 75
Discharge: HOME | End: 2025-07-02
Payer: COMMERCIAL

## 2025-07-02 ENCOUNTER — HOSPITAL ENCOUNTER (OUTPATIENT)
Age: 75
Discharge: HOME | End: 2025-07-02
Payer: COMMERCIAL

## 2025-07-02 VITALS
RESPIRATION RATE: 16 BRPM | BODY MASS INDEX: 31.45 KG/M2 | WEIGHT: 200.4 LBS | HEART RATE: 55 BPM | OXYGEN SATURATION: 99 % | HEIGHT: 67 IN | DIASTOLIC BLOOD PRESSURE: 72 MMHG | SYSTOLIC BLOOD PRESSURE: 188 MMHG | TEMPERATURE: 97.4 F

## 2025-07-02 DIAGNOSIS — R76.8 ELEVATED SERUM IMMUNOGLOBULIN FREE LIGHT CHAIN LEVEL: ICD-10-CM

## 2025-07-02 DIAGNOSIS — C90.00 MULTIPLE MYELOMA NOT HAVING ACHIEVED REMISSION (HCC): Primary | ICD-10-CM

## 2025-07-02 DIAGNOSIS — C90.00 MULTIPLE MYELOMA NOT HAVING ACHIEVED REMISSION (HCC): ICD-10-CM

## 2025-07-02 DIAGNOSIS — C90.00 MULTIPLE MYELOMA, REMISSION STATUS UNSPECIFIED (HCC): ICD-10-CM

## 2025-07-02 DIAGNOSIS — K21.9 GASTROESOPHAGEAL REFLUX DISEASE WITHOUT ESOPHAGITIS: ICD-10-CM

## 2025-07-02 LAB
ALBUMIN SERPL-MCNC: 3.9 G/DL (ref 3.5–5.2)
ALBUMIN/GLOB SERPL: 1 {RATIO} (ref 1–2.5)
ALP SERPL-CCNC: 101 U/L (ref 40–129)
ALT SERPL-CCNC: 32 U/L (ref 10–50)
ANION GAP SERPL CALCULATED.3IONS-SCNC: 11 MMOL/L (ref 9–16)
AST SERPL-CCNC: 42 U/L (ref 10–50)
BASOPHILS # BLD: <0.03 K/UL (ref 0–0.2)
BASOPHILS NFR BLD: 0 % (ref 0–2)
BILIRUB SERPL-MCNC: 0.5 MG/DL (ref 0–1.2)
BUN SERPL-MCNC: 29 MG/DL (ref 8–23)
BUN/CREAT SERPL: 22 (ref 9–20)
CALCIUM SERPL-MCNC: 9.3 MG/DL (ref 8.6–10.4)
CHLORIDE SERPL-SCNC: 99 MMOL/L (ref 98–107)
CO2 SERPL-SCNC: 26 MMOL/L (ref 20–31)
CREAT SERPL-MCNC: 1.3 MG/DL (ref 0.7–1.2)
EOSINOPHIL # BLD: 0.05 K/UL (ref 0–0.44)
EOSINOPHILS RELATIVE PERCENT: 1 % (ref 1–4)
ERYTHROCYTE [DISTWIDTH] IN BLOOD BY AUTOMATED COUNT: 12.6 % (ref 11.8–14.4)
GFR, ESTIMATED: 57 ML/MIN/1.73M2
GLUCOSE SERPL-MCNC: 173 MG/DL (ref 74–99)
HCT VFR BLD AUTO: 33.3 % (ref 40.7–50.3)
HGB BLD-MCNC: 11.7 G/DL (ref 13–17)
IMM GRANULOCYTES # BLD AUTO: <0.03 K/UL (ref 0–0.3)
IMM GRANULOCYTES NFR BLD: 0 %
LYMPHOCYTES NFR BLD: 0.83 K/UL (ref 1.1–3.7)
LYMPHOCYTES RELATIVE PERCENT: 18 % (ref 24–43)
MCH RBC QN AUTO: 33.1 PG (ref 25.2–33.5)
MCHC RBC AUTO-ENTMCNC: 35.1 G/DL (ref 25.2–33.5)
MCV RBC AUTO: 94.1 FL (ref 82.6–102.9)
MONOCYTES NFR BLD: 0.48 K/UL (ref 0.1–1.2)
MONOCYTES NFR BLD: 10 % (ref 3–12)
NEUTROPHILS NFR BLD: 71 % (ref 36–65)
NEUTS SEG NFR BLD: 3.23 K/UL (ref 1.5–8.1)
NRBC BLD-RTO: 0 PER 100 WBC
PLATELET # BLD AUTO: 195 K/UL (ref 138–453)
PMV BLD AUTO: 10.1 FL (ref 8.1–13.5)
POTASSIUM SERPL-SCNC: 4.9 MMOL/L (ref 3.7–5.3)
PROT SERPL-MCNC: 7.7 G/DL (ref 6.6–8.7)
RBC # BLD AUTO: 3.54 M/UL (ref 4.21–5.77)
SODIUM SERPL-SCNC: 136 MMOL/L (ref 136–145)
WBC OTHER # BLD: 4.6 K/UL (ref 3.5–11.3)

## 2025-07-02 PROCEDURE — 84165 PROTEIN E-PHORESIS SERUM: CPT

## 2025-07-02 PROCEDURE — 96401 CHEMO ANTI-NEOPL SQ/IM: CPT

## 2025-07-02 PROCEDURE — 80053 COMPREHEN METABOLIC PANEL: CPT

## 2025-07-02 PROCEDURE — 6370000000 HC RX 637 (ALT 250 FOR IP): Performed by: INTERNAL MEDICINE

## 2025-07-02 PROCEDURE — 6360000002 HC RX W HCPCS: Performed by: INTERNAL MEDICINE

## 2025-07-02 PROCEDURE — 83521 IG LIGHT CHAINS FREE EACH: CPT

## 2025-07-02 PROCEDURE — 36415 COLL VENOUS BLD VENIPUNCTURE: CPT

## 2025-07-02 PROCEDURE — 86334 IMMUNOFIX E-PHORESIS SERUM: CPT

## 2025-07-02 PROCEDURE — 84155 ASSAY OF PROTEIN SERUM: CPT

## 2025-07-02 PROCEDURE — 85025 COMPLETE CBC W/AUTO DIFF WBC: CPT

## 2025-07-02 RX ORDER — MEPERIDINE HYDROCHLORIDE 50 MG/ML
12.5 INJECTION INTRAMUSCULAR; INTRAVENOUS; SUBCUTANEOUS PRN
Status: CANCELLED | OUTPATIENT
Start: 2025-07-02

## 2025-07-02 RX ORDER — ALBUTEROL SULFATE 90 UG/1
4 INHALANT RESPIRATORY (INHALATION) PRN
OUTPATIENT
Start: 2025-07-09

## 2025-07-02 RX ORDER — MEPERIDINE HYDROCHLORIDE 50 MG/ML
12.5 INJECTION INTRAMUSCULAR; INTRAVENOUS; SUBCUTANEOUS PRN
OUTPATIENT
Start: 2025-07-23

## 2025-07-02 RX ORDER — ACETAMINOPHEN 325 MG/1
650 TABLET ORAL
OUTPATIENT
Start: 2025-07-23

## 2025-07-02 RX ORDER — DEXAMETHASONE 0.5 MG/1
20 TABLET ORAL ONCE
OUTPATIENT
Start: 2025-07-09 | End: 2025-07-09

## 2025-07-02 RX ORDER — MEPERIDINE HYDROCHLORIDE 50 MG/ML
12.5 INJECTION INTRAMUSCULAR; INTRAVENOUS; SUBCUTANEOUS PRN
OUTPATIENT
Start: 2025-07-16

## 2025-07-02 RX ORDER — SODIUM CHLORIDE 0.9 % (FLUSH) 0.9 %
5-40 SYRINGE (ML) INJECTION PRN
OUTPATIENT
Start: 2025-07-09

## 2025-07-02 RX ORDER — DIPHENHYDRAMINE HCL 25 MG
25 TABLET ORAL ONCE
Status: COMPLETED | OUTPATIENT
Start: 2025-07-02 | End: 2025-07-02

## 2025-07-02 RX ORDER — ALBUTEROL SULFATE 90 UG/1
4 INHALANT RESPIRATORY (INHALATION) PRN
OUTPATIENT
Start: 2025-07-23

## 2025-07-02 RX ORDER — ONDANSETRON 2 MG/ML
8 INJECTION INTRAMUSCULAR; INTRAVENOUS
OUTPATIENT
Start: 2025-07-09

## 2025-07-02 RX ORDER — ONDANSETRON 2 MG/ML
8 INJECTION INTRAMUSCULAR; INTRAVENOUS
OUTPATIENT
Start: 2025-07-23

## 2025-07-02 RX ORDER — FAMOTIDINE 10 MG/ML
20 INJECTION, SOLUTION INTRAVENOUS
Status: CANCELLED | OUTPATIENT
Start: 2025-07-02

## 2025-07-02 RX ORDER — ALBUTEROL SULFATE 90 UG/1
4 INHALANT RESPIRATORY (INHALATION) PRN
OUTPATIENT
Start: 2025-07-16

## 2025-07-02 RX ORDER — DEXAMETHASONE 4 MG/1
20 TABLET ORAL ONCE
Status: COMPLETED | OUTPATIENT
Start: 2025-07-02 | End: 2025-07-02

## 2025-07-02 RX ORDER — SODIUM CHLORIDE 9 MG/ML
INJECTION, SOLUTION INTRAVENOUS CONTINUOUS
OUTPATIENT
Start: 2025-07-23

## 2025-07-02 RX ORDER — ACETAMINOPHEN 325 MG/1
650 TABLET ORAL ONCE
OUTPATIENT
Start: 2025-07-16 | End: 2025-07-16

## 2025-07-02 RX ORDER — ONDANSETRON 4 MG/1
8 TABLET, ORALLY DISINTEGRATING ORAL
OUTPATIENT
Start: 2025-07-23

## 2025-07-02 RX ORDER — ONDANSETRON 2 MG/ML
8 INJECTION INTRAMUSCULAR; INTRAVENOUS
Status: CANCELLED | OUTPATIENT
Start: 2025-07-02

## 2025-07-02 RX ORDER — DEXAMETHASONE 0.5 MG/1
20 TABLET ORAL ONCE
OUTPATIENT
Start: 2025-07-16 | End: 2025-07-16

## 2025-07-02 RX ORDER — SODIUM CHLORIDE 9 MG/ML
INJECTION, SOLUTION INTRAVENOUS CONTINUOUS
Status: CANCELLED | OUTPATIENT
Start: 2025-07-02

## 2025-07-02 RX ORDER — FAMOTIDINE 10 MG/ML
20 INJECTION, SOLUTION INTRAVENOUS
OUTPATIENT
Start: 2025-07-23

## 2025-07-02 RX ORDER — HYDROCORTISONE SODIUM SUCCINATE 100 MG/2ML
100 INJECTION INTRAMUSCULAR; INTRAVENOUS
OUTPATIENT
Start: 2025-07-16

## 2025-07-02 RX ORDER — ALBUTEROL SULFATE 90 UG/1
4 INHALANT RESPIRATORY (INHALATION) PRN
Status: CANCELLED | OUTPATIENT
Start: 2025-07-02

## 2025-07-02 RX ORDER — HEPARIN SODIUM (PORCINE) LOCK FLUSH IV SOLN 100 UNIT/ML 100 UNIT/ML
500 SOLUTION INTRAVENOUS PRN
OUTPATIENT
Start: 2025-07-09

## 2025-07-02 RX ORDER — SODIUM CHLORIDE 9 MG/ML
5-250 INJECTION, SOLUTION INTRAVENOUS PRN
OUTPATIENT
Start: 2025-07-23

## 2025-07-02 RX ORDER — SODIUM CHLORIDE 9 MG/ML
INJECTION, SOLUTION INTRAVENOUS CONTINUOUS
OUTPATIENT
Start: 2025-07-09

## 2025-07-02 RX ORDER — ONDANSETRON 2 MG/ML
8 INJECTION INTRAMUSCULAR; INTRAVENOUS
OUTPATIENT
Start: 2025-07-16

## 2025-07-02 RX ORDER — ACETAMINOPHEN 325 MG/1
650 TABLET ORAL ONCE
OUTPATIENT
Start: 2025-07-09 | End: 2025-07-09

## 2025-07-02 RX ORDER — ONDANSETRON 4 MG/1
8 TABLET, ORALLY DISINTEGRATING ORAL
OUTPATIENT
Start: 2025-07-09

## 2025-07-02 RX ORDER — SODIUM CHLORIDE 0.9 % (FLUSH) 0.9 %
5-40 SYRINGE (ML) INJECTION PRN
OUTPATIENT
Start: 2025-07-23

## 2025-07-02 RX ORDER — ACETAMINOPHEN 325 MG/1
650 TABLET ORAL
OUTPATIENT
Start: 2025-07-09

## 2025-07-02 RX ORDER — DIPHENHYDRAMINE HCL 25 MG
25 TABLET ORAL ONCE
Status: CANCELLED | OUTPATIENT
Start: 2025-07-02 | End: 2025-07-02

## 2025-07-02 RX ORDER — MEPERIDINE HYDROCHLORIDE 50 MG/ML
12.5 INJECTION INTRAMUSCULAR; INTRAVENOUS; SUBCUTANEOUS PRN
OUTPATIENT
Start: 2025-07-09

## 2025-07-02 RX ORDER — ONDANSETRON 4 MG/1
8 TABLET, ORALLY DISINTEGRATING ORAL
Status: CANCELLED | OUTPATIENT
Start: 2025-07-02

## 2025-07-02 RX ORDER — DIPHENHYDRAMINE HCL 25 MG
25 TABLET ORAL ONCE
OUTPATIENT
Start: 2025-07-23 | End: 2025-07-23

## 2025-07-02 RX ORDER — EPINEPHRINE 1 MG/ML
0.3 INJECTION, SOLUTION, CONCENTRATE INTRAVENOUS PRN
OUTPATIENT
Start: 2025-07-09

## 2025-07-02 RX ORDER — HEPARIN SODIUM (PORCINE) LOCK FLUSH IV SOLN 100 UNIT/ML 100 UNIT/ML
500 SOLUTION INTRAVENOUS PRN
OUTPATIENT
Start: 2025-07-16

## 2025-07-02 RX ORDER — SODIUM CHLORIDE 9 MG/ML
5-250 INJECTION, SOLUTION INTRAVENOUS PRN
OUTPATIENT
Start: 2025-07-16

## 2025-07-02 RX ORDER — DIPHENHYDRAMINE HYDROCHLORIDE 50 MG/ML
50 INJECTION, SOLUTION INTRAMUSCULAR; INTRAVENOUS
OUTPATIENT
Start: 2025-07-23

## 2025-07-02 RX ORDER — DEXAMETHASONE 0.5 MG/1
20 TABLET ORAL ONCE
OUTPATIENT
Start: 2025-07-23 | End: 2025-07-23

## 2025-07-02 RX ORDER — SODIUM CHLORIDE 0.9 % (FLUSH) 0.9 %
5-40 SYRINGE (ML) INJECTION PRN
OUTPATIENT
Start: 2025-07-16

## 2025-07-02 RX ORDER — DIPHENHYDRAMINE HYDROCHLORIDE 50 MG/ML
50 INJECTION, SOLUTION INTRAMUSCULAR; INTRAVENOUS
OUTPATIENT
Start: 2025-07-16

## 2025-07-02 RX ORDER — ACETAMINOPHEN 325 MG/1
650 TABLET ORAL ONCE
OUTPATIENT
Start: 2025-07-23 | End: 2025-07-23

## 2025-07-02 RX ORDER — HYDROCORTISONE SODIUM SUCCINATE 100 MG/2ML
100 INJECTION INTRAMUSCULAR; INTRAVENOUS
OUTPATIENT
Start: 2025-07-09

## 2025-07-02 RX ORDER — DIPHENHYDRAMINE HYDROCHLORIDE 50 MG/ML
50 INJECTION, SOLUTION INTRAMUSCULAR; INTRAVENOUS
OUTPATIENT
Start: 2025-07-09

## 2025-07-02 RX ORDER — HEPARIN SODIUM (PORCINE) LOCK FLUSH IV SOLN 100 UNIT/ML 100 UNIT/ML
500 SOLUTION INTRAVENOUS PRN
OUTPATIENT
Start: 2025-07-23

## 2025-07-02 RX ORDER — DIPHENHYDRAMINE HYDROCHLORIDE 50 MG/ML
50 INJECTION, SOLUTION INTRAMUSCULAR; INTRAVENOUS
Status: CANCELLED | OUTPATIENT
Start: 2025-07-02

## 2025-07-02 RX ORDER — ACETAMINOPHEN 325 MG/1
650 TABLET ORAL
OUTPATIENT
Start: 2025-07-16

## 2025-07-02 RX ORDER — DIPHENHYDRAMINE HCL 25 MG
25 TABLET ORAL ONCE
OUTPATIENT
Start: 2025-07-09 | End: 2025-07-09

## 2025-07-02 RX ORDER — EPINEPHRINE 1 MG/ML
0.3 INJECTION, SOLUTION, CONCENTRATE INTRAVENOUS PRN
OUTPATIENT
Start: 2025-07-23

## 2025-07-02 RX ORDER — EPINEPHRINE 1 MG/ML
0.3 INJECTION, SOLUTION, CONCENTRATE INTRAVENOUS PRN
OUTPATIENT
Start: 2025-07-16

## 2025-07-02 RX ORDER — HEPARIN SODIUM (PORCINE) LOCK FLUSH IV SOLN 100 UNIT/ML 100 UNIT/ML
500 SOLUTION INTRAVENOUS PRN
Status: CANCELLED | OUTPATIENT
Start: 2025-07-02

## 2025-07-02 RX ORDER — HYDROCORTISONE SODIUM SUCCINATE 100 MG/2ML
100 INJECTION INTRAMUSCULAR; INTRAVENOUS
OUTPATIENT
Start: 2025-07-23

## 2025-07-02 RX ORDER — SODIUM CHLORIDE 0.9 % (FLUSH) 0.9 %
5-40 SYRINGE (ML) INJECTION PRN
Status: CANCELLED | OUTPATIENT
Start: 2025-07-02

## 2025-07-02 RX ORDER — ACETAMINOPHEN 325 MG/1
650 TABLET ORAL
Status: CANCELLED | OUTPATIENT
Start: 2025-07-02

## 2025-07-02 RX ORDER — DIPHENHYDRAMINE HCL 25 MG
25 TABLET ORAL ONCE
OUTPATIENT
Start: 2025-07-16 | End: 2025-07-16

## 2025-07-02 RX ORDER — SODIUM CHLORIDE 9 MG/ML
5-250 INJECTION, SOLUTION INTRAVENOUS PRN
OUTPATIENT
Start: 2025-07-09

## 2025-07-02 RX ORDER — ONDANSETRON 4 MG/1
8 TABLET, ORALLY DISINTEGRATING ORAL
OUTPATIENT
Start: 2025-07-16

## 2025-07-02 RX ORDER — FAMOTIDINE 10 MG/ML
20 INJECTION, SOLUTION INTRAVENOUS
OUTPATIENT
Start: 2025-07-09

## 2025-07-02 RX ORDER — HYDROCORTISONE SODIUM SUCCINATE 100 MG/2ML
100 INJECTION INTRAMUSCULAR; INTRAVENOUS
Status: CANCELLED | OUTPATIENT
Start: 2025-07-02

## 2025-07-02 RX ORDER — DEXAMETHASONE 0.5 MG/1
20 TABLET ORAL ONCE
Status: CANCELLED | OUTPATIENT
Start: 2025-07-02 | End: 2025-07-02

## 2025-07-02 RX ORDER — ACETAMINOPHEN 325 MG/1
650 TABLET ORAL ONCE
Status: CANCELLED | OUTPATIENT
Start: 2025-07-02 | End: 2025-07-02

## 2025-07-02 RX ORDER — SODIUM CHLORIDE 9 MG/ML
5-250 INJECTION, SOLUTION INTRAVENOUS PRN
Status: CANCELLED | OUTPATIENT
Start: 2025-07-02

## 2025-07-02 RX ORDER — ACETAMINOPHEN 325 MG/1
650 TABLET ORAL ONCE
Status: COMPLETED | OUTPATIENT
Start: 2025-07-02 | End: 2025-07-02

## 2025-07-02 RX ORDER — EPINEPHRINE 1 MG/ML
0.3 INJECTION, SOLUTION, CONCENTRATE INTRAVENOUS PRN
Status: CANCELLED | OUTPATIENT
Start: 2025-07-02

## 2025-07-02 RX ORDER — FAMOTIDINE 10 MG/ML
20 INJECTION, SOLUTION INTRAVENOUS
OUTPATIENT
Start: 2025-07-16

## 2025-07-02 RX ORDER — SODIUM CHLORIDE 9 MG/ML
INJECTION, SOLUTION INTRAVENOUS CONTINUOUS
OUTPATIENT
Start: 2025-07-16

## 2025-07-02 RX ADMIN — DIPHENHYDRAMINE HYDROCHLORIDE 25 MG: 25 TABLET ORAL at 13:59

## 2025-07-02 RX ADMIN — DARATUMUMAB AND HYALURONIDASE-FIHJ (HUMAN RECOMBINANT) 1800 MG: 1800; 30000 INJECTION SUBCUTANEOUS at 15:01

## 2025-07-02 RX ADMIN — DEXAMETHASONE 20 MG: 4 TABLET ORAL at 13:58

## 2025-07-02 RX ADMIN — ACETAMINOPHEN 650 MG: 325 TABLET ORAL at 13:59

## 2025-07-02 ASSESSMENT — PAIN SCALES - GENERAL
PAINLEVEL_OUTOF10: 0
PAINLEVEL_OUTOF10: 2

## 2025-07-02 ASSESSMENT — PAIN DESCRIPTION - LOCATION: LOCATION: HEAD

## 2025-07-02 ASSESSMENT — PAIN - FUNCTIONAL ASSESSMENT: PAIN_FUNCTIONAL_ASSESSMENT: ACTIVITIES ARE NOT PREVENTED

## 2025-07-02 NOTE — PROGRESS NOTES
Patient arrived for Darzalex Faspro injection.  VSS as charted.  Patient tolerated well.  Patient left infusion center with all belongings and steady gate.  Next appts already set.

## 2025-07-02 NOTE — TELEPHONE ENCOUNTER
Pt and Niece came to  to discuss echo and stress test results and recommendations per Dr MERCY Holm for cath. Pt and niece was informed of all results and verbalized understanding of all and agreed to do cath. Pt was advised if he has any chest pains or sob to go to ER. Pt verbalized understanding and had no further questions at the time. Pt was advised once approval is received from insurance he will be called to schedule cath.     Orders faxed to [facility] for scheduling.     
  Interatrial Septum No interatrial shunt visualized with color Doppler.   Right Ventricle Right ventricle size is normal. Normal systolic function. TAPSE is 3.0 cm.   Right Atrium Right atrium size is normal.   Aortic Valve Trileaflet valve. Mild sclerosis of the aortic valve cusps. No regurgitation. No stenosis.   Mitral Valve Valve structure is normal. Mild to moderate regurgitation with a centrally directed jet. No stenosis noted.   Tricuspid Valve Valve structure is normal. Mild to moderate regurgitation. No stenosis noted.   Pulmonic Valve The pulmonic valve visualization is suboptimal but appears to be functioning normally. Trace regurgitation. No stenosis noted.   Pulmonary Artery Normal pulmonary arteries.   Aorta Normal sized aortic root, ascending aorta, aortic arch and descending aorta.   IVC/Hepatic Veins IVC was not well visualized. IVC diameter is normal or and decreases greater than 50% during inspiration; therefore the estimated right atrial pressure is normal (~3 mmHg). IVC size is normal.   Pericardium No pericardial effusion.

## 2025-07-02 NOTE — FLOWSHEET NOTE
Spiritual Health History and Assessment/Progress Note  [facility]    Spiritual/Emotional Needs,  ,  ,      Name: Bandar Gabriel MRN: [id number]    Age: 75 y.o.     Sex: male   Language: English   Holiness: None   <principal problem not specified>     Date: 7/2/2025            Total Time Calculated: 7 min              Spiritual Assessment continued in [facility]  MED ONC        Referral/Consult From: Rounding   Encounter Overview/Reason: Spiritual/Emotional Needs  Service Provided For: Patient and family together    Patient was accompanied by his niece (Shahnaz) and great-niece (Jenae).  Patient was struggling to hear due to multiple boughts of sinus infections, so he relied on his niece to carry the conversation.  Patient did not express any spiritual concerns at this time.  Patient's nice asked for additional prayer for her granddaughter's friend.    Eileen, Belief, Meaning:   Patient identifies as spiritual and has beliefs or practices that help with coping during difficult times  Family/Friends identify as spiritual and have beliefs or practices that help with coping during difficult times      Importance and Influence:  Patient has spiritual/personal beliefs that influence decisions regarding their health  Family/Friends have spiritual/personal beliefs that influence decisions regarding the patient's health    Community:  Patient feels well-supported. Support system includes: Children, Friends, and Extended family  Family/Friends feel well-supported. Support system includes: Children, Friends, and Extended family    Assessment and Plan of Care:     Patient Interventions include: Facilitated expression of thoughts and feelings, Affirmed coping skills/support systems, and Provided sacramental/Oriental orthodox ritual  Family/Friends Interventions include: Facilitated expression of thoughts and feelings, Affirmed coping skills/support systems, and Provided sacramental/Oriental orthodox ritual    Patient Plan of Care:

## 2025-07-04 LAB
ALBUMIN PERCENT: 48 % (ref 56–66)
ALBUMIN SERPL-MCNC: 3.5 G/DL (ref 3.2–5.2)
ALPHA 2 PERCENT: 12 % (ref 7–12)
ALPHA1 GLOB SERPL ELPH-MCNC: 0.4 G/DL (ref 0.1–0.4)
ALPHA1 GLOB SERPL ELPH-MCNC: 5 % (ref 3–5)
ALPHA2 GLOB SERPL ELPH-MCNC: 0.9 G/DL (ref 0.5–0.9)
B-GLOBULIN SERPL ELPH-MCNC: 2.5 G/DL (ref 0.7–1.4)
B-GLOBULIN SERPL ELPH-MCNC: 34 % (ref 8–13)
FREE KAPPA/LAMBDA RATIO: 0.01 (ref 0.22–1.74)
GAMMA GLOB SERPL ELPH-MCNC: 0.2 G/DL (ref 0.5–1.5)
GAMMA GLOBULIN %: 2 % (ref 11–19)
ITYP INTERPRETATION: ABNORMAL
KAPPA LC FREE SER-MCNC: 11.7 MG/L
LAMBDA LC FREE SERPL-MCNC: 931 MG/L (ref 4.2–27.7)
PATH REV: ABNORMAL
PATHOLOGIST: ABNORMAL
PROT PATTERN SERPL ELPH-IMP: ABNORMAL
PROT SERPL-MCNC: 7.3 G/DL (ref 6.6–8.7)
TOTAL PROT. SUM,%: 101 % (ref 98–102)
TOTAL PROT. SUM: 7.5 G/DL (ref 6.3–8.2)

## 2025-07-07 ENCOUNTER — OFFICE VISIT (OUTPATIENT)
Dept: INTERNAL MEDICINE | Age: 75
End: 2025-07-07
Payer: COMMERCIAL

## 2025-07-07 VITALS
RESPIRATION RATE: 16 BRPM | WEIGHT: 200 LBS | HEART RATE: 65 BPM | OXYGEN SATURATION: 97 % | DIASTOLIC BLOOD PRESSURE: 88 MMHG | SYSTOLIC BLOOD PRESSURE: 176 MMHG | HEIGHT: 67 IN | BODY MASS INDEX: 31.39 KG/M2

## 2025-07-07 DIAGNOSIS — E78.2 MIXED HYPERLIPIDEMIA: ICD-10-CM

## 2025-07-07 DIAGNOSIS — I10 ESSENTIAL HYPERTENSION: Primary | ICD-10-CM

## 2025-07-07 DIAGNOSIS — C90.00 MULTIPLE MYELOMA, REMISSION STATUS UNSPECIFIED (HCC): ICD-10-CM

## 2025-07-07 DIAGNOSIS — I25.10 CORONARY ARTERY DISEASE WITHOUT ANGINA PECTORIS, UNSPECIFIED VESSEL OR LESION TYPE, UNSPECIFIED WHETHER NATIVE OR TRANSPLANTED HEART: ICD-10-CM

## 2025-07-07 PROCEDURE — 99212 OFFICE O/P EST SF 10 MIN: CPT | Performed by: INTERNAL MEDICINE

## 2025-07-07 PROCEDURE — 1159F MED LIST DOCD IN RCRD: CPT | Performed by: INTERNAL MEDICINE

## 2025-07-07 PROCEDURE — 1123F ACP DISCUSS/DSCN MKR DOCD: CPT | Performed by: INTERNAL MEDICINE

## 2025-07-07 PROCEDURE — 3079F DIAST BP 80-89 MM HG: CPT | Performed by: INTERNAL MEDICINE

## 2025-07-07 PROCEDURE — 3077F SYST BP >= 140 MM HG: CPT | Performed by: INTERNAL MEDICINE

## 2025-07-07 PROCEDURE — G2211 COMPLEX E/M VISIT ADD ON: HCPCS | Performed by: INTERNAL MEDICINE

## 2025-07-07 PROCEDURE — 99214 OFFICE O/P EST MOD 30 MIN: CPT | Performed by: INTERNAL MEDICINE

## 2025-07-07 RX ORDER — FLUTICASONE PROPIONATE 50 MCG
1 SPRAY, SUSPENSION (ML) NASAL 2 TIMES DAILY PRN
Qty: 32 G | Refills: 1 | Status: SHIPPED | OUTPATIENT
Start: 2025-07-07

## 2025-07-07 RX ORDER — BLOOD PRESSURE TEST KIT
KIT MISCELLANEOUS
Qty: 1 KIT | Refills: 0 | Status: SHIPPED | OUTPATIENT
Start: 2025-07-07

## 2025-07-07 RX ORDER — LISINOPRIL 20 MG/1
20 TABLET ORAL DAILY
Qty: 90 TABLET | Refills: 1 | Status: SHIPPED | OUTPATIENT
Start: 2025-07-07

## 2025-07-07 NOTE — PROGRESS NOTES
L femoral arterial line removed. Pressure applied and hemostasis achieved. Pressure dressing applied. Minimal blood loss, no complications. mouth once daily, Disp: 90 tablet, Rfl: 0    amLODIPine (NORVASC) 5 MG tablet, Take 1 tablet by mouth once daily, Disp: 90 tablet, Rfl: 0    bisoprolol (ZEBETA) 10 MG tablet, Take 1 tablet by mouth once daily, Disp: 90 tablet, Rfl: 0    meloxicam (MOBIC) 15 MG tablet, Take 1 tablet by mouth daily, Disp: 90 tablet, Rfl: 3    aspirin 81 MG tablet, Take 1 tablet by mouth, Disp: , Rfl:     fluticasone (FLONASE) 50 MCG/ACT nasal spray, 2 sprays by Each Nostril route daily (Patient not taking: Reported on 7/7/2025), Disp: 16 g, Rfl: 0    nitroGLYCERIN (NITROSTAT) 0.4 MG SL tablet, Place 1 tablet under the tongue every 5 minutes as needed for Chest pain (Patient not taking: Reported on 7/7/2025), Disp: 25 tablet, Rfl: 0    allopurinol (ZYLOPRIM) 300 MG tablet, Take 1 tablet by mouth once daily (Patient not taking: Reported on 7/7/2025), Disp: 90 tablet, Rfl: 3    Allergies  Adhesive tape, Tetanus toxoids, Codeine, Dtap-ipv vaccine, Penicillins, and Tetanus antitoxin    Past Medical History:   Diagnosis Date    Angina at rest     Arthritis     CAD (coronary artery disease)     angina    Cellulitis of right hand 11/21/2023    Hyperlipidemia     Hypertension     Knee pain 04/18/2024    Pain in wrist 11/21/2023    Skin cancer      Past Surgical History:   Procedure Laterality Date    CARDIAC CATHETERIZATION      x 2    COLONOSCOPY      COLONOSCOPY N/A 9/12/2018    COLONOSCOPY WITH BIOPSY performed by Luis Felipe Park DO at [facility] OR    COSMETIC SURGERY      forehead and  mouth in 1987    CT BIOPSY BONE MARROW  10/14/2021    CT BONE MARROW BIOPSY 10/14/2021 [facility] CT SCAN    HERNIA REPAIR      PAIN MANAGEMENT PROCEDURE Left 1/7/2022    Left L4 L5 TRANSFORAMINAL performed by Ashwin Portillo MD at [facility] OR    PAIN MANAGEMENT PROCEDURE Left 2/23/2023    Left L4-L5 L5-S1 FACET performed by Ashwin Portillo MD at [facility] OR     Family History  This patient's family history includes Cancer in his mother; Diabetes in his mother; Heart Attack

## 2025-07-09 ENCOUNTER — HOSPITAL ENCOUNTER (OUTPATIENT)
Dept: INFUSION THERAPY | Age: 75
Discharge: HOME | End: 2025-07-09
Payer: COMMERCIAL

## 2025-07-09 VITALS
TEMPERATURE: 98 F | BODY MASS INDEX: 31.2 KG/M2 | DIASTOLIC BLOOD PRESSURE: 70 MMHG | OXYGEN SATURATION: 96 % | WEIGHT: 199.2 LBS | SYSTOLIC BLOOD PRESSURE: 171 MMHG | HEART RATE: 58 BPM | RESPIRATION RATE: 16 BRPM

## 2025-07-09 DIAGNOSIS — C90.00 MULTIPLE MYELOMA NOT HAVING ACHIEVED REMISSION (HCC): Primary | ICD-10-CM

## 2025-07-09 LAB
ALBUMIN SERPL-MCNC: 3.8 G/DL (ref 3.5–5.2)
ALBUMIN/GLOB SERPL: 1 {RATIO} (ref 1–2.5)
ALP SERPL-CCNC: 105 U/L (ref 40–129)
ALT SERPL-CCNC: 29 U/L (ref 10–50)
ANION GAP SERPL CALCULATED.3IONS-SCNC: 12 MMOL/L (ref 9–16)
AST SERPL-CCNC: 32 U/L (ref 10–50)
BASOPHILS # BLD: <0.03 K/UL (ref 0–0.2)
BASOPHILS NFR BLD: 0 % (ref 0–2)
BILIRUB SERPL-MCNC: 0.5 MG/DL (ref 0–1.2)
BUN SERPL-MCNC: 29 MG/DL (ref 8–23)
BUN/CREAT SERPL: 22 (ref 9–20)
CALCIUM SERPL-MCNC: 9.3 MG/DL (ref 8.6–10.4)
CHLORIDE SERPL-SCNC: 100 MMOL/L (ref 98–107)
CO2 SERPL-SCNC: 24 MMOL/L (ref 20–31)
CREAT SERPL-MCNC: 1.3 MG/DL (ref 0.7–1.2)
EOSINOPHIL # BLD: 0.05 K/UL (ref 0–0.44)
EOSINOPHILS RELATIVE PERCENT: 1 % (ref 1–4)
ERYTHROCYTE [DISTWIDTH] IN BLOOD BY AUTOMATED COUNT: 13.2 % (ref 11.8–14.4)
GFR, ESTIMATED: 57 ML/MIN/1.73M2
GLUCOSE SERPL-MCNC: 171 MG/DL (ref 74–99)
HCT VFR BLD AUTO: 32.8 % (ref 40.7–50.3)
HGB BLD-MCNC: 11.4 G/DL (ref 13–17)
IMM GRANULOCYTES # BLD AUTO: <0.03 K/UL (ref 0–0.3)
IMM GRANULOCYTES NFR BLD: 1 %
LYMPHOCYTES NFR BLD: 0.83 K/UL (ref 1.1–3.7)
LYMPHOCYTES RELATIVE PERCENT: 21 % (ref 24–43)
MCH RBC QN AUTO: 33.2 PG (ref 25.2–33.5)
MCHC RBC AUTO-ENTMCNC: 34.8 G/DL (ref 25.2–33.5)
MCV RBC AUTO: 95.6 FL (ref 82.6–102.9)
MONOCYTES NFR BLD: 0.42 K/UL (ref 0.1–1.2)
MONOCYTES NFR BLD: 10 % (ref 3–12)
NEUTROPHILS NFR BLD: 67 % (ref 36–65)
NEUTS SEG NFR BLD: 2.72 K/UL (ref 1.5–8.1)
NRBC BLD-RTO: 0 PER 100 WBC
PLATELET # BLD AUTO: 191 K/UL (ref 138–453)
PMV BLD AUTO: 10.9 FL (ref 8.1–13.5)
POTASSIUM SERPL-SCNC: 4.4 MMOL/L (ref 3.7–5.3)
PROT SERPL-MCNC: 7.7 G/DL (ref 6.6–8.7)
RBC # BLD AUTO: 3.43 M/UL (ref 4.21–5.77)
SODIUM SERPL-SCNC: 136 MMOL/L (ref 136–145)
WBC OTHER # BLD: 4.1 K/UL (ref 3.5–11.3)

## 2025-07-09 PROCEDURE — 85025 COMPLETE CBC W/AUTO DIFF WBC: CPT

## 2025-07-09 PROCEDURE — 36415 COLL VENOUS BLD VENIPUNCTURE: CPT

## 2025-07-09 PROCEDURE — 80053 COMPREHEN METABOLIC PANEL: CPT

## 2025-07-09 PROCEDURE — 6360000002 HC RX W HCPCS: Performed by: INTERNAL MEDICINE

## 2025-07-09 PROCEDURE — 96402 CHEMO HORMON ANTINEOPL SQ/IM: CPT

## 2025-07-09 PROCEDURE — 6370000000 HC RX 637 (ALT 250 FOR IP): Performed by: INTERNAL MEDICINE

## 2025-07-09 RX ORDER — DIPHENHYDRAMINE HCL 25 MG
25 TABLET ORAL ONCE
Status: COMPLETED | OUTPATIENT
Start: 2025-07-09 | End: 2025-07-09

## 2025-07-09 RX ORDER — DEXAMETHASONE 4 MG/1
20 TABLET ORAL ONCE
Status: COMPLETED | OUTPATIENT
Start: 2025-07-09 | End: 2025-07-09

## 2025-07-09 RX ORDER — ACETAMINOPHEN 325 MG/1
650 TABLET ORAL ONCE
Status: COMPLETED | OUTPATIENT
Start: 2025-07-09 | End: 2025-07-09

## 2025-07-09 RX ADMIN — ACETAMINOPHEN 650 MG: 325 TABLET ORAL at 11:47

## 2025-07-09 RX ADMIN — DEXAMETHASONE 20 MG: 4 TABLET ORAL at 11:47

## 2025-07-09 RX ADMIN — DIPHENHYDRAMINE HYDROCHLORIDE 25 MG: 25 TABLET ORAL at 11:47

## 2025-07-09 RX ADMIN — DARATUMUMAB AND HYALURONIDASE-FIHJ (HUMAN RECOMBINANT) 1800 MG: 1800; 30000 INJECTION SUBCUTANEOUS at 12:53

## 2025-07-09 NOTE — PROGRESS NOTES
Patient came to the infusion center for his treatment. He had his blood drawn and the results were within required range. Premeds given.  Injection given. Tolerated well. Next appointment scheduled. Discharged home. Ambulated out of the dept .

## 2025-07-10 ENCOUNTER — HOSPITAL ENCOUNTER (OUTPATIENT)
Dept: INFUSION THERAPY | Age: 75
Discharge: HOME | End: 2025-07-10
Payer: COMMERCIAL

## 2025-07-10 ENCOUNTER — OFFICE VISIT (OUTPATIENT)
Dept: ONCOLOGY | Age: 75
End: 2025-07-10
Payer: COMMERCIAL

## 2025-07-10 VITALS
HEART RATE: 62 BPM | WEIGHT: 199 LBS | TEMPERATURE: 99.7 F | SYSTOLIC BLOOD PRESSURE: 138 MMHG | HEIGHT: 67 IN | OXYGEN SATURATION: 96 % | BODY MASS INDEX: 31.23 KG/M2 | DIASTOLIC BLOOD PRESSURE: 72 MMHG

## 2025-07-10 VITALS — RESPIRATION RATE: 16 BRPM

## 2025-07-10 DIAGNOSIS — J01.90 ACUTE SINUSITIS, RECURRENCE NOT SPECIFIED, UNSPECIFIED LOCATION: ICD-10-CM

## 2025-07-10 DIAGNOSIS — C90.00 MULTIPLE MYELOMA NOT HAVING ACHIEVED REMISSION (HCC): Primary | ICD-10-CM

## 2025-07-10 DIAGNOSIS — C90.00 MULTIPLE MYELOMA, REMISSION STATUS UNSPECIFIED (HCC): Primary | ICD-10-CM

## 2025-07-10 DIAGNOSIS — R50.9 FEVER, UNSPECIFIED FEVER CAUSE: ICD-10-CM

## 2025-07-10 PROCEDURE — 99214 OFFICE O/P EST MOD 30 MIN: CPT | Performed by: INTERNAL MEDICINE

## 2025-07-10 PROCEDURE — 96361 HYDRATE IV INFUSION ADD-ON: CPT

## 2025-07-10 PROCEDURE — 1159F MED LIST DOCD IN RCRD: CPT | Performed by: INTERNAL MEDICINE

## 2025-07-10 PROCEDURE — 3075F SYST BP GE 130 - 139MM HG: CPT | Performed by: INTERNAL MEDICINE

## 2025-07-10 PROCEDURE — 1123F ACP DISCUSS/DSCN MKR DOCD: CPT | Performed by: INTERNAL MEDICINE

## 2025-07-10 PROCEDURE — 96360 HYDRATION IV INFUSION INIT: CPT

## 2025-07-10 PROCEDURE — 3078F DIAST BP <80 MM HG: CPT | Performed by: INTERNAL MEDICINE

## 2025-07-10 PROCEDURE — 2580000003 HC RX 258: Performed by: INTERNAL MEDICINE

## 2025-07-10 RX ORDER — ACETAMINOPHEN 325 MG/1
650 TABLET ORAL
Status: CANCELLED | OUTPATIENT
Start: 2025-07-10

## 2025-07-10 RX ORDER — ACETAMINOPHEN 325 MG/1
650 TABLET ORAL
OUTPATIENT
Start: 2025-07-10

## 2025-07-10 RX ORDER — ALBUTEROL SULFATE 90 UG/1
4 INHALANT RESPIRATORY (INHALATION) PRN
Status: CANCELLED | OUTPATIENT
Start: 2025-07-10

## 2025-07-10 RX ORDER — EPINEPHRINE 1 MG/ML
0.3 INJECTION, SOLUTION, CONCENTRATE INTRAVENOUS PRN
OUTPATIENT
Start: 2025-07-10

## 2025-07-10 RX ORDER — 0.9 % SODIUM CHLORIDE 0.9 %
1000 INTRAVENOUS SOLUTION INTRAVENOUS PRN
Status: DISCONTINUED | OUTPATIENT
Start: 2025-07-10 | End: 2025-07-11 | Stop reason: HOSPADM

## 2025-07-10 RX ORDER — ONDANSETRON 2 MG/ML
8 INJECTION INTRAMUSCULAR; INTRAVENOUS
OUTPATIENT
Start: 2025-07-10

## 2025-07-10 RX ORDER — GEMFIBROZIL 600 MG/1
600 TABLET, FILM COATED ORAL DAILY
Qty: 90 TABLET | Refills: 0 | Status: SHIPPED | OUTPATIENT
Start: 2025-07-10

## 2025-07-10 RX ORDER — HYDROCORTISONE SODIUM SUCCINATE 100 MG/2ML
100 INJECTION INTRAMUSCULAR; INTRAVENOUS
Status: CANCELLED | OUTPATIENT
Start: 2025-07-10

## 2025-07-10 RX ORDER — ONDANSETRON 2 MG/ML
8 INJECTION INTRAMUSCULAR; INTRAVENOUS
Status: CANCELLED | OUTPATIENT
Start: 2025-07-10

## 2025-07-10 RX ORDER — CIPROFLOXACIN 500 MG/1
500 TABLET, FILM COATED ORAL 2 TIMES DAILY
Qty: 14 TABLET | Refills: 0 | Status: SHIPPED | OUTPATIENT
Start: 2025-07-10 | End: 2025-07-17

## 2025-07-10 RX ORDER — SODIUM CHLORIDE 9 MG/ML
INJECTION, SOLUTION INTRAVENOUS PRN
OUTPATIENT
Start: 2025-07-10

## 2025-07-10 RX ORDER — DIPHENHYDRAMINE HYDROCHLORIDE 50 MG/ML
50 INJECTION, SOLUTION INTRAMUSCULAR; INTRAVENOUS
Status: CANCELLED | OUTPATIENT
Start: 2025-07-10

## 2025-07-10 RX ORDER — EPINEPHRINE 1 MG/ML
0.3 INJECTION, SOLUTION, CONCENTRATE INTRAVENOUS PRN
Status: CANCELLED | OUTPATIENT
Start: 2025-07-10

## 2025-07-10 RX ORDER — SODIUM CHLORIDE 9 MG/ML
INJECTION, SOLUTION INTRAVENOUS PRN
Status: CANCELLED | OUTPATIENT
Start: 2025-07-10

## 2025-07-10 RX ORDER — 0.9 % SODIUM CHLORIDE 0.9 %
1000 INTRAVENOUS SOLUTION INTRAVENOUS PRN
OUTPATIENT
Start: 2025-07-10

## 2025-07-10 RX ORDER — ALBUTEROL SULFATE 90 UG/1
4 INHALANT RESPIRATORY (INHALATION) PRN
OUTPATIENT
Start: 2025-07-10

## 2025-07-10 RX ORDER — DIPHENHYDRAMINE HYDROCHLORIDE 50 MG/ML
50 INJECTION, SOLUTION INTRAMUSCULAR; INTRAVENOUS
OUTPATIENT
Start: 2025-07-10

## 2025-07-10 RX ORDER — GUAIFENESIN 600 MG/1
600 TABLET, EXTENDED RELEASE ORAL 2 TIMES DAILY
Qty: 30 TABLET | Refills: 0 | Status: SHIPPED | OUTPATIENT
Start: 2025-07-10 | End: 2025-07-25

## 2025-07-10 RX ORDER — HYDROCORTISONE SODIUM SUCCINATE 100 MG/2ML
100 INJECTION INTRAMUSCULAR; INTRAVENOUS
OUTPATIENT
Start: 2025-07-10

## 2025-07-10 RX ADMIN — SODIUM CHLORIDE 1000 ML: 0.9 INJECTION, SOLUTION INTRAVENOUS at 14:21

## 2025-07-10 NOTE — PROGRESS NOTES
Bandar Gabriel                                                                                                                  7/10/2025  MRN:   [id number]  YOB: 1950  PCP:                           Beth Holm MD  Referring Physician: No ref. provider found  Treating Physician Name: HEMANT BECK MD      Reason for visit:  Chief Complaint   Patient presents with    Discuss Labs     Smoldering myeloma    Other     Fever 101 earlier today, headache    Toxicity check  Discussed treatment plan    Current problems:  Activemyeloma, IgG lambda, M protein 1.26 g/dL, greater than 20% lambda restricted plasma cells, free light chain ratio greater than 100, high risk 1q gain  Normocytic anemia  CVA(while in [place])-6/2022     Active and recent treatments:  Revlimid 25 mg 3 weeks on 1 week off-3/2024 (x 1 cycle).  Discontinued due to adverse events  Amber-PD-6/2025    Interval history:  History of Present Illness  Patient presents to the clinic for a toxicity check.  Patient started having fever earlier today.  Has fever of 101.  He has been to urgent care twice for sinusitis.  Finished 2 courses of Z-J Luis.  Still does not feel well.  Feel congested in the head.  Complains of feeling thirsty.  Took Motrin before coming in.  M protein improving    During this visit patient's allergy, social, medical, surgical history and medications were reviewed and updated.    Summary of Case/History:    Bandar Gabriel a 75 y.o.male is a patient who presents to the clinic to establish care and for further work-up and evaluation.  Patient was noted to have a hemoglobin of 11.6 with MCV 92 on his routine work-up done earlier in July.  Review of patient's record revealed he has had mild anemia since 2018 with hemoglobin around 11.8 in August 2018 12.4 in August 2020.  Work-up done so far shows adequate kidney function patient was noted to have positive stool occult blood test back in 2018 patient has had colonoscopy

## 2025-07-10 NOTE — PROGRESS NOTES
Patient arrived for IV hydration infusion.  VSS as charted.  IV placed without complication.  Patient tolerated infusion well.  Patient left infusion center with all belongings and steady gate.

## 2025-07-15 ENCOUNTER — OFFICE VISIT (OUTPATIENT)
Dept: FAMILY MEDICINE CLINIC | Age: 75
End: 2025-07-15
Payer: COMMERCIAL

## 2025-07-15 ENCOUNTER — HOSPITAL ENCOUNTER (OUTPATIENT)
Age: 75
Discharge: HOME | End: 2025-07-15
Payer: COMMERCIAL

## 2025-07-15 VITALS
OXYGEN SATURATION: 97 % | BODY MASS INDEX: 31.86 KG/M2 | HEIGHT: 67 IN | DIASTOLIC BLOOD PRESSURE: 80 MMHG | WEIGHT: 203 LBS | SYSTOLIC BLOOD PRESSURE: 134 MMHG | HEART RATE: 59 BPM | TEMPERATURE: 97.7 F

## 2025-07-15 DIAGNOSIS — C90.00 MULTIPLE MYELOMA, REMISSION STATUS UNSPECIFIED (HCC): ICD-10-CM

## 2025-07-15 DIAGNOSIS — I25.10 CORONARY ARTERY DISEASE WITHOUT ANGINA PECTORIS, UNSPECIFIED VESSEL OR LESION TYPE, UNSPECIFIED WHETHER NATIVE OR TRANSPLANTED HEART: ICD-10-CM

## 2025-07-15 DIAGNOSIS — R50.9 FEVER, UNSPECIFIED FEVER CAUSE: ICD-10-CM

## 2025-07-15 DIAGNOSIS — J01.90 ACUTE SINUSITIS, RECURRENCE NOT SPECIFIED, UNSPECIFIED LOCATION: ICD-10-CM

## 2025-07-15 LAB
ALBUMIN SERPL-MCNC: 4 G/DL (ref 3.5–5.2)
ALBUMIN/GLOB SERPL: 1.1 {RATIO} (ref 1–2.5)
ALP SERPL-CCNC: 99 U/L (ref 40–129)
ALT SERPL-CCNC: 40 U/L (ref 10–50)
ANION GAP SERPL CALCULATED.3IONS-SCNC: 12 MMOL/L (ref 9–16)
AST SERPL-CCNC: 38 U/L (ref 10–50)
BASOPHILS # BLD: <0.03 K/UL (ref 0–0.2)
BASOPHILS NFR BLD: 0 % (ref 0–2)
BILIRUB SERPL-MCNC: 0.6 MG/DL (ref 0–1.2)
BUN SERPL-MCNC: 18 MG/DL (ref 8–23)
BUN/CREAT SERPL: 15 (ref 9–20)
CALCIUM SERPL-MCNC: 9.3 MG/DL (ref 8.6–10.4)
CHLORIDE SERPL-SCNC: 98 MMOL/L (ref 98–107)
CO2 SERPL-SCNC: 25 MMOL/L (ref 20–31)
CREAT SERPL-MCNC: 1.2 MG/DL (ref 0.7–1.2)
EOSINOPHIL # BLD: 0.04 K/UL (ref 0–0.44)
EOSINOPHILS RELATIVE PERCENT: 1 % (ref 1–4)
ERYTHROCYTE [DISTWIDTH] IN BLOOD BY AUTOMATED COUNT: 13.2 % (ref 11.8–14.4)
GFR, ESTIMATED: 63 ML/MIN/1.73M2
GLUCOSE SERPL-MCNC: 142 MG/DL (ref 74–99)
HCT VFR BLD AUTO: 33.2 % (ref 40.7–50.3)
HGB BLD-MCNC: 11.8 G/DL (ref 13–17)
IMM GRANULOCYTES # BLD AUTO: <0.03 K/UL (ref 0–0.3)
IMM GRANULOCYTES NFR BLD: 0 %
LYMPHOCYTES NFR BLD: 0.9 K/UL (ref 1.1–3.7)
LYMPHOCYTES RELATIVE PERCENT: 24 % (ref 24–43)
MCH RBC QN AUTO: 33.3 PG (ref 25.2–33.5)
MCHC RBC AUTO-ENTMCNC: 35.5 G/DL (ref 25.2–33.5)
MCV RBC AUTO: 93.8 FL (ref 82.6–102.9)
MONOCYTES NFR BLD: 0.37 K/UL (ref 0.1–1.2)
MONOCYTES NFR BLD: 10 % (ref 3–12)
NEUTROPHILS NFR BLD: 65 % (ref 36–65)
NEUTS SEG NFR BLD: 2.45 K/UL (ref 1.5–8.1)
NRBC BLD-RTO: 0 PER 100 WBC
PLATELET # BLD AUTO: 186 K/UL (ref 138–453)
PMV BLD AUTO: 11 FL (ref 8.1–13.5)
POTASSIUM SERPL-SCNC: 4.2 MMOL/L (ref 3.7–5.3)
PROT SERPL-MCNC: 7.5 G/DL (ref 6.6–8.7)
RBC # BLD AUTO: 3.54 M/UL (ref 4.21–5.77)
SODIUM SERPL-SCNC: 135 MMOL/L (ref 136–145)
WBC OTHER # BLD: 3.8 K/UL (ref 3.5–11.3)

## 2025-07-15 PROCEDURE — 99213 OFFICE O/P EST LOW 20 MIN: CPT

## 2025-07-15 PROCEDURE — 1159F MED LIST DOCD IN RCRD: CPT

## 2025-07-15 PROCEDURE — 1123F ACP DISCUSS/DSCN MKR DOCD: CPT

## 2025-07-15 PROCEDURE — 85025 COMPLETE CBC W/AUTO DIFF WBC: CPT

## 2025-07-15 PROCEDURE — 36415 COLL VENOUS BLD VENIPUNCTURE: CPT

## 2025-07-15 PROCEDURE — 1160F RVW MEDS BY RX/DR IN RCRD: CPT

## 2025-07-15 PROCEDURE — 80053 COMPREHEN METABOLIC PANEL: CPT

## 2025-07-15 PROCEDURE — 3079F DIAST BP 80-89 MM HG: CPT

## 2025-07-15 PROCEDURE — 3075F SYST BP GE 130 - 139MM HG: CPT

## 2025-07-15 RX ORDER — PREDNISONE 20 MG/1
20 TABLET ORAL 2 TIMES DAILY
Qty: 10 TABLET | Refills: 0 | Status: SHIPPED | OUTPATIENT
Start: 2025-07-15 | End: 2025-07-20

## 2025-07-15 RX ORDER — ISOSORBIDE MONONITRATE 30 MG/1
30 TABLET, EXTENDED RELEASE ORAL DAILY
Qty: 90 TABLET | Refills: 0 | Status: SHIPPED | OUTPATIENT
Start: 2025-07-15

## 2025-07-15 RX ORDER — DOXYCYCLINE HYCLATE 100 MG
100 TABLET ORAL 2 TIMES DAILY
Qty: 20 TABLET | Refills: 0 | Status: SHIPPED | OUTPATIENT
Start: 2025-07-15 | End: 2025-07-22 | Stop reason: ALTCHOICE

## 2025-07-15 NOTE — PROGRESS NOTES
[facility]  [address] [address]  Dept: [phone]    Date of Service:  7/15/2025    Bandar Gabriel is a 75 y.o. male who presents in office today with Self.    Chief Complaint   Patient presents with    Congestion     Pt is here for head congestion,ear pain,burning in throat. Pt states that it started 3 weeks ago. Pt has had 2 rounds of zpack and flonase.     Ear Pain        Diagnoses / Plan:   1. Multiple myeloma, remission status unspecified (HCC)  2. Fever, unspecified fever cause  -     predniSONE (DELTASONE) 20 MG tablet; Take 1 tablet by mouth 2 times daily for 5 days, Disp-10 tablet, R-0Normal  -     doxycycline hyclate (VIBRA-TABS) 100 MG tablet; Take 1 tablet by mouth 2 times daily for 10 days, Disp-20 tablet, R-0Normal  3. Acute sinusitis, recurrence not specified, unspecified location  -     predniSONE (DELTASONE) 20 MG tablet; Take 1 tablet by mouth 2 times daily for 5 days, Disp-10 tablet, R-0Normal  -     doxycycline hyclate (VIBRA-TABS) 100 MG tablet; Take 1 tablet by mouth 2 times daily for 10 days, Disp-20 tablet, R-0Normal  Will start on Doxy as he has allergy to penicillins and has been on macrolide without any success.  Will also start on prednisone to help with acute inflammation.  Side effects these medications are discussed.  Have him hold off on Doxy until he has lab draw tomorrow.    Medication sent to the pharmacy.  Discussed medication desired effects, potential side effects, and how to take the medication.  Encouraged symptomatic treatment, rest, increase oral fluid intake.  Follow-up for worsening or persistent symptoms.  Patient verbalizes understanding regarding plan of care and all questions answered.    No follow-ups on file.     Subjective:   History of Present Illness:  Celso is here today with his family for a fever, chills, sinus pressure and pain over his forehead and cheeks down into his teeth.  Reports not taking any

## 2025-07-21 ASSESSMENT — ENCOUNTER SYMPTOMS
WHEEZING: 0
ABDOMINAL PAIN: 0
SINUS PRESSURE: 1
COUGH: 1
SHORTNESS OF BREATH: 0
TROUBLE SWALLOWING: 0
SORE THROAT: 0
COLOR CHANGE: 0

## 2025-07-22 ENCOUNTER — HOSPITAL ENCOUNTER (OUTPATIENT)
Dept: GENERAL RADIOLOGY | Age: 75
Discharge: HOME | End: 2025-07-24
Payer: COMMERCIAL

## 2025-07-22 ENCOUNTER — TELEPHONE (OUTPATIENT)
Dept: ONCOLOGY | Age: 75
End: 2025-07-22

## 2025-07-22 ENCOUNTER — OFFICE VISIT (OUTPATIENT)
Dept: ONCOLOGY | Age: 75
End: 2025-07-22
Payer: COMMERCIAL

## 2025-07-22 VITALS
OXYGEN SATURATION: 96 % | SYSTOLIC BLOOD PRESSURE: 142 MMHG | WEIGHT: 203 LBS | DIASTOLIC BLOOD PRESSURE: 72 MMHG | BODY MASS INDEX: 31.79 KG/M2 | HEART RATE: 58 BPM | TEMPERATURE: 98.2 F

## 2025-07-22 DIAGNOSIS — J01.90 ACUTE SINUSITIS, RECURRENCE NOT SPECIFIED, UNSPECIFIED LOCATION: ICD-10-CM

## 2025-07-22 DIAGNOSIS — C90.00 MULTIPLE MYELOMA NOT HAVING ACHIEVED REMISSION (HCC): ICD-10-CM

## 2025-07-22 DIAGNOSIS — C90.00 MULTIPLE MYELOMA, REMISSION STATUS UNSPECIFIED (HCC): Primary | ICD-10-CM

## 2025-07-22 DIAGNOSIS — C90.00 MULTIPLE MYELOMA, REMISSION STATUS UNSPECIFIED (HCC): ICD-10-CM

## 2025-07-22 PROCEDURE — 3077F SYST BP >= 140 MM HG: CPT | Performed by: INTERNAL MEDICINE

## 2025-07-22 PROCEDURE — 1123F ACP DISCUSS/DSCN MKR DOCD: CPT | Performed by: INTERNAL MEDICINE

## 2025-07-22 PROCEDURE — 99214 OFFICE O/P EST MOD 30 MIN: CPT | Performed by: INTERNAL MEDICINE

## 2025-07-22 PROCEDURE — 71045 X-RAY EXAM CHEST 1 VIEW: CPT

## 2025-07-22 PROCEDURE — 3078F DIAST BP <80 MM HG: CPT | Performed by: INTERNAL MEDICINE

## 2025-07-22 PROCEDURE — 99213 OFFICE O/P EST LOW 20 MIN: CPT | Performed by: INTERNAL MEDICINE

## 2025-07-22 RX ORDER — ALBUTEROL SULFATE 90 UG/1
4 INHALANT RESPIRATORY (INHALATION) PRN
OUTPATIENT
Start: 2025-08-13

## 2025-07-22 RX ORDER — SODIUM CHLORIDE 9 MG/ML
INJECTION, SOLUTION INTRAVENOUS CONTINUOUS
OUTPATIENT
Start: 2025-08-27

## 2025-07-22 RX ORDER — ONDANSETRON 4 MG/1
8 TABLET, ORALLY DISINTEGRATING ORAL
OUTPATIENT
Start: 2025-08-27

## 2025-07-22 RX ORDER — ONDANSETRON 2 MG/ML
8 INJECTION INTRAMUSCULAR; INTRAVENOUS
OUTPATIENT
Start: 2025-08-27

## 2025-07-22 RX ORDER — ACETAMINOPHEN 325 MG/1
650 TABLET ORAL ONCE
OUTPATIENT
Start: 2025-08-27 | End: 2025-08-13

## 2025-07-22 RX ORDER — FAMOTIDINE 10 MG/ML
20 INJECTION, SOLUTION INTRAVENOUS
OUTPATIENT
Start: 2025-08-27

## 2025-07-22 RX ORDER — HEPARIN SODIUM (PORCINE) LOCK FLUSH IV SOLN 100 UNIT/ML 100 UNIT/ML
500 SOLUTION INTRAVENOUS PRN
OUTPATIENT
Start: 2025-08-27

## 2025-07-22 RX ORDER — SODIUM CHLORIDE 9 MG/ML
INJECTION, SOLUTION INTRAVENOUS CONTINUOUS
OUTPATIENT
Start: 2025-08-13

## 2025-07-22 RX ORDER — HYDROCORTISONE SODIUM SUCCINATE 100 MG/2ML
100 INJECTION INTRAMUSCULAR; INTRAVENOUS
OUTPATIENT
Start: 2025-08-27

## 2025-07-22 RX ORDER — EPINEPHRINE 1 MG/ML
0.3 INJECTION, SOLUTION, CONCENTRATE INTRAVENOUS PRN
OUTPATIENT
Start: 2025-08-27

## 2025-07-22 RX ORDER — ACETAMINOPHEN 325 MG/1
650 TABLET ORAL
OUTPATIENT
Start: 2025-08-13

## 2025-07-22 RX ORDER — MEPERIDINE HYDROCHLORIDE 50 MG/ML
12.5 INJECTION INTRAMUSCULAR; INTRAVENOUS; SUBCUTANEOUS PRN
OUTPATIENT
Start: 2025-08-27

## 2025-07-22 RX ORDER — SODIUM CHLORIDE 9 MG/ML
5-250 INJECTION, SOLUTION INTRAVENOUS PRN
OUTPATIENT
Start: 2025-08-27

## 2025-07-22 RX ORDER — HEPARIN SODIUM (PORCINE) LOCK FLUSH IV SOLN 100 UNIT/ML 100 UNIT/ML
500 SOLUTION INTRAVENOUS PRN
OUTPATIENT
Start: 2025-08-13

## 2025-07-22 RX ORDER — ACETAMINOPHEN 325 MG/1
650 TABLET ORAL
OUTPATIENT
Start: 2025-08-27

## 2025-07-22 RX ORDER — DEXAMETHASONE 0.5 MG/1
20 TABLET ORAL ONCE
OUTPATIENT
Start: 2025-08-27 | End: 2025-08-13

## 2025-07-22 RX ORDER — FAMOTIDINE 10 MG/ML
20 INJECTION, SOLUTION INTRAVENOUS
OUTPATIENT
Start: 2025-08-13

## 2025-07-22 RX ORDER — SODIUM CHLORIDE 0.9 % (FLUSH) 0.9 %
5-40 SYRINGE (ML) INJECTION PRN
OUTPATIENT
Start: 2025-08-13

## 2025-07-22 RX ORDER — ONDANSETRON 2 MG/ML
8 INJECTION INTRAMUSCULAR; INTRAVENOUS
OUTPATIENT
Start: 2025-08-13

## 2025-07-22 RX ORDER — MEPERIDINE HYDROCHLORIDE 50 MG/ML
12.5 INJECTION INTRAMUSCULAR; INTRAVENOUS; SUBCUTANEOUS PRN
OUTPATIENT
Start: 2025-08-13

## 2025-07-22 RX ORDER — ALBUTEROL SULFATE 90 UG/1
4 INHALANT RESPIRATORY (INHALATION) PRN
OUTPATIENT
Start: 2025-08-27

## 2025-07-22 RX ORDER — EPINEPHRINE 1 MG/ML
0.3 INJECTION, SOLUTION, CONCENTRATE INTRAVENOUS PRN
OUTPATIENT
Start: 2025-08-13

## 2025-07-22 RX ORDER — OXYMETAZOLINE HYDROCHLORIDE 0.05 G/100ML
2 SPRAY NASAL 2 TIMES DAILY
Qty: 30 ML | Refills: 3 | Status: SHIPPED | OUTPATIENT
Start: 2025-07-22 | End: 2026-07-22

## 2025-07-22 RX ORDER — SODIUM CHLORIDE 0.9 % (FLUSH) 0.9 %
5-40 SYRINGE (ML) INJECTION PRN
OUTPATIENT
Start: 2025-08-27

## 2025-07-22 RX ORDER — DEXAMETHASONE 0.5 MG/1
20 TABLET ORAL ONCE
OUTPATIENT
Start: 2025-08-13 | End: 2025-07-30

## 2025-07-22 RX ORDER — ONDANSETRON 4 MG/1
8 TABLET, ORALLY DISINTEGRATING ORAL
OUTPATIENT
Start: 2025-08-13

## 2025-07-22 RX ORDER — HYDROCORTISONE SODIUM SUCCINATE 100 MG/2ML
100 INJECTION INTRAMUSCULAR; INTRAVENOUS
OUTPATIENT
Start: 2025-08-13

## 2025-07-22 RX ORDER — CEFUROXIME AXETIL 500 MG/1
500 TABLET ORAL 2 TIMES DAILY
Qty: 14 TABLET | Refills: 0 | Status: SHIPPED | OUTPATIENT
Start: 2025-07-22 | End: 2025-07-29

## 2025-07-22 RX ORDER — DIPHENHYDRAMINE HCL 25 MG
25 TABLET ORAL ONCE
OUTPATIENT
Start: 2025-08-27 | End: 2025-08-13

## 2025-07-22 RX ORDER — DIPHENHYDRAMINE HCL 25 MG
25 TABLET ORAL ONCE
OUTPATIENT
Start: 2025-08-13 | End: 2025-07-30

## 2025-07-22 RX ORDER — ACETAMINOPHEN 325 MG/1
650 TABLET ORAL ONCE
OUTPATIENT
Start: 2025-08-13 | End: 2025-07-30

## 2025-07-22 RX ORDER — DIPHENHYDRAMINE HYDROCHLORIDE 50 MG/ML
50 INJECTION, SOLUTION INTRAMUSCULAR; INTRAVENOUS
OUTPATIENT
Start: 2025-08-27

## 2025-07-22 RX ORDER — SODIUM CHLORIDE 9 MG/ML
5-250 INJECTION, SOLUTION INTRAVENOUS PRN
OUTPATIENT
Start: 2025-08-13

## 2025-07-22 RX ORDER — DIPHENHYDRAMINE HYDROCHLORIDE 50 MG/ML
50 INJECTION, SOLUTION INTRAMUSCULAR; INTRAVENOUS
OUTPATIENT
Start: 2025-08-13

## 2025-07-22 NOTE — TELEPHONE ENCOUNTER
Name: Bandar Gabriel  : 1950  MRN: [id number]    Oncology Navigation Follow-Up Note    Contact Type:  Medical Oncology    Notes:   Writer met with patient and family while in office for appt and was present during med onc visit. He denies any navigation needs at present.  Per Dr. Stewart, post pone Darzalex injection until next week. Patient to get chest xray today. Infusion team updated regarding Darzalex.        Electronically signed by Meghna Austin RN on 2025 at 3:24 PM

## 2025-07-22 NOTE — PROGRESS NOTES
Bandar Gabriel                                                                                                                  7/22/2025  MRN:   [id number]  YOB: 1950  PCP:                           Beth Holm MD  Referring Physician: No ref. provider found  Treating Physician Name: HEMANT BECK MD      Reason for visit:  Chief Complaint   Patient presents with    Cancer     smoldering myeloma 2 wk f/u, labs prior   Toxicity check    Current problems:  Activemyeloma, IgG lambda, M protein 1.26 g/dL, greater than 20% lambda restricted plasma cells, free light chain ratio greater than 100, high risk 1q gain  Normocytic anemia  CVA(while in [place])-6/2022     Active and recent treatments:  Revlimid 25 mg 3 weeks on 1 week off-3/2024 (x 1 cycle).  Discontinued due to adverse events  Amber-PD-6/2025    Interval history:  History of Present Illness  Patient presents to the clinic for a toxicity check.  Patient feeling better but still feel congested.  He has been to the urgent care twice since last office visit.  Does not have any fever anymore but still complains of fullness in the ears.    During this visit patient's allergy, social, medical, surgical history and medications were reviewed and updated.    Summary of Case/History:    Bandar Gabriel a 75 y.o.male is a patient who presents to the clinic to establish care and for further work-up and evaluation.  Patient was noted to have a hemoglobin of 11.6 with MCV 92 on his routine work-up done earlier in July.  Review of patient's record revealed he has had mild anemia since 2018 with hemoglobin around 11.8 in August 2018 12.4 in August 2020.  Work-up done so far shows adequate kidney function patient was noted to have positive stool occult blood test back in 2018 patient has had colonoscopy done back in 2018 due to findings of positive stool occult blood test and underwent polypectomy.  Pathology from the polypectomy came back as

## 2025-07-30 ENCOUNTER — HOSPITAL ENCOUNTER (OUTPATIENT)
Dept: INFUSION THERAPY | Age: 75
Discharge: HOME | End: 2025-07-30
Payer: COMMERCIAL

## 2025-07-30 VITALS
OXYGEN SATURATION: 97 % | BODY MASS INDEX: 31.36 KG/M2 | SYSTOLIC BLOOD PRESSURE: 148 MMHG | WEIGHT: 199.8 LBS | HEART RATE: 57 BPM | TEMPERATURE: 98.5 F | HEIGHT: 67 IN | DIASTOLIC BLOOD PRESSURE: 65 MMHG

## 2025-07-30 DIAGNOSIS — C90.00 MULTIPLE MYELOMA NOT HAVING ACHIEVED REMISSION (HCC): Primary | ICD-10-CM

## 2025-07-30 LAB
ALBUMIN SERPL-MCNC: 3.6 G/DL (ref 3.5–5.2)
ALBUMIN/GLOB SERPL: 1 {RATIO} (ref 1–2.5)
ALP SERPL-CCNC: 70 U/L (ref 40–129)
ALT SERPL-CCNC: 28 U/L (ref 10–50)
ANION GAP SERPL CALCULATED.3IONS-SCNC: 10 MMOL/L (ref 9–16)
AST SERPL-CCNC: 34 U/L (ref 10–50)
BASOPHILS # BLD: <0.03 K/UL (ref 0–0.2)
BASOPHILS NFR BLD: 0 % (ref 0–2)
BILIRUB SERPL-MCNC: 0.5 MG/DL (ref 0–1.2)
BUN SERPL-MCNC: 27 MG/DL (ref 8–23)
BUN/CREAT SERPL: 23 (ref 9–20)
CALCIUM SERPL-MCNC: 9.5 MG/DL (ref 8.6–10.4)
CHLORIDE SERPL-SCNC: 100 MMOL/L (ref 98–107)
CO2 SERPL-SCNC: 27 MMOL/L (ref 20–31)
CREAT SERPL-MCNC: 1.2 MG/DL (ref 0.7–1.2)
EOSINOPHIL # BLD: 0.03 K/UL (ref 0–0.44)
EOSINOPHILS RELATIVE PERCENT: 1 % (ref 1–4)
ERYTHROCYTE [DISTWIDTH] IN BLOOD BY AUTOMATED COUNT: 13.1 % (ref 11.8–14.4)
GFR, ESTIMATED: 63 ML/MIN/1.73M2
GLUCOSE SERPL-MCNC: 205 MG/DL (ref 74–99)
HCT VFR BLD AUTO: 33.1 % (ref 40.7–50.3)
HGB BLD-MCNC: 11.3 G/DL (ref 13–17)
IMM GRANULOCYTES # BLD AUTO: <0.03 K/UL (ref 0–0.3)
IMM GRANULOCYTES NFR BLD: 0 %
LYMPHOCYTES NFR BLD: 0.72 K/UL (ref 1.1–3.7)
LYMPHOCYTES RELATIVE PERCENT: 31 % (ref 24–43)
MCH RBC QN AUTO: 33.2 PG (ref 25.2–33.5)
MCHC RBC AUTO-ENTMCNC: 34.1 G/DL (ref 25.2–33.5)
MCV RBC AUTO: 97.4 FL (ref 82.6–102.9)
MONOCYTES NFR BLD: 0.33 K/UL (ref 0.1–1.2)
MONOCYTES NFR BLD: 14 % (ref 3–12)
NEUTROPHILS NFR BLD: 53 % (ref 36–65)
NEUTS SEG NFR BLD: 1.25 K/UL (ref 1.5–8.1)
NRBC BLD-RTO: 0 PER 100 WBC
PLATELET # BLD AUTO: ABNORMAL K/UL (ref 138–453)
PLATELET, FLUORESCENCE: 139 K/UL (ref 138–453)
PLATELETS.RETICULATED NFR BLD AUTO: 6.7 % (ref 1.1–10.3)
POTASSIUM SERPL-SCNC: 4.6 MMOL/L (ref 3.7–5.3)
PROT SERPL-MCNC: 7.2 G/DL (ref 6.6–8.7)
RBC # BLD AUTO: 3.4 M/UL (ref 4.21–5.77)
SODIUM SERPL-SCNC: 137 MMOL/L (ref 136–145)
WBC OTHER # BLD: 2.3 K/UL (ref 3.5–11.3)

## 2025-07-30 PROCEDURE — 84165 PROTEIN E-PHORESIS SERUM: CPT

## 2025-07-30 PROCEDURE — 36415 COLL VENOUS BLD VENIPUNCTURE: CPT

## 2025-07-30 PROCEDURE — 6360000002 HC RX W HCPCS: Performed by: INTERNAL MEDICINE

## 2025-07-30 PROCEDURE — 84155 ASSAY OF PROTEIN SERUM: CPT

## 2025-07-30 PROCEDURE — 96401 CHEMO ANTI-NEOPL SQ/IM: CPT

## 2025-07-30 PROCEDURE — 86334 IMMUNOFIX E-PHORESIS SERUM: CPT

## 2025-07-30 PROCEDURE — 83521 IG LIGHT CHAINS FREE EACH: CPT

## 2025-07-30 PROCEDURE — 85025 COMPLETE CBC W/AUTO DIFF WBC: CPT

## 2025-07-30 PROCEDURE — 80053 COMPREHEN METABOLIC PANEL: CPT

## 2025-07-30 PROCEDURE — 6370000000 HC RX 637 (ALT 250 FOR IP): Performed by: INTERNAL MEDICINE

## 2025-07-30 RX ORDER — ACETAMINOPHEN 325 MG/1
650 TABLET ORAL ONCE
Status: COMPLETED | OUTPATIENT
Start: 2025-07-30 | End: 2025-07-30

## 2025-07-30 RX ORDER — DIPHENHYDRAMINE HCL 25 MG
25 TABLET ORAL ONCE
Status: COMPLETED | OUTPATIENT
Start: 2025-07-30 | End: 2025-07-30

## 2025-07-30 RX ORDER — DEXAMETHASONE 4 MG/1
20 TABLET ORAL ONCE
Status: COMPLETED | OUTPATIENT
Start: 2025-07-30 | End: 2025-07-30

## 2025-07-30 RX ADMIN — DIPHENHYDRAMINE HYDROCHLORIDE 25 MG: 25 TABLET ORAL at 11:05

## 2025-07-30 RX ADMIN — DARATUMUMAB AND HYALURONIDASE-FIHJ (HUMAN RECOMBINANT) 1800 MG: 1800; 30000 INJECTION SUBCUTANEOUS at 12:07

## 2025-07-30 RX ADMIN — ACETAMINOPHEN 650 MG: 325 TABLET ORAL at 11:05

## 2025-07-30 RX ADMIN — DEXAMETHASONE 20 MG: 4 TABLET ORAL at 11:04

## 2025-07-31 ENCOUNTER — TELEPHONE (OUTPATIENT)
Dept: CARDIOLOGY | Age: 75
End: 2025-07-31

## 2025-07-31 LAB
ALBUMIN PERCENT: 50 % (ref 56–66)
ALBUMIN SERPL-MCNC: 3.4 G/DL (ref 3.2–5.2)
ALPHA 2 PERCENT: 11 % (ref 7–12)
ALPHA1 GLOB SERPL ELPH-MCNC: 0.3 G/DL (ref 0.1–0.4)
ALPHA1 GLOB SERPL ELPH-MCNC: 5 % (ref 3–5)
ALPHA2 GLOB SERPL ELPH-MCNC: 0.8 G/DL (ref 0.5–0.9)
B-GLOBULIN SERPL ELPH-MCNC: 2.2 G/DL (ref 0.7–1.4)
B-GLOBULIN SERPL ELPH-MCNC: 32 % (ref 8–13)
ECHO BSA: 2.07 M2
FREE KAPPA/LAMBDA RATIO: 0.01 (ref 0.22–1.74)
GAMMA GLOB SERPL ELPH-MCNC: 0.1 G/DL (ref 0.5–1.5)
GAMMA GLOBULIN %: 2 % (ref 11–19)
ITYP INTERPRETATION: ABNORMAL
KAPPA LC FREE SER-MCNC: 9.8 MG/L
LAMBDA LC FREE SERPL-MCNC: 1026 MG/L (ref 4.2–27.7)
PATH REV: ABNORMAL
PATHOLOGIST: ABNORMAL
PROT PATTERN SERPL ELPH-IMP: ABNORMAL
PROT SERPL-MCNC: 6.7 G/DL (ref 6.6–8.7)
TOTAL PROT. SUM,%: 100 % (ref 98–102)
TOTAL PROT. SUM: 6.8 G/DL (ref 6.3–8.2)

## 2025-08-03 RX ORDER — POMALIDOMIDE 3 MG/1
CAPSULE ORAL
Qty: 21 CAPSULE | Refills: 3 | Status: SHIPPED | OUTPATIENT
Start: 2025-08-03

## 2025-08-06 ENCOUNTER — HOSPITAL ENCOUNTER (OUTPATIENT)
Dept: INFUSION THERAPY | Age: 75
Discharge: HOME | End: 2025-08-06
Payer: COMMERCIAL

## 2025-08-06 VITALS
TEMPERATURE: 97 F | BODY MASS INDEX: 31.86 KG/M2 | HEIGHT: 67 IN | HEART RATE: 57 BPM | OXYGEN SATURATION: 98 % | DIASTOLIC BLOOD PRESSURE: 69 MMHG | SYSTOLIC BLOOD PRESSURE: 176 MMHG | RESPIRATION RATE: 16 BRPM | WEIGHT: 203 LBS

## 2025-08-06 DIAGNOSIS — C90.00 MULTIPLE MYELOMA NOT HAVING ACHIEVED REMISSION (HCC): ICD-10-CM

## 2025-08-06 DIAGNOSIS — D47.2 MONOCLONAL GAMMOPATHY: Primary | ICD-10-CM

## 2025-08-06 LAB
ALBUMIN SERPL-MCNC: 4 G/DL (ref 3.5–5.2)
ALBUMIN/GLOB SERPL: 1.2 {RATIO} (ref 1–2.5)
ALP SERPL-CCNC: 85 U/L (ref 40–129)
ALT SERPL-CCNC: 37 U/L (ref 10–50)
ANION GAP SERPL CALCULATED.3IONS-SCNC: 11 MMOL/L (ref 9–16)
AST SERPL-CCNC: 38 U/L (ref 10–50)
BASOPHILS # BLD: <0.03 K/UL (ref 0–0.2)
BASOPHILS NFR BLD: 0 % (ref 0–2)
BILIRUB SERPL-MCNC: 0.5 MG/DL (ref 0–1.2)
BUN SERPL-MCNC: 27 MG/DL (ref 8–23)
BUN/CREAT SERPL: 18 (ref 9–20)
CALCIUM SERPL-MCNC: 9.5 MG/DL (ref 8.6–10.4)
CHLORIDE SERPL-SCNC: 99 MMOL/L (ref 98–107)
CO2 SERPL-SCNC: 26 MMOL/L (ref 20–31)
CREAT SERPL-MCNC: 1.5 MG/DL (ref 0.7–1.2)
EOSINOPHIL # BLD: 0.03 K/UL (ref 0–0.44)
EOSINOPHILS RELATIVE PERCENT: 1 % (ref 1–4)
ERYTHROCYTE [DISTWIDTH] IN BLOOD BY AUTOMATED COUNT: 13.1 % (ref 11.8–14.4)
GFR, ESTIMATED: 48 ML/MIN/1.73M2
GLUCOSE SERPL-MCNC: 179 MG/DL (ref 74–99)
HCT VFR BLD AUTO: 33.1 % (ref 40.7–50.3)
HGB BLD-MCNC: 11.3 G/DL (ref 13–17)
IMM GRANULOCYTES # BLD AUTO: <0.03 K/UL (ref 0–0.3)
IMM GRANULOCYTES NFR BLD: 0 %
LYMPHOCYTES NFR BLD: 0.74 K/UL (ref 1.1–3.7)
LYMPHOCYTES RELATIVE PERCENT: 31 % (ref 24–43)
MCH RBC QN AUTO: 33.3 PG (ref 25.2–33.5)
MCHC RBC AUTO-ENTMCNC: 34.1 G/DL (ref 25.2–33.5)
MCV RBC AUTO: 97.6 FL (ref 82.6–102.9)
MONOCYTES NFR BLD: 0.31 K/UL (ref 0.1–1.2)
MONOCYTES NFR BLD: 13 % (ref 3–12)
NEUTROPHILS NFR BLD: 55 % (ref 36–65)
NEUTS SEG NFR BLD: 1.3 K/UL (ref 1.5–8.1)
NRBC BLD-RTO: 0 PER 100 WBC
PLATELET # BLD AUTO: 149 K/UL (ref 138–453)
PMV BLD AUTO: 11.1 FL (ref 8.1–13.5)
POTASSIUM SERPL-SCNC: 4.6 MMOL/L (ref 3.7–5.3)
PROT SERPL-MCNC: 7.4 G/DL (ref 6.6–8.7)
RBC # BLD AUTO: 3.39 M/UL (ref 4.21–5.77)
SODIUM SERPL-SCNC: 136 MMOL/L (ref 136–145)
WBC OTHER # BLD: 2.4 K/UL (ref 3.5–11.3)

## 2025-08-06 PROCEDURE — 6370000000 HC RX 637 (ALT 250 FOR IP): Performed by: INTERNAL MEDICINE

## 2025-08-06 PROCEDURE — 85025 COMPLETE CBC W/AUTO DIFF WBC: CPT

## 2025-08-06 PROCEDURE — 36415 COLL VENOUS BLD VENIPUNCTURE: CPT

## 2025-08-06 PROCEDURE — 6360000002 HC RX W HCPCS: Performed by: INTERNAL MEDICINE

## 2025-08-06 PROCEDURE — 80053 COMPREHEN METABOLIC PANEL: CPT

## 2025-08-06 PROCEDURE — 96401 CHEMO ANTI-NEOPL SQ/IM: CPT

## 2025-08-06 RX ORDER — DIPHENHYDRAMINE HCL 25 MG
25 TABLET ORAL ONCE
Status: COMPLETED | OUTPATIENT
Start: 2025-08-06 | End: 2025-08-06

## 2025-08-06 RX ORDER — DEXAMETHASONE 4 MG/1
20 TABLET ORAL ONCE
Status: COMPLETED | OUTPATIENT
Start: 2025-08-06 | End: 2025-08-06

## 2025-08-06 RX ORDER — ACETAMINOPHEN 325 MG/1
650 TABLET ORAL ONCE
Status: COMPLETED | OUTPATIENT
Start: 2025-08-06 | End: 2025-08-06

## 2025-08-06 RX ADMIN — DARATUMUMAB AND HYALURONIDASE-FIHJ (HUMAN RECOMBINANT) 1800 MG: 1800; 30000 INJECTION SUBCUTANEOUS at 11:51

## 2025-08-06 RX ADMIN — DIPHENHYDRAMINE HYDROCHLORIDE 25 MG: 25 TABLET ORAL at 10:52

## 2025-08-06 RX ADMIN — DEXAMETHASONE 20 MG: 4 TABLET ORAL at 10:52

## 2025-08-06 RX ADMIN — ACETAMINOPHEN 650 MG: 325 TABLET ORAL at 10:52

## 2025-08-07 RX ORDER — PANTOPRAZOLE SODIUM 40 MG/1
40 TABLET, DELAYED RELEASE ORAL DAILY
Qty: 30 TABLET | Refills: 3 | Status: SHIPPED | OUTPATIENT
Start: 2025-08-07

## 2025-08-11 ENCOUNTER — OFFICE VISIT (OUTPATIENT)
Dept: INTERNAL MEDICINE | Age: 75
End: 2025-08-11
Payer: COMMERCIAL

## 2025-08-11 VITALS
RESPIRATION RATE: 16 BRPM | BODY MASS INDEX: 31.86 KG/M2 | SYSTOLIC BLOOD PRESSURE: 160 MMHG | WEIGHT: 203 LBS | HEIGHT: 67 IN | DIASTOLIC BLOOD PRESSURE: 80 MMHG | OXYGEN SATURATION: 96 % | HEART RATE: 78 BPM

## 2025-08-11 DIAGNOSIS — I10 ESSENTIAL HYPERTENSION: Primary | ICD-10-CM

## 2025-08-11 DIAGNOSIS — E78.2 MIXED HYPERLIPIDEMIA: ICD-10-CM

## 2025-08-11 PROCEDURE — 3078F DIAST BP <80 MM HG: CPT | Performed by: INTERNAL MEDICINE

## 2025-08-11 PROCEDURE — 3077F SYST BP >= 140 MM HG: CPT | Performed by: INTERNAL MEDICINE

## 2025-08-11 PROCEDURE — 99212 OFFICE O/P EST SF 10 MIN: CPT

## 2025-08-11 PROCEDURE — 1123F ACP DISCUSS/DSCN MKR DOCD: CPT | Performed by: INTERNAL MEDICINE

## 2025-08-11 PROCEDURE — G2211 COMPLEX E/M VISIT ADD ON: HCPCS | Performed by: INTERNAL MEDICINE

## 2025-08-11 PROCEDURE — 1159F MED LIST DOCD IN RCRD: CPT | Performed by: INTERNAL MEDICINE

## 2025-08-11 PROCEDURE — 99214 OFFICE O/P EST MOD 30 MIN: CPT | Performed by: INTERNAL MEDICINE

## 2025-08-11 RX ORDER — PREDNISONE 20 MG/1
20 TABLET ORAL DAILY
Qty: 5 TABLET | Refills: 0 | Status: SHIPPED | OUTPATIENT
Start: 2025-08-11 | End: 2025-08-16

## 2025-08-11 RX ORDER — LISINOPRIL 40 MG/1
40 TABLET ORAL DAILY
Qty: 90 TABLET | Refills: 1 | Status: SHIPPED | OUTPATIENT
Start: 2025-08-11

## 2025-08-13 ENCOUNTER — HOSPITAL ENCOUNTER (OUTPATIENT)
Dept: INFUSION THERAPY | Age: 75
Discharge: HOME | End: 2025-08-13
Payer: COMMERCIAL

## 2025-08-13 VITALS
TEMPERATURE: 97.6 F | SYSTOLIC BLOOD PRESSURE: 169 MMHG | OXYGEN SATURATION: 98 % | DIASTOLIC BLOOD PRESSURE: 77 MMHG | RESPIRATION RATE: 16 BRPM | HEART RATE: 54 BPM

## 2025-08-13 DIAGNOSIS — C90.00 MULTIPLE MYELOMA, REMISSION STATUS UNSPECIFIED (HCC): ICD-10-CM

## 2025-08-13 DIAGNOSIS — D47.2 MONOCLONAL GAMMOPATHY: ICD-10-CM

## 2025-08-13 DIAGNOSIS — K21.9 GASTROESOPHAGEAL REFLUX DISEASE WITHOUT ESOPHAGITIS: ICD-10-CM

## 2025-08-13 DIAGNOSIS — R76.8 ELEVATED SERUM IMMUNOGLOBULIN FREE LIGHT CHAIN LEVEL: ICD-10-CM

## 2025-08-13 DIAGNOSIS — C90.00 MULTIPLE MYELOMA NOT HAVING ACHIEVED REMISSION (HCC): Primary | ICD-10-CM

## 2025-08-13 LAB
ALBUMIN SERPL-MCNC: 3.9 G/DL (ref 3.5–5.2)
ALBUMIN/GLOB SERPL: 1.1 {RATIO} (ref 1–2.5)
ALP SERPL-CCNC: 83 U/L (ref 40–129)
ALT SERPL-CCNC: 27 U/L (ref 10–50)
ANION GAP SERPL CALCULATED.3IONS-SCNC: 12 MMOL/L (ref 9–16)
AST SERPL-CCNC: 30 U/L (ref 10–50)
BASOPHILS # BLD: <0.03 K/UL (ref 0–0.2)
BASOPHILS NFR BLD: 0 % (ref 0–2)
BILIRUB SERPL-MCNC: 0.6 MG/DL (ref 0–1.2)
BUN SERPL-MCNC: 25 MG/DL (ref 8–23)
BUN/CREAT SERPL: 25 (ref 9–20)
CALCIUM SERPL-MCNC: 9 MG/DL (ref 8.6–10.4)
CHLORIDE SERPL-SCNC: 101 MMOL/L (ref 98–107)
CO2 SERPL-SCNC: 23 MMOL/L (ref 20–31)
CREAT SERPL-MCNC: 1 MG/DL (ref 0.7–1.2)
EOSINOPHIL # BLD: 0.05 K/UL (ref 0–0.44)
EOSINOPHILS RELATIVE PERCENT: 2 % (ref 1–4)
ERYTHROCYTE [DISTWIDTH] IN BLOOD BY AUTOMATED COUNT: 13.3 % (ref 11.8–14.4)
GFR, ESTIMATED: 78 ML/MIN/1.73M2
GLUCOSE SERPL-MCNC: 162 MG/DL (ref 74–99)
HCT VFR BLD AUTO: 30.8 % (ref 40.7–50.3)
HGB BLD-MCNC: 10.7 G/DL (ref 13–17)
IMM GRANULOCYTES # BLD AUTO: <0.03 K/UL (ref 0–0.3)
IMM GRANULOCYTES NFR BLD: 0 %
LYMPHOCYTES NFR BLD: 0.86 K/UL (ref 1.1–3.7)
LYMPHOCYTES RELATIVE PERCENT: 28 % (ref 24–43)
MCH RBC QN AUTO: 33.6 PG (ref 25.2–33.5)
MCHC RBC AUTO-ENTMCNC: 34.7 G/DL (ref 25.2–33.5)
MCV RBC AUTO: 96.9 FL (ref 82.6–102.9)
MONOCYTES NFR BLD: 0.37 K/UL (ref 0.1–1.2)
MONOCYTES NFR BLD: 12 % (ref 3–12)
NEUTROPHILS NFR BLD: 58 % (ref 36–65)
NEUTS SEG NFR BLD: 1.79 K/UL (ref 1.5–8.1)
NRBC BLD-RTO: 0 PER 100 WBC
PLATELET # BLD AUTO: 177 K/UL (ref 138–453)
PMV BLD AUTO: 10.8 FL (ref 8.1–13.5)
POTASSIUM SERPL-SCNC: 4.3 MMOL/L (ref 3.7–5.3)
PROT SERPL-MCNC: 7.4 G/DL (ref 6.6–8.7)
RBC # BLD AUTO: 3.18 M/UL (ref 4.21–5.77)
SODIUM SERPL-SCNC: 136 MMOL/L (ref 136–145)
WBC OTHER # BLD: 3.1 K/UL (ref 3.5–11.3)

## 2025-08-13 PROCEDURE — 83521 IG LIGHT CHAINS FREE EACH: CPT

## 2025-08-13 PROCEDURE — 84155 ASSAY OF PROTEIN SERUM: CPT

## 2025-08-13 PROCEDURE — 6370000000 HC RX 637 (ALT 250 FOR IP): Performed by: INTERNAL MEDICINE

## 2025-08-13 PROCEDURE — 36415 COLL VENOUS BLD VENIPUNCTURE: CPT

## 2025-08-13 PROCEDURE — 96401 CHEMO ANTI-NEOPL SQ/IM: CPT

## 2025-08-13 PROCEDURE — 86334 IMMUNOFIX E-PHORESIS SERUM: CPT

## 2025-08-13 PROCEDURE — 80053 COMPREHEN METABOLIC PANEL: CPT

## 2025-08-13 PROCEDURE — 84165 PROTEIN E-PHORESIS SERUM: CPT

## 2025-08-13 PROCEDURE — 6360000002 HC RX W HCPCS: Performed by: INTERNAL MEDICINE

## 2025-08-13 PROCEDURE — 85025 COMPLETE CBC W/AUTO DIFF WBC: CPT

## 2025-08-13 RX ORDER — DIPHENHYDRAMINE HCL 25 MG
25 TABLET ORAL ONCE
Status: COMPLETED | OUTPATIENT
Start: 2025-08-13 | End: 2025-08-13

## 2025-08-13 RX ORDER — DEXAMETHASONE 4 MG/1
20 TABLET ORAL ONCE
Status: COMPLETED | OUTPATIENT
Start: 2025-08-13 | End: 2025-08-13

## 2025-08-13 RX ORDER — ACETAMINOPHEN 325 MG/1
650 TABLET ORAL ONCE
Status: COMPLETED | OUTPATIENT
Start: 2025-08-13 | End: 2025-08-13

## 2025-08-13 RX ADMIN — ACETAMINOPHEN 650 MG: 325 TABLET ORAL at 11:28

## 2025-08-13 RX ADMIN — DARATUMUMAB AND HYALURONIDASE-FIHJ (HUMAN RECOMBINANT) 1800 MG: 1800; 30000 INJECTION SUBCUTANEOUS at 12:28

## 2025-08-13 RX ADMIN — DEXAMETHASONE 20 MG: 4 TABLET ORAL at 11:27

## 2025-08-13 RX ADMIN — DIPHENHYDRAMINE HYDROCHLORIDE 25 MG: 25 TABLET ORAL at 11:28

## 2025-08-13 ASSESSMENT — PAIN SCALES - GENERAL: PAINLEVEL_OUTOF10: 0

## 2025-08-14 LAB
ALBUMIN PERCENT: 51 % (ref 56–66)
ALBUMIN SERPL-MCNC: 3.7 G/DL (ref 3.2–5.2)
ALPHA 2 PERCENT: 11 % (ref 7–12)
ALPHA1 GLOB SERPL ELPH-MCNC: 0.3 G/DL (ref 0.1–0.4)
ALPHA1 GLOB SERPL ELPH-MCNC: 4 % (ref 3–5)
ALPHA2 GLOB SERPL ELPH-MCNC: 0.8 G/DL (ref 0.5–0.9)
B-GLOBULIN SERPL ELPH-MCNC: 2.2 G/DL (ref 0.7–1.4)
B-GLOBULIN SERPL ELPH-MCNC: 31 % (ref 8–13)
FREE KAPPA/LAMBDA RATIO: 0.01 (ref 0.22–1.74)
GAMMA GLOB SERPL ELPH-MCNC: 0.1 G/DL (ref 0.5–1.5)
GAMMA GLOBULIN %: 2 % (ref 11–19)
ITYP INTERPRETATION: ABNORMAL
KAPPA LC FREE SER-MCNC: 9.1 MG/L
LAMBDA LC FREE SERPL-MCNC: 939 MG/L (ref 4.2–27.7)
PATH REV: ABNORMAL
PATHOLOGIST: ABNORMAL
PROT PATTERN SERPL ELPH-IMP: ABNORMAL
PROT SERPL-MCNC: 7.1 G/DL (ref 6.6–8.7)
TOTAL PROT. SUM,%: 99 % (ref 98–102)
TOTAL PROT. SUM: 7.1 G/DL (ref 6.3–8.2)

## 2025-08-18 DIAGNOSIS — C90.00 MULTIPLE MYELOMA NOT HAVING ACHIEVED REMISSION (HCC): ICD-10-CM

## 2025-08-18 DIAGNOSIS — J01.90 ACUTE SINUSITIS, RECURRENCE NOT SPECIFIED, UNSPECIFIED LOCATION: ICD-10-CM

## 2025-08-18 DIAGNOSIS — C90.00 MULTIPLE MYELOMA, REMISSION STATUS UNSPECIFIED (HCC): ICD-10-CM

## 2025-08-19 RX ORDER — POMALIDOMIDE 3 MG/1
CAPSULE ORAL
Qty: 21 CAPSULE | Refills: 3 | Status: ACTIVE | OUTPATIENT
Start: 2025-08-19 | End: 2025-08-21 | Stop reason: SDUPTHER

## 2025-08-21 ENCOUNTER — OFFICE VISIT (OUTPATIENT)
Age: 75
End: 2025-08-21
Payer: COMMERCIAL

## 2025-08-21 VITALS
TEMPERATURE: 98.2 F | HEART RATE: 72 BPM | BODY MASS INDEX: 31.55 KG/M2 | HEIGHT: 67 IN | WEIGHT: 201 LBS | SYSTOLIC BLOOD PRESSURE: 142 MMHG | OXYGEN SATURATION: 98 % | DIASTOLIC BLOOD PRESSURE: 82 MMHG

## 2025-08-21 DIAGNOSIS — J01.90 ACUTE SINUSITIS, RECURRENCE NOT SPECIFIED, UNSPECIFIED LOCATION: ICD-10-CM

## 2025-08-21 DIAGNOSIS — C90.00 MULTIPLE MYELOMA NOT HAVING ACHIEVED REMISSION (HCC): Primary | ICD-10-CM

## 2025-08-21 DIAGNOSIS — C90.00 MULTIPLE MYELOMA, REMISSION STATUS UNSPECIFIED (HCC): ICD-10-CM

## 2025-08-21 PROCEDURE — 1123F ACP DISCUSS/DSCN MKR DOCD: CPT | Performed by: INTERNAL MEDICINE

## 2025-08-21 PROCEDURE — 3077F SYST BP >= 140 MM HG: CPT | Performed by: INTERNAL MEDICINE

## 2025-08-21 PROCEDURE — 3079F DIAST BP 80-89 MM HG: CPT | Performed by: INTERNAL MEDICINE

## 2025-08-21 PROCEDURE — 1159F MED LIST DOCD IN RCRD: CPT | Performed by: INTERNAL MEDICINE

## 2025-08-21 PROCEDURE — 99213 OFFICE O/P EST LOW 20 MIN: CPT | Performed by: INTERNAL MEDICINE

## 2025-08-21 PROCEDURE — 99214 OFFICE O/P EST MOD 30 MIN: CPT | Performed by: INTERNAL MEDICINE

## 2025-08-21 RX ORDER — LORATADINE 10 MG/1
10 TABLET ORAL DAILY
Qty: 30 TABLET | Refills: 1 | Status: SHIPPED | OUTPATIENT
Start: 2025-08-21

## 2025-08-21 RX ORDER — POMALIDOMIDE 3 MG/1
CAPSULE ORAL
Qty: 21 CAPSULE | Refills: 3 | Status: ACTIVE | OUTPATIENT
Start: 2025-08-21

## 2025-08-25 ENCOUNTER — TELEPHONE (OUTPATIENT)
Dept: ONCOLOGY | Age: 75
End: 2025-08-25

## 2025-08-27 ENCOUNTER — HOSPITAL ENCOUNTER (OUTPATIENT)
Dept: INFUSION THERAPY | Age: 75
Discharge: HOME | End: 2025-08-27
Payer: COMMERCIAL

## 2025-08-27 VITALS
HEART RATE: 56 BPM | WEIGHT: 200 LBS | SYSTOLIC BLOOD PRESSURE: 157 MMHG | RESPIRATION RATE: 16 BRPM | TEMPERATURE: 98 F | OXYGEN SATURATION: 97 % | DIASTOLIC BLOOD PRESSURE: 70 MMHG | BODY MASS INDEX: 31.32 KG/M2

## 2025-08-27 DIAGNOSIS — C90.00 MULTIPLE MYELOMA NOT HAVING ACHIEVED REMISSION (HCC): Primary | ICD-10-CM

## 2025-08-27 LAB
ALBUMIN SERPL-MCNC: 4 G/DL (ref 3.5–5.2)
ALBUMIN/GLOB SERPL: 1.1 {RATIO} (ref 1–2.5)
ALP SERPL-CCNC: 84 U/L (ref 40–129)
ALT SERPL-CCNC: 33 U/L (ref 10–50)
ANION GAP SERPL CALCULATED.3IONS-SCNC: 12 MMOL/L (ref 9–16)
AST SERPL-CCNC: 38 U/L (ref 10–50)
BASOPHILS # BLD: <0.03 K/UL (ref 0–0.2)
BASOPHILS NFR BLD: 0 % (ref 0–2)
BILIRUB SERPL-MCNC: 0.5 MG/DL (ref 0–1.2)
BUN SERPL-MCNC: 21 MG/DL (ref 8–23)
BUN/CREAT SERPL: 19 (ref 9–20)
CALCIUM SERPL-MCNC: 9.5 MG/DL (ref 8.6–10.4)
CHLORIDE SERPL-SCNC: 100 MMOL/L (ref 98–107)
CO2 SERPL-SCNC: 25 MMOL/L (ref 20–31)
CREAT SERPL-MCNC: 1.1 MG/DL (ref 0.7–1.2)
EOSINOPHIL # BLD: 0.05 K/UL (ref 0–0.44)
EOSINOPHILS RELATIVE PERCENT: 2 % (ref 1–4)
ERYTHROCYTE [DISTWIDTH] IN BLOOD BY AUTOMATED COUNT: 13.3 % (ref 11.8–14.4)
GFR, ESTIMATED: 70 ML/MIN/1.73M2
GLUCOSE SERPL-MCNC: 175 MG/DL (ref 74–99)
HCT VFR BLD AUTO: 34.2 % (ref 40.7–50.3)
HGB BLD-MCNC: 11.8 G/DL (ref 13–17)
IMM GRANULOCYTES # BLD AUTO: <0.03 K/UL (ref 0–0.3)
IMM GRANULOCYTES NFR BLD: 0 %
LYMPHOCYTES NFR BLD: 0.86 K/UL (ref 1.1–3.7)
LYMPHOCYTES RELATIVE PERCENT: 29 % (ref 24–43)
MCH RBC QN AUTO: 33.6 PG (ref 25.2–33.5)
MCHC RBC AUTO-ENTMCNC: 34.5 G/DL (ref 25.2–33.5)
MCV RBC AUTO: 97.4 FL (ref 82.6–102.9)
MONOCYTES NFR BLD: 0.4 K/UL (ref 0.1–1.2)
MONOCYTES NFR BLD: 13 % (ref 3–12)
NEUTROPHILS NFR BLD: 56 % (ref 36–65)
NEUTS SEG NFR BLD: 1.65 K/UL (ref 1.5–8.1)
NRBC BLD-RTO: 0 PER 100 WBC
PLATELET # BLD AUTO: 175 K/UL (ref 138–453)
PMV BLD AUTO: 11.4 FL (ref 8.1–13.5)
POTASSIUM SERPL-SCNC: 4.5 MMOL/L (ref 3.7–5.3)
PROT SERPL-MCNC: 7.6 G/DL (ref 6.6–8.7)
RBC # BLD AUTO: 3.51 M/UL (ref 4.21–5.77)
SODIUM SERPL-SCNC: 137 MMOL/L (ref 136–145)
WBC OTHER # BLD: 3 K/UL (ref 3.5–11.3)

## 2025-08-27 PROCEDURE — 36415 COLL VENOUS BLD VENIPUNCTURE: CPT

## 2025-08-27 PROCEDURE — 6370000000 HC RX 637 (ALT 250 FOR IP): Performed by: INTERNAL MEDICINE

## 2025-08-27 PROCEDURE — 85025 COMPLETE CBC W/AUTO DIFF WBC: CPT

## 2025-08-27 PROCEDURE — 96401 CHEMO ANTI-NEOPL SQ/IM: CPT

## 2025-08-27 PROCEDURE — 80053 COMPREHEN METABOLIC PANEL: CPT

## 2025-08-27 PROCEDURE — 6360000002 HC RX W HCPCS: Performed by: INTERNAL MEDICINE

## 2025-08-27 RX ORDER — ACETAMINOPHEN 325 MG/1
650 TABLET ORAL ONCE
Status: COMPLETED | OUTPATIENT
Start: 2025-08-27 | End: 2025-08-27

## 2025-08-27 RX ORDER — DEXAMETHASONE 4 MG/1
20 TABLET ORAL ONCE
Status: COMPLETED | OUTPATIENT
Start: 2025-08-27 | End: 2025-08-27

## 2025-08-27 RX ORDER — DIPHENHYDRAMINE HCL 25 MG
25 TABLET ORAL ONCE
Status: COMPLETED | OUTPATIENT
Start: 2025-08-27 | End: 2025-08-27

## 2025-08-27 RX ORDER — ONDANSETRON 4 MG/1
8 TABLET, ORALLY DISINTEGRATING ORAL
Status: COMPLETED | OUTPATIENT
Start: 2025-08-27 | End: 2025-08-27

## 2025-08-27 RX ADMIN — DARATUMUMAB AND HYALURONIDASE-FIHJ (HUMAN RECOMBINANT) 1800 MG: 1800; 30000 INJECTION SUBCUTANEOUS at 11:39

## 2025-08-27 RX ADMIN — DIPHENHYDRAMINE HYDROCHLORIDE 25 MG: 25 TABLET ORAL at 10:36

## 2025-08-27 RX ADMIN — ONDANSETRON 8 MG: 4 TABLET, ORALLY DISINTEGRATING ORAL at 10:34

## 2025-08-27 RX ADMIN — DEXAMETHASONE 20 MG: 4 TABLET ORAL at 10:33

## 2025-08-27 RX ADMIN — ACETAMINOPHEN 650 MG: 325 TABLET ORAL at 10:35

## 2025-08-27 ASSESSMENT — PAIN SCALES - GENERAL: PAINLEVEL_OUTOF10: 0

## 2025-09-05 ENCOUNTER — HOSPITAL ENCOUNTER (OUTPATIENT)
Dept: LAB | Age: 75
Discharge: HOME | End: 2025-09-05
Payer: COMMERCIAL

## 2025-09-05 DIAGNOSIS — D47.2 MONOCLONAL GAMMOPATHY: ICD-10-CM

## 2025-09-05 DIAGNOSIS — C90.00 MULTIPLE MYELOMA NOT HAVING ACHIEVED REMISSION (HCC): ICD-10-CM

## 2025-09-05 DIAGNOSIS — C90.00 MULTIPLE MYELOMA, REMISSION STATUS UNSPECIFIED (HCC): Primary | ICD-10-CM

## 2025-09-05 DIAGNOSIS — J01.90 ACUTE SINUSITIS, RECURRENCE NOT SPECIFIED, UNSPECIFIED LOCATION: ICD-10-CM

## 2025-09-05 DIAGNOSIS — C90.00 MULTIPLE MYELOMA, REMISSION STATUS UNSPECIFIED (HCC): ICD-10-CM

## 2025-09-05 LAB
ALBUMIN PERCENT: ABNORMAL %
ALBUMIN SERPL-MCNC: 4.1 G/DL (ref 3.5–5.2)
ALBUMIN SERPL-MCNC: ABNORMAL G/DL
ALBUMIN/GLOB SERPL: 1.1 {RATIO} (ref 1–2.5)
ALP SERPL-CCNC: 89 U/L (ref 40–129)
ALPHA 2 PERCENT: ABNORMAL %
ALPHA1 GLOB SERPL ELPH-MCNC: ABNORMAL %
ALPHA1 GLOB SERPL ELPH-MCNC: ABNORMAL G/DL
ALPHA2 GLOB SERPL ELPH-MCNC: ABNORMAL G/DL
ALT SERPL-CCNC: 33 U/L (ref 10–50)
ANION GAP SERPL CALCULATED.3IONS-SCNC: 13 MMOL/L (ref 9–16)
AST SERPL-CCNC: 37 U/L (ref 10–50)
B-GLOBULIN SERPL ELPH-MCNC: ABNORMAL %
B-GLOBULIN SERPL ELPH-MCNC: ABNORMAL G/DL
BASOPHILS # BLD: <0.03 K/UL (ref 0–0.2)
BASOPHILS NFR BLD: 0 % (ref 0–2)
BILIRUB SERPL-MCNC: 0.6 MG/DL (ref 0–1.2)
BUN SERPL-MCNC: 19 MG/DL (ref 8–23)
BUN/CREAT SERPL: 16 (ref 9–20)
CALCIUM SERPL-MCNC: 9.7 MG/DL (ref 8.6–10.4)
CHLORIDE SERPL-SCNC: 97 MMOL/L (ref 98–107)
CO2 SERPL-SCNC: 25 MMOL/L (ref 20–31)
CREAT SERPL-MCNC: 1.2 MG/DL (ref 0.7–1.2)
EOSINOPHIL # BLD: 0.05 K/UL (ref 0–0.44)
EOSINOPHILS RELATIVE PERCENT: 2 % (ref 1–4)
ERYTHROCYTE [DISTWIDTH] IN BLOOD BY AUTOMATED COUNT: 13 % (ref 11.8–14.4)
FREE KAPPA/LAMBDA RATIO: 0.01 (ref 0.22–1.74)
GAMMA GLOB SERPL ELPH-MCNC: ABNORMAL G/DL
GAMMA GLOBULIN %: ABNORMAL %
GFR, ESTIMATED: 63 ML/MIN/1.73M2
GLUCOSE SERPL-MCNC: 168 MG/DL (ref 74–99)
HCT VFR BLD AUTO: 34.2 % (ref 40.7–50.3)
HGB BLD-MCNC: 11.9 G/DL (ref 13–17)
IMM GRANULOCYTES # BLD AUTO: <0.03 K/UL (ref 0–0.3)
IMM GRANULOCYTES NFR BLD: 0 %
ITYP INTERPRETATION: ABNORMAL
KAPPA LC FREE SER-MCNC: 9.5 MG/L
LAMBDA LC FREE SERPL-MCNC: 1034 MG/L (ref 4.2–27.7)
LYMPHOCYTES NFR BLD: 0.93 K/UL (ref 1.1–3.7)
LYMPHOCYTES RELATIVE PERCENT: 33 % (ref 24–43)
M PROTEIN 2 SERPL ELPH-MCNC: ABNORMAL G/DL
M PROTEIN SERPL ELPH-MCNC: ABNORMAL G/DL
MCH RBC QN AUTO: 33.6 PG (ref 25.2–33.5)
MCHC RBC AUTO-ENTMCNC: 34.8 G/DL (ref 25.2–33.5)
MCV RBC AUTO: 96.6 FL (ref 82.6–102.9)
MONOCYTES NFR BLD: 0.4 K/UL (ref 0.1–1.2)
MONOCYTES NFR BLD: 14 % (ref 3–12)
NEUTROPHILS NFR BLD: 51 % (ref 36–65)
NEUTS SEG NFR BLD: 1.42 K/UL (ref 1.5–8.1)
NRBC BLD-RTO: 0 PER 100 WBC
PATH REV: ABNORMAL
PATHOLOGIST: ABNORMAL
PLATELET # BLD AUTO: 189 K/UL (ref 138–453)
PMV BLD AUTO: 11.4 FL (ref 8.1–13.5)
POTASSIUM SERPL-SCNC: 4.5 MMOL/L (ref 3.7–5.3)
PROT PATTERN SERPL ELPH-IMP: ABNORMAL
PROT SERPL-MCNC: 7.6 G/DL (ref 6.6–8.7)
PROT SERPL-MCNC: 7.7 G/DL (ref 6.6–8.7)
RBC # BLD AUTO: 3.54 M/UL (ref 4.21–5.77)
SODIUM SERPL-SCNC: 135 MMOL/L (ref 136–145)
TOTAL PROT. SUM,%: ABNORMAL %
TOTAL PROT. SUM: ABNORMAL G/DL
WBC OTHER # BLD: 2.8 K/UL (ref 3.5–11.3)

## 2025-09-05 PROCEDURE — 83521 IG LIGHT CHAINS FREE EACH: CPT

## 2025-09-05 PROCEDURE — 84155 ASSAY OF PROTEIN SERUM: CPT

## 2025-09-05 PROCEDURE — 84165 PROTEIN E-PHORESIS SERUM: CPT

## 2025-09-05 PROCEDURE — 86334 IMMUNOFIX E-PHORESIS SERUM: CPT

## 2025-09-05 PROCEDURE — 85025 COMPLETE CBC W/AUTO DIFF WBC: CPT

## 2025-09-05 PROCEDURE — 80053 COMPREHEN METABOLIC PANEL: CPT

## 2025-09-05 PROCEDURE — 36415 COLL VENOUS BLD VENIPUNCTURE: CPT

## (undated) DEVICE — 60 ML SYRINGE REGULAR TIP: Brand: MONOJECT

## (undated) DEVICE — GLOVE SURG SZ 8 L12IN THK91MIL BRN LTX FREE POLYCHLOROPRENE

## (undated) DEVICE — TRAY PAIN CUST

## (undated) DEVICE — GLOVE ORANGE PI 8   MSG9080

## (undated) DEVICE — LINE SAMP O2 6.5FT W/FEMALE CONN F/ADULT CAPNOLINE PLUS

## (undated) DEVICE — BANDAGE ADH DIA7/8IN NAT FLEX-FABRIC WVN FOR WND PROTCT

## (undated) DEVICE — ENDOSCOPIC KIT 10X0.75 FT COLON W/ 1.1 OZ LUBE DBL BNDL SEAL

## (undated) DEVICE — NEEDLE, QUINCKE, 22GX5": Brand: MEDLINE

## (undated) DEVICE — GLOVE ORANGE PI 7   MSG9070

## (undated) DEVICE — CHLORAPREP 26ML CLEAR

## (undated) DEVICE — COLONOSCOPE ENDOSCP ABSORBENT SM PEDIATRIC 104 MM VISION

## (undated) DEVICE — 1200CC GUARDIAN II: Brand: GUARDIAN

## (undated) DEVICE — FORCEPS BX L240CM JAW DIA2.4MM ORNG L CAP W/ NDL DISP RAD

## (undated) DEVICE — CONNECTOR TBNG AUX H2O JET DISP FOR OLY 160/180 SER